# Patient Record
Sex: FEMALE | Race: WHITE | Employment: OTHER | URBAN - METROPOLITAN AREA
[De-identification: names, ages, dates, MRNs, and addresses within clinical notes are randomized per-mention and may not be internally consistent; named-entity substitution may affect disease eponyms.]

---

## 2008-01-30 LAB — COLONOSCOPY, EXTERNAL: NORMAL

## 2017-01-09 ENCOUNTER — OFFICE VISIT (OUTPATIENT)
Dept: FAMILY MEDICINE CLINIC | Age: 67
End: 2017-01-09

## 2017-01-09 VITALS
SYSTOLIC BLOOD PRESSURE: 156 MMHG | DIASTOLIC BLOOD PRESSURE: 80 MMHG | OXYGEN SATURATION: 97 % | BODY MASS INDEX: 33.84 KG/M2 | WEIGHT: 191 LBS | RESPIRATION RATE: 16 BRPM | HEIGHT: 63 IN | TEMPERATURE: 98.1 F | HEART RATE: 83 BPM

## 2017-01-09 DIAGNOSIS — Z13.6 SCREENING FOR CARDIOVASCULAR CONDITION: ICD-10-CM

## 2017-01-09 DIAGNOSIS — Z00.00 ROUTINE GENERAL MEDICAL EXAMINATION AT A HEALTH CARE FACILITY: Primary | ICD-10-CM

## 2017-01-09 DIAGNOSIS — Z23 ENCOUNTER FOR IMMUNIZATION: ICD-10-CM

## 2017-01-09 DIAGNOSIS — E78.00 HYPERCHOLESTEROLEMIA: ICD-10-CM

## 2017-01-09 PROBLEM — Z71.89 ADVANCED CARE PLANNING/COUNSELING DISCUSSION: Status: ACTIVE | Noted: 2017-01-09

## 2017-01-09 RX ORDER — NIFEDIPINE 30 MG/1
30 TABLET, FILM COATED, EXTENDED RELEASE ORAL DAILY
Qty: 30 TAB | Refills: 6 | Status: SHIPPED | OUTPATIENT
Start: 2017-01-09 | End: 2017-05-08 | Stop reason: SDUPTHER

## 2017-01-09 RX ORDER — FLUCONAZOLE 150 MG/1
150 TABLET ORAL DAILY
Qty: 1 TAB | Refills: 1 | Status: SHIPPED | OUTPATIENT
Start: 2017-01-09 | End: 2017-01-10

## 2017-01-09 NOTE — PROGRESS NOTES
This is a Subsequent Medicare Annual Wellness Visit providing Personalized Prevention Plan Services (PPPS) (Performed 12 months after initial AWV and PPPS )    I have reviewed the patient's medical history in detail and updated the computerized patient record. Requests  Are fill on diflucan for suspected vaginal yeast infection. She was treated in November per chart review. State sx have returned. Sx did get better after the diflucan. She requests a refill; She states her insurance will be changing in February. She may have to change endocrinologist ; she will make an appointment to see her current endocrinologist before Feb.  She states she has had some elevated blood sugars. PMH,  Meds, Allergies, Family History, Social history reviewed      History     Past Medical History   Diagnosis Date    Depression     Diabetes ([address]     GERD (gastroesophageal reflux disease)     Hiatal hernia     Hypercholesterolemia     Hypertension     Migraines     Thyroid disease     Urinary tract infection       Past Surgical History   Procedure Laterality Date    Hx partial hysterectomy  11/17/1972    Hx breast biopsy  1993     right breast, benign    Hx mohs procedure Right 09/2001    Hx other surgical  2001     HEAD LACERATION DUE TO A FALL    Hx tubal ligation  11/1971    Hx hysterectomy       Current Outpatient Prescriptions   Medication Sig Dispense Refill    olmesartan-hydroCHLOROthiazide (BENICAR HCT) 40-25 mg per tablet TAKE ONE TABLET BY MOUTH EVERY DAY 90 Tab 3    HYDROcodone-acetaminophen (NORCO) 7.5-325 mg per tablet Take 1-2 Tabs by mouth every four (4) hours as needed for Pain. Max Daily Amount: 12 Tabs. Do not take until after surgery 40 Tab 0    naproxen sodium (NAPROSYN) 220 mg tablet Take 220 mg by mouth two (2) times daily (with meals).       simvastatin (ZOCOR) 40 mg tablet TAKE ONE TABLET BY MOUTH AT NIGHTLY 90 Tab 3    meloxicam (MOBIC) 15 mg tablet Take 1 Tab by mouth daily (with breakfast). 30 Tab 0    omeprazole (PRILOSEC) 10 mg capsule Take 10 mg by mouth daily.  insulin lispro (HUMALOG) 100 unit/mL kwikpen by SubCUTAneous route Before breakfast, lunch, dinner and at bedtime.  meclizine (ANTIVERT) 25 mg tablet Take  by mouth three (3) times daily as needed.  clotrimazole (MYCELEX) 10 mg ernesto Take 10 mg by mouth five (5) times daily.  insulin glargine (LANTUS SOLOSTAR) 100 unit/mL (3 mL) pen INJECT 30 UNITS BENEATH THE SKIN AT BEDTIME (Patient taking differently: INJECT 20 UNITS BENEATH THE SKIN AT BEDTIME AND 30 UNITS IN THE MORNING) 1 Each 0    levothyroxine (SYNTHROID) 175 mcg tablet TAKE ONE TABLET BY MOUTH DAILY BEFORE BREAKFAST 90 Tab 0    PSYLLIUM SEED, WITH DEXTROSE, (FIBER PO) Take  by mouth.  glucose blood VI test strips (FREESTYLE LITE STRIPS) strip Blood sugar check 3-4 times a day due to fluctuating blood sugar 300 Strip 11    Cholecalciferol, Vitamin D3, 5,000 unit Tab Take  by mouth.          Allergies   Allergen Reactions    Gloves, Latex With Aloe Vera Contact Dermatitis     Rash from powder inside latex gloves       Family History   Problem Relation Age of Onset    Diabetes Mother     Hypertension Mother     Cancer Mother     Kidney Disease Mother     Diabetes Father     Heart Disease Father     Hypertension Father     Diabetes Maternal Grandmother     Breast Cancer Maternal Grandmother     Cancer Maternal Grandfather     Breast Cancer Maternal Aunt      Social History   Substance Use Topics    Smoking status: Never Smoker    Smokeless tobacco: Never Used    Alcohol use No     Patient Active Problem List   Diagnosis Code    Hypertension I10    Thyroid disease E07.9    Hiatal hernia K44.9    Hypercholesterolemia E78.00    Depression F32.9    Urinary tract infection N39.0    Migraines G43.909    Lump or mass in breast N63    Family history of malignant neoplasm of breast Z80.3       Depression Risk Factor Screening:     PHQ 2 / 9, over the last two weeks 12/22/2014   Little interest or pleasure in doing things Several days   Feeling down, depressed or hopeless Several days   Total Score PHQ 2 2     Alcohol Risk Factor Screening: On any occasion during the past 3 months, have you had more than 3 drinks containing alcohol? No    Do you average more than 7 drinks per week? No      Functional Ability and Level of Safety:     Hearing Loss   mild    Activities of Daily Living   Self-care. Requires assistance with: no ADLs    Fall Risk     Fall Risk Assessment, last 12 mths 8/30/2016   Able to walk? Yes   Fall in past 12 months? No     Abuse Screen   Patient is not abused    Review of Systems   A comprehensive review of systems was negative except for that written in the HPI.     Physical Examination     Evaluation of Cognitive Function:  Mood/affect:  neutral  Appearance: age appropriate  Family member/caregiver input: none;  Visit Vitals    /90 (BP 1 Location: Left arm, BP Patient Position: Sitting)    Pulse 83    Temp 98.1 °F (36.7 °C) (Oral)    Resp 16    Ht 5' 3\" (1.6 m)    Wt 191 lb (86.6 kg)    LMP 11/17/1972    SpO2 97%    BMI 33.83 kg/m2     General appearance: alert, cooperative, no distress, appears stated age  Neck: supple, symmetrical, trachea midline, no adenopathy, thyroid: not enlarged, symmetric, no tenderness/mass/nodules, no carotid bruit and no JVD  Lungs: clear to auscultation bilaterally  Heart: regular rate and rhythm, S1, S2 normal, no murmur, click, rub or gallop  Extremities: extremities normal, atraumatic, no cyanosis or edema      Patient Care Team:  Gilmer Heaton MD as PCP - General (Family Practice)  Allyson Ayala MD (Gastroenterology)  Michael Arteaga MD (Surgical Oncology)    Advice/Referrals/Counseling   Education and counseling provided:  Are appropriate based on today's review and evaluation  End-of-Life planning (with patient's consent)    Assessment/Plan ICD-10-CM ICD-9-CM    1. Routine general medical examination at a health care facility Z00.00 V70.0    2. Encounter for immunization Z23 V03.89 INFLUENZA VIRUS VACCINE, HIGH DOSE SEASONAL, PRESERVATIVE FREE      ADMIN INFLUENZA VIRUS VAC      PNEUMOCOCCAL CONJ VACCINE 13 VALENT IM   3. Screening for cardiovascular condition Z13.6 V81.2 LIPID PANEL      METABOLIC PANEL, BASIC   4. Hypercholesterolemia- for lab only E78.00 272.0 LIPID PANEL      AST      ALT   . As above, pt is stable  Flu shot/ pneumonia shot today  Labs as ordered  Follow-up Disposition:  Return in about 4 months (around 5/9/2017) for BP/chol. An After Visit Summary was printed and given to the patient. This has been fully explained to the patient, who indicates understanding.

## 2017-01-09 NOTE — PROGRESS NOTES
1. Have you been to the ER, urgent care clinic since your last visit? Hospitalized since your last visit? Yes Where: [facility] emergency room    2. Have you seen or consulted any other health care providers outside of the [address] [place] [address] since your last visit? Include any pap smears or colon screening.  Yes Where: Theodore Cantor MD - endocrinology

## 2017-01-09 NOTE — ACP (ADVANCE CARE PLANNING)
Advance Care Planning (ACP) Provider Conversation Snapshot    Date of ACP Conversation: 01/09/17  Persons included in Conversation:  patient  Length of ACP Conversation in minutes:  < 16 minutes          Conversation Outcomes / Follow-Up Plan:   Recommended completion of Advance Directive form after review of ACP materials and conversation with prospective healthcare agent

## 2017-01-09 NOTE — PATIENT INSTRUCTIONS

## 2017-01-16 ENCOUNTER — HOSPITAL ENCOUNTER (OUTPATIENT)
Dept: LAB | Age: 67
Discharge: HOME | End: 2017-01-16

## 2017-01-16 PROCEDURE — 99001 SPECIMEN HANDLING PT-LAB: CPT | Performed by: FAMILY MEDICINE

## 2017-01-17 LAB
ALT SERPL-CCNC: 12 IU/L (ref 0–32)
AST SERPL-CCNC: 15 IU/L (ref 0–40)
BUN SERPL-MCNC: 22 MG/DL (ref 8–27)
BUN/CREAT SERPL: 18 (ref 11–26)
CALCIUM SERPL-MCNC: 9.2 MG/DL (ref 8.7–10.3)
CHLORIDE SERPL-SCNC: 101 MMOL/L (ref 96–106)
CHOLEST SERPL-MCNC: 242 MG/DL (ref 100–199)
CO2 SERPL-SCNC: 25 MMOL/L (ref 18–29)
CREAT SERPL-MCNC: 1.25 MG/DL (ref 0.57–1)
GLUCOSE SERPL-MCNC: 155 MG/DL (ref 65–99)
HDLC SERPL-MCNC: 38 MG/DL
INTERPRETATION, 910389: NORMAL
INTERPRETATION: NORMAL
LDLC SERPL CALC-MCNC: 158 MG/DL (ref 0–99)
POTASSIUM SERPL-SCNC: 5.3 MMOL/L (ref 3.5–5.2)
SODIUM SERPL-SCNC: 140 MMOL/L (ref 134–144)
TRIGL SERPL-MCNC: 229 MG/DL (ref 0–149)
VLDLC SERPL CALC-MCNC: 46 MG/DL (ref 5–40)

## 2017-01-23 RX ORDER — ATORVASTATIN CALCIUM 80 MG/1
80 TABLET, FILM COATED ORAL DAILY
Qty: 90 TAB | Refills: 3 | Status: SHIPPED | OUTPATIENT
Start: 2017-01-23 | End: 2018-04-16 | Stop reason: SINTOL

## 2017-01-24 ENCOUNTER — TELEPHONE (OUTPATIENT)
Dept: FAMILY MEDICINE CLINIC | Age: 67
End: 2017-01-24

## 2017-01-24 NOTE — TELEPHONE ENCOUNTER
Spoke with patient to inform lab results. Informed that glucose and creatinine were elevated. Informed that potassium was slightly elevated. Informed that total cholesterol, triglyceride, and LDL was elevated. Informed that Dr. Serena Sauer has sent a medication order Lipitor 80 mg to pharmacy and that patient needs to discontinue medication Zocor. Informed to follow a low sugar, low fat, low cholesterol diet and exercise as tolerated. Patient verbalized understanding.

## 2017-01-24 NOTE — TELEPHONE ENCOUNTER
----- Message from Marko Gardner MD sent at 1/23/2017 10:41 AM EST -----  Dc zocor; Lipitor 80 in place of this.  Will review labs with pt per nursing;

## 2017-02-07 ENCOUNTER — HOSPITAL ENCOUNTER (OUTPATIENT)
Dept: MAMMOGRAPHY | Age: 67
Discharge: HOME | End: 2017-02-07
Attending: SURGERY
Payer: MEDICARE

## 2017-02-07 DIAGNOSIS — Z12.31 ENCOUNTER FOR SCREENING MAMMOGRAM FOR MALIGNANT NEOPLASM OF BREAST: ICD-10-CM

## 2017-02-07 PROCEDURE — 77063 BREAST TOMOSYNTHESIS BI: CPT

## 2017-05-08 ENCOUNTER — HOSPITAL ENCOUNTER (OUTPATIENT)
Dept: LAB | Age: 67
Discharge: HOME | End: 2017-05-08
Payer: MEDICARE

## 2017-05-08 ENCOUNTER — HOSPITAL ENCOUNTER (OUTPATIENT)
Dept: GENERAL RADIOLOGY | Age: 67
Discharge: HOME | End: 2017-05-08
Payer: MEDICARE

## 2017-05-08 ENCOUNTER — OFFICE VISIT (OUTPATIENT)
Dept: FAMILY MEDICINE CLINIC | Age: 67
End: 2017-05-08

## 2017-05-08 VITALS
WEIGHT: 193 LBS | TEMPERATURE: 98.1 F | BODY MASS INDEX: 34.2 KG/M2 | DIASTOLIC BLOOD PRESSURE: 78 MMHG | HEART RATE: 97 BPM | OXYGEN SATURATION: 98 % | HEIGHT: 63 IN | RESPIRATION RATE: 16 BRPM | SYSTOLIC BLOOD PRESSURE: 126 MMHG

## 2017-05-08 DIAGNOSIS — E78.00 HYPERCHOLESTEROLEMIA: ICD-10-CM

## 2017-05-08 DIAGNOSIS — M79.672 LEFT FOOT PAIN: ICD-10-CM

## 2017-05-08 DIAGNOSIS — E11.9 DIABETES MELLITUS WITHOUT COMPLICATION (HCC): ICD-10-CM

## 2017-05-08 DIAGNOSIS — Z11.59 SCREENING FOR VIRAL DISEASE: ICD-10-CM

## 2017-05-08 DIAGNOSIS — I10 ESSENTIAL HYPERTENSION: ICD-10-CM

## 2017-05-08 DIAGNOSIS — I10 ESSENTIAL HYPERTENSION: Primary | ICD-10-CM

## 2017-05-08 DIAGNOSIS — R10.13 DYSPEPSIA: ICD-10-CM

## 2017-05-08 PROCEDURE — 73620 X-RAY EXAM OF FOOT: CPT

## 2017-05-08 PROCEDURE — 99001 SPECIMEN HANDLING PT-LAB: CPT | Performed by: FAMILY MEDICINE

## 2017-05-08 RX ORDER — OMEPRAZOLE 40 MG/1
40 CAPSULE, DELAYED RELEASE ORAL DAILY
Qty: 90 CAP | Refills: 2 | Status: SHIPPED | OUTPATIENT
Start: 2017-05-08 | End: 2020-07-24

## 2017-05-08 RX ORDER — OLMESARTAN MEDOXOMIL AND HYDROCHLOROTHIAZIDE 40/25 40; 25 MG/1; MG/1
TABLET ORAL
Qty: 90 TAB | Refills: 3 | Status: SHIPPED | OUTPATIENT
Start: 2017-05-08 | End: 2018-04-16 | Stop reason: SDUPTHER

## 2017-05-08 RX ORDER — INSULIN GLARGINE 100 [IU]/ML
INJECTION, SOLUTION SUBCUTANEOUS
Qty: 10 PEN | Refills: 5 | Status: SHIPPED | OUTPATIENT
Start: 2017-05-08 | End: 2017-08-31 | Stop reason: SDUPTHER

## 2017-05-08 RX ORDER — NIFEDIPINE 30 MG/1
30 TABLET, FILM COATED, EXTENDED RELEASE ORAL DAILY
Qty: 90 TAB | Refills: 3 | Status: SHIPPED | OUTPATIENT
Start: 2017-05-08 | End: 2018-04-16 | Stop reason: SDUPTHER

## 2017-05-08 NOTE — PROGRESS NOTES
1. Have you been to the ER, urgent care clinic since your last visit? Hospitalized since your last visit? No    2. Have you seen or consulted any other health care providers outside of the [address] [place] [address] since your last visit? Include any pap smears or colon screening.  Yes Where: Dr. Sherrell Benavides - endocrinology

## 2017-05-08 NOTE — PATIENT INSTRUCTIONS

## 2017-05-08 NOTE — MR AVS SNAPSHOT
Visit Information Date & Time Provider Department Dept. Phone Encounter #  
 5/8/2017  9:00 AM Shu Villarreal, [address] [id number] Follow-up Instructions Return in about 3 months (around 8/8/2017) for htn/chol/foot abdomen. Upcoming Health Maintenance Date Due  
 GLAUCOMA SCREENING Q2Y 5/23/2017* EYE EXAM RETINAL OR DILATED Q1 5/23/2017* FOOT EXAM Q1 9/19/2017* Hepatitis C Screening 9/21/2017* MICROALBUMIN Q1 9/21/2017* HEMOGLOBIN A1C Q6M 9/22/2017* ZOSTER VACCINE AGE 60> 10/26/2017* DTaP/Tdap/Td series (1 - Tdap) 10/26/2017* INFLUENZA AGE 9 TO ADULT 8/1/2017 MEDICARE YEARLY EXAM 1/10/2018 LIPID PANEL Q1 1/16/2018 BREAST CANCER SCRN MAMMOGRAM 2/7/2019 COLONOSCOPY 2/11/2019 *Topic was postponed. The date shown is not the original due date. Allergies as of 5/8/2017  Review Complete On: 5/8/2017 By: Kala Cole LPN Severity Noted Reaction Type Reactions Darvocet A500 [Propoxyphene N-acetaminophen]  05/08/2017    Nausea and Vomiting Gloves, Latex With Aloe Vera  08/19/2016    Contact Dermatitis Rash from powder inside latex gloves Current Immunizations  Reviewed on 1/9/2017 Name Date Influenza High Dose Vaccine PF 1/9/2017, 11/11/2015 Influenza Vaccine (Quad) PF 10/6/2014 Influenza Vaccine PF 10/31/2013 Pneumococcal Conjugate (PCV-13) 1/9/2017 Pneumococcal Polysaccharide (PPSV-23) 5/14/2015, 10/31/2013 Td, Adsorbed PF 12/22/2014 Not reviewed this visit You Were Diagnosed With   
  
 Codes Comments Essential hypertension    -  Primary ICD-10-CM: I10 
ICD-9-CM: 401.9 Hypercholesterolemia     ICD-10-CM: E78.00 ICD-9-CM: 272.0 Left foot pain     ICD-10-CM: H09.008 ICD-9-CM: 729.5 Dyspepsia     ICD-10-CM: R10.13 ICD-9-CM: 536.8  Screening for viral disease     ICD-10-CM: Z11.59 
ICD-9-CM: V73.99   
 Diabetes mellitus without complication ([facility]     TII-10-UE: E11.9 ICD-9-CM: 250.00 Vitals BP Pulse Temp Resp Height(growth percentile) Weight(growth percentile) 126/78 (BP 1 Location: Left arm, BP Patient Position: Sitting) 97 98.1 °F (36.7 °C) (Oral) 16 5' 3\" (1.6 m) 193 lb (87.5 kg) LMP SpO2 BMI OB Status Smoking Status 11/17/1972 98% 34.19 kg/m2 Hysterectomy Never Smoker BMI and BSA Data Body Mass Index Body Surface Area  
 34.19 kg/m 2 1.97 m 2 Preferred Pharmacy Pharmacy Name Phone [facility] [address] Your Updated Medication List  
  
   
This list is accurate as of: 5/8/17  9:45 AM.  Always use your most recent med list.  
  
  
  
  
 atorvastatin 80 mg tablet Commonly known as:  LIPITOR Take 1 Tab by mouth daily. cholecalciferol (VITAMIN D3) 5,000 unit Tab tablet Commonly known as:  VITAMIN D3 Take  by mouth. FIBER PO Take  by mouth. glucose blood VI test strips strip Commonly known as:  FREESTYLE LITE STRIPS Blood sugar check 3-4 times a day due to fluctuating blood sugar  
  
 insulin glargine 100 unit/mL (3 mL) pen Commonly known as:  LANTUS SOLOSTAR INJECT 40 units of  Subcu in am and then 30 UNITS subcu AT BEDTIME  
  
 levothyroxine 175 mcg tablet Commonly known as:  SYNTHROID  
TAKE ONE TABLET BY MOUTH DAILY BEFORE BREAKFAST  
  
 meclizine 25 mg tablet Commonly known as:  ANTIVERT Take  by mouth three (3) times daily as needed. meloxicam 15 mg tablet Commonly known as:  MOBIC Take 1 Tab by mouth daily (with breakfast). NIFEdipine ER 30 mg ER tablet Commonly known as:  ADALAT CC Take 1 Tab by mouth daily. NOVOLOG PENFILL SC  
by SubCUTAneous route. Sliding Scale as per patient. olmesartan-hydroCHLOROthiazide 40-25 mg per tablet Commonly known as:  BENICAR HCT  
TAKE ONE TABLET BY MOUTH EVERY DAY  
  
 omeprazole 40 mg capsule Commonly known as:  PRILOSEC Take 1 Cap by mouth daily. Prescriptions Sent to Pharmacy Refills NIFEdipine ER (ADALAT CC) 30 mg ER tablet 3 Sig: Take 1 Tab by mouth daily. Class: Normal  
 Pharmacy: [address], [address] [place] [address] Ph #: [phone] Route: Oral  
 olmesartan-hydroCHLOROthiazide (BENICAR HCT) 40-25 mg per tablet 3 Sig: TAKE ONE TABLET BY MOUTH EVERY DAY Class: Normal  
 Pharmacy: [address] Ph #: [phone]  
 insulin glargine (LANTUS SOLOSTAR) 100 unit/mL (3 mL) pen 5 Sig: INJECT 40 units of  Subcu in am and then 30 UNITS subcu AT BEDTIME Class: Normal  
 Pharmacy: [address] Ph #: [phone]  
 glucose blood VI test strips (FREESTYLE LITE STRIPS) strip 11 Sig: Blood sugar check 3-4 times a day due to fluctuating blood sugar Class: Normal  
 Pharmacy: [address] Ph #: [phone]  
 omeprazole (PRILOSEC) 40 mg capsule 2 Sig: Take 1 Cap by mouth daily. Class: Normal  
 Pharmacy: [address], [address] [place] [address] Ph #: [phone] Route: Oral  
  
We Performed the Following REFERRAL TO ENDOCRINOLOGY [NNZ68 Custom] Comments:  
 Please evaluate patient for  Diabetes mellitus- [facility] at [address] per pt request  
  
Follow-up Instructions Return in about 3 months (around 8/8/2017) for htn/chol/foot abdomen. To-Do List   
 05/08/2017 Lab:  ALT   
  
 05/08/2017 Lab:  AST   
  
 05/08/2017 Lab:  HEPATITIS C AB   
  
 05/08/2017 Lab:  LIPID PANEL   
  
 05/08/2017 Lab:  METABOLIC PANEL, BASIC   
  
 05/08/2017 Imaging:  XR FOOT LT AP/LAT Referral Information Referral ID Referred By Referred To [id number] [facility] Not Available Visits Status Start Date End Date 1 New Request 5/8/17 5/8/18 If your referral has a status of pending review or denied, additional information will be sent to support the outcome of this decision. Patient Instructions Abdominal Pain: Care Instructions Your Care Instructions Abdominal pain has many possible causes. Some aren't serious and get better on their own in a few days. Others need more testing and treatment. If your pain continues or gets worse, you need to be rechecked and may need more tests to find out what is wrong. You may need surgery to correct the problem. Don't ignore new symptoms, such as fever, nausea and vomiting, urination problems, pain that gets worse, and dizziness. These may be signs of a more serious problem. Your doctor may have recommended a follow-up visit in the next 8 to 12 hours. If you are not getting better, you may need more tests or treatment. The doctor has checked you carefully, but problems can develop later. If you notice any problems or new symptoms, get medical treatment right away. Follow-up care is a key part of your treatment and safety. Be sure to make and go to all appointments, and call your doctor if you are having problems. It's also a good idea to know your test results and keep a list of the medicines you take. How can you care for yourself at home? · Rest until you feel better. · To prevent dehydration, drink plenty of fluids, enough so that your urine is light yellow or clear like water. Choose water and other caffeine-free clear liquids until you feel better. If you have kidney, heart, or liver disease and have to limit fluids, talk with your doctor before you increase the amount of fluids you drink. · If your stomach is upset, eat mild foods, such as rice, dry toast or crackers, bananas, and applesauce. Try eating several small meals instead of two or three large ones. · Wait until 48 hours after all symptoms have gone away before you have spicy foods, alcohol, and drinks that contain caffeine. · Do not eat foods that are high in fat. · Avoid anti-inflammatory medicines such as aspirin, ibuprofen (Advil, Motrin), and naproxen (Aleve). These can cause stomach upset. Talk to your doctor if you take daily aspirin for another health problem. When should you call for help? Call 911 anytime you think you may need emergency care. For example, call if: 
· You passed out (lost consciousness). · You pass maroon or very bloody stools. · You vomit blood or what looks like coffee grounds. · You have new, severe belly pain. Call your doctor now or seek immediate medical care if: 
· Your pain gets worse, especially if it becomes focused in one area of your belly. · You have a new or higher fever. · Your stools are black and look like tar, or they have streaks of blood. · You have unexpected vaginal bleeding. · You have symptoms of a urinary tract infection. These may include: 
¨ Pain when you urinate. ¨ Urinating more often than usual. 
¨ Blood in your urine. · You are dizzy or lightheaded, or you feel like you may faint. Watch closely for changes in your health, and be sure to contact your doctor if: 
· You are not getting better after 1 day (24 hours). Where can you learn more? Go to http://chasidy-ena.info/. Enter F312 in the search box to learn more about \"Abdominal Pain: Care Instructions. \" Current as of: May 27, 2016 Content Version: 11.2 © 4317-4086 [employer]. Care instructions adapted under license by [employer] (which disclaims liability or warranty for this information). If you have questions about a medical condition or this instruction, always ask your healthcare professional. [address] any warranty or liability for your use of this information. Introducing [facility]! Dear Sherice Sees: Thank you for requesting a [employer] account. Our records indicate that you already have an active [employer] account. You can access your account anytime at [email or web address] [employer]/Health Informatics Did you know that you can access your hospital and ER discharge instructions at any time in [employer]? You can also review all of your test results from your hospital stay or ER visit. Additional Information If you have questions, please visit the Frequently Asked Questions section of the [employer] website at [email or web address]/Health [email or web address] Remember, [employer] is NOT to be used for urgent needs. For medical emergencies, dial 911. Now available from your iPhone and Android! Please provide this summary of care documentation to your next provider. Your primary care clinician is listed as [address]. If you have any questions after today's visit, please call [phone].

## 2017-05-08 NOTE — PROGRESS NOTES
HISTORY OF PRESENT ILLNESS  Maxwell Ruiz is a 78 y.o. female. HPI   Patient is here today for evaluation and treatment of: Foot Pain/Abdominal Pain/Hypertension/Cholesterol problem    Foot Pain: she has had a bump on her foot; has been present since last Fall. Area hurts if she puts pressure on it for extended period of time. She is prescribed mobic. Pt states she has had fecal incontinence at times; Has had some pain in her left upper quadrant; She may have sx daily; She also may have constipation at times; She tries to eat a lower residue diet. No particular foods aggravate sx. She has a lot of flatus. Due for colonoscopy in 2019    Hypertension: BP is controlled today; She is compliant with med regimen. She is on meds as listed below    Cholesterol: she continues on lipitor; She attempts a lower cholesterol diet. Pt will be changing endocrinologist as her previous MD is no longer in her network; She needs a referral to endocrinology at [address] [place] [address]; This is closer to her home. Current Outpatient Prescriptions:     INSULIN ASPART (NOVOLOG PENFILL SC), by SubCUTAneous route. Sliding Scale as per patient., Disp: , Rfl:     NIFEdipine ER (ADALAT CC) 30 mg ER tablet, Take 1 Tab by mouth daily. , Disp: 90 Tab, Rfl: 3    olmesartan-hydroCHLOROthiazide (BENICAR HCT) 40-25 mg per tablet, TAKE ONE TABLET BY MOUTH EVERY DAY, Disp: 90 Tab, Rfl: 3    insulin glargine (LANTUS SOLOSTAR) 100 unit/mL (3 mL) pen, INJECT 40 units of  Subcu in am and then 30 UNITS subcu AT BEDTIME, Disp: 10 Pen, Rfl: 5    glucose blood VI test strips (FREESTYLE LITE STRIPS) strip, Blood sugar check 3-4 times a day due to fluctuating blood sugar, Disp: 300 Strip, Rfl: 11    omeprazole (PRILOSEC) 40 mg capsule, Take 1 Cap by mouth daily. , Disp: 90 Cap, Rfl: 2    atorvastatin (LIPITOR) 80 mg tablet, Take 1 Tab by mouth daily. , Disp: 90 Tab, Rfl: 3    meloxicam (MOBIC) 15 mg tablet, Take 1 Tab by mouth daily (with breakfast). , Disp: 30 Tab, Rfl: 0    meclizine (ANTIVERT) 25 mg tablet, Take  by mouth three (3) times daily as needed. , Disp: , Rfl:     levothyroxine (SYNTHROID) 175 mcg tablet, TAKE ONE TABLET BY MOUTH DAILY BEFORE BREAKFAST, Disp: 90 Tab, Rfl: 0    PSYLLIUM SEED, WITH DEXTROSE, (FIBER PO), Take  by mouth., Disp: , Rfl:     Cholecalciferol, Vitamin D3, 5,000 unit Tab, Take  by mouth.  , Disp: , Rfl:     PMH,  Meds, Allergies, Family History, Social history reviewed    Review of Systems   Constitutional: Negative for chills and fever. Cardiovascular: Negative for chest pain and palpitations. Physical Exam   Constitutional: She is oriented to person, place, and time. She appears well-developed and well-nourished. No distress. Neck: Neck supple. No thyromegaly present. Cardiovascular: Normal rate and regular rhythm. Exam reveals no gallop and no friction rub. No murmur heard. Pulmonary/Chest: Breath sounds normal. No respiratory distress. She has no wheezes. She has no rales. She exhibits no tenderness. Abdominal: Bowel sounds are normal. She exhibits no distension and no mass. There is tenderness (in Left upper quadrant; less so in RUQ. none in LLQ and RLQ). There is no guarding. Musculoskeletal: She exhibits tenderness (at left proximal metarsal joint; no edema). She exhibits no edema. Neurological: She is alert and oriented to person, place, and time. Nursing note and vitals reviewed.      Visit Vitals    /78 (BP 1 Location: Left arm, BP Patient Position: Sitting)    Pulse 97    Temp 98.1 °F (36.7 °C) (Oral)    Resp 16    Ht 5' 3\" (1.6 m)    Wt 193 lb (87.5 kg)    LMP 11/17/1972    SpO2 98%    BMI 34.19 kg/m2     Lab Results   Component Value Date/Time    Cholesterol, total 242 01/16/2017 10:19 AM    HDL Cholesterol 38 01/16/2017 10:19 AM    LDL, calculated 158 01/16/2017 10:19 AM    VLDL, calculated 46 01/16/2017 10:19 AM    Triglyceride 229 01/16/2017 10:19 AM Lab Results   Component Value Date/Time    Sodium 140 01/16/2017 10:19 AM    Potassium 5.3 01/16/2017 10:19 AM    Chloride 101 01/16/2017 10:19 AM    CO2 25 01/16/2017 10:19 AM    Anion gap 7 08/05/2016 09:50 AM    Glucose 155 01/16/2017 10:19 AM    BUN 22 01/16/2017 10:19 AM    Creatinine 1.25 01/16/2017 10:19 AM    BUN/Creatinine ratio 18 01/16/2017 10:19 AM    GFR est AA 51 01/16/2017 10:19 AM    GFR est non-AA 45 01/16/2017 10:19 AM    Calcium 9.2 01/16/2017 10:19 AM         ASSESSMENT and PLAN    ICD-10-CM ICD-9-CM    1. Essential hypertension Z59 290.9 METABOLIC PANEL, BASIC   2. Hypercholesterolemia E78.00 272.0 LIPID PANEL      AST      ALT   3. Left foot pain- new M79.672 729.5 XR FOOT LT AP/LAT   4. Dyspepsia- new R10.13 536.8 omeprazole (PRILOSEC) 40 mg capsule   5. Screening for viral disease Z11.59 V73.99 HEPATITIS C AB   6. Diabetes mellitus without complication ([facility] [address]- for needed referral only E11.9 250.00 REFERRAL TO ENDOCRINOLOGY       As above,   above all stable unless otherwise noted   treatment plan as listed below  Orders Placed This Encounter    XR FOOT LT AP/LAT    LIPID PANEL    METABOLIC PANEL, BASIC    AST    ALT    HEPATITIS C AB    REFERRAL TO ENDOCRINOLOGY    INSULIN ASPART (NOVOLOG PENFILL SC)    NIFEdipine ER (ADALAT CC) 30 mg ER tablet    olmesartan-hydroCHLOROthiazide (BENICAR HCT) 40-25 mg per tablet    insulin glargine (LANTUS SOLOSTAR) 100 unit/mL (3 mL) pen    glucose blood VI test strips (FREESTYLE LITE STRIPS) strip    omeprazole (PRILOSEC) 40 mg capsule     Increase prilosec to 40 mg daily, consider immodium for bowels if p will be out for extended times. ?  Element of gastroparesis  Xray left foot; consider orthotic to keep pressure off joint  Refilled meds needed  Referral for new endocrinologist per pt request  Labs as ordered  Follow up with consultants as recommended  Follow-up Disposition:  Return in about 3 months (around 8/8/2017) for htn/chol/foot abdomen. An After Visit Summary was printed and given to the patient. This has been fully explained to the patient, who indicates understanding.

## 2017-05-09 LAB
ALT SERPL-CCNC: 11 IU/L (ref 0–32)
AST SERPL-CCNC: 11 IU/L (ref 0–40)
BUN SERPL-MCNC: 19 MG/DL (ref 8–27)
BUN/CREAT SERPL: 16 (ref 12–28)
CALCIUM SERPL-MCNC: 8.9 MG/DL (ref 8.7–10.3)
CHLORIDE SERPL-SCNC: 101 MMOL/L (ref 96–106)
CHOLEST SERPL-MCNC: 270 MG/DL (ref 100–199)
CO2 SERPL-SCNC: 25 MMOL/L (ref 18–29)
CREAT SERPL-MCNC: 1.17 MG/DL (ref 0.57–1)
GLUCOSE SERPL-MCNC: 304 MG/DL (ref 65–99)
HCV AB S/CO SERPL IA: <0.1 S/CO RATIO (ref 0–0.9)
HDLC SERPL-MCNC: 41 MG/DL
INTERPRETATION, 910389: NORMAL
INTERPRETATION: NORMAL
LDLC SERPL CALC-MCNC: 186 MG/DL (ref 0–99)
PDF IMAGE, 910387: NORMAL
POTASSIUM SERPL-SCNC: 4.2 MMOL/L (ref 3.5–5.2)
SODIUM SERPL-SCNC: 142 MMOL/L (ref 134–144)
TRIGL SERPL-MCNC: 216 MG/DL (ref 0–149)
VLDLC SERPL CALC-MCNC: 43 MG/DL (ref 5–40)

## 2017-06-30 ENCOUNTER — OFFICE VISIT (OUTPATIENT)
Dept: FAMILY MEDICINE CLINIC | Age: 67
End: 2017-06-30

## 2017-06-30 VITALS
TEMPERATURE: 97.6 F | OXYGEN SATURATION: 98 % | HEART RATE: 81 BPM | SYSTOLIC BLOOD PRESSURE: 122 MMHG | BODY MASS INDEX: 34.55 KG/M2 | WEIGHT: 195 LBS | HEIGHT: 63 IN | RESPIRATION RATE: 16 BRPM | DIASTOLIC BLOOD PRESSURE: 60 MMHG

## 2017-06-30 DIAGNOSIS — Z01.818 PREOP EXAMINATION: Primary | ICD-10-CM

## 2017-06-30 DIAGNOSIS — H25.9 AGE-RELATED CATARACT OF BOTH EYES, UNSPECIFIED AGE-RELATED CATARACT TYPE: ICD-10-CM

## 2017-06-30 RX ORDER — BORIC ACID
POWDER (GRAM) MISCELLANEOUS
COMMUNITY
Start: 2017-05-16

## 2017-06-30 RX ORDER — MULTIVITAMIN
TABLET,CHEWABLE ORAL
COMMUNITY
Start: 2017-05-16

## 2017-06-30 RX ORDER — CIPROFLOXACIN HYDROCHLORIDE 3.5 MG/ML
SOLUTION/ DROPS TOPICAL
COMMUNITY
Start: 2017-06-29 | End: 2017-08-31 | Stop reason: ALTCHOICE

## 2017-06-30 RX ORDER — DUREZOL 0.5 MG/ML
EMULSION OPHTHALMIC
COMMUNITY
Start: 2017-06-29 | End: 2017-08-31 | Stop reason: ALTCHOICE

## 2017-06-30 NOTE — PROGRESS NOTES
1. Have you been to the ER, urgent care clinic since your last visit? Hospitalized since your last visit? No    2. Have you seen or consulted any other health care providers outside of the [facility] since your last visit? Include any pap smears or colon screening.  Yes endocrnology, eye

## 2017-06-30 NOTE — PROGRESS NOTES
Preoperative Evaluation    Date of Exam: 2017    Kiran Giron is a 78 y.o. female (:1950) who presents for preoperative evaluation. Procedure/Surgery: cataract removal w/IOC right eye then left eye. Date of Procedure/Surgery: 17 right eye,   17 left eye  Surgeon: Malissa Herrera MD  Hospital/Surgical Facility: [address] [place] [address]  Primary Physician: Mayra Nieves MD  Latex Allergy: yes    Problem List:     Patient Active Problem List    Diagnosis Date Noted    Uncontrolled type 2 diabetes mellitus with complication, with long-term current use of insulin ([facility] [address] 2017    Advanced care planning/counseling discussion 2017    Family history of malignant neoplasm of breast 2016    Microalbuminuria 2016    Lump or mass in breast 2016    Hypertension     Thyroid disease     Hiatal hernia     Hypercholesterolemia     Depression     Migraines      Social History:     Social History     Social History    Marital status:      Spouse name: N/A    Number of children: N/A    Years of education: N/A     Occupational History    retired      [employer]     Social History Main Topics    Smoking status: Never Smoker    Smokeless tobacco: Never Used    Alcohol use No    Drug use: No    Sexual activity: No     Other Topics Concern    Caffeine Concern Yes     4 cups a day    Exercise No    Seat Belt Yes     Social History Narrative       Recent use of: No recent use of aspirin (ASA), NSAIDS or steroids    Tetanus up to date: 2014      Anesthesia Complications: \"None\"  History of abnormal bleeding : \"None\"  History of Blood Transfusions: \"no\"  Health Care Directive or Living Will: Yes    REVIEW OF SYSTEMS:  A comprehensive review of systems was negative.      EXAM:   Visit Vitals    /60 (BP 1 Location: Left arm, BP Patient Position: Sitting)    Pulse 81    Temp 97.6 °F (36.4 °C) (Oral)    Resp 16    Ht 5' 3\" (1.6 m)    Wt 195 lb (88.5 kg)    LMP 11/17/1972    SpO2 98%    BMI 34.54 kg/m2     General appearance - oriented to person, place, and time and overweight  Mental status - alert, oriented to person, place, and time, normal mood, behavior, speech, dress, motor activity, and thought processes  Eyes - pupils equal and reactive, extraocular eye movements intact  Ears - bilateral TM's and external ear canals normal  Nose - normal and patent, no erythema, discharge or polyps  Mouth - mucous membranes moist, pharynx normal without lesions  Neck - supple, no significant adenopathy  Lymphatics - no palpable lymphadenopathy, no hepatosplenomegaly  Chest - clear to auscultation, no wheezes, rales or rhonchi, symmetric air entry  Heart - normal rate, regular rhythm, normal S1, S2, no murmurs, rubs, clicks or gallops  Abdomen - soft, nontender, nondistended, no masses or organomegaly        IMPRESSION:   None Pt cleared for surgery. Diabetes mellitus  No contraindications to planned surgery  Pt advised no ASA or NSAIDS one week prior to procedure.     Rachele Muro NP   6/30/2017

## 2017-08-31 ENCOUNTER — HOSPITAL ENCOUNTER (OUTPATIENT)
Dept: LAB | Age: 67
Discharge: HOME | End: 2017-08-31
Payer: MEDICARE

## 2017-08-31 ENCOUNTER — OFFICE VISIT (OUTPATIENT)
Dept: FAMILY MEDICINE CLINIC | Age: 67
End: 2017-08-31

## 2017-08-31 VITALS
WEIGHT: 192 LBS | RESPIRATION RATE: 16 BRPM | BODY MASS INDEX: 34.02 KG/M2 | SYSTOLIC BLOOD PRESSURE: 118 MMHG | HEART RATE: 81 BPM | TEMPERATURE: 97.8 F | OXYGEN SATURATION: 97 % | DIASTOLIC BLOOD PRESSURE: 72 MMHG | HEIGHT: 63 IN

## 2017-08-31 DIAGNOSIS — R10.9 ABDOMINAL CRAMPING: ICD-10-CM

## 2017-08-31 DIAGNOSIS — M79.672 LEFT FOOT PAIN: ICD-10-CM

## 2017-08-31 DIAGNOSIS — K59.1 FUNCTIONAL DIARRHEA: Primary | ICD-10-CM

## 2017-08-31 LAB
ALBUMIN SERPL-MCNC: 3.8 G/DL (ref 3.4–5)
ALBUMIN/GLOB SERPL: 1.1 {RATIO} (ref 0.8–1.7)
ALP SERPL-CCNC: 144 U/L (ref 45–117)
ALT SERPL-CCNC: 21 U/L (ref 13–56)
AMYLASE SERPL-CCNC: 34 U/L (ref 25–115)
ANION GAP SERPL CALC-SCNC: 7 MMOL/L (ref 3–18)
AST SERPL-CCNC: 14 U/L (ref 15–37)
BASOPHILS # BLD: 0.1 K/UL (ref 0–0.06)
BASOPHILS NFR BLD: 1 % (ref 0–2)
BILIRUB SERPL-MCNC: 0.3 MG/DL (ref 0.2–1)
BILIRUB UR QL STRIP: NEGATIVE
BUN SERPL-MCNC: 21 MG/DL (ref 7–18)
BUN/CREAT SERPL: 15 (ref 12–20)
CALCIUM SERPL-MCNC: 9.4 MG/DL (ref 8.5–10.1)
CHLORIDE SERPL-SCNC: 104 MMOL/L (ref 100–108)
CO2 SERPL-SCNC: 27 MMOL/L (ref 21–32)
CREAT SERPL-MCNC: 1.36 MG/DL (ref 0.6–1.3)
DIFFERENTIAL METHOD BLD: ABNORMAL
EOSINOPHIL # BLD: 0.1 K/UL (ref 0–0.4)
EOSINOPHIL NFR BLD: 1 % (ref 0–5)
ERYTHROCYTE [DISTWIDTH] IN BLOOD BY AUTOMATED COUNT: 12.6 % (ref 11.6–14.5)
GLOBULIN SER CALC-MCNC: 3.5 G/DL (ref 2–4)
GLUCOSE SERPL-MCNC: 355 MG/DL (ref 74–99)
GLUCOSE UR-MCNC: NORMAL MG/DL
HCT VFR BLD AUTO: 44.6 % (ref 35–45)
HGB BLD-MCNC: 14.1 G/DL (ref 12–16)
KETONES P FAST UR STRIP-MCNC: NORMAL MG/DL
LIPASE SERPL-CCNC: 259 U/L (ref 73–393)
LYMPHOCYTES # BLD: 2.3 K/UL (ref 0.9–3.6)
LYMPHOCYTES NFR BLD: 25 % (ref 21–52)
MCH RBC QN AUTO: 28.4 PG (ref 24–34)
MCHC RBC AUTO-ENTMCNC: 31.6 G/DL (ref 31–37)
MCV RBC AUTO: 89.7 FL (ref 74–97)
MONOCYTES # BLD: 0.5 K/UL (ref 0.05–1.2)
MONOCYTES NFR BLD: 5 % (ref 3–10)
NEUTS SEG # BLD: 6.2 K/UL (ref 1.8–8)
NEUTS SEG NFR BLD: 68 % (ref 40–73)
PH UR STRIP: 5.5 [PH] (ref 4.6–8)
PLATELET # BLD AUTO: 356 K/UL (ref 135–420)
PMV BLD AUTO: 11.9 FL (ref 9.2–11.8)
POTASSIUM SERPL-SCNC: 4.6 MMOL/L (ref 3.5–5.5)
PROT SERPL-MCNC: 7.3 G/DL (ref 6.4–8.2)
PROT UR QL STRIP: NORMAL MG/DL
RBC # BLD AUTO: 4.97 M/UL (ref 4.2–5.3)
SODIUM SERPL-SCNC: 138 MMOL/L (ref 136–145)
SP GR UR STRIP: 1.02 (ref 1–1.03)
UA UROBILINOGEN AMB POC: NORMAL (ref 0.2–1)
URINALYSIS CLARITY POC: CLEAR
URINALYSIS COLOR POC: YELLOW
URINE BLOOD POC: NEGATIVE
URINE LEUKOCYTES POC: NEGATIVE
URINE NITRITES POC: NEGATIVE
WBC # BLD AUTO: 9.2 K/UL (ref 4.6–13.2)

## 2017-08-31 PROCEDURE — 85025 COMPLETE CBC W/AUTO DIFF WBC: CPT | Performed by: FAMILY MEDICINE

## 2017-08-31 PROCEDURE — 36415 COLL VENOUS BLD VENIPUNCTURE: CPT | Performed by: FAMILY MEDICINE

## 2017-08-31 PROCEDURE — 83690 ASSAY OF LIPASE: CPT | Performed by: FAMILY MEDICINE

## 2017-08-31 PROCEDURE — 80053 COMPREHEN METABOLIC PANEL: CPT | Performed by: FAMILY MEDICINE

## 2017-08-31 PROCEDURE — 82150 ASSAY OF AMYLASE: CPT | Performed by: FAMILY MEDICINE

## 2017-08-31 RX ORDER — INSULIN GLARGINE 100 [IU]/ML
INJECTION, SOLUTION SUBCUTANEOUS
Qty: 10 PEN | Refills: 5
Start: 2017-08-31 | End: 2018-04-16 | Stop reason: SDUPTHER

## 2017-08-31 RX ORDER — DIPHENOXYLATE HYDROCHLORIDE AND ATROPINE SULFATE 2.5; .025 MG/1; MG/1
1 TABLET ORAL
Qty: 30 TAB | Refills: 1 | Status: SHIPPED | OUTPATIENT
Start: 2017-08-31 | End: 2018-07-09 | Stop reason: ALTCHOICE

## 2017-08-31 RX ORDER — LEVOTHYROXINE SODIUM 150 UG/1
TABLET ORAL
COMMUNITY
End: 2018-04-16 | Stop reason: SDUPTHER

## 2017-08-31 NOTE — MR AVS SNAPSHOT
Visit Information Date & Time Provider Department Dept. Phone Encounter #  
 8/31/2017 11:00 AM Watson Buck [id number] Follow-up Instructions Return in about 8 weeks (around 10/26/2017) for abdominal pain foot pain; diarrhea. Upcoming Health Maintenance Date Due  
 EYE EXAM RETINAL OR DILATED Q1 2/8/2014 GLAUCOMA SCREENING Q2Y 2/8/2015 INFLUENZA AGE 9 TO ADULT 8/1/2017 FOOT EXAM Q1 9/19/2017* MICROALBUMIN Q1 9/21/2017* HEMOGLOBIN A1C Q6M 9/22/2017* ZOSTER VACCINE AGE 60> 10/26/2017* DTaP/Tdap/Td series (1 - Tdap) 10/26/2017* MEDICARE YEARLY EXAM 1/10/2018 LIPID PANEL Q1 5/8/2018 BREAST CANCER SCRN MAMMOGRAM 2/7/2019 COLONOSCOPY 2/11/2019 *Topic was postponed. The date shown is not the original due date. Allergies as of 8/31/2017  Review Complete On: 8/31/2017 By: Radha Ayala LPN Severity Noted Reaction Type Reactions Darvocet A500 [Propoxyphene N-acetaminophen]  05/08/2017    Nausea and Vomiting Gloves, Latex With Aloe Vera  08/19/2016    Contact Dermatitis Rash from powder inside latex gloves Current Immunizations  Reviewed on 1/9/2017 Name Date Influenza High Dose Vaccine PF 1/9/2017, 11/11/2015 Influenza Vaccine (Quad) PF 10/6/2014 Influenza Vaccine PF 10/31/2013 Pneumococcal Conjugate (PCV-13) 1/9/2017 Pneumococcal Polysaccharide (PPSV-23) 5/14/2015, 10/31/2013 Td, Adsorbed PF 12/22/2014 Not reviewed this visit You Were Diagnosed With   
  
 Codes Comments Functional diarrhea    -  Primary ICD-10-CM: K59.1 ICD-9-CM: 564.5 Left foot pain     ICD-10-CM: A77.199 ICD-9-CM: 729.5 Abdominal cramping     ICD-10-CM: R10.9 ICD-9-CM: 789.00 Vitals BP Pulse Temp Resp Height(growth percentile) Weight(growth percentile)  118/72 (BP 1 Location: Left arm, BP Patient Position: Sitting) 81 97.8 °F (36.6 °C) (Oral) 16 5' 3\" (1.6 m) 192 lb (87.1 kg) LMP SpO2 BMI OB Status Smoking Status 11/17/1972 97% 34.01 kg/m2 Hysterectomy Never Smoker BMI and BSA Data Body Mass Index Body Surface Area 34.01 kg/m 2 1.97 m 2 Preferred Pharmacy Pharmacy Name Phone [facility] [address] [place], [address] Your Updated Medication List  
  
   
This list is accurate as of: 8/31/17 12:13 PM.  Always use your most recent med list. ALCOHOL PREP PADS Padm Generic drug:  alcohol swabs  
  
 atorvastatin 80 mg tablet Commonly known as:  LIPITOR Take 1 Tab by mouth daily. BLADDER CONTROL PADS Pads Generic drug:  Incontinence Pad, Liner, Disp  
  
 cholecalciferol (VITAMIN D3) 5,000 unit Tab tablet Commonly known as:  VITAMIN D3 Take  by mouth. diphenoxylate-atropine 2.5-0.025 mg per tablet Commonly known as:  LOMOTIL Take 1 Tab by mouth four (4) times daily as needed for Diarrhea. Max Daily Amount: 4 Tabs. FIBER PO Take  by mouth. glucose blood VI test strips strip Commonly known as:  FREESTYLE LITE STRIPS Blood sugar check 3-4 times a day due to fluctuating blood sugar  
  
 insulin glargine 100 unit/mL (3 mL) Inpn Commonly known as:  LANTUS SOLOSTAR INJECT 30 units of  Subcu in am and then 30 UNITS subcu AT BEDTIME  
  
 meclizine 25 mg tablet Commonly known as:  ANTIVERT Take  by mouth three (3) times daily as needed. meloxicam 15 mg tablet Commonly known as:  MOBIC Take 1 Tab by mouth daily (with breakfast). NIFEdipine ER 30 mg ER tablet Commonly known as:  ADALAT CC Take 1 Tab by mouth daily. NOVOLOG PENFILL SC  
by SubCUTAneous route. Sliding Scale as per patient. olmesartan-hydroCHLOROthiazide 40-25 mg per tablet Commonly known as:  BENICAR HCT  
TAKE ONE TABLET BY MOUTH EVERY DAY  
  
 omeprazole 40 mg capsule Commonly known as:  PRILOSEC Take 1 Cap by mouth daily. SYNTHROID 150 mcg tablet Generic drug:  levothyroxine Take  by mouth Daily (before breakfast). Prescriptions Printed Refills diphenoxylate-atropine (LOMOTIL) 2.5-0.025 mg per tablet 1 Sig: Take 1 Tab by mouth four (4) times daily as needed for Diarrhea. Max Daily Amount: 4 Tabs. Class: Print Route: Oral  
  
We Performed the Following AMB POC URINALYSIS DIP STICK AUTO W/ MICRO [17183 CPT(R)] Follow-up Instructions Return in about 8 weeks (around 10/26/2017) for abdominal pain foot pain; diarrhea. To-Do List   
 08/31/2017 Lab:  AMYLASE   
  
 08/31/2017 Lab:  CBC WITH AUTOMATED DIFF   
  
 08/31/2017 Lab:  LIPASE   
  
 08/31/2017 Lab:  METABOLIC PANEL, COMPREHENSIVE Patient Instructions Foot Pain: Care Instructions Your Care Instructions Foot injuries that cause pain and swelling are fairly common. Almost all sports or home repair projects can cause a misstep that ends up as foot pain. Normal wear and tear, especially as you get older, also can cause foot pain. Most minor foot injuries will heal on their own, and home treatment is usually all you need to do. If you have a severe injury, you may need tests and treatment. Follow-up care is a key part of your treatment and safety. Be sure to make and go to all appointments, and call your doctor if you are having problems. Its also a good idea to know your test results and keep a list of the medicines you take. How can you care for yourself at home? · Take pain medicines exactly as directed. ¨ If the doctor gave you a prescription medicine for pain, take it as prescribed. ¨ If you are not taking a prescription pain medicine, ask your doctor if you can take an over-the-counter medicine. · Rest and protect your foot. Take a break from any activity that may cause pain. · Put ice or a cold pack on your foot for 10 to 20 minutes at a time. Put a thin cloth between the ice and your skin. · Prop up the sore foot on a pillow when you ice it or anytime you sit or lie down during the next 3 days. Try to keep it above the level of your heart. This will help reduce swelling. · Your doctor may recommend that you wrap your foot with an elastic bandage. Keep your foot wrapped for as long as your doctor advises. · If your doctor recommends crutches, use them as directed. · Wear roomy footwear. · As soon as pain and swelling end, begin gentle exercises of your foot. Your doctor can tell you which exercises will help. When should you call for help? Call 911 anytime you think you may need emergency care. For example, call if: 
· Your foot turns pale, white, blue, or cold. Call your doctor now or seek immediate medical care if: 
· You cannot move or stand on your foot. · Your foot looks twisted or out of its normal position. · Your foot is not stable when you step down. · You have signs of infection, such as: 
¨ Increased pain, swelling, warmth, or redness. ¨ Red streaks leading from the sore area. ¨ Pus draining from a place on your foot. ¨ A fever. · Your foot is numb or tingly. Watch closely for changes in your health, and be sure to contact your doctor if: 
· You do not get better as expected. · You have bruises from an injury that last longer than 2 weeks. Where can you learn more? Go to http://chasidy-ena.info/. Enter V343 in the search box to learn more about \"Foot Pain: Care Instructions. \" Current as of: March 21, 2017 Content Version: 11.3 © 4547-0926 [employer]. Care instructions adapted under license by [employer] (which disclaims liability or warranty for this information).  If you have questions about a medical condition or this instruction, always ask your healthcare professional. [employer] disclaims any warranty or liability for your use of this information. Introducing \[facility] [place] [facility]\"" & HEALTH SERVICES! Dear Jose Juan oMntelongo: Thank you for requesting a [employer] account. Our records indicate that you already have an active [employer] account. You can access your account anytime at [email or web address] Did you know that you can access your hospital and ER discharge instructions at any time in [employer]? You can also review all of your test results from your hospital stay or ER visit. Additional Information If you have questions, please visit the Frequently Asked Questions section of the [employer] website at [email or web address] Remember, [employer] is NOT to be used for urgent needs. For medical emergencies, dial 911. Now available from your iPhone and Android! Please provide this summary of care documentation to your next provider. Your primary care clinician is listed as [address]. If you have any questions after today's visit, please call [phone].

## 2017-08-31 NOTE — PROGRESS NOTES
HISTORY OF PRESENT ILLNESS  Camilla Osorio is a 78 y.o. female. HPI  Patient is here today for evaluation and treatment of: Bilateral Foot Pain / Abdominal Pain / Diarrhea    Bilateral Foot Pain: Pts feet hurt. Has had some swelling in the ankle; She tried compression hose. She was on vacation at the time. Edema helped with elevation and the compression hose. She has not had an injury to her feet; Feet began hurting about 1 month ago. Diarrhea: Pt has had alternating constipation and diarrhea episodes. She mostly has diarrhea She has seen GI before. She has had a colonoscopy and is due again in 2019. She has no associated vomiting.  + nauseated; + crampy pain in abdomen;  Gets a knot in the left side and radiated up to under the breast. Blood sugars have been fluctuating. This am blood sugar was in the 160s. She is under endocrinology care. Will see endocrine next week. Current Outpatient Prescriptions:     BLADDER CONTROL PADS pads, , Disp: , Rfl:     ALCOHOL PREP PADS padm, , Disp: , Rfl:     INSULIN ASPART (NOVOLOG PENFILL SC), by SubCUTAneous route. Sliding Scale as per patient., Disp: , Rfl:     NIFEdipine ER (ADALAT CC) 30 mg ER tablet, Take 1 Tab by mouth daily. , Disp: 90 Tab, Rfl: 3    olmesartan-hydroCHLOROthiazide (BENICAR HCT) 40-25 mg per tablet, TAKE ONE TABLET BY MOUTH EVERY DAY, Disp: 90 Tab, Rfl: 3    insulin glargine (LANTUS SOLOSTAR) 100 unit/mL (3 mL) pen, INJECT 40 units of  Subcu in am and then 30 UNITS subcu AT BEDTIME, Disp: 10 Pen, Rfl: 5    glucose blood VI test strips (FREESTYLE LITE STRIPS) strip, Blood sugar check 3-4 times a day due to fluctuating blood sugar, Disp: 300 Strip, Rfl: 11    omeprazole (PRILOSEC) 40 mg capsule, Take 1 Cap by mouth daily. , Disp: 90 Cap, Rfl: 2    atorvastatin (LIPITOR) 80 mg tablet, Take 1 Tab by mouth daily. , Disp: 90 Tab, Rfl: 3    meloxicam (MOBIC) 15 mg tablet, Take 1 Tab by mouth daily (with breakfast). , Disp: 30 Tab, Rfl: 0    meclizine (ANTIVERT) 25 mg tablet, Take  by mouth three (3) times daily as needed. , Disp: , Rfl:     levothyroxine (SYNTHROID) 175 mcg tablet, TAKE ONE TABLET BY MOUTH DAILY BEFORE BREAKFAST, Disp: 90 Tab, Rfl: 0    PSYLLIUM SEED, WITH DEXTROSE, (FIBER PO), Take  by mouth., Disp: , Rfl:     Cholecalciferol, Vitamin D3, 5,000 unit Tab, Take  by mouth.  , Disp: , Rfl:     DUREZOL 0.05 % ophthalmic emulsion, , Disp: , Rfl:     Pt has had cataract surgery. Review of Systems   Respiratory: Negative for shortness of breath and wheezing. Cardiovascular: Positive for leg swelling. Negative for chest pain. Physical Exam   Constitutional: She is oriented to person, place, and time. She appears well-developed and well-nourished. No distress. HENT:   Head: Normocephalic and atraumatic. Cardiovascular: Normal rate and regular rhythm. Exam reveals no gallop and no friction rub. No murmur heard. Pulmonary/Chest: Breath sounds normal. No respiratory distress. She has no wheezes. She has no rales. Abdominal: Bowel sounds are normal. She exhibits no distension and no mass. There is tenderness (near left flank area). There is no rebound and no guarding. Musculoskeletal: She exhibits tenderness (at left lateral foot; no edema). Neurological: She is alert and oriented to person, place, and time. Nursing note and vitals reviewed. PMH,  Meds, Allergies, Family History, Social history reviewed    Visit Vitals    /72 (BP 1 Location: Left arm, BP Patient Position: Sitting)    Pulse 81    Temp 97.8 °F (36.6 °C) (Oral)    Resp 16    Ht 5' 3\" (1.6 m)    Wt 192 lb (87.1 kg)    LMP 11/17/1972    SpO2 97%    BMI 34.01 kg/m2         ASSESSMENT and PLAN    ICD-10-CM ICD-9-CM    1. Functional diarrhea K59.1 564.5    2. Left foot pain M79.672 729.5    3.  Abdominal cramping R10.9 789.00 AMB POC URINALYSIS DIP STICK AUTO W/ MICRO      CBC WITH AUTOMATED DIFF      METABOLIC PANEL, COMPREHENSIVE      AMYLASE      LIPASE         As above, all new; ? Etiology   treatment plan as listed below  Orders Placed This Encounter    CBC WITH AUTOMATED DIFF    METABOLIC PANEL, COMPREHENSIVE    AMYLASE    LIPASE    AMB POC URINALYSIS DIP STICK AUTO W/ MICRO    insulin glargine (LANTUS SOLOSTAR) 100 unit/mL (3 mL) inpn    levothyroxine (SYNTHROID) 150 mcg tablet    diphenoxylate-atropine (LOMOTIL) 2.5-0.025 mg per tablet     Lomotil for prn diarrhea; Follow-up Disposition:  Return in about 8 weeks (around 10/26/2017) for abdominal pain foot pain; diarrhea. An After Visit Summary was printed and given to the patient. This has been fully explained to the patient, who indicates understanding.

## 2017-08-31 NOTE — PROGRESS NOTES
1. Have you been to the ER, urgent care clinic since your last visit? Hospitalized since your last visit? No    2. Have you seen or consulted any other health care providers outside of the [address] [place] [address] since your last visit? Include any pap smears or colon screening.  Yes Where: Russell Mcduffie MD - ophthalmology / Dr. Gayla Gtz - endocrinology

## 2017-08-31 NOTE — PATIENT INSTRUCTIONS

## 2017-11-08 ENCOUNTER — OFFICE VISIT (OUTPATIENT)
Dept: FAMILY MEDICINE CLINIC | Age: 67
End: 2017-11-08

## 2017-11-08 VITALS
TEMPERATURE: 98.4 F | HEIGHT: 63 IN | SYSTOLIC BLOOD PRESSURE: 135 MMHG | BODY MASS INDEX: 34.02 KG/M2 | OXYGEN SATURATION: 97 % | RESPIRATION RATE: 18 BRPM | HEART RATE: 94 BPM | WEIGHT: 192 LBS | DIASTOLIC BLOOD PRESSURE: 84 MMHG

## 2017-11-08 DIAGNOSIS — E07.9 THYROID DISEASE: ICD-10-CM

## 2017-11-08 DIAGNOSIS — K59.1 FUNCTIONAL DIARRHEA: ICD-10-CM

## 2017-11-08 DIAGNOSIS — E11.9 DIABETES MELLITUS WITHOUT COMPLICATION (HCC): ICD-10-CM

## 2017-11-08 DIAGNOSIS — E78.00 HYPERCHOLESTEROLEMIA: ICD-10-CM

## 2017-11-08 DIAGNOSIS — R06.83 SNORING: ICD-10-CM

## 2017-11-08 DIAGNOSIS — I10 ESSENTIAL HYPERTENSION: Primary | ICD-10-CM

## 2017-11-08 DIAGNOSIS — Z23 ENCOUNTER FOR IMMUNIZATION: ICD-10-CM

## 2017-11-08 NOTE — MR AVS SNAPSHOT
Visit Information Date & Time Provider Department Dept. Phone Encounter #  
 11/8/2017 11:40 AM Lolyprudence Lucero, [address] [place] [address] [place] [facility] [id number] Follow-up Instructions Return in about 4 months (around 3/8/2018) for htn/chol. Upcoming Health Maintenance Date Due ZOSTER VACCINE AGE 60> 9/17/2009 EYE EXAM RETINAL OR DILATED Q1 2/8/2014 DTaP/Tdap/Td series (1 - Tdap) 12/23/2014 GLAUCOMA SCREENING Q2Y 2/8/2015 HEMOGLOBIN A1C Q6M 9/21/2016 MICROALBUMIN Q1 3/21/2017 FOOT EXAM Q1 5/4/2017 Influenza Age 5 to Adult 8/1/2017 MEDICARE YEARLY EXAM 1/10/2018 LIPID PANEL Q1 5/8/2018 BREAST CANCER SCRN MAMMOGRAM 2/7/2019 COLONOSCOPY 2/11/2019 Allergies as of 11/8/2017  Review Complete On: 11/8/2017 By: Hank Hsieh LPN Severity Noted Reaction Type Reactions Darvocet A500 [Propoxyphene N-acetaminophen]  05/08/2017    Nausea and Vomiting Gloves, Latex With Aloe Vera  08/19/2016    Contact Dermatitis Rash from powder inside latex gloves Current Immunizations  Reviewed on 1/9/2017 Name Date Influenza High Dose Vaccine PF 1/9/2017, 11/11/2015 Influenza Vaccine (Quad) PF 10/6/2014 Influenza Vaccine PF 10/31/2013 Pneumococcal Conjugate (PCV-13) 1/9/2017 Pneumococcal Polysaccharide (PPSV-23) 5/14/2015, 10/31/2013 Td, Adsorbed PF 12/22/2014 Not reviewed this visit You Were Diagnosed With   
  
 Codes Comments Essential hypertension    -  Primary ICD-10-CM: I10 
ICD-9-CM: 401.9 Hypercholesterolemia     ICD-10-CM: E78.00 ICD-9-CM: 272.0 Thyroid disease     ICD-10-CM: E07.9 ICD-9-CM: 246.9 Functional diarrhea     ICD-10-CM: K59.1 ICD-9-CM: 564.5 Snoring     ICD-10-CM: R06.83 
ICD-9-CM: 786.09 Diabetes mellitus without complication ([address]     QNT-99-GC: E11.9 ICD-9-CM: 250.00 Vitals BP Pulse Temp Resp Height(growth percentile) Weight(growth percentile) 135/84 (BP 1 Location: Left arm, BP Patient Position: Sitting) 94 98.4 °F (36.9 °C) (Oral) 18 5' 3\" (1.6 m) 192 lb (87.1 kg) LMP SpO2 BMI OB Status Smoking Status 11/17/1972 97% 34.01 kg/m2 Hysterectomy Never Smoker Vitals History BMI and BSA Data Body Mass Index Body Surface Area 34.01 kg/m 2 1.97 m 2 Preferred Pharmacy Pharmacy Name Phone [facility] [address], [address] Your Updated Medication List  
  
   
This list is accurate as of: 11/8/17 12:46 PM.  Always use your most recent med list. ALCOHOL PREP PADS Padm Generic drug:  alcohol swabs  
  
 atorvastatin 80 mg tablet Commonly known as:  LIPITOR Take 1 Tab by mouth daily. BLADDER CONTROL PADS Pads Generic drug:  Incontinence Pad, Liner, Disp  
  
 cholecalciferol (VITAMIN D3) 5,000 unit Tab tablet Commonly known as:  VITAMIN D3 Take  by mouth. diphenoxylate-atropine 2.5-0.025 mg per tablet Commonly known as:  LOMOTIL Take 1 Tab by mouth four (4) times daily as needed for Diarrhea. Max Daily Amount: 4 Tabs. FIBER PO Take  by mouth. glucose blood VI test strips strip Commonly known as:  FREESTYLE LITE STRIPS Blood sugar check 3-4 times a day due to fluctuating blood sugar  
  
 insulin glargine 100 unit/mL (3 mL) Inpn Commonly known as:  LANTUS SOLOSTAR INJECT 30 units of  Subcu in am and then 30 UNITS subcu AT BEDTIME  
  
 meclizine 25 mg tablet Commonly known as:  ANTIVERT Take  by mouth three (3) times daily as needed. meloxicam 15 mg tablet Commonly known as:  MOBIC Take 1 Tab by mouth daily (with breakfast). NIFEdipine ER 30 mg ER tablet Commonly known as:  ADALAT CC Take 1 Tab by mouth daily. NOVOLOG PENFILL SC  
by SubCUTAneous route. Sliding Scale as per patient. olmesartan-hydroCHLOROthiazide 40-25 mg per tablet Commonly known as:  BENICAR HCT  
 TAKE ONE TABLET BY MOUTH EVERY DAY  
  
 omeprazole 40 mg capsule Commonly known as:  PRILOSEC Take 1 Cap by mouth daily. SYNTHROID 150 mcg tablet Generic drug:  levothyroxine Take  by mouth Daily (before breakfast). We Performed the Following REFERRAL TO GASTROENTEROLOGY [NAK76 Custom] REFERRAL TO PULMONARY DISEASE [SXQ31 Custom] Comments: SLEEP EVAL Follow-up Instructions Return in about 4 months (around 3/8/2018) for htn/chol. To-Do List   
 11/22/2017 Lab:  ALT   
  
 11/22/2017 Lab:  AST   
  
 11/22/2017 Lab:  HEMOGLOBIN A1C WITH EAG   
  
 11/22/2017 Lab:  LIPID PANEL   
  
 11/22/2017 Lab:  METABOLIC PANEL, BASIC   
  
 11/22/2017 Lab:  T4, FREE   
  
 11/22/2017 Lab:  TSH 3RD GENERATION Referral Information Referral ID Referred By Referred To  
  
 [id number] Mitchell Petty MD   
   [address] [place] [address] [address] [place], [address] Phone: [phone] Fax: [phone] Visits Status Start Date End Date 1 New Request 11/8/17 11/8/18 If your referral has a status of pending review or denied, additional information will be sent to support the outcome of this decision. Referral ID Referred By Referred To  
 [id number] Claudine Meyer, DO  
   [address] [place] [address] [address] [place], [address] [place] [address] [address] Phone: [phone] Fax: [phone] Visits Status Start Date End Date 1 New Request 11/8/17 11/8/18 If your referral has a status of pending review or denied, additional information will be sent to support the outcome of this decision. Introducing [facility]! Dear Dima Oneil: Thank you for requesting a Avaz account. Our records indicate that you already have an active Avaz account. You can access your account anytime at [email or web address] Did you know that you can access your hospital and ER discharge instructions at any time in [employer]? You can also review all of your test results from your hospital stay or ER visit. Additional Information If you have questions, please visit the Frequently Asked Questions section of the [employer] website at [email or web address] Remember, [employer] is NOT to be used for urgent needs. For medical emergencies, dial 911. Now available from your iPhone and Android! Please provide this summary of care documentation to your next provider. Your primary care clinician is listed as [address]. If you have any questions after today's visit, please call [phone].

## 2017-11-08 NOTE — PROGRESS NOTES
HISTORY OF PRESENT ILLNESS  Branden Tong is a 78 y.o. female. HPI   Patient is here today for evaluation and treatment of; HTN and Hypercholesterolemia; Pt is on meds as listed below; BP is stable currently; Pt states she snores aggressively. She has been observed to stop breathing once as well. Desires a sleep study    She is having difficulty with her referral to endocrine; she requests a refill on lantus until this is corrected. She still has diarrhea; she agrees to see GI. Lomotil has not helped. She feels well and has no new complaints. Current Outpatient Prescriptions:     insulin glargine (LANTUS SOLOSTAR) 100 unit/mL (3 mL) inpn, INJECT 30 units of  Subcu in am and then 30 UNITS subcu AT BEDTIME, Disp: 10 Pen, Rfl: 5    levothyroxine (SYNTHROID) 150 mcg tablet, Take  by mouth Daily (before breakfast). , Disp: , Rfl:     diphenoxylate-atropine (LOMOTIL) 2.5-0.025 mg per tablet, Take 1 Tab by mouth four (4) times daily as needed for Diarrhea. Max Daily Amount: 4 Tabs., Disp: 30 Tab, Rfl: 1    BLADDER CONTROL PADS pads, , Disp: , Rfl:     ALCOHOL PREP PADS padm, , Disp: , Rfl:     INSULIN ASPART (NOVOLOG PENFILL SC), by SubCUTAneous route. Sliding Scale as per patient., Disp: , Rfl:     NIFEdipine ER (ADALAT CC) 30 mg ER tablet, Take 1 Tab by mouth daily. , Disp: 90 Tab, Rfl: 3    olmesartan-hydroCHLOROthiazide (BENICAR HCT) 40-25 mg per tablet, TAKE ONE TABLET BY MOUTH EVERY DAY, Disp: 90 Tab, Rfl: 3    glucose blood VI test strips (FREESTYLE LITE STRIPS) strip, Blood sugar check 3-4 times a day due to fluctuating blood sugar, Disp: 300 Strip, Rfl: 11    omeprazole (PRILOSEC) 40 mg capsule, Take 1 Cap by mouth daily. , Disp: 90 Cap, Rfl: 2    atorvastatin (LIPITOR) 80 mg tablet, Take 1 Tab by mouth daily. , Disp: 90 Tab, Rfl: 3    meclizine (ANTIVERT) 25 mg tablet, Take  by mouth three (3) times daily as needed. , Disp: , Rfl:     PSYLLIUM SEED, WITH DEXTROSE, (FIBER PO), Take  by mouth., Disp: , Rfl:     Cholecalciferol, Vitamin D3, 5,000 unit Tab, Take  by mouth.  , Disp: , Rfl:     meloxicam (MOBIC) 15 mg tablet, Take 1 Tab by mouth daily (with breakfast). , Disp: 30 Tab, Rfl: 0  PMH,  Meds, Allergies, Family History, Social history reviewed      Review of Systems   Constitutional: Negative for chills and fever. Cardiovascular: Negative for chest pain and leg swelling. Gastrointestinal: Positive for diarrhea. Physical Exam   Constitutional: She is oriented to person, place, and time. She appears well-developed and well-nourished. No distress. Cardiovascular: Normal rate and regular rhythm. Exam reveals no gallop and no friction rub. No murmur heard. Pulmonary/Chest: Breath sounds normal. No respiratory distress. She has no wheezes. She has no rales. Musculoskeletal: She exhibits no edema. Neurological: She is oriented to person, place, and time. Nursing note and vitals reviewed. Lab Results   Component Value Date/Time    Cholesterol, total 270 05/08/2017 12:00 AM    HDL Cholesterol 41 05/08/2017 12:00 AM    LDL, calculated 186 05/08/2017 12:00 AM    VLDL, calculated 43 05/08/2017 12:00 AM    Triglyceride 216 05/08/2017 12:00 AM     Lab Results   Component Value Date/Time    Sodium 138 08/31/2017 12:14 PM    Potassium 4.6 08/31/2017 12:14 PM    Chloride 104 08/31/2017 12:14 PM    CO2 27 08/31/2017 12:14 PM    Anion gap 7 08/31/2017 12:14 PM    Glucose 355 08/31/2017 12:14 PM    BUN 21 08/31/2017 12:14 PM    Creatinine 1.36 08/31/2017 12:14 PM    BUN/Creatinine ratio 15 08/31/2017 12:14 PM    GFR est AA 47 08/31/2017 12:14 PM    GFR est non-AA 39 08/31/2017 12:14 PM    Calcium 9.4 08/31/2017 12:14 PM     Lab Results   Component Value Date/Time    Hemoglobin A1c 10.9 05/14/2015 12:17 PM    Hemoglobin A1c, External 11.1 03/21/2016       ASSESSMENT and PLAN    ICD-10-CM ICD-9-CM    1. Essential hypertension L95 302.8 METABOLIC PANEL, BASIC   2.  Hypercholesterolemia E78.00 272.0 LIPID PANEL      AST      ALT   3. Thyroid disease E07.9 246.9 TSH 3RD GENERATION      T4, FREE   4. Functional diarrhea- not controlled K59.1 564.5 REFERRAL TO GASTROENTEROLOGY   5. Snoring- new R06.83 786.09 REFERRAL TO PULMONARY DISEASE   6. Diabetes mellitus without complication (HCC) B65.4 250.00 HEMOGLOBIN A1C WITH EAG   7. Encounter for immunization Z23 V03.89 INFLUENZA VIRUS VACCINE, HIGH DOSE SEASONAL, PRESERVATIVE FREE       As above,    treatment plan as listed below  Orders Placed This Encounter    Influenza virus vaccine ([address]) 72 years and older    LIPID PANEL    METABOLIC PANEL, BASIC    AST    ALT    TSH 3RD GENERATION    T4, FREE    HEMOGLOBIN A1C WITH EAG    [facility] Gast[facility]    [facility] Ref [facility]     Refilled meds needed  Strict diabetic diet advised  Exercise as tolerated  Follow-up Disposition:  Return in about 4 months (around 3/8/2018) for htn/chol. An After Visit Summary was printed and given to the patient. This has been fully explained to the patient, who indicates understanding.

## 2018-02-12 ENCOUNTER — HOSPITAL ENCOUNTER (OUTPATIENT)
Dept: MAMMOGRAPHY | Age: 68
Discharge: HOME | End: 2018-02-12
Attending: FAMILY MEDICINE
Payer: MEDICARE

## 2018-02-12 DIAGNOSIS — Z12.39 BREAST CANCER SCREENING: ICD-10-CM

## 2018-02-12 PROCEDURE — 77063 BREAST TOMOSYNTHESIS BI: CPT

## 2018-04-16 ENCOUNTER — OFFICE VISIT (OUTPATIENT)
Dept: FAMILY MEDICINE CLINIC | Age: 68
End: 2018-04-16

## 2018-04-16 ENCOUNTER — HOSPITAL ENCOUNTER (OUTPATIENT)
Dept: LAB | Age: 68
Discharge: HOME | End: 2018-04-16
Payer: MEDICARE

## 2018-04-16 VITALS
DIASTOLIC BLOOD PRESSURE: 92 MMHG | HEART RATE: 88 BPM | BODY MASS INDEX: 33.31 KG/M2 | WEIGHT: 188 LBS | HEIGHT: 63 IN | RESPIRATION RATE: 12 BRPM | OXYGEN SATURATION: 96 % | SYSTOLIC BLOOD PRESSURE: 150 MMHG | TEMPERATURE: 98 F

## 2018-04-16 DIAGNOSIS — E78.5 HYPERLIPIDEMIA, UNSPECIFIED HYPERLIPIDEMIA TYPE: ICD-10-CM

## 2018-04-16 DIAGNOSIS — I10 ESSENTIAL HYPERTENSION: ICD-10-CM

## 2018-04-16 DIAGNOSIS — E03.9 ACQUIRED HYPOTHYROIDISM: ICD-10-CM

## 2018-04-16 DIAGNOSIS — E11.21 DIABETIC NEPHROPATHY ASSOCIATED WITH TYPE 2 DIABETES MELLITUS (HCC): ICD-10-CM

## 2018-04-16 LAB
APPEARANCE UR: ABNORMAL
BACTERIA URNS QL MICRO: ABNORMAL /HPF
BILIRUB UR QL: NEGATIVE
COLOR UR: YELLOW
EPITH CASTS URNS QL MICRO: ABNORMAL /LPF (ref 0–5)
GLUCOSE UR STRIP.AUTO-MCNC: >1000 MG/DL
HGB UR QL STRIP: NEGATIVE
KETONES UR QL STRIP.AUTO: 15 MG/DL
LEUKOCYTE ESTERASE UR QL STRIP.AUTO: NEGATIVE
NITRITE UR QL STRIP.AUTO: POSITIVE
PH UR STRIP: 5.5 [PH] (ref 5–8)
PROT UR STRIP-MCNC: 30 MG/DL
RBC #/AREA URNS HPF: 0 /HPF (ref 0–5)
SP GR UR REFRACTOMETRY: 1.02 (ref 1–1.03)
UROBILINOGEN UR QL STRIP.AUTO: 0.2 EU/DL (ref 0.2–1)
WBC URNS QL MICRO: ABNORMAL /HPF (ref 0–4)

## 2018-04-16 PROCEDURE — 81001 URINALYSIS AUTO W/SCOPE: CPT | Performed by: FAMILY MEDICINE

## 2018-04-16 PROCEDURE — 82043 UR ALBUMIN QUANTITATIVE: CPT | Performed by: FAMILY MEDICINE

## 2018-04-16 PROCEDURE — 80061 LIPID PANEL: CPT | Performed by: FAMILY MEDICINE

## 2018-04-16 PROCEDURE — 80048 BASIC METABOLIC PNL TOTAL CA: CPT | Performed by: FAMILY MEDICINE

## 2018-04-16 PROCEDURE — 83036 HEMOGLOBIN GLYCOSYLATED A1C: CPT | Performed by: FAMILY MEDICINE

## 2018-04-16 RX ORDER — INSULIN ASPART 100 [IU]/ML
INJECTION, SUSPENSION SUBCUTANEOUS
COMMUNITY
End: 2018-04-16 | Stop reason: CLARIF

## 2018-04-16 RX ORDER — LEVOTHYROXINE SODIUM 150 UG/1
150 TABLET ORAL
Qty: 90 TAB | Refills: 1 | Status: SHIPPED | OUTPATIENT
Start: 2018-04-16 | End: 2019-01-28 | Stop reason: SDUPTHER

## 2018-04-16 RX ORDER — INSULIN GLARGINE 100 [IU]/ML
INJECTION, SOLUTION SUBCUTANEOUS
Qty: 10 PEN | Refills: 2 | Status: SHIPPED | OUTPATIENT
Start: 2018-04-16 | End: 2018-06-07 | Stop reason: SDUPTHER

## 2018-04-16 RX ORDER — INSULIN ASPART 100 [IU]/ML
INJECTION, SOLUTION INTRAVENOUS; SUBCUTANEOUS
Qty: 8 CARTRIDGE | Refills: 2 | Status: SHIPPED | OUTPATIENT
Start: 2018-04-16 | End: 2018-04-17 | Stop reason: SDUPTHER

## 2018-04-16 RX ORDER — SIMVASTATIN 40 MG/1
40 TABLET, FILM COATED ORAL
Qty: 90 TAB | Refills: 4 | Status: SHIPPED | OUTPATIENT
Start: 2018-04-16 | End: 2018-08-06 | Stop reason: SDUPTHER

## 2018-04-16 RX ORDER — SIMVASTATIN 40 MG/1
TABLET, FILM COATED ORAL
COMMUNITY
End: 2018-04-16 | Stop reason: SDUPTHER

## 2018-04-16 RX ORDER — OLMESARTAN MEDOXOMIL AND HYDROCHLOROTHIAZIDE 40/25 40; 25 MG/1; MG/1
TABLET ORAL
Qty: 90 TAB | Refills: 0 | Status: SHIPPED | OUTPATIENT
Start: 2018-04-16 | End: 2018-09-24 | Stop reason: SDUPTHER

## 2018-04-16 RX ORDER — NIFEDIPINE 30 MG/1
30 TABLET, FILM COATED, EXTENDED RELEASE ORAL DAILY
Qty: 90 TAB | Refills: 0 | Status: SHIPPED | OUTPATIENT
Start: 2018-04-16 | End: 2018-09-24 | Stop reason: SDUPTHER

## 2018-04-16 NOTE — PATIENT INSTRUCTIONS
Please call the clinic if you haven't received a call with the results of your tests or with an appointment plan for any referrals/studies within one week. No news is not good news; it's no news. Please review the list of your current medications (name, formulation, strength and dosing instructions) and allergies and call us if there is an error. It is good practice to bring all of your prescriptions, over the counter medications and herbal supplements to each visit. Learn what conditions your medications are treating. Know which doctor is responsible for managing which condition. If you are needing of more medication, contact the office of the doctor managing that condition. It is my practice to ensure that any prescription generated has a large enough supply with enough refills to last you through and beyond the time I am next expecting to see you. If your medications are running low, contact your pharmacy for a refill. If there are no refills remaining, that means it is time for you to see me to reassess the status of your condition and whether the medication is still appropriate.

## 2018-04-16 NOTE — PROGRESS NOTES
Suhas Hawkins is a 75 y.o. female  Former patient of Dr. Elsi Banuelos. Lapse in care x few months due to insurance issues  Transferring care as closer to residence    DM2 with microalbuminuria. Dx   Eye exam last year. No retinopathy    On Lantus  Had been using Novolog SSI pre meal - none x 3 months    Reports had been on metformin, stopped in response to rising creatinine  Also been on glipizide. Unsure why it was stopped     Lots of UTI's and yeast infections    No history of medullary cancer of the thyroid. No fam hx MEN    Home glucoses:  Fastin-260  PP lunch if queasy: 160 or sleepy: 300  PP dinner: 160-400  Hypoglycemic episodes: 40s during the night on occasion - most recently January. Unsure why    \"My diet is a problem\"  Salt and vinegar potato chips    Hypertension: ran out of ARB thiazide. Stretching CCB  Hypothyroidism: No history of surgery or radiation. stretching replacement. \"I have angina. No heart attack though. That's how they found I have a hiatal hernia. \"  No recollection of what sounds like a cardiac workup though. [place] in [place]. Going to [employer] mid May    Patient Care Team:  Bernard Perez MD as PCP - General (Family Practice)  Tessie Vidal MD (Gastroenterology)  Allergies   Allergen Reactions    Darvocet A500 [Propoxyphene N-Acetaminophen] Nausea and Vomiting    Gloves, Latex With Aloe Vera Contact Dermatitis     Rash from powder inside latex gloves       Outpatient Prescriptions Marked as Taking for the 18 encounter (Office Visit) with Bernard Perez MD   Medication Sig Dispense Refill    simvastatin (ZOCOR) 40 mg tablet Take  by mouth nightly.  ESOMEPRAZOLE MAGNESIUM (NEXIUM PO) Take  by mouth.  insulin glargine (LANTUS SOLOSTAR) 100 unit/mL (3 mL) inpn INJECT 30 units of  Subcu in am and then 30 UNITS subcu AT BEDTIME 10 Pen 5    levothyroxine (SYNTHROID) 150 mcg tablet Take  by mouth Daily (before breakfast).       NIFEdipine ER (ADALAT CC) 30 mg ER tablet Take 1 Tab by mouth daily. 90 Tab 3     Patient Active Problem List    Diagnosis    Uncontrolled type 2 diabetes mellitus with microalbuminuria, without long-term current use of insulin ([address]    Advanced care planning/counseling discussion    Family history of malignant neoplasm of breast    Microalbuminuria     Last Assessment & Plan:   Unchanged  See the notes in the \"SUGGESTIONS\" area.        Lump or mass in breast    Hypertension    Acquired hypothyroidism    Hiatal hernia    Hyperlipidemia    Depression    Migraines     Past Medical History:   Diagnosis Date    Depression     Diabetes ([address]     GERD (gastroesophageal reflux disease)     Hiatal hernia     Hypercholesterolemia     Hypertension     Migraines     Thyroid disease     Urinary tract infection      Past Surgical History:   Procedure Laterality Date    HX BREAST BIOPSY  1993    right breast, benign    HX HYSTERECTOMY      HX MOHS PROCEDURES Right 09/2001    HX OTHER SURGICAL  2001    HEAD LACERATION DUE TO A FALL    HX PARTIAL HYSTERECTOMY  11/17/1972    HX TUBAL LIGATION  11/1971     Family History   Problem Relation Age of Onset    Diabetes Mother     Hypertension Mother     Cancer Mother     Kidney Disease Mother     Diabetes Father     Heart Disease Father     Hypertension Father     Diabetes Maternal Grandmother     Breast Cancer Maternal Grandmother     Cancer Maternal Grandfather     Breast Cancer Maternal Aunt      Social History     Social History    Marital status:      Spouse name: N/A    Number of children: N/A    Years of education: N/A     Occupational History    retired      [employer].     Social History Main Topics    Smoking status: Never Smoker    Smokeless tobacco: Never Used    Alcohol use No    Drug use: No    Sexual activity: No     Other Topics Concern    Caffeine Concern Yes     4 cups a day    Exercise No    Seat Belt Yes     Social History Narrative Review of Systems   Musculoskeletal: Positive for joint swelling (hands). Neurological: Positive for numbness (tingling in feet \"sometimes\"). Visit Vitals    BP (!) 150/92    Pulse 88    Temp 98 °F (36.7 °C) (Oral)    Resp 12    Ht 5' 3\" (1.6 m)    Wt 188 lb (85.3 kg)    LMP 11/17/1972  Comment: partial    SpO2 96%    BMI 33.3 kg/m2      Physical Exam   Constitutional: She is oriented to person, place, and time. She appears well-developed. No distress. Pleasant obese white female   HENT:   Head: Normocephalic and atraumatic. Neck: Neck supple. No thyromegaly present. Cardiovascular: Normal rate, regular rhythm and normal heart sounds. No murmur heard. Pulmonary/Chest: Effort normal and breath sounds normal. No respiratory distress. She has no wheezes. She has no rales. Musculoskeletal: She exhibits no edema. Lymphadenopathy:     She has no cervical adenopathy. Neurological: She is alert and oriented to person, place, and time. Skin: Skin is warm and dry. Psychiatric: She has a normal mood and affect. Her behavior is normal. Judgment and thought content normal.       Labs dated 2/8/2018 reviewed through Care Everywhere: Sodium 136, chloride 97, calcium 9.7, BUN 19, creatinine 1.2, CO2 26, GFR 43, potassium 4.2, glucose 423, alk phos 132, remaining LFTs unremarkable; TSH 12.78, free T4 0.8      Lab Results   Component Value Date/Time    Hemoglobin A1c 10.9 (H) 05/14/2015 12:17 PM    Hemoglobin A1c 9.1 (H) 10/06/2014 11:17 AM    Hemoglobin A1c 10.0 (H) 02/03/2014 10:40 AM    Hemoglobin A1c, External 11.1 03/21/2016    Glucose 355 (H) 08/31/2017 12:14 PM    Glucose (POC) 139 (H) 08/19/2016 02:14 PM    Microalb/Creat ratio (ug/mg creat.) 15.8 10/06/2014 11:17 AM    LDL, calculated 186 (H) 05/08/2017 12:00 AM    Creatinine 1.36 (H) 08/31/2017 12:14 PM           ICD-10-CM ICD-9-CM    1.  Uncontrolled type 2 diabetes mellitus with microalbuminuria, without long-term current use of insulin (HCC) E11.29 250.42 insulin glargine (LANTUS SOLOSTAR U-100 INSULIN) 100 unit/mL (3 mL) inpn    E11.65 791.0 insulin aspart U-100 (NOVOLOG) 100 unit/mL crtg    R80.9  HEMOGLOBIN A1C WITH EAG      METABOLIC PANEL, BASIC      MICROALBUMIN, UR, RAND W/ MICROALB/CREAT RATIO      URINALYSIS W/ RFLX MICROSCOPIC      LIPID PANEL W/ REFLX DIRECT LDL   2. Hyperlipidemia, unspecified hyperlipidemia type E78.5 272.4 simvastatin (ZOCOR) 40 mg tablet      LIPID PANEL W/ REFLX DIRECT LDL   3. Essential hypertension I10 401.9 NIFEdipine ER (ADALAT CC) 30 mg ER tablet      olmesartan-hydroCHLOROthiazide (BENICAR HCT) 40-25 mg per tablet   4. Acquired hypothyroidism E03.9 244.9 levothyroxine (SYNTHROID) 150 mcg tablet     Labile sugars reflect diet - not quirkiness of her disease  DM class/nutrition  Resume prior regimen for now  Bring sliding scale  Foot exam next visit    UTI/yeast infections likely due to poor control. Nonetheless, would avoid SGLT2 inhibitors at least for now. Anticipate GLP1 agonist  Review records for metformin details  Resuming statin    Will let us know which ophtho she can see now    Hypothyroidism: uncontrolled. Resume replacement  Hypertension: uncontrolled. Resume prior meds. PPI on chart. No NTG or ASA - nonetheless will attempt to find clarification of \"angina\"    BMI > 25. Therapeutic lifestyle changes recommended      Unless otherwise stated, conditions above are stable or improved and well controlled, with a plan to continue present management. The patient understands our medical plan. Alternatives have been explained and offered. The risks, benefits and significant side effects of all medications have been reviewed. Anticipated time course and progression of condition reviewed. All questions have been addressed.      She is instructed to call the clinic if she has not been notified either by phone or through [address] with the results of her tests or with an appointment plan for any referrals within 1 week(s). No news is not good news; it's no news. The patient  is to call if her condition worsens or fails to improve or if significant side effects are experienced. Follow-up Disposition:  Return in about 3 weeks (around 5/4/2018) for diabetes follow up. Dragon medical dictation software was used for portions of this report. Unintended voice recognition errors may occur.

## 2018-04-16 NOTE — PROGRESS NOTES
New patient here to establish care she is asking for refills on all of her medications today. She states she has been haing issues with health insurance for 3 months and has ran completely out of her medication. Patient states she has had tests done at [facility], care everywhere has been ran. Medication reconciliation has been completed with patient. Care team has been discussed/updated.

## 2018-04-16 NOTE — Clinical Note
Turns out she had been seeing Dr. Dragan Gomez at the [facility] office.  Her [facility] records may be in media - or last year's eye exam.

## 2018-04-16 NOTE — MR AVS SNAPSHOT
[facility] [address] [place], [place] [id number] Patient: Claudette Sarks MRN: [id number] KKT:[id number] Visit Information Date & Time Provider Department Dept. Phone Encounter #  
 4/16/2018  1:45 PM Jack Peña MD [facility] [phone] [id number] Follow-up Instructions Return in about 3 weeks (around 5/4/2018) for diabetes follow up. Upcoming Health Maintenance Date Due ZOSTER VACCINE AGE 60> 9/17/2009 EYE EXAM RETINAL OR DILATED Q1 2/8/2014 DTaP/Tdap/Td series (1 - Tdap) 12/23/2014 GLAUCOMA SCREENING Q2Y 2/8/2015 HEMOGLOBIN A1C Q6M 9/21/2016 MICROALBUMIN Q1 3/21/2017 FOOT EXAM Q1 5/4/2017 MEDICARE YEARLY EXAM 3/20/2018 LIPID PANEL Q1 5/8/2018 COLONOSCOPY 2/11/2019 BREAST CANCER SCRN MAMMOGRAM 2/12/2020 Allergies as of 4/16/2018  Review Complete On: 4/16/2018 By: Jack Peña MD  
  
 Severity Noted Reaction Type Reactions Darvocet A500 [Propoxyphene N-acetaminophen]  05/08/2017    Nausea and Vomiting Gloves, Latex With Aloe Vera  08/19/2016    Contact Dermatitis Rash from powder inside latex gloves Current Immunizations  Reviewed on 4/16/2018 Name Date Influenza High Dose Vaccine PF 11/8/2017, 1/9/2017, 11/11/2015 Influenza Vaccine (Quad) PF 10/6/2014 Influenza Vaccine PF 10/31/2013 Pneumococcal Conjugate (PCV-13) 1/9/2017 Pneumococcal Polysaccharide (PPSV-23) 5/14/2015, 10/31/2013 Td, Adsorbed PF 12/22/2014 Reviewed by Jack Peña MD on 4/16/2018 at  2:50 PM  
You Were Diagnosed With   
  
 Codes Comments Uncontrolled type 2 diabetes mellitus with complication, with long-term current use of insulin (HCC)    -  Primary ICD-10-CM: E11.8, E11.65, Z79.4 ICD-9-CM: 250.82, V58.67 Hyperlipidemia, unspecified hyperlipidemia type     ICD-10-CM: E78.5 ICD-9-CM: 272.4 Essential hypertension     ICD-10-CM: I10 
ICD-9-CM: 401.9 Acquired hypothyroidism     ICD-10-CM: E03.9 ICD-9-CM: 366. 9 Vitals BP Pulse Temp Resp Height(growth percentile) Weight(growth percentile) (!) 150/92 88 98 °F (36.7 °C) (Oral) 12 5' 3\" (1.6 m) 188 lb (85.3 kg) LMP SpO2 BMI OB Status Smoking Status 11/17/1972 96% 33.3 kg/m2 Hysterectomy Never Smoker Vitals History BMI and BSA Data Body Mass Index Body Surface Area  
 33.3 kg/m 2 1.95 m 2 Preferred Pharmacy Pharmacy Name Phone [facility] [address], [address] [phone] Your Updated Medication List  
  
   
This list is accurate as of 4/16/18  2:58 PM.  Always use your most recent med list. ALCOHOL PREP PADS Padm Generic drug:  alcohol swabs BLADDER CONTROL PADS Pads Generic drug:  Incontinence Pad, Liner, Disp  
  
 cholecalciferol (VITAMIN D3) 5,000 unit Tab tablet Commonly known as:  VITAMIN D3 Take  by mouth. diphenoxylate-atropine 2.5-0.025 mg per tablet Commonly known as:  LOMOTIL Take 1 Tab by mouth four (4) times daily as needed for Diarrhea. Max Daily Amount: 4 Tabs. FIBER PO Take  by mouth. glucose blood VI test strips strip Commonly known as:  FREESTYLE LITE STRIPS Blood sugar check 3-4 times a day due to fluctuating blood sugar  
  
 insulin aspart U-100 100 unit/mL Crtg Commonly known as:  Mark Anthony Morton Sliding Scale as per patient. insulin glargine 100 unit/mL (3 mL) Inpn Commonly known as:  LANTUS SOLOSTAR U-100 INSULIN INJECT 30 units of  Subcu in am and then 30 UNITS subcu AT BEDTIME  
  
 levothyroxine 150 mcg tablet Commonly known as:  SYNTHROID Take 1 Tab by mouth Daily (before breakfast). meclizine 25 mg tablet Commonly known as:  ANTIVERT Take  by mouth three (3) times daily as needed. meloxicam 15 mg tablet Commonly known as:  MOBIC  
 Take 1 Tab by mouth daily (with breakfast). NEXIUM PO Take  by mouth. NIFEdipine ER 30 mg ER tablet Commonly known as:  ADALAT CC Take 1 Tab by mouth daily. olmesartan-hydroCHLOROthiazide 40-25 mg per tablet Commonly known as:  BENICAR HCT  
TAKE ONE TABLET BY MOUTH EVERY DAY  
  
 omeprazole 40 mg capsule Commonly known as:  PRILOSEC Take 1 Cap by mouth daily. simvastatin 40 mg tablet Commonly known as:  ZOCOR Take 1 Tab by mouth nightly. Prescriptions Sent to Pharmacy Refills  
 simvastatin (ZOCOR) 40 mg tablet 4 Sig: Take 1 Tab by mouth nightly. Class: Normal  
 [facility]acy: [id number], [address] [place] [address] Rd Ph #: [phone] Route: Oral  
 insulin glargine (LANTUS SOLOSTAR U-100 INSULIN) 100 unit/mL (3 mL) inpn 2 Sig: INJECT 30 units of  Subcu in am and then 30 UNITS subcu AT BEDTIME Class: Normal  
 [facility]acy: [id number] [address] [place] [address] Ph #: [phone]  
 levothyroxine (SYNTHROID) 150 mcg tablet 1 Sig: Take 1 Tab by mouth Daily (before breakfast). Class: Normal  
 Pharmacy: [id number], [address] [place] [address] Rd Ph #: [phone] Route: Oral  
 NIFEdipine ER (ADALAT CC) 30 mg ER tablet 0 Sig: Take 1 Tab by mouth daily. Class: Normal  
 [facility]acy: [id number], [address] [place] [address] Rd Ph #: [phone] Route: Oral  
 olmesartan-hydroCHLOROthiazide (BENICAR HCT) 40-25 mg per tablet 0 Sig: TAKE ONE TABLET BY MOUTH EVERY DAY Class: Normal  
 [facility]: [id number] [address] [place] [address] Ph #: [phone]  
 insulin aspart U-100 (NOVOLOG) 100 unit/mL crtg 2 Sig: Sliding Scale as per patient. Class: Normal  
 Pharmacy: [id number], [address] [place] [address] Rd Ph #: [phone] Follow-up Instructions Return in about 3 weeks (around 5/4/2018) for diabetes follow up. To-Do List   
 04/16/2018 Lab:  HEMOGLOBIN A1C WITH EAG   
  
 04/16/2018 Lab:  LIPID PANEL W/ REFLX DIRECT LDL   
  
 04/16/2018 Lab:  METABOLIC PANEL, BASIC   
  
 04/16/2018 Lab:  MICROALBUMIN, UR, RAND W/ MICROALB/CREAT RATIO   
  
 04/16/2018 Lab:  URINALYSIS W/ RFLX MICROSCOPIC Patient Instructions Please call the clinic if you haven't received a call with the results of your tests or with an appointment plan for any referrals/studies within one week. No news is not good news; it's no news. Please review the list of your current medications (name, formulation, strength and dosing instructions) and allergies and call us if there is an error. It is good practice to bring all of your prescriptions, over the counter medications and herbal supplements to each visit. Learn what conditions your medications are treating. Know which doctor is responsible for managing which condition. If you are needing of more medication, contact the office of the doctor managing that condition. It is my practice to ensure that any prescription generated has a large enough supply with enough refills to last you through and beyond the time I am next expecting to see you. If your medications are running low, contact your pharmacy for a refill. If there are no refills remaining, that means it is time for you to see me to reassess the status of your condition and whether the medication is still appropriate. Introducing [facility] & HEALTH SERVICES! Dear Severo Maciel: Thank you for requesting a OutboundEngine account. Our records indicate that you already have an active OutboundEngine account. You can access your account anytime at [email or web address] Did you know that you can access your hospital and ER discharge instructions at any time in [employer]? You can also review all of your test results from your hospital stay or ER visit. Additional Information If you have questions, please visit the Frequently Asked Questions section of the [employer] website at [email or web address] Remember, [employer] is NOT to be used for urgent needs. For medical emergencies, dial 911. Now available from your iPhone and Android! Please provide this summary of care documentation to your next provider. Your primary care clinician is listed as Tim Draper. If you have any questions after today's visit, please call [phone].

## 2018-04-17 LAB
ANION GAP SERPL CALC-SCNC: 12 MMOL/L (ref 3–18)
BUN SERPL-MCNC: 21 MG/DL (ref 7–18)
BUN/CREAT SERPL: 16 (ref 12–20)
CALCIUM SERPL-MCNC: 8.7 MG/DL (ref 8.5–10.1)
CHLORIDE SERPL-SCNC: 100 MMOL/L (ref 100–108)
CHOLEST SERPL-MCNC: 324 MG/DL
CO2 SERPL-SCNC: 27 MMOL/L (ref 21–32)
CREAT SERPL-MCNC: 1.29 MG/DL (ref 0.6–1.3)
CREAT UR-MCNC: 71.45 MG/DL (ref 30–125)
EST. AVERAGE GLUCOSE BLD GHB EST-MCNC: 266 MG/DL
GLUCOSE SERPL-MCNC: 267 MG/DL (ref 74–99)
HBA1C MFR BLD: 10.9 % (ref 4.2–5.6)
HDLC SERPL-MCNC: 46 MG/DL (ref 40–60)
HDLC SERPL: 7 {RATIO} (ref 0–5)
LDLC SERPL CALC-MCNC: 234.2 MG/DL (ref 0–100)
LIPID PROFILE,FLP: ABNORMAL
MICROALBUMIN UR-MCNC: 31.7 MG/DL (ref 0–3)
MICROALBUMIN/CREAT UR-RTO: 444 MG/G (ref 0–30)
POTASSIUM SERPL-SCNC: 3.8 MMOL/L (ref 3.5–5.5)
SODIUM SERPL-SCNC: 139 MMOL/L (ref 136–145)
TRIGL SERPL-MCNC: 219 MG/DL (ref ?–150)
VLDLC SERPL CALC-MCNC: 43.8 MG/DL

## 2018-04-17 RX ORDER — INSULIN ASPART 100 [IU]/ML
INJECTION, SOLUTION INTRAVENOUS; SUBCUTANEOUS
Qty: 8 CARTRIDGE | Refills: 2 | Status: SHIPPED | OUTPATIENT
Start: 2018-04-17 | End: 2018-06-07 | Stop reason: SDUPTHER

## 2018-04-17 NOTE — TELEPHONE ENCOUNTER
Pharmacy called to inquire about patients Novolog instructions. Advised pharmacy patient was new patient she was suppose to call back with medication instructions from previous provider. Pharmacy stated they will call the patient to inform.

## 2018-04-17 NOTE — TELEPHONE ENCOUNTER
Patient called to inform Dr. Adonay Wiseman her sliding scale is:  Blood sugars:  100-150 = 6 units Novolog  150-200 = 8 units Novolog  200-250 = 10 units Novolog  250-300= 12 units Novolog  300-350 = 14 units Novolog  400 = Emergency room

## 2018-04-19 ENCOUNTER — TELEPHONE (OUTPATIENT)
Dept: FAMILY MEDICINE CLINIC | Age: 68
End: 2018-04-19

## 2018-04-19 NOTE — TELEPHONE ENCOUNTER
Called patient to inform her RX has been corrected. Advised patient to always bring medications to appointment to make sure our system matches what she takes.

## 2018-04-19 NOTE — TELEPHONE ENCOUNTER
Ms. Galen Fat called stating that the wrong thing was called in, cartridges rather than the refill syringes for Novolog Flex Pen. She would like this corrected and a call back at [phone]. She also switched her pharmacy from [facility] to the [facility] [address]. They do take her [employer] drug care plan.

## 2018-04-19 NOTE — TELEPHONE ENCOUNTER
Called [facility] in [place] gave verbal order to switch to the Novolog pen due to the cartridge that was ordered was for an old pen.

## 2018-04-30 ENCOUNTER — TELEPHONE (OUTPATIENT)
Dept: FAMILY MEDICINE CLINIC | Age: 68
End: 2018-04-30

## 2018-04-30 NOTE — TELEPHONE ENCOUNTER
Ms. Haroldo Ramirez went to [facility] Saturday for an upper respiratory infection. She has [employer] as primary and will need a referral placed for 4/28/18.

## 2018-04-30 NOTE — TELEPHONE ENCOUNTER
Called patient had her verify her  she has been told she does need a referral for Urgent Care, [facility] no longer require referrals. She has expressed understanding and agrees with this plan.

## 2018-05-07 ENCOUNTER — OFFICE VISIT (OUTPATIENT)
Dept: FAMILY MEDICINE CLINIC | Age: 68
End: 2018-05-07

## 2018-05-07 ENCOUNTER — HOSPITAL ENCOUNTER (OUTPATIENT)
Dept: LAB | Age: 68
Discharge: HOME | End: 2018-05-07
Payer: MEDICARE

## 2018-05-07 VITALS
OXYGEN SATURATION: 97 % | HEIGHT: 63 IN | TEMPERATURE: 97.6 F | BODY MASS INDEX: 32.57 KG/M2 | RESPIRATION RATE: 16 BRPM | DIASTOLIC BLOOD PRESSURE: 66 MMHG | WEIGHT: 183.8 LBS | SYSTOLIC BLOOD PRESSURE: 124 MMHG | HEART RATE: 93 BPM

## 2018-05-07 DIAGNOSIS — I10 ESSENTIAL HYPERTENSION: ICD-10-CM

## 2018-05-07 DIAGNOSIS — J20.9 ACUTE BRONCHITIS, UNSPECIFIED ORGANISM: ICD-10-CM

## 2018-05-07 DIAGNOSIS — E78.5 HYPERLIPIDEMIA, UNSPECIFIED HYPERLIPIDEMIA TYPE: ICD-10-CM

## 2018-05-07 DIAGNOSIS — E11.21 DIABETIC NEPHROPATHY ASSOCIATED WITH TYPE 2 DIABETES MELLITUS (HCC): ICD-10-CM

## 2018-05-07 LAB
ANION GAP SERPL CALC-SCNC: 10 MMOL/L (ref 3–18)
BUN SERPL-MCNC: 23 MG/DL (ref 7–18)
BUN/CREAT SERPL: 15 (ref 12–20)
CALCIUM SERPL-MCNC: 9.2 MG/DL (ref 8.5–10.1)
CHLORIDE SERPL-SCNC: 102 MMOL/L (ref 100–108)
CO2 SERPL-SCNC: 26 MMOL/L (ref 21–32)
CREAT SERPL-MCNC: 1.51 MG/DL (ref 0.6–1.3)
GLUCOSE SERPL-MCNC: 291 MG/DL (ref 74–99)
POTASSIUM SERPL-SCNC: 4.8 MMOL/L (ref 3.5–5.5)
SODIUM SERPL-SCNC: 138 MMOL/L (ref 136–145)

## 2018-05-07 PROCEDURE — 80048 BASIC METABOLIC PNL TOTAL CA: CPT | Performed by: FAMILY MEDICINE

## 2018-05-07 RX ORDER — BENZONATATE 100 MG/1
CAPSULE ORAL
Refills: 0 | COMMUNITY
Start: 2018-04-30 | End: 2018-06-07 | Stop reason: ALTCHOICE

## 2018-05-07 RX ORDER — AMOXICILLIN AND CLAVULANATE POTASSIUM 875; 125 MG/1; MG/1
TABLET, FILM COATED ORAL
Refills: 0 | COMMUNITY
Start: 2018-04-28 | End: 2018-06-07 | Stop reason: ALTCHOICE

## 2018-05-07 RX ORDER — ZOSTER VACCINE RECOMBINANT, ADJUVANTED 50 MCG/0.5
KIT INTRAMUSCULAR
Refills: 0 | COMMUNITY
Start: 2018-04-18 | End: 2018-08-27 | Stop reason: ALTCHOICE

## 2018-05-07 RX ORDER — METFORMIN HYDROCHLORIDE 500 MG/1
1000 TABLET, EXTENDED RELEASE ORAL
Qty: 60 TAB | Refills: 1 | Status: SHIPPED | OUTPATIENT
Start: 2018-05-07 | End: 2018-06-07 | Stop reason: SDUPTHER

## 2018-05-07 NOTE — PROGRESS NOTES
Patient here for 3 month f/u on her DM no concerns. 1. Have you been to the ER, urgent care clinic since your last visit? Hospitalized since your last visit? Yes When: 4/28/18 Where: [facility] Reason for visit: cough  2. Have you seen or consulted any other health care providers outside of the [facility] since your last visit? Include any pap smears or colon screening. No  Health Maintenance reviewed - Patient has been asked to set up here [address] [place] [address] to address HM. Medication reconciliation has been completed with patient. Care team discussed/updated as well as pharmacy. Care everywhere has been ran.

## 2018-05-07 NOTE — MR AVS SNAPSHOT
[facility] [address] [place] [address] 
[phone] Patient: Alberta Azevedo MRN: [id number]:[id number] Visit Information Date & Time Provider Department Dept. Phone Encounter #  
 5/7/2018 10:45 AM Jason Gomes MD [facility] [phone] [id number] Follow-up Instructions Return in about 1 month (around 6/7/2018) for diabetes follow up, no labs prior. Upcoming Health Maintenance Date Due ZOSTER VACCINE AGE 60> 9/17/2009 EYE EXAM RETINAL OR DILATED Q1 2/8/2014 DTaP/Tdap/Td series (1 - Tdap) 12/23/2014 GLAUCOMA SCREENING Q2Y 2/8/2015 FOOT EXAM Q1 5/4/2017 MEDICARE YEARLY EXAM 3/20/2018 Influenza Age 5 to Adult 8/1/2018 HEMOGLOBIN A1C Q6M 10/16/2018 COLONOSCOPY 2/11/2019 MICROALBUMIN Q1 4/16/2019 LIPID PANEL Q1 4/16/2019 BREAST CANCER SCRN MAMMOGRAM 2/12/2020 Allergies as of 5/7/2018  Review Complete On: 5/7/2018 By: Jason Gomes MD  
  
 Severity Noted Reaction Type Reactions Darvocet A500 [Propoxyphene N-acetaminophen]  05/08/2017    Nausea and Vomiting Gloves, Latex With Aloe Vera  08/19/2016    Contact Dermatitis Rash from powder inside latex gloves Current Immunizations  Reviewed on 5/7/2018 Name Date Influenza High Dose Vaccine PF 11/8/2017, 1/9/2017, 11/11/2015 Influenza Vaccine (Quad) PF 10/6/2014 Influenza Vaccine PF 10/31/2013 Pneumococcal Conjugate (PCV-13) 1/9/2017 Pneumococcal Polysaccharide (PPSV-23) 5/14/2015, 10/31/2013 Td, Adsorbed PF 12/22/2014 Reviewed by Jason Gomes MD on 5/7/2018 at 10:58 AM  
You Were Diagnosed With   
  
 Codes Comments Uncontrolled type 2 diabetes mellitus with microalbuminuria, with long-term current use of insulin (HCC)    -  Primary ICD-10-CM: E11.29, E11.65, R80.9, Z79.4 ICD-9-CM: 250.42, 791.0, V58.67   
 Diabetic nephropathy associated with type 2 diabetes mellitus ([facility] [address]     ICD-10-CM: E11.21 
ICD-9-CM: 250.40, 583.81 Essential hypertension     ICD-10-CM: I10 
ICD-9-CM: 401.9 Hyperlipidemia, unspecified hyperlipidemia type     ICD-10-CM: E78.5 ICD-9-CM: 272.4 Acute bronchitis, unspecified organism     ICD-10-CM: J20.9 ICD-9-CM: 466.0 Vitals BP Pulse Temp Resp Height(growth percentile) Weight(growth percentile) 124/66 93 97.6 °F (36.4 °C) (Oral) 16 5' 3\" (1.6 m) 183 lb 12.8 oz (83.4 kg) LMP SpO2 BMI OB Status Smoking Status 11/17/1972 97% 32.56 kg/m2 Hysterectomy Never Smoker BMI and BSA Data Body Mass Index Body Surface Area 32.56 kg/m 2 1.93 m 2 Preferred Pharmacy Pharmacy Name Phone [facility] [address], [address] [facility] [phone] Your Updated Medication List  
  
   
This list is accurate as of 5/7/18 11:33 AM.  Always use your most recent med list. ALCOHOL PREP PADS Padm Generic drug:  alcohol swabs  
  
 amoxicillin-clavulanate 875-125 mg per tablet Commonly known as:  AUGMENTIN  
take 1 tablet by mouth twice a day for 10 days  
  
 benzonatate 100 mg capsule Commonly known as:  TESSALON  
TAKE 1 CAPSULE EVERY 8 HOURS. .. SWALLOW PILLS WHOLE  
  
 BLADDER CONTROL PADS Pads Generic drug:  Incontinence Pad, Liner, Disp  
  
 cholecalciferol (VITAMIN D3) 5,000 unit Tab tablet Commonly known as:  VITAMIN D3 Take  by mouth. diphenoxylate-atropine 2.5-0.025 mg per tablet Commonly known as:  LOMOTIL Take 1 Tab by mouth four (4) times daily as needed for Diarrhea. Max Daily Amount: 4 Tabs. FIBER PO Take  by mouth. glucose blood VI test strips strip Commonly known as:  FREESTYLE LITE STRIPS Blood sugar check 3-4 times a day due to fluctuating blood sugar  
  
 insulin aspart U-100 100 unit/mL Crtg Commonly known as:  Ivonne Pagan Sliding Scale: 100-150 = 6 units; 150-200 = 8 units; 200-250 = 10 units; 250-300= 12 units; 300-350 = 14 units; 400 = Emergency room  
  
 insulin glargine 100 unit/mL (3 mL) Inpn Commonly known as:  LANTUS SOLOSTAR U-100 INSULIN INJECT 30 units of  Subcu in am and then 30 UNITS subcu AT BEDTIME  
  
 levothyroxine 150 mcg tablet Commonly known as:  SYNTHROID Take 1 Tab by mouth Daily (before breakfast). meclizine 25 mg tablet Commonly known as:  ANTIVERT Take  by mouth three (3) times daily as needed. metFORMIN  mg tablet Commonly known as:  GLUCOPHAGE XR Take 2 Tabs by mouth daily (with dinner). Start with 1 tab, increase as tolerated. NIFEdipine ER 30 mg ER tablet Commonly known as:  ADALAT CC Take 1 Tab by mouth daily. olmesartan-hydroCHLOROthiazide 40-25 mg per tablet Commonly known as:  BENICAR HCT  
TAKE ONE TABLET BY MOUTH EVERY DAY  
  
 omeprazole 40 mg capsule Commonly known as:  PRILOSEC Take 1 Cap by mouth daily. SHINGRIX (PF) 50 mcg/0.5 mL Susr injection Generic drug:  varicella-zoster recombinant (PF)  
inject 0.5 milliliter intramuscularly  
  
 simvastatin 40 mg tablet Commonly known as:  ZOCOR Take 1 Tab by mouth nightly. Prescriptions Sent to Pharmacy Refills  
 metFORMIN ER (GLUCOPHAGE XR) 500 mg tablet 1 Sig: Take 2 Tabs by mouth daily (with dinner). Start with 1 tab, increase as tolerated. Class: Normal  
 Pharmacy: [id number], [address] [place] [address] Ph #: [phone] Route: Oral  
  
Follow-up Instructions Return in about 1 month (around 6/7/2018) for diabetes follow up, no labs prior. To-Do List   
 05/07/2018 Lab:  METABOLIC PANEL, BASIC Patient Instructions If your blood sugar drops <100, call my office for guidance. Schedule your diabetes classes Introducing [facility] & HEALTH SERVICES! Dear Rachna Davidk: Thank you for requesting a AppwoRx account. Our records indicate that you already have an active App[employer]Rx account. You can access your account anytime at [email or web address] Did you know that you can access your hospital and ER discharge instructions at any time in AppwoRx? You can also review all of your test results from your hospital stay or ER visit. Additional Information If you have questions, please visit the Frequently Asked Questions section of the AppwoRx website at [email or web address] Remember, AppwoRx is NOT to be used for urgent needs. For medical emergencies, dial 911. Now available from your iPhone and Android! Please provide this summary of care documentation to your next provider. Your primary care clinician is listed as Hailey Toth. If you have any questions after today's visit, please call [phone].

## 2018-06-07 ENCOUNTER — OFFICE VISIT (OUTPATIENT)
Dept: FAMILY MEDICINE CLINIC | Age: 68
End: 2018-06-07

## 2018-06-07 ENCOUNTER — HOSPITAL ENCOUNTER (OUTPATIENT)
Dept: LAB | Age: 68
Discharge: HOME | End: 2018-06-07
Payer: MEDICARE

## 2018-06-07 ENCOUNTER — TELEPHONE (OUTPATIENT)
Dept: FAMILY MEDICINE CLINIC | Age: 68
End: 2018-06-07

## 2018-06-07 VITALS
DIASTOLIC BLOOD PRESSURE: 72 MMHG | TEMPERATURE: 98.3 F | BODY MASS INDEX: 32.5 KG/M2 | HEART RATE: 67 BPM | HEIGHT: 63 IN | WEIGHT: 183.4 LBS | SYSTOLIC BLOOD PRESSURE: 120 MMHG | OXYGEN SATURATION: 99 % | RESPIRATION RATE: 12 BRPM

## 2018-06-07 DIAGNOSIS — R19.7 DIARRHEA, UNSPECIFIED TYPE: ICD-10-CM

## 2018-06-07 DIAGNOSIS — E55.9 VITAMIN D DEFICIENCY: ICD-10-CM

## 2018-06-07 LAB
ANION GAP SERPL CALC-SCNC: 10 MMOL/L (ref 3–18)
BUN SERPL-MCNC: 17 MG/DL (ref 7–18)
BUN/CREAT SERPL: 14 (ref 12–20)
CALCIUM SERPL-MCNC: 9.1 MG/DL (ref 8.5–10.1)
CHLORIDE SERPL-SCNC: 100 MMOL/L (ref 100–108)
CO2 SERPL-SCNC: 27 MMOL/L (ref 21–32)
CREAT SERPL-MCNC: 1.25 MG/DL (ref 0.6–1.3)
EST. AVERAGE GLUCOSE BLD GHB EST-MCNC: 260 MG/DL
GLUCOSE SERPL-MCNC: 307 MG/DL (ref 74–99)
HBA1C MFR BLD: 10.7 % (ref 4.2–5.6)
POTASSIUM SERPL-SCNC: 4.1 MMOL/L (ref 3.5–5.5)
SODIUM SERPL-SCNC: 137 MMOL/L (ref 136–145)

## 2018-06-07 PROCEDURE — 83036 HEMOGLOBIN GLYCOSYLATED A1C: CPT | Performed by: FAMILY MEDICINE

## 2018-06-07 PROCEDURE — 82306 VITAMIN D 25 HYDROXY: CPT | Performed by: FAMILY MEDICINE

## 2018-06-07 PROCEDURE — 80048 BASIC METABOLIC PNL TOTAL CA: CPT | Performed by: FAMILY MEDICINE

## 2018-06-07 RX ORDER — INSULIN GLARGINE 100 [IU]/ML
INJECTION, SOLUTION SUBCUTANEOUS
Qty: 10 PEN | Refills: 2 | Status: SHIPPED | OUTPATIENT
Start: 2018-06-07 | End: 2018-08-28 | Stop reason: SDUPTHER

## 2018-06-07 RX ORDER — INSULIN ASPART 100 [IU]/ML
INJECTION, SOLUTION INTRAVENOUS; SUBCUTANEOUS
Qty: 8 CARTRIDGE | Refills: 2 | Status: SHIPPED | OUTPATIENT
Start: 2018-06-07 | End: 2019-01-10

## 2018-06-07 RX ORDER — METFORMIN HYDROCHLORIDE 500 MG/1
2000 TABLET, EXTENDED RELEASE ORAL
Qty: 360 TAB | Refills: 1 | Status: SHIPPED | OUTPATIENT
Start: 2018-06-07 | End: 2018-08-06 | Stop reason: SDUPTHER

## 2018-06-07 RX ORDER — ERGOCALCIFEROL 1.25 MG/1
50000 CAPSULE ORAL
Qty: 20 CAP | Refills: 1 | Status: SHIPPED | OUTPATIENT
Start: 2018-06-07 | End: 2019-01-28 | Stop reason: ALTCHOICE

## 2018-06-07 NOTE — TELEPHONE ENCOUNTER
Fax received from [facility] my meds stating prior [facility] Lite Test Strips. Submit a PA request  1. Go to key. Trumaker and click \"Enter a Key\"  2. Patient last name: Joanna Whatley      : Zackery      Valdivia: 48  3.  Click \"start a PA\", complete the form, and \"send to plan\"     See attached scanned document

## 2018-06-07 NOTE — PROGRESS NOTES
Chief Complaint   Patient presents with    Follow-up    Diabetes       Pt preferred language for health care discussion is english. Is someone accompanying this pt? no    Is the patient using any DME equipment during OV? no    Depression Screening:  PHQ over the last two weeks 12/22/2014 10/6/2014   PHQ Not Done - Active Diagnosis of Depression or Bipolar Disorder   Little interest or pleasure in doing things Several days -   Feeling down, depressed or hopeless Several days -   Total Score PHQ 2 2 -       Learning Assessment:  Learning Assessment 4/16/2018 5/14/2015 2/14/2013   PRIMARY LEARNER Patient Patient Patient   PRIMARY LANGUAGE ENGLISH ENGLISH ENGLISH   LEARNER PREFERENCE PRIMARY DEMONSTRATION LISTENING LISTENING     READING READING DEMONSTRATION     LISTENING DEMONSTRATION READING   ANSWERED BY Jacque patient patient   RELATIONSHIP SELF SELF SELF       Abuse Screening:  Abuse Screening Questionnaire 10/6/2014   Do you ever feel afraid of your partner? N   Are you in a relationship with someone who physically or mentally threatens you? N   Is it safe for you to go home? Y       Fall Risk  Fall Risk Assessment, last 12 mths 6/7/2018 4/16/2018 11/8/2017 8/30/2016 6/21/2016 2/17/2016 12/22/2014   Able to walk? Yes Yes Yes Yes Yes Yes Yes   Fall in past 12 months? No No No No No No No         Health Maintenance reviewed and discussed per provider. Pt is due for   Health Maintenance Due   Topic Date Due    ZOSTER VACCINE AGE 60>  09/17/2009    EYE EXAM RETINAL OR DILATED Q1  02/08/2014    DTaP/Tdap/Td series (1 - Tdap) 12/23/2014    GLAUCOMA SCREENING Q2Y  02/08/2015    MEDICARE YEARLY EXAM  03/20/2018     Please order/place referral if appropriate. Advance Directive:  1. Do you have an advance directive in place? Patient Reply:no        Coordination of Care:  1. Have you been to the ER, urgent care clinic since your last visit? Hospitalized since your last visit? no    2.  Have you seen or consulted any other health care providers outside of the [address] [place] [address] since your last visit? Include any pap smears or colon screening. no    Pharmacy verified  Care Team verified and updated. Please see Red banners under Allergies, Med rec, Immunizations to remove outside inquires. All correct information has been verified with patient and added to chart. Patient has eye appointment in July with Dr. Antonia Zarate.

## 2018-06-07 NOTE — MR AVS SNAPSHOT
[facility] [address] [facility] [facility] [address] 
[phone] Patient: Ale Zhang MRN: [id number] HOV:[id number] Visit Information Date & Time Provider Department Dept. Phone Encounter #  
 6/7/2018 11:30 AM Sugar Busby MD [facility] [phone] [id number] Follow-up Instructions Return in about 1 month (around 7/7/2018) for diabetes follow up, [address] same day, stool evaluation if unresolved by fiber. Upcoming Health Maintenance Date Due ZOSTER VACCINE AGE 60> 9/17/2009 EYE EXAM RETINAL OR DILATED Q1 2/8/2014 DTaP/Tdap/Td series (1 - Tdap) 12/23/2014 GLAUCOMA SCREENING Q2Y 2/8/2015 MEDICARE YEARLY EXAM 3/20/2018 Influenza Age 5 to Adult 8/1/2018 HEMOGLOBIN A1C Q6M 10/16/2018 COLONOSCOPY 2/11/2019 MICROALBUMIN Q1 4/16/2019 LIPID PANEL Q1 4/16/2019 FOOT EXAM Q1 5/7/2019 BREAST CANCER SCRN MAMMOGRAM 2/12/2020 Allergies as of 6/7/2018  Review Complete On: 6/7/2018 By: Sugar Busby MD  
  
 Severity Noted Reaction Type Reactions Darvocet A500 [Propoxyphene N-acetaminophen]  05/08/2017    Nausea and Vomiting Gloves, Latex With Aloe Vera  08/19/2016    Contact Dermatitis Rash from powder inside latex gloves Current Immunizations  Reviewed on 5/7/2018 Name Date Influenza High Dose Vaccine PF 11/8/2017, 1/9/2017, 11/11/2015 Influenza Vaccine (Quad) PF 10/6/2014 Influenza Vaccine PF 10/31/2013 Pneumococcal Conjugate (PCV-13) 1/9/2017 Pneumococcal Polysaccharide (PPSV-23) 5/14/2015, 10/31/2013 Td, Adsorbed PF 12/22/2014 Not reviewed this visit You Were Diagnosed With   
  
 Codes Comments Vitamin D deficiency    -  Primary ICD-10-CM: E55.9 ICD-9-CM: 268.9 Uncontrolled type 2 diabetes mellitus with microalbuminuria, with long-term current use of insulin (HCC)     ICD-10-CM: E11.29, E11.65, R80.9, Z79.4 ICD-9-CM: 250.42, 791.0, V58.67 Vitals BP Pulse Temp Resp Height(growth percentile) Weight(growth percentile) 120/72 (BP 1 Location: Right arm, BP Patient Position: Sitting) 67 98.3 °F (36.8 °C) (Oral) 12 5' 3\" (1.6 m) 183 lb 6.4 oz (83.2 kg) LMP SpO2 BMI OB Status Smoking Status 11/17/1972 99% 32.49 kg/m2 Hysterectomy Never Smoker BMI and BSA Data Body Mass Index Body Surface Area  
 32.49 kg/m 2 1.92 m 2 Preferred Pharmacy Pharmacy Name Phone [facility] [address], [address] Alyssa Cunningham [phone] Your Updated Medication List  
  
   
This list is accurate as of 6/7/18 12:20 PM.  Always use your most recent med list. ALCOHOL PREP PADS Padm Generic drug:  alcohol swabs BLADDER CONTROL PADS Pads Generic drug:  Incontinence Pad, Liner, Disp diphenoxylate-atropine 2.5-0.025 mg per tablet Commonly known as:  LOMOTIL Take 1 Tab by mouth four (4) times daily as needed for Diarrhea. Max Daily Amount: 4 Tabs.  
  
 ergocalciferol 50,000 unit capsule Commonly known as:  VITAMIN D2 Take 1 Cap by mouth every seven (7) days. Start with 1 cap daily for 3 days FIBER PO Take  by mouth. glucose blood VI test strips strip Commonly known as:  FREESTYLE LITE STRIPS Blood sugar check 3-4 times a day due to fluctuating blood sugar  
  
 insulin aspart U-100 100 unit/mL Crtg Commonly known as:  Bonnie Esteves Sliding Scale: 100-150 = 6 units; 150-200 = 8 units; 200-250 = 10 units; 250-300= 12 units; 300-350 = 14 units; 400 = Emergency room  
  
 insulin glargine 100 unit/mL (3 mL) Inpn Commonly known as:  LANTUS SOLOSTAR U-100 INSULIN INJECT 30 units of  Subcu in am and then 30 UNITS subcu AT BEDTIME  
  
 levothyroxine 150 mcg tablet Commonly known as:  SYNTHROID Take 1 Tab by mouth Daily (before breakfast). meclizine 25 mg tablet Commonly known as:  ANTIVERT  
 Take  by mouth three (3) times daily as needed. metFORMIN  mg tablet Commonly known as:  GLUCOPHAGE XR Take 4 Tabs by mouth daily (with dinner). NIFEdipine ER 30 mg ER tablet Commonly known as:  ADALAT CC Take 1 Tab by mouth daily. olmesartan-hydroCHLOROthiazide 40-25 mg per tablet Commonly known as:  BENICAR HCT  
TAKE ONE TABLET BY MOUTH EVERY DAY  
  
 omeprazole 40 mg capsule Commonly known as:  PRILOSEC Take 1 Cap by mouth daily. SHINGRIX (PF) 50 mcg/0.5 mL Susr injection Generic drug:  varicella-zoster recombinant (PF)  
inject 0.5 milliliter intramuscularly  
  
 simvastatin 40 mg tablet Commonly known as:  ZOCOR Take 1 Tab by mouth nightly. Prescriptions Sent to Pharmacy Refills  
 insulin aspart U-100 (NOVOLOG) 100 unit/mL crtg 2 Sig: Sliding Scale: 100-150 = 6 units; 150-200 = 8 units; 200-250 = 10 units; 250-300= 12 units; 300-350 = 14 units; 400 = Emergency room Class: Normal  
 [facility]: [id number] [address] [place] [address] Ph #: [phone]  
 insulin glargine (LANTUS SOLOSTAR U-100 INSULIN) 100 unit/mL (3 mL) inpn 2 Sig: INJECT 30 units of  Subcu in am and then 30 UNITS subcu AT BEDTIME Class: Normal  
 [facility]: [id number] [address] [place] [address] Ph #: [phone]  
 glucose blood VI test strips (FREESTYLE LITE STRIPS) strip 11 Sig: Blood sugar check 3-4 times a day due to fluctuating blood sugar Class: Normal  
 [facility]: [id number] [address] [place] [address] Ph #: [phone]  
 metFORMIN ER (GLUCOPHAGE XR) 500 mg tablet 1 Sig: Take 4 Tabs by mouth daily (with dinner). Class: Normal  
 [facility]: [id number] [address] [place] [facility] Ph #: [phone] Route: Oral  
 ergocalciferol (VITAMIN D2) 50,000 unit capsule 1 Sig: Take 1 Cap by mouth every seven (7) days. Start with 1 cap daily for 3 days Class: Normal  
 Pharmacy: [facility] [place], [address] [place] [address] Ph #: [phone] Route: Oral  
  
Follow-up Instructions Return in about 1 month (around 7/7/2018) for diabetes follow up, [address] same day, stool evaluation if unresolved by fiber. To-Do List   
 06/07/2018 Lab:  HEMOGLOBIN A1C WITH EAG   
  
 06/07/2018 Lab:  METABOLIC PANEL, BASIC   
  
 06/07/2018 Lab:  VITAMIN D, 25 HYDROXY Patient Instructions Metamucil or Citrucel is excellent for BOTH loose stools and constipation. Schedule diabetes classes Schedule eye exam 
 
 
 
 
  
Introducing [facility]! Dear Sindi Flannery: Thank you for requesting a Dwllr account. Our records indicate that you already have an active Dwllr account. You can access your account anytime at [email or web address]. [email or web address] Did you know that you can access your hospital and ER discharge instructions at any time in Dwllr? You can also review all of your test results from your hospital stay or ER visit. Additional Information If you have questions, please visit the Frequently Asked Questions section of the Dwllr website at [email or web address] Remember, Dwllr is NOT to be used for urgent needs. For medical emergencies, dial 911. Now available from your iPhone and Android! Please provide this summary of care documentation to your next provider. Your primary care clinician is listed as Jael Martinez. If you have any questions after today's visit, please call [phone].

## 2018-06-07 NOTE — PATIENT INSTRUCTIONS
Metamucil or Citrucel is excellent for BOTH loose stools and constipation.     Schedule diabetes classes    Schedule eye exam

## 2018-06-07 NOTE — PROGRESS NOTES
Celia Zazueta is a 75 y.o. female who was seen in clinic today (6/7/2018). Assessment & Plan:       ICD-10-CM ICD-9-CM    1. Uncontrolled type 2 diabetes mellitus with microalbuminuria, with long-term current use of insulin (HCC) E11.29 250.42 insulin aspart U-100 (NOVOLOG) 100 unit/mL crtg    E11.65 791.0 insulin glargine (LANTUS SOLOSTAR U-100 INSULIN) 100 unit/mL (3 mL) inpn    R80.9 V58.67 glucose blood VI test strips (FREESTYLE LITE STRIPS) strip    Z79.4  metFORMIN ER (GLUCOPHAGE XR) 500 mg tablet      METABOLIC PANEL, BASIC      HEMOGLOBIN A1C WITH EAG   2. Vitamin D deficiency E55.9 268.9 VITAMIN D, 25 HYDROXY      ergocalciferol (VITAMIN D2) 50,000 unit capsule   3. Diarrhea, unspecified type R19.7 787.91      Uncontrolled  Schedule classes and eye exam  Increase metformin to 2000 mg daily - may be limited by aggravation of diarrhea    Resume Vit d replacement to facilitate statin retrial    Add fiber        Follow-up Disposition:  Return in about 1 month (around 7/7/2018) for diabetes follow up, [facility] same day, stool evaluation if unresolved by fiber. Subjective:     FU DM2 with microalbuminuria. Last month:  On Lantus  Novolog SSI pre meal    Home glucoses:  Fasting: most 180-200,  116 outlier, 295 outlier  PP lunch if queasy: mid 200s-300s prior to illness  PP dinner: 160-400    Initiated: Metformin: Start low, go slow. Up to 1000 mg daily x 1 week  Schedule class (not done)  Schedule eye exam (not done)    Home glucoses since return from [address] [facility] 3 weeks ago:  Fasting: 160-170, lowest was 81, 129; highest 234  PP lunch: NA  PP dinner: 170-250, rare 363 400 \"I know what happened\" - a couple of cookies, banana after meatloaf and mashed potatoes - took 18 units short acting insulin, down to 200s within 2 hours    Vicco: miserable twofold:  \"blisters on blisters\" on her feet  Bowel incontinence daily \"for a while\" approx 1 year. Prior PCM had referred her to GI. Lost insurance and didn't go. Gradually worsening x 3 months (prior to metformin)  Has had RUQ US  Also had colo 2014, q5 due to polyps  Occasionally with constipation  No change with addition metformin      Hyperlipidemia: Uncontrolled. Prior intolerance to Lipitor. Focus on DM control first.    Notes prior tx for vit d def. None currently. Patient Care Team:  Bernard Perez MD as PCP - General (Family Practice)  Tessie Vidal MD (Gastroenterology)  Allergies   Allergen Reactions    Darvocet A500 [Propoxyphene N-Acetaminophen] Nausea and Vomiting    Gloves, Latex With Aloe Vera Contact Dermatitis     Rash from powder inside latex gloves       Outpatient Prescriptions Marked as Taking for the 6/7/18 encounter (Office Visit) with Bernard Perez MD   Medication Sig Dispense Refill    SHINGRIX, PF, 50 mcg/0.5 mL susr injection inject 0.5 milliliter intramuscularly  0    metFORMIN ER (GLUCOPHAGE XR) 500 mg tablet Take 2 Tabs by mouth daily (with dinner). Start with 1 tab, increase as tolerated. 60 Tab 1    insulin aspart U-100 (NOVOLOG) 100 unit/mL crtg Sliding Scale: 100-150 = 6 units; 150-200 = 8 units; 200-250 = 10 units; 250-300= 12 units; 300-350 = 14 units; 400 = Emergency room 8 Cartridge 2    simvastatin (ZOCOR) 40 mg tablet Take 1 Tab by mouth nightly. 90 Tab 4    insulin glargine (LANTUS SOLOSTAR U-100 INSULIN) 100 unit/mL (3 mL) inpn INJECT 30 units of  Subcu in am and then 30 UNITS subcu AT BEDTIME 10 Pen 2    levothyroxine (SYNTHROID) 150 mcg tablet Take 1 Tab by mouth Daily (before breakfast). 90 Tab 1    NIFEdipine ER (ADALAT CC) 30 mg ER tablet Take 1 Tab by mouth daily. 90 Tab 0    olmesartan-hydroCHLOROthiazide (BENICAR HCT) 40-25 mg per tablet TAKE ONE TABLET BY MOUTH EVERY DAY 90 Tab 0    diphenoxylate-atropine (LOMOTIL) 2.5-0.025 mg per tablet Take 1 Tab by mouth four (4) times daily as needed for Diarrhea. Max Daily Amount: 4 Tabs.  30 Tab 1    BLADDER CONTROL PADS pads       ALCOHOL PREP PADS padm  glucose blood VI test strips (FREESTYLE LITE STRIPS) strip Blood sugar check 3-4 times a day due to fluctuating blood sugar 300 Strip 11    omeprazole (PRILOSEC) 40 mg capsule Take 1 Cap by mouth daily. 90 Cap 2    meclizine (ANTIVERT) 25 mg tablet Take  by mouth three (3) times daily as needed.  PSYLLIUM SEED, WITH DEXTROSE, (FIBER PO) Take  by mouth.  Cholecalciferol, Vitamin D3, 5,000 unit Tab Take  by mouth.            Patient Active Problem List    Diagnosis    Uncontrolled type 2 diabetes mellitus with microalbuminuria, with long-term current use of insulin ([facility]    Family history of malignant neoplasm of breast    Diabetic nephropathy associated with type 2 diabetes mellitus ([facility]    Lump or mass in breast    Hypertension    Acquired hypothyroidism    Hiatal hernia     EGD 11/14/2017 Birgit Strickland MD \"prn acid suppression\"      Hyperlipidemia    Depression    Migraines     Past Medical History:   Diagnosis Date    Depression     Diabetes ([facility]     GERD (gastroesophageal reflux disease)     Hiatal hernia     Hypercholesterolemia     Hypertension     Migraines     Thyroid disease     Urinary tract infection        Family History   Problem Relation Age of Onset    Diabetes Mother     Hypertension Mother     Cancer Mother     Kidney Disease Mother     Diabetes Father     Heart Disease Father     Hypertension Father     Diabetes Maternal Grandmother     Breast Cancer Maternal Grandmother     Cancer Maternal Grandfather     Breast Cancer Maternal Aunt      Social History     Social History    Marital status:      Spouse name: N/A    Number of children: N/A    Years of education: N/A     Occupational History    retired      [employer]     Social History Main Topics    Smoking status: Never Smoker    Smokeless tobacco: Never Used    Alcohol use No    Drug use: No    Sexual activity: No     Other Topics Concern    Caffeine Concern Yes     4 cups a day    Exercise No    Seat Belt Yes     Social History Narrative         Review of Systems   Constitutional: Negative for fever and unexpected weight change. Gastrointestinal: Positive for blood in stool (cw years of experience with hemorrhoids, no melena). Negative for nausea and vomiting. Objective:     Physical Exam   Constitutional: She is oriented to person, place, and time. She appears well-developed. No distress. Pleasant obese white female   HENT:   Head: Normocephalic and atraumatic. Cardiovascular: Normal rate, regular rhythm and normal heart sounds. No murmur heard. Pulmonary/Chest: Effort normal and breath sounds normal. No respiratory distress. She has no wheezes. She has no rales. Musculoskeletal: She exhibits no edema. Neurological: She is alert and oriented to person, place, and time. Skin: Skin is warm and dry. Psychiatric: She has a normal mood and affect. Her behavior is normal. Judgment and thought content normal.     Visit Vitals    /72 (BP 1 Location: Right arm, BP Patient Position: Sitting)    Pulse 67    Temp 98.3 °F (36.8 °C) (Oral)    Resp 12    Ht 5' 3\" (1.6 m)    Wt 183 lb 6.4 oz (83.2 kg)    LMP 11/17/1972  Comment: partial    SpO2 99%    BMI 32.49 kg/m2        Lab Results   Component Value Date/Time    Hemoglobin A1c 10.9 (H) 04/16/2018 03:01 PM    Hemoglobin A1c 10.9 (H) 05/14/2015 12:17 PM    Hemoglobin A1c 9.1 (H) 10/06/2014 11:17 AM    Hemoglobin A1c, External 11.1 03/21/2016    Glucose 291 (H) 05/07/2018 11:35 AM    Glucose (POC) 139 (H) 08/19/2016 02:14 PM    Microalbumin/Creat ratio (mg/g creat) 444 (H) 04/16/2018 03:01 PM    Microalbumin,urine random 31.70 (H) 04/16/2018 03:01 PM    LDL, calculated 234.2 (H) 04/16/2018 03:01 PM    Creatinine 1.51 (H) 05/07/2018 11:35 AM     No results found for: VITD3, Lara Rice, VD3RIA      Disclaimer: The patient understands our medical plan. Alternatives have been explained and offered. The risks, benefits and significant side effects of all medications have been reviewed. Anticipated time course and progression of condition reviewed. All questions have been addressed. She is encouraged to employ the information provided in the after visit summary, which was reviewed. Where appropriate, She is instructed to call the clinic if she has not been notified either by phone or through [address] with the results of her tests or with an appointment plan for any referrals within 1 week(s). No news is not good news; it's no news. The patient  is to call if her condition worsens or fails to improve or if significant side effects are experienced. Aspects of this note may have been generated using voice recognition software. Despite editing, there may be unrecognized errors.        Joe Ballard MD

## 2018-06-08 LAB — 25(OH)D3 SERPL-MCNC: 19.3 NG/ML (ref 30–100)

## 2018-06-08 RX ORDER — BLOOD-GLUCOSE METER
1 EACH MISCELLANEOUS 4 TIMES DAILY
Qty: 1 EACH | Refills: 0 | Status: SHIPPED | OUTPATIENT
Start: 2018-06-08

## 2018-06-08 NOTE — PROGRESS NOTES
Results released to patient via [employer]. No change in management plan with fu next month.

## 2018-06-08 NOTE — TELEPHONE ENCOUNTER
Patient called back and states she spoke with [facility], and they will cover the Accu strips and meter. If this is what Dr. Darline Chery for strips she will need the meter too.

## 2018-06-08 NOTE — TELEPHONE ENCOUNTER
PA sent to Tommy this was the result: This request has received an Unfavorable outcome. Please note any additional information provided by Tommy at the bottom of your screen.   You will also receive a faxed copy of the determination

## 2018-06-08 NOTE — TELEPHONE ENCOUNTER
Called patient informed her that insurance will not cover the test strips. Advised patient to contact insurance company to find our which test strips they will cover.

## 2018-07-09 ENCOUNTER — OFFICE VISIT (OUTPATIENT)
Dept: FAMILY MEDICINE CLINIC | Age: 68
End: 2018-07-09

## 2018-07-09 VITALS
TEMPERATURE: 97.5 F | DIASTOLIC BLOOD PRESSURE: 60 MMHG | RESPIRATION RATE: 14 BRPM | BODY MASS INDEX: 32.04 KG/M2 | HEART RATE: 79 BPM | OXYGEN SATURATION: 98 % | HEIGHT: 63 IN | WEIGHT: 180.8 LBS | SYSTOLIC BLOOD PRESSURE: 126 MMHG

## 2018-07-09 VITALS
OXYGEN SATURATION: 98 % | DIASTOLIC BLOOD PRESSURE: 60 MMHG | TEMPERATURE: 97.5 F | HEIGHT: 63 IN | BODY MASS INDEX: 32.04 KG/M2 | WEIGHT: 180.8 LBS | RESPIRATION RATE: 14 BRPM | HEART RATE: 79 BPM | SYSTOLIC BLOOD PRESSURE: 126 MMHG

## 2018-07-09 DIAGNOSIS — M94.9 DISORDER OF BONE AND CARTILAGE: ICD-10-CM

## 2018-07-09 DIAGNOSIS — M89.9 DISORDER OF BONE AND CARTILAGE: ICD-10-CM

## 2018-07-09 DIAGNOSIS — Z00.00 MEDICARE ANNUAL WELLNESS VISIT, INITIAL: Primary | ICD-10-CM

## 2018-07-09 DIAGNOSIS — E11.21 DIABETIC NEPHROPATHY ASSOCIATED WITH TYPE 2 DIABETES MELLITUS (HCC): ICD-10-CM

## 2018-07-09 DIAGNOSIS — E03.9 ACQUIRED HYPOTHYROIDISM: ICD-10-CM

## 2018-07-09 DIAGNOSIS — Z12.11 SCREEN FOR COLON CANCER: ICD-10-CM

## 2018-07-09 DIAGNOSIS — F32.1 MODERATE MAJOR DEPRESSION (HCC): ICD-10-CM

## 2018-07-09 DIAGNOSIS — E55.9 VITAMIN D DEFICIENCY: ICD-10-CM

## 2018-07-09 DIAGNOSIS — R68.89 OTHER GENERAL SYMPTOMS AND SIGNS: ICD-10-CM

## 2018-07-09 NOTE — PATIENT INSTRUCTIONS
Medicare Wellness Visit, Female    The best way to improve and maintain good health is to have a healthy lifestyle by eating a well-balanced diet, exercising regularly, limiting alcohol and stopping smoking. Regular visits with your physician or non-physician health care provider also support your good health. Preventive screening tests can find health problems before they become diseases or illnesses. Here is a list of your current Health Maintenance items with a due date:  Health Maintenance   Topic Date Due    ZOSTER VACCINE AGE 60>  09/17/2009    DTaP/Tdap/Td series (1 - Tdap) 12/23/2014    MEDICARE YEARLY EXAM  03/20/2018    Influenza Age 5 to Adult  08/01/2018    EYE EXAM RETINAL OR DILATED Q1  09/07/2018    HEMOGLOBIN A1C Q6M  12/07/2018    COLONOSCOPY  02/11/2019    MICROALBUMIN Q1  04/16/2019    LIPID PANEL Q1  04/16/2019    FOOT EXAM Q1  05/07/2019    BREAST CANCER SCRN MAMMOGRAM  02/12/2020    GLAUCOMA SCREENING Q2Y  06/05/2020    Hepatitis C Screening  Completed    Bone Densitometry (Dexa) Screening  Completed    Pneumococcal 65+ Low/Medium Risk  Completed       Preventive services such as immunizations prevent serious infections. All people over age 72 should have a Pneumovax and a Prevnar-13 shot to prevent potentially life threatening infections with the pneumococcus bacteria, a common cause of pneumonia. These are once in a lifetime unless you and your provider decide differently. All people over 65 should have a yearly influenza vaccine or \"flu\" shot. This does not prevent infection with cold viruses but has been proven to prevent hospitalization and death from influenza. Although Medicare part B \"regular Medicare\" currently only covers tetanus vaccination in the context of an injury, a tetanus vaccine (Tdap or Td) is recommended every 10 years.     A new 2 shot shingles vaccine series (Shingrix) is recommended after age 48 even for people who have already received Zostavax (the old vaccine). It is also not covered by Medicare part B. Note, however, that both the Shingles vaccine and Tdap/Td are generally covered by secondary carriers. Please check your coverage and out of pocket expenses. Consider contacting your local health department because it may stock these vaccines for a reasonable charge. We currently have documentation of the following immunization history for you:  Immunization History   Administered Date(s) Administered    Influenza High Dose Vaccine PF 11/11/2015, 01/09/2017, 11/08/2017    Influenza Vaccine (Quad) PF 10/06/2014    Influenza Vaccine PF 10/31/2013    Pneumococcal Conjugate (PCV-13) 01/09/2017    Pneumococcal Polysaccharide (PPSV-23) 10/31/2013, 05/14/2015    Td, Adsorbed PF 12/22/2014           Screening for infection with Hepatitis C is recommended for anyone born between [birth year] through 1965. The table at the top of this document indicates the status of this and other preventive services. A bone mass density test (DEXA) to screen for osteoporosis or thinning of the bones should be done at least once after age 72 and may be done up to every 2 years as determined by you and your health care provider. The most recent DEXA we have on file for you is:  DEXA Results (most recent):    [facility]   DEXA BONE DENSITY STUDY AXIAL  Narrative DXA BONE DENSITOMETRY, CENTRAL    CPT CODE: 23616     INDICATION: POST MENOPAUSAL    TECHNIQUE: Using GE LUNAR Prodigy densitometer, bone density measurement was  performed in the lumbar spine, the proximal left and right femora      . T Score  refers to standard deviations above or below average compared to a young adult  of the same sex. Z Score refers to standard deviations above or below average  compared to a patient of the same sex, age, race and weight. COMPARISON: None available.     FINDINGS:     Lumbar Spine Levels: L1-L4  Mean Bone Mineral Density (BMD):  1.065 g/cm2    T Score: -1.0   Z Score: -0.2       Left Total Proximal Femur BMD: 0.917 g/cm2    T Score:  -0.7    Z Score:  0.0      Right Total Proximal Femur BMD: 0.937 g/cm2   T Score:  -0.6       Z Score:  0.1        Left Femoral Neck BMD:  0.869 g/cm2    T Score: -1.2  Z Score: -0.2      Right Femoral Neck BMD:  0.873 g/cm2    T Score: -1.2  Z Score: -0.2        Impression IMPRESSION:    BMD MEASURES  . CONSISTENT WITH OSTEOPENIA. Liza Jordan Based upon current ISCD guidelines, the patient's overall diagnostic category,  selected using WHO criteria in postmenopausal women and males aged 48 and above,  is selected based upon the lowest T Score from among the lumbar spine, total  femur, femoral neck, (or distal third radius if measured). In men under age 48, premenopausal women who are not otherwise hormone  deficient, and children, current ISCD guidelines suggest that a Z Score higher  than -2.0 be considered within range of normal limits for age. WHO Definition of Osteoporosis and Osteopenia on DXA (specified for post  menopausal  females):     Normal:                     T Score at or above -1 SD   Osteopenia:              T Score between -1 and -2.5 SD   Osteoporosis:          T Score at or below -2.5 SD    The risk of fracture approximately doubles for each 1 SD decrease in T Score. It is important to consider other factors in assessing a patient's risk of  fracture, including age, risk of falling/injury, history of fragility fracture,  family history of osteoporosis, smoking, low weight. It is also important to note that DXA measures bone density but does not  distinguish among causes of decreased bone density, which include primary versus  secondary osteoporosis (such as metabolic bone disorders or possible effects of  medications) and also other conditions (such as osteomalacia).   Clinical  considerations should determine what additional evaluation may be warranted to  exclude secondary conditions in a patient with low bone density. Please note that reliable, valid comparisons can not be made between studies  which have been performed on different densitometers. If clinically warranted,  follow up study performed at this site would best permit assessment of trend for  possible change in bone mineral density over time in comparison to this study. Thank you for this referral.         Screening for diabetes mellitus with a blood sugar test (glucose) should be done at least every 3 years until age 79. You and your health care provider may decide whether to continue screening after age 79. The most recent blood glucose we have on file for you is: Lab Results   Component Value Date/Time    Glucose 307 (H) 06/07/2018 12:40 PM    Glucose (POC) 139 (H) 08/19/2016 02:14 PM       Fasting sugars >126 on 2 separate occassions are consistent with diabetes. Random sugars >200 on 2 separate occassions are consistent with diabetes    Glaucoma is a disease of the eye due to increased ocular pressure that can lead to blindness. People with risk factors for glaucoma ( race, diabetes, family history) should consider screening at least every 2 years by an eye professional.     Cardiovascular screening tests that check for elevated lipids or cholesterol (fatty part of blood) which can lead to heart disease and strokes should be done every 4-6 years through age 79. You and your health care provider may decide whether to continue screening after age 79.  The most recent lipid panel we have on file for you is:   Lab Results   Component Value Date/Time    Cholesterol, total 324 (H) 04/16/2018 03:01 PM    HDL Cholesterol 46 04/16/2018 03:01 PM    LDL, calculated 234.2 (H) 04/16/2018 03:01 PM    VLDL, calculated 43.8 04/16/2018 03:01 PM    Triglyceride 219 (H) 04/16/2018 03:01 PM    CHOL/HDL Ratio 7.0 (H) 04/16/2018 03:01 PM       Colorectal cancer screening that evaluates for blood or polyps in your colon for people with average risk should be done yearly as a stool test, every five years as a flexible sigmoidoscope or every 10 years as a colonoscopy up to age 76. You and your health care provider may decide whether to continue screening after age 76. Breast cancer screening with a mammogram is recommended at least once every 2 years  for women age 54-69. You and your health care provider may decide whether to continue screening after age 76. The most recent mammogram we have on file for you is:   [facility] Results (most recent):    Results from Hospital Encounter encounter on 02/12/18   MIKAYLA 3D SAVI W MAMMO BI SCREENING INCL CAD  Narrative SCREENING MAMMOGRAM  BILATERAL  3D tomosynthesis    HISTORY: Screening. Reported history of benign right breast biopsy    COMPARISON: 2017, 2016, 2014    TECHNIQUE: Digital mammography (2-D and 3-D) was performed with CAD. Routine  views were performed. FINDINGS:   No suspicious mass or suspicious calcifications are seen . Impression IMPRESSION:      No evidence for malignancy. Recommend routine annual followup. BIRADS 1:  Negative  Breast Density B: There are scattered areas of fibroglandular density. Screening for cervical cancer with a pap smear is recommended for all women with a cervix until age 72. The frequency of this test is based on the details of her prior pap smear testing. You and your health care provider may decide whether to continue screening after age 72. You are done with cervical cancer screening. People who have smoked the equivalent of 1 pack per day for 30 years or more may benefit from screening for lung cancer with a yearly low dose CT scan until they have been non smokers for 15 years or competing health conditions render this unlikely to be beneficial. Our records show:N/A    Your Medicare Wellness Exam is recommended annually.          Well Visit, Over 72: Care Instructions  Your Care Instructions    Physical exams can help you stay healthy. Your doctor has checked your overall health and may have suggested ways to take good care of yourself. He or she also may have recommended tests. At home, you can help prevent illness with healthy eating, regular exercise, and other steps. Follow-up care is a key part of your treatment and safety. Be sure to make and go to all appointments, and call your doctor if you are having problems. It's also a good idea to know your test results and keep a list of the medicines you take. How can you care for yourself at home? · Reach and stay at a healthy weight. This will lower your risk for many problems, such as obesity, diabetes, heart disease, and high blood pressure. · Get at least 30 minutes of exercise on most days of the week. Walking is a good choice. You also may want to do other activities, such as running, swimming, cycling, or playing tennis or team sports. · Do not smoke. Smoking can make health problems worse. If you need help quitting, talk to your doctor about stop-smoking programs and medicines. These can increase your chances of quitting for good. · Protect your skin from too much sun. When you're outdoors from 10 a.m. to 4 p.m., stay in the shade or cover up with clothing and a hat with a wide brim. Wear sunglasses that block UV rays. Even when it's cloudy, put broad-spectrum sunscreen (SPF 30 or higher) on any exposed skin. · See a dentist one or two times a year for checkups and to have your teeth cleaned. · Wear a seat belt in the car. · Limit alcohol to 2 drinks a day for men and 1 drink a day for women. Too much alcohol can cause health problems. Follow your doctor's advice about when to have certain tests. These tests can spot problems early. For men and women  · Cholesterol. Your doctor will tell you how often to have this done based on your overall health and other things that can increase your risk for heart attack and stroke. · Blood pressure.  Have your blood pressure checked during a routine doctor visit. Your doctor will tell you how often to check your blood pressure based on your age, your blood pressure results, and other factors. · Diabetes. Ask your doctor whether you should have tests for diabetes. · Vision. Experts recommend that you have yearly exams for glaucoma and other age-related eye problems. · Hearing. Tell your doctor if you notice any change in your hearing. You can have tests to find out how well you hear. · Colon cancer tests. Keep having colon cancer tests as your doctor recommends. You can have one of several types of tests. · Heart attack and stroke risk. At least every 4 to 6 years, you should have your risk for heart attack and stroke assessed. Your doctor uses factors such as your age, blood pressure, cholesterol, and whether you smoke or have diabetes to show what your risk for a heart attack or stroke is over the next 10 years. · Osteoporosis. Talk to your doctor about whether you should have a bone density test to find out whether you have thinning bones. Also ask your doctor about whether you should take calcium and vitamin D supplements. For women  · Pap test and pelvic exam. You may no longer need a Pap test. Talk with your doctor about whether to stop or continue to have Pap tests. · Breast exam and mammogram. Ask how often you should have a mammogram, which is an X-ray of your breasts. A mammogram can spot breast cancer before it can be felt and when it is easiest to treat. · Thyroid disease. Talk to your doctor about whether to have your thyroid checked as part of a regular physical exam. Women have an increased chance of a thyroid problem. For men  · Prostate exam. Talk to your doctor about whether you should have a blood test (called a PSA test) for prostate cancer. Experts disagree on whether men should have this test. Some experts recommend that you discuss the benefits and risks of the test with your doctor.   · Abdominal aortic aneurysm. Ask your doctor whether you should have a test to check for an aneurysm. You may need a test if you ever smoked or if your parent, brother, sister, or child has had an aneurysm. When should you call for help? Watch closely for changes in your health, and be sure to contact your doctor if you have any problems or symptoms that concern you. Where can you learn more? Go to http://chasidy-ena.info/. Enter I140 in the search box to learn more about \"Well Visit, Over 65: Care Instructions. \"  Current as of: May 12, 2017  Content Version: 11.4  © 3641-3190 [employer]. Care instructions adapted under license by [employer] (which disclaims liability or warranty for this information). If you have questions about a medical condition or this instruction, always ask your healthcare professional. [address] any warranty or liability for your use of this information.

## 2018-07-09 NOTE — MR AVS SNAPSHOT
[facility] [address] [place] [address] 
[phone] Patient: Denisha Dykes MRN: [id number] SBS:[id number] Visit Information Date & Time Provider Department Dept. Phone Encounter #  
 7/9/2018 10:00 AM Wyatt Nyhan, MD [facility] [phone] [id number] Follow-up Instructions Return in about 1 week (around 7/16/2018) for depression evaluation, separate for bowel issues, back pain, 1 year for Medicare Wellness. Upcoming Health Maintenance Date Due ZOSTER VACCINE AGE 60> 9/17/2009 DTaP/Tdap/Td series (1 - Tdap) 12/23/2014 MEDICARE YEARLY EXAM 3/20/2018 Influenza Age 5 to Adult 8/1/2018 EYE EXAM RETINAL OR DILATED Q1 9/7/2018 HEMOGLOBIN A1C Q6M 12/7/2018 COLONOSCOPY 2/11/2019 MICROALBUMIN Q1 4/16/2019 LIPID PANEL Q1 4/16/2019 FOOT EXAM Q1 5/7/2019 BREAST CANCER SCRN MAMMOGRAM 2/12/2020 GLAUCOMA SCREENING Q2Y 6/5/2020 Allergies as of 7/9/2018  Review Complete On: 7/9/2018 By: Wyatt Nyhan, MD  
  
 Severity Noted Reaction Type Reactions Darvocet A500 [Propoxyphene N-acetaminophen]  05/08/2017    Nausea and Vomiting Gloves, Latex With Aloe Vera  08/19/2016    Contact Dermatitis Rash from powder inside latex gloves Current Immunizations  Reviewed on 5/7/2018 Name Date Influenza High Dose Vaccine PF 11/8/2017, 1/9/2017, 11/11/2015 Influenza Vaccine (Quad) PF 10/6/2014 Influenza Vaccine PF 10/31/2013 Pneumococcal Conjugate (PCV-13) 1/9/2017 Pneumococcal Polysaccharide (PPSV-23) 5/14/2015, 10/31/2013 Td, Adsorbed PF 12/22/2014 Not reviewed this visit You Were Diagnosed With   
  
 Codes Comments Medicare annual wellness visit, initial    -  Primary ICD-10-CM: Z00.00 ICD-9-CM: V70.0 Moderate major depression (HCC)     ICD-10-CM: F32.1 ICD-9-CM: 296.22   
 Disorder of bone and cartilage     ICD-10-CM: M89.9, M94.9 ICD-9-CM: 733.90 Screen for colon cancer     ICD-10-CM: Z12.11 ICD-9-CM: V76.51 Vitals BP Pulse Temp Resp Height(growth percentile) Weight(growth percentile) 126/60 79 97.5 °F (36.4 °C) (Oral) 14 5' 3\" (1.6 m) 180 lb 12.8 oz (82 kg) LMP SpO2 BMI OB Status Smoking Status 11/17/1972 98% 32.03 kg/m2 Hysterectomy Never Smoker Vitals History BMI and BSA Data Body Mass Index Body Surface Area 32.03 kg/m 2 1.91 m 2 Preferred Pharmacy Pharmacy Name Phone [facility] [address], [address] [facility] [place] [phone] Your Updated Medication List  
  
   
This list is accurate as of 7/9/18 11:01 AM.  Always use your most recent med list. ALCOHOL PREP PADS Padm Generic drug:  alcohol swabs BLADDER CONTROL PADS Pads Generic drug:  Incontinence Pad, Liner, Disp Blood-Glucose Meter Misc Commonly known as:  ACCU-CHEK CODY PLUS METER  
1 Device by Does Not Apply route four (4) times daily. ergocalciferol 50,000 unit capsule Commonly known as:  VITAMIN D2 Take 1 Cap by mouth every seven (7) days. Start with 1 cap daily for 3 days FIBER PO Take  by mouth. * glucose blood VI test strips strip Commonly known as:  FREESTYLE LITE STRIPS Blood sugar check 3-4 times a day due to fluctuating blood sugar * glucose blood VI test strips strip Commonly known as:  ACCU-CHEK CODY PLUS TEST STRP Patient is to check blood sugar 3-4 times a day  
  
 insulin aspart U-100 100 unit/mL Crtg Commonly known as:  Ila Leung Sliding Scale: 100-150 = 6 units; 150-200 = 8 units; 200-250 = 10 units; 250-300= 12 units; 300-350 = 14 units; 400 = Emergency room  
  
 insulin glargine 100 unit/mL (3 mL) Inpn Commonly known as:  LANTUS SOLOSTAR U-100 INSULIN INJECT 30 units of  Subcu in am and then 30 UNITS subcu AT BEDTIME  
  
 levothyroxine 150 mcg tablet Commonly known as:  SYNTHROID Take 1 Tab by mouth Daily (before breakfast). meclizine 25 mg tablet Commonly known as:  ANTIVERT Take  by mouth three (3) times daily as needed. metFORMIN  mg tablet Commonly known as:  GLUCOPHAGE XR Take 4 Tabs by mouth daily (with dinner). NIFEdipine ER 30 mg ER tablet Commonly known as:  ADALAT CC Take 1 Tab by mouth daily. olmesartan-hydroCHLOROthiazide 40-25 mg per tablet Commonly known as:  BENICAR HCT  
TAKE ONE TABLET BY MOUTH EVERY DAY  
  
 omeprazole 40 mg capsule Commonly known as:  PRILOSEC Take 1 Cap by mouth daily. SHINGRIX (PF) 50 mcg/0.5 mL Susr injection Generic drug:  varicella-zoster recombinant (PF)  
inject 0.5 milliliter intramuscularly  
  
 simvastatin 40 mg tablet Commonly known as:  ZOCOR Take 1 Tab by mouth nightly. * Notice: This list has 2 medication(s) that are the same as other medications prescribed for you. Read the directions carefully, and ask your doctor or other care provider to review them with you. We Performed the Following REFERRAL FOR COLONOSCOPY [[id number] Custom] Comments:  
 Moi Turner is a 75 y.o. female due for colon cancer screening with colo. Please evaluate and treat as indicated. Thank you. Follow-up Instructions Return in about 1 week (around 7/16/2018) for depression evaluation, separate for bowel issues, back pain, 1 year for Medicare Wellness. To-Do List   
 07/12/2018 Imaging:  DEXA BONE DENSITY STUDY AXIAL Referral Information Referral ID Referred By Referred To  
  
 [id number] Dixie CAMACHO MD   
   [address] [place] [address] [address] [place] [address] [facility] [address] Phone: [phone] Fax: [phone] Visits Status Start Date End Date 1 New Request 7/9/18 7/9/19 If your referral has a status of pending review or denied, additional information will be sent to support the outcome of this decision. Patient Instructions Medicare Wellness Visit, Female The best way to improve and maintain good health is to have a healthy lifestyle by eating a well-balanced diet, exercising regularly, limiting alcohol and stopping smoking. Regular visits with your physician or non-physician health care provider also support your good health. Preventive screening tests can find health problems before they become diseases or illnesses. Here is a list of your current Health Maintenance items with a due date: 
Health Maintenance Topic Date Due  
 ZOSTER VACCINE AGE 60>  09/17/2009  DTaP/Tdap/Td series (1 - Tdap) 12/23/2014  MEDICARE YEARLY EXAM  03/20/2018  Influenza Age 5 to Adult  08/01/2018  
 EYE EXAM RETINAL OR DILATED Q1  09/07/2018  HEMOGLOBIN A1C Q6M  12/07/2018  COLONOSCOPY  02/11/2019  MICROALBUMIN Q1  04/16/2019  LIPID PANEL Q1  04/16/2019  
 FOOT EXAM Q1  05/07/2019  BREAST CANCER SCRN MAMMOGRAM  02/12/2020  GLAUCOMA SCREENING Q2Y  06/05/2020  Hepatitis C Screening  Completed  Bone Densitometry (Dexa) Screening  Completed  Pneumococcal 65+ Low/Medium Risk  Completed Preventive services such as immunizations prevent serious infections. All people over age 72 should have a Pneumovax and a Prevnar-13 shot to prevent potentially life threatening infections with the pneumococcus bacteria, a common cause of pneumonia. These are once in a lifetime unless you and your provider decide differently. All people over 65 should have a yearly influenza vaccine or \"flu\" shot. This does not prevent infection with cold viruses but has been proven to prevent hospitalization and death from influenza.  
 
Although Medicare part B \"regular Medicare\" currently only covers tetanus vaccination in the context of an injury, a tetanus vaccine (Tdap or Td) is recommended every 10 years. A new 2 shot shingles vaccine series (Shingrix) is recommended after age 48 even for people who have already received Zostavax (the old vaccine). It is also not covered by Medicare part B. Note, however, that both the Shingles vaccine and Tdap/Td are generally covered by secondary carriers. Please check your coverage and out of pocket expenses. Consider contacting your local health department because it may stock these vaccines for a reasonable charge. We currently have documentation of the following immunization history for you: 
Immunization History Administered Date(s) Administered  Influenza High Dose Vaccine PF 11/11/2015, 01/09/2017, 11/08/2017  Influenza Vaccine (Quad) PF 10/06/2014  Influenza Vaccine PF 10/31/2013  Pneumococcal Conjugate (PCV-13) 01/09/2017  Pneumococcal Polysaccharide (PPSV-23) 10/31/2013, 05/14/2015  Td, Adsorbed PF 12/22/2014 Screening for infection with Hepatitis C is recommended for anyone born between 80 through [address]. The table at the top of this document indicates the status of this and other preventive services. A bone mass density test (DEXA) to screen for osteoporosis or thinning of the bones should be done at least once after age 72 and may be done up to every 2 years as determined by you and your health care provider. The most recent DEXA we have on file for you is: DEXA Results (most recent): 
 
Results from Hospital Encounter encounter on 12/31/14 DEXA BONE DENSITY STUDY AXIAL Narrative DXA BONE DENSITOMETRY, CENTRAL 
 
CPT CODE: 23034 INDICATION: POST MENOPAUSAL 
 
TECHNIQUE: Using GE LUNAR Prodigy densitometer, bone density measurement was 
performed in the lumbar spine, the proximal left and right femora      . T Score 
refers to standard deviations above or below average compared to a young adult of the same sex. Z Score refers to standard deviations above or below average 
compared to a patient of the same sex, age, race and weight. COMPARISON: None available. FINDINGS:  
 
Lumbar Spine Levels: L1-L4 Mean Bone Mineral Density (BMD):  1.065 g/cm2 T Score: -1.0  
Z Score: -0.2 Left Total Proximal Femur BMD: 0.917 g/cm2 T Score:  -0.7 Z Score:  0.0 Right Total Proximal Femur BMD: 0.937 g/cm2 T Score:  -0.6 Z Score:  0.1 Left Femoral Neck BMD:  0.869 g/cm2 T Score: -1.2 Z Score: -0.2 Right Femoral Neck BMD:  0.873 g/cm2 T Score: -1.2 Z Score: -0.2 Impression IMPRESSION: 
 
BMD MEASURES  . CONSISTENT WITH OSTEOPENIA. Liza Jordan Based upon current ISCD guidelines, the patient's overall diagnostic category, 
selected using WHO criteria in postmenopausal women and males aged 48 and above, 
is selected based upon the lowest T Score from among the lumbar spine, total 
femur, femoral neck, (or distal third radius if measured). In men under age 48, premenopausal women who are not otherwise hormone 
deficient, and children, current ISCD guidelines suggest that a Z Score higher 
than -2.0 be considered within range of normal limits for age. WHO Definition of Osteoporosis and Osteopenia on DXA (specified for post 
menopausal  females): 
 
 Normal:                     T Score at or above -1 SD Osteopenia:              T Score between -1 and -2.5 SD Osteoporosis:          T Score at or below -2.5 SD The risk of fracture approximately doubles for each 1 SD decrease in T Score. It is important to consider other factors in assessing a patient's risk of 
fracture, including age, risk of falling/injury, history of fragility fracture, 
family history of osteoporosis, smoking, low weight.    
 
It is also important to note that DXA measures bone density but does not 
distinguish among causes of decreased bone density, which include primary versus 
secondary osteoporosis (such as metabolic bone disorders or possible effects of 
medications) and also other conditions (such as osteomalacia). Clinical 
considerations should determine what additional evaluation may be warranted to 
exclude secondary conditions in a patient with low bone density. Please note that reliable, valid comparisons can not be made between studies 
which have been performed on different densitometers. If clinically warranted, 
follow up study performed at this site would best permit assessment of trend for 
possible change in bone mineral density over time in comparison to this study. Thank you for this referral. 
  
 
 
Screening for diabetes mellitus with a blood sugar test (glucose) should be done at least every 3 years until age 79. You and your health care provider may decide whether to continue screening after age 79. The most recent blood glucose we have on file for you is: Lab Results Component Value Date/Time Glucose 307 (H) 06/07/2018 12:40 PM  
 Glucose (POC) 139 (H) 08/19/2016 02:14 PM  
 
 
Fasting sugars >126 on 2 separate occassions are consistent with diabetes. Random sugars >200 on 2 separate occassions are consistent with diabetes Glaucoma is a disease of the eye due to increased ocular pressure that can lead to blindness. People with risk factors for glaucoma ( race, diabetes, family history) should consider screening at least every 2 years by an eye professional.  
 
Cardiovascular screening tests that check for elevated lipids or cholesterol (fatty part of blood) which can lead to heart disease and strokes should be done every 4-6 years through age 79. You and your health care provider may decide whether to continue screening after age 79. The most recent lipid panel we have on file for you is:  
Lab Results Component Value Date/Time  Cholesterol, total 324 (H) 04/16/2018 03:01 PM  
 HDL Cholesterol 46 04/16/2018 03:01 PM  
 LDL, calculated 234.2 (H) 04/16/2018 03:01 PM  
 VLDL, calculated 43.8 04/16/2018 03:01 PM  
 Triglyceride 219 (H) 04/16/2018 03:01 PM  
 CHOL/HDL Ratio 7.0 (H) 04/16/2018 03:01 PM  
 
 
Colorectal cancer screening that evaluates for blood or polyps in your colon for people with average risk should be done yearly as a stool test, every five years as a flexible sigmoidoscope or every 10 years as a colonoscopy up to age 76. You and your health care provider may decide whether to continue screening after age 76. Breast cancer screening with a mammogram is recommended at least once every 2 years  for women age 54-69. You and your health care provider may decide whether to continue screening after age 76. The most recent mammogram we have on file for you is: [facility] Results (most recent): 
 
Results from Hospital Encounter encounter on 02/12/18 MIKAYLA 3D SAVI W MAMMO BI SCREENING INCL CAD Narrative SCREENING MAMMOGRAM  BILATERAL  3D tomosynthesis HISTORY: Screening. Reported history of benign right breast biopsy COMPARISON: 2017, 2016, 2014 TECHNIQUE: Digital mammography (2-D and 3-D) was performed with CAD. Routine 
views were performed. FINDINGS:  
No suspicious mass or suspicious calcifications are seen . Impression IMPRESSION:   
 
No evidence for malignancy. Recommend routine annual followup. BIRADS 1:  Negative Breast Density B: There are scattered areas of fibroglandular density. Screening for cervical cancer with a pap smear is recommended for all women with a cervix until age 72. The frequency of this test is based on the details of her prior pap smear testing. You and your health care provider may decide whether to continue screening after age 72. You are done with cervical cancer screening.  
 
People who have smoked the equivalent of 1 pack per day for 30 years or more may benefit from screening for lung cancer with a yearly low dose CT scan until they have been non smokers for 15 years or competing health conditions render this unlikely to be beneficial. Our records show:N/A Your Medicare Wellness Exam is recommended annually. Well Visit, Over 72: Care Instructions Your Care Instructions Physical exams can help you stay healthy. Your doctor has checked your overall health and may have suggested ways to take good care of yourself. He or she also may have recommended tests. At home, you can help prevent illness with healthy eating, regular exercise, and other steps. Follow-up care is a key part of your treatment and safety. Be sure to make and go to all appointments, and call your doctor if you are having problems. It's also a good idea to know your test results and keep a list of the medicines you take. How can you care for yourself at home? · Reach and stay at a healthy weight. This will lower your risk for many problems, such as obesity, diabetes, heart disease, and high blood pressure. · Get at least 30 minutes of exercise on most days of the week. Walking is a good choice. You also may want to do other activities, such as running, swimming, cycling, or playing tennis or team sports. · Do not smoke. Smoking can make health problems worse. If you need help quitting, talk to your doctor about stop-smoking programs and medicines. These can increase your chances of quitting for good. · Protect your skin from too much sun. When you're outdoors from 10 a.m. to 4 p.m., stay in the shade or cover up with clothing and a hat with a wide brim. Wear sunglasses that block UV rays. Even when it's cloudy, put broad-spectrum sunscreen (SPF 30 or higher) on any exposed skin. · See a dentist one or two times a year for checkups and to have your teeth cleaned. · Wear a seat belt in the car. · Limit alcohol to 2 drinks a day for men and 1 drink a day for women. Too much alcohol can cause health problems. Follow your doctor's advice about when to have certain tests. These tests can spot problems early. For men and women · Cholesterol. Your doctor will tell you how often to have this done based on your overall health and other things that can increase your risk for heart attack and stroke. · Blood pressure. Have your blood pressure checked during a routine doctor visit. Your doctor will tell you how often to check your blood pressure based on your age, your blood pressure results, and other factors. · Diabetes. Ask your doctor whether you should have tests for diabetes. · Vision. Experts recommend that you have yearly exams for glaucoma and other age-related eye problems. · Hearing. Tell your doctor if you notice any change in your hearing. You can have tests to find out how well you hear. · Colon cancer tests. Keep having colon cancer tests as your doctor recommends. You can have one of several types of tests. · Heart attack and stroke risk. At least every 4 to 6 years, you should have your risk for heart attack and stroke assessed. Your doctor uses factors such as your age, blood pressure, cholesterol, and whether you smoke or have diabetes to show what your risk for a heart attack or stroke is over the next 10 years. · Osteoporosis. Talk to your doctor about whether you should have a bone density test to find out whether you have thinning bones. Also ask your doctor about whether you should take calcium and vitamin D supplements. For women · Pap test and pelvic exam. You may no longer need a Pap test. Talk with your doctor about whether to stop or continue to have Pap tests. · Breast exam and mammogram. Ask how often you should have a mammogram, which is an X-ray of your breasts. A mammogram can spot breast cancer before it can be felt and when it is easiest to treat. · Thyroid disease.  Talk to your doctor about whether to have your thyroid checked as part of a regular physical exam. Women have an increased chance of a thyroid problem. For men · Prostate exam. Talk to your doctor about whether you should have a blood test (called a PSA test) for prostate cancer. Experts disagree on whether men should have this test. Some experts recommend that you discuss the benefits and risks of the test with your doctor. · Abdominal aortic aneurysm. Ask your doctor whether you should have a test to check for an aneurysm. You may need a test if you ever smoked or if your parent, brother, sister, or child has had an aneurysm. When should you call for help? Watch closely for changes in your health, and be sure to contact your doctor if you have any problems or symptoms that concern you. Where can you learn more? Go to http://chasidy-ena.info/. Enter F330 in the search box to learn more about \"Well Visit, Over 65: Care Instructions. \" Current as of: May 12, 2017 Content Version: 11.4 © 5411-6570 [employer]. Care instructions adapted under license by [employer] (which disclaims liability or warranty for this information). If you have questions about a medical condition or this instruction, always ask your healthcare professional. [place] [address] any warranty or liability for your use of this information. Introducing hospitals & HEALTH SERVICES! Dear Tammie Mclain: Thank you for requesting a My Dentist account. Our records indicate that you already have an active My Dentist account. You can access your account anytime at [email or web address]. Cat [email or web address] Media Did you know that you can access your hospital and ER discharge instructions at any time in My Dentist? You can also review all of your test results from your hospital stay or ER visit. Additional Information If you have questions, please visit the Frequently Asked Questions section of the [employer] website at [email or web address] Remember, [employer] is NOT to be used for urgent needs. For medical emergencies, dial 911. Now available from your iPhone and Android! Please provide this summary of care documentation to your next provider. Your primary care clinician is listed as Prem Winter. If you have any questions after today's visit, please call [phone].

## 2018-07-09 NOTE — PROGRESS NOTES
1. Have you been to the ER, urgent care clinic since your last visit? Hospitalized since your last visit? No  2. Have you seen or consulted any other health care providers outside of the [address] [place] [address] since your last visit? Include any pap smears or colon screening. No  Medication reconciliation has been completed with patient. Care team discussed/updated as well as pharmacy. Care everywhere has been ran. Health Maintenance reviewed - Will request shingles documentation from [facility].  TUG= 6 sec. This is an Initial Medicare Annual Wellness Exam (AWV) (Performed 12 months after IPPE or effective date of Medicare Part B enrollment, Once in a lifetime)    I have reviewed the patient's medical history in detail and updated the computerized patient record. History     Past Medical History:   Diagnosis Date    Depression     Diabetes ([address]     GERD (gastroesophageal reflux disease)     Hiatal hernia     Hypercholesterolemia     Hypertension     Migraines     Thyroid disease     Urinary tract infection       Past Surgical History:   Procedure Laterality Date    HX BREAST BIOPSY  1993    right breast, benign    HX HYSTERECTOMY      HX MOHS PROCEDURES Right 09/2001    HX OTHER SURGICAL  2001    HEAD LACERATION DUE TO A FALL    HX PARTIAL HYSTERECTOMY  11/17/1972    HX TUBAL LIGATION  11/1971     Current Outpatient Prescriptions   Medication Sig Dispense Refill    Blood-Glucose Meter (ACCU-CHEK CODY PLUS METER) misc 1 Device by Does Not Apply route four (4) times daily.  1 Each 0    glucose blood VI test strips (ACCU-CHEK CODY PLUS TEST STRP) strip Patient is to check blood sugar 3-4 times a day 300 Strip 11    insulin aspart U-100 (NOVOLOG) 100 unit/mL crtg Sliding Scale: 100-150 = 6 units; 150-200 = 8 units; 200-250 = 10 units; 250-300= 12 units; 300-350 = 14 units; 400 = Emergency room 8 Cartridge 2    insulin glargine (LANTUS SOLOSTAR U-100 INSULIN) 100 unit/mL (3 mL) inpn INJECT 30 units of  Subcu in am and then 30 UNITS subcu AT BEDTIME 10 Pen 2    glucose blood VI test strips (FREESTYLE LITE STRIPS) strip Blood sugar check 3-4 times a day due to fluctuating blood sugar 300 Strip 11    metFORMIN ER (GLUCOPHAGE XR) 500 mg tablet Take 4 Tabs by mouth daily (with dinner). 360 Tab 1    ergocalciferol (VITAMIN D2) 50,000 unit capsule Take 1 Cap by mouth every seven (7) days. Start with 1 cap daily for 3 days 20 Cap 1    simvastatin (ZOCOR) 40 mg tablet Take 1 Tab by mouth nightly. 90 Tab 4    levothyroxine (SYNTHROID) 150 mcg tablet Take 1 Tab by mouth Daily (before breakfast). 90 Tab 1    NIFEdipine ER (ADALAT CC) 30 mg ER tablet Take 1 Tab by mouth daily. 90 Tab 0    olmesartan-hydroCHLOROthiazide (BENICAR HCT) 40-25 mg per tablet TAKE ONE TABLET BY MOUTH EVERY DAY 90 Tab 0    BLADDER CONTROL PADS pads       ALCOHOL PREP PADS padm       omeprazole (PRILOSEC) 40 mg capsule Take 1 Cap by mouth daily. 90 Cap 2    PSYLLIUM SEED, WITH DEXTROSE, (FIBER PO) Take  by mouth.  SHINGRIX, PF, 50 mcg/0.5 mL susr injection inject 0.5 milliliter intramuscularly  0    meclizine (ANTIVERT) 25 mg tablet Take  by mouth three (3) times daily as needed.        Allergies   Allergen Reactions    [de-identified] A500 [Propoxyphene N-Acetaminophen] Nausea and Vomiting    Gloves, Latex With Aloe Vera Contact Dermatitis     Rash from powder inside latex gloves       Family History   Problem Relation Age of Onset    Diabetes Mother     Hypertension Mother     Cancer Mother     Kidney Disease Mother     Diabetes Father     Heart Disease Father     Hypertension Father     Diabetes Maternal Grandmother     Breast Cancer Maternal Grandmother     Cancer Maternal Grandfather     Breast Cancer Maternal Aunt      Social History   Substance Use Topics    Smoking status: Never Smoker    Smokeless tobacco: Never Used    Alcohol use No     Patient Active Problem List   Diagnosis Code    Hypertension I10    Acquired hypothyroidism E03.9    Hiatal hernia K44.9    Hyperlipidemia E78.5    Depression F32.9    Migraines G43.909    Lump or mass in breast N63.0    Family history of malignant neoplasm of breast Z80.3    Uncontrolled type 2 diabetes mellitus with microalbuminuria, with long-term current use of insulin (HCC) E11.29, E11.65, R80.9, Z79.4    Diabetic nephropathy associated with type 2 diabetes mellitus (HCC) E11.21    Moderate major depression (HCC) F32.1       Depression Risk Factor Screening:     PHQ over the last two weeks 7/9/2018   PHQ Not Done -   Little interest or pleasure in doing things Not at all   Feeling down, depressed or hopeless More than half the days   Total Score PHQ 2 2   Trouble falling or staying asleep, or sleeping too much Nearly every day   Feeling tired or having little energy Nearly every day   Poor appetite or overeating Several days   Feeling bad about yourself - or that you are a failure or have let yourself or your family down Not at all   Trouble concentrating on things such as school, work, reading or watching TV Not at all   Moving or speaking so slowly that other people could have noticed; or the opposite being so fidgety that others notice Not at all   Thoughts of being better off dead, or hurting yourself in some way Not at all   PHQ 9 Score 9   How difficult have these problems made it for you to do your work, take care of your home and get along with others Not difficult at all     Tears up when I recommend we schedule time to address this. Alcohol Risk Factor Screening: You do not drink alcohol or very rarely. Functional Ability and Level of Safety:     Hearing Loss  The patient needs further evaluation. Activities of Daily Living  The home contains: no safety equipment. Patient does total self care    Fall Risk  Fall Risk Assessment, last 12 mths 7/9/2018   Able to walk? Yes   Fall in past 12 months?  No   TUG test 6 seconds    Abuse Screen  Patient is not abused    Cognitive Screening   Evaluation of Cognitive Function:  Has your family/caregiver stated any concerns about your memory: no  Normal    Patient Care Team   Patient Care Team:  Harper Rucker MD as PCP - General (Family Practice)    Assessment/Plan   Education and counseling provided:  End-of-Life planning (with patient's consent)    Diagnoses and all orders for this visit:    1. Medicare annual wellness visit, initial    2. Moderate major depression ([facility]    3. Disorder of bone and cartilage  -     Dexa Bone Density Study Axial ([id number]); Future    4. Screen for colon cancer  -     Referral for Colonoscopy (options for GI, Colon &  Rectal Surgery, & General Surgery)       Health Maintenance Due   Topic Date Due    ZOSTER VACCINE AGE 60>  09/17/2009    DTaP/Tdap/Td series (1 - Tdap) 12/23/2014    MEDICARE YEARLY EXAM  03/20/2018       Follow-up Disposition:  Return in about 1 week (around 7/16/2018) for depression evaluation, separate for bowel issues, back pain, 1 year for Medicare Wellness.

## 2018-07-09 NOTE — PROGRESS NOTES
Patient here for f/u on her DM. 1. Have you been to the ER, urgent care clinic since your last visit? Hospitalized since your last visit? No  2. Have you seen or consulted any other health care providers outside of the [address] [place] [address] since your last visit? Include any pap smears or colon screening. No  Medication reconciliation has been completed with patient. Care team discussed/updated as well as pharmacy. Care everywhere has been ran. Health Maintenance reviewed - Will request shingles vaccine from [employer].

## 2018-07-09 NOTE — PROGRESS NOTES
Ileana Holley is a 75 y.o. female who was seen in clinic today (2018). Assessment & Plan:       ICD-10-CM ICD-9-CM    1. Uncontrolled type 2 diabetes mellitus with microalbuminuria, with long-term current use of insulin (HCC) E11.29 250.42 VITAMIN B12 & FOLATE    E11.65 791.0 MAGNESIUM    R80.9 V58.67 HEMOGLOBIN A1C WITH EAG    Z79.4  MICROALBUMIN, UR, RAND W/ MICROALB/CREAT RATIO      METABOLIC PANEL, COMPREHENSIVE      LIPID PANEL W/ REFLX DIRECT LDL   2. Vitamin D deficiency E55.9 268.9 VITAMIN D, 25 HYDROXY      METABOLIC PANEL, COMPREHENSIVE   3. Diabetic nephropathy associated with type 2 diabetes mellitus (HCC) E11.21 250.40 MICROALBUMIN, UR, RAND W/ MICROALB/CREAT RATIO     583.81    4. Acquired hypothyroidism E03.9 244.9 TSH W/ REFLX FREE T4 IF ABNORMAL   5. Other general symptoms and signs R68.89 780.99 VITAMIN B12 & FOLATE      MAGNESIUM     Uncontrolled DM 2: Not using medications properly with suspension of basal insulin when applying short acting insulin  Basal insulin is to be used every single day regardless of additional short acting insulin requirements  Tolerating metformin 1500 mg daily. Continue and aim to increase to 2000  Schedule classes  Schedule eye exam      Follow-up Disposition:  Return in about 1 month (around 2018) for diabetes follow up, non fasting labs 1 week prior. Subjective: Ileana Holley was seen today for Diabetes    Uncontrolled 2 with microalbuminuria at last visit at which point metformin was increased to thousand milligrams daily. Currently on 1500 mg.   Insulin was unchanged  Classes not yet scheduled  Eye exam not yet scheduled    Home glucoses:  Fastin-200 this morning 337 (late dinner) \"so I gave myself 24 NovoLog instead of the Lantus\"  PP lunch: 160-200  PP dinner: 129-247  Hypoglycemic episodes: none    Tolerating simvastatin well despite prior history of intolerance to atorvastatin    Obesity: has lost 17 lbs since initiation of metformin      Vitamin D deficiency on replacement. Results for orders placed or performed during the hospital encounter of 06/07/18   VITAMIN D, 25 HYDROXY   Result Value Ref Range    Vitamin D 25-Hydroxy 19.3 (L) 30 - 007 ng/mL   METABOLIC PANEL, BASIC   Result Value Ref Range    Sodium 137 136 - 145 mmol/L    Potassium 4.1 3.5 - 5.5 mmol/L    Chloride 100 100 - 108 mmol/L    CO2 27 21 - 32 mmol/L    Anion gap 10 3.0 - 18 mmol/L    Glucose 307 (H) 74 - 99 mg/dL    BUN 17 7.0 - 18 MG/DL    Creatinine 1.25 0.6 - 1.3 MG/DL    BUN/Creatinine ratio 14 12 - 20      GFR est AA 52 (L) >60 ml/min/1.73m2    GFR est non-AA 43 (L) >60 ml/min/1.73m2    Calcium 9.1 8.5 - 10.1 MG/DL   HEMOGLOBIN A1C WITH EAG   Result Value Ref Range    Hemoglobin A1c 10.7 (H) 4.2 - 5.6 %    Est. average glucose 260 mg/dL      Prior to Admission medications    Medication Sig Start Date End Date Taking? Authorizing Provider   Blood-Glucose Meter (ACCU-CHEK CODY PLUS METER) misc 1 Device by Does Not Apply route four (4) times daily. 6/8/18  Yes Aminah Martinez MD   glucose blood VI test strips (ACCU-CHEK CODY PLUS TEST STRP) strip Patient is to check blood sugar 3-4 times a day 6/8/18  Yes Aminah Martinez MD   insulin aspart U-100 (NOVOLOG) 100 unit/mL crtg Sliding Scale: 100-150 = 6 units; 150-200 = 8 units; 200-250 = 10 units; 250-300= 12 units; 300-350 = 14 units; 400 = Emergency room 6/7/18  Yes Aminah Martinez MD   insulin glargine (LANTUS SOLOSTAR U-100 INSULIN) 100 unit/mL (3 mL) inpn INJECT 30 units of  Subcu in am and then 30 UNITS subcu AT BEDTIME 6/7/18  Yes Aminah Martinez MD   glucose blood VI test strips (FREESTYLE LITE STRIPS) strip Blood sugar check 3-4 times a day due to fluctuating blood sugar 6/7/18  Yes Aminah Martinez MD   metFORMIN ER (GLUCOPHAGE XR) 500 mg tablet Take 4 Tabs by mouth daily (with dinner).  6/7/18  Yes Aminah Martinez MD   ergocalciferol (VITAMIN D2) 50,000 unit capsule Take 1 Cap by mouth every seven (7) days. Start with 1 cap daily for 3 days 6/7/18  Yes Dang Manzanares MD   simvastatin (ZOCOR) 40 mg tablet Take 1 Tab by mouth nightly. 4/16/18  Yes Dang Manzanares MD   levothyroxine (SYNTHROID) 150 mcg tablet Take 1 Tab by mouth Daily (before breakfast). 4/16/18  Yes Dang Manzanares MD   NIFEdipine ER (ADALAT CC) 30 mg ER tablet Take 1 Tab by mouth daily. 4/16/18  Yes Dang Manzanares MD   olmesartan-hydroCHLOROthiazide [facility] HCT) 40-25 mg per tablet TAKE ONE TABLET BY MOUTH EVERY DAY 4/16/18  Yes Dang Manzanares MD   BLADDER CONTROL PADS pads  5/16/17  Yes Historical Provider   ALCOHOL PREP PADS padm  5/16/17  Yes Historical Provider   omeprazole (PRILOSEC) 40 mg capsule Take 1 Cap by mouth daily. 5/8/17  Yes Eric Franks MD   PSYLLIUM SEED, WITH DEXTROSE, (FIBER PO) Take  by mouth. Yes Historical Provider   SHINGRIX, PF, 50 mcg/0.5 mL susr injection inject 0.5 milliliter intramuscularly 4/18/18   Historical Provider   meclizine (ANTIVERT) 25 mg tablet Take  by mouth three (3) times daily as needed. Historical Provider          Allergies   Allergen Reactions    Darvocet A500 [Propoxyphene N-Acetaminophen] Nausea and Vomiting    Gloves, Latex With Aloe Vera Contact Dermatitis     Rash from powder inside latex gloves         Patient Care Team:  Dang Manzanares MD as PCP - General (Family Practice)      Review of Systems   Gastrointestinal: Negative for nausea. Musculoskeletal: Negative for myalgias. Objective:   Physical Exam   Constitutional: She is oriented to person, place, and time. She appears well-developed. No distress. Pleasant obese white female   HENT:   Head: Normocephalic and atraumatic. Pulmonary/Chest: Effort normal.   Neurological: She is alert and oriented to person, place, and time. Psychiatric: She has a normal mood and affect.  Her behavior is normal. Judgment and thought content normal.     Visit Vitals    /60    Pulse 79    Temp 97.5 °F (36.4 °C) (Oral)    Resp 14    Ht 5' 3\" (1.6 m)    Wt 180 lb 12.8 oz (82 kg)    LMP 11/17/1972  Comment: partial    SpO2 98%    BMI 32.03 kg/m2           Disclaimer: The patient understands our medical plan. Alternatives have been explained and offered. The risks, benefits and significant side effects of all medications have been reviewed. Anticipated time course and progression of condition reviewed. All questions have been addressed. She is encouraged to employ the information provided in the after visit summary, which was reviewed. Where appropriate, she is instructed to call the clinic if she has not been notified either by phone or through [address] with the results of her tests or with an appointment plan for any referrals within 1 week(s). No news is not good news; it's no news. The patient  is to call if her condition worsens or fails to improve or if significant side effects are experienced. Aspects of this note may have been generated using voice recognition software. Despite editing, there may be unrecognized errors.        Wendy Dominguez MD

## 2018-07-09 NOTE — PATIENT INSTRUCTIONS
Schedule eye exam  Schedule classes    To register for [facility]'s diabetes class, call [phone].      Use the Lantus EVERY DAY  If your Sliding Scale is indicated based on your sugars, use that WITH the Lantus

## 2018-07-09 NOTE — MR AVS SNAPSHOT
[facility] [address] [place] [address] 
[phone] Patient: Corinne Johnson MRN: [id number] DBM:[id number] Visit Information Date & Time Provider Department Dept. Phone Encounter #  
 7/9/2018 10:30 AM Reema Jaffe MD [facility] [phone] [id number] Follow-up Instructions Return in about 1 month (around 8/9/2018) for diabetes follow up, non fasting labs 1 week prior. Upcoming Health Maintenance Date Due ZOSTER VACCINE AGE 60> 9/17/2009 DTaP/Tdap/Td series (1 - Tdap) 12/23/2014 MEDICARE YEARLY EXAM 3/20/2018 Influenza Age 5 to Adult 8/1/2018 EYE EXAM RETINAL OR DILATED Q1 9/7/2018 HEMOGLOBIN A1C Q6M 12/7/2018 COLONOSCOPY 2/11/2019 MICROALBUMIN Q1 4/16/2019 LIPID PANEL Q1 4/16/2019 FOOT EXAM Q1 5/7/2019 BREAST CANCER SCRN MAMMOGRAM 2/12/2020 GLAUCOMA SCREENING Q2Y 6/5/2020 Allergies as of 7/9/2018  Review Complete On: 7/9/2018 By: Reema Jaffe MD  
  
 Severity Noted Reaction Type Reactions Darvocet A500 [Propoxyphene N-acetaminophen]  05/08/2017    Nausea and Vomiting Gloves, Latex With Aloe Vera  08/19/2016    Contact Dermatitis Rash from powder inside latex gloves Current Immunizations  Reviewed on 5/7/2018 Name Date Influenza High Dose Vaccine PF 11/8/2017, 1/9/2017, 11/11/2015 Influenza Vaccine (Quad) PF 10/6/2014 Influenza Vaccine PF 10/31/2013 Pneumococcal Conjugate (PCV-13) 1/9/2017 Pneumococcal Polysaccharide (PPSV-23) 5/14/2015, 10/31/2013 Td, Adsorbed PF 12/22/2014 Not reviewed this visit You Were Diagnosed With   
  
 Codes Comments Uncontrolled type 2 diabetes mellitus with microalbuminuria, with long-term current use of insulin (HCC)    -  Primary ICD-10-CM: E11.29, E11.65, R80.9, Z79.4 ICD-9-CM: 250.42, 791.0, V58.67 Vitamin D deficiency     ICD-10-CM: E55.9 ICD-9-CM: 268.9 Diabetic nephropathy associated with type 2 diabetes mellitus ([facility] [address]     ICD-10-CM: E11.21 
ICD-9-CM: 250.40, 583.81 Acquired hypothyroidism     ICD-10-CM: E03.9 ICD-9-CM: 244.9 Other general symptoms and signs     ICD-10-CM: R68.89 ICD-9-CM: 780.99 Vitals BP Pulse Temp Resp Height(growth percentile) Weight(growth percentile) 126/60 79 97.5 °F (36.4 °C) (Oral) 14 5' 3\" (1.6 m) 180 lb 12.8 oz (82 kg) LMP SpO2 BMI OB Status Smoking Status 11/17/1972 98% 32.03 kg/m2 Hysterectomy Never Smoker BMI and BSA Data Body Mass Index Body Surface Area 32.03 kg/m 2 1.91 m 2 Preferred Pharmacy Pharmacy Name Phone [facility] [address], [address] [facility] [phone] Your Updated Medication List  
  
   
This list is accurate as of 7/9/18 10:43 AM.  Always use your most recent med list. ALCOHOL PREP PADS Padm Generic drug:  alcohol swabs BLADDER CONTROL PADS Pads Generic drug:  Incontinence Pad, Liner, Disp Blood-Glucose Meter Misc Commonly known as:  ACCU-CHEK CODY PLUS METER  
1 Device by Does Not Apply route four (4) times daily. ergocalciferol 50,000 unit capsule Commonly known as:  VITAMIN D2 Take 1 Cap by mouth every seven (7) days. Start with 1 cap daily for 3 days FIBER PO Take  by mouth. * glucose blood VI test strips strip Commonly known as:  FREESTYLE LITE STRIPS Blood sugar check 3-4 times a day due to fluctuating blood sugar * glucose blood VI test strips strip Commonly known as:  ACCU-CHEK CODY PLUS TEST STRP Patient is to check blood sugar 3-4 times a day  
  
 insulin aspart U-100 100 unit/mL Crtg Commonly known as:  Basilia Judge Sliding Scale: 100-150 = 6 units; 150-200 = 8 units; 200-250 = 10 units; 250-300= 12 units; 300-350 = 14 units; 400 = Emergency room  
  
 insulin glargine 100 unit/mL (3 mL) Inpn Commonly known as:  LANTUS SOLOSTAR U-100 INSULIN INJECT 30 units of  Subcu in am and then 30 UNITS subcu AT BEDTIME  
  
 levothyroxine 150 mcg tablet Commonly known as:  SYNTHROID Take 1 Tab by mouth Daily (before breakfast). meclizine 25 mg tablet Commonly known as:  ANTIVERT Take  by mouth three (3) times daily as needed. metFORMIN  mg tablet Commonly known as:  GLUCOPHAGE XR Take 4 Tabs by mouth daily (with dinner). NIFEdipine ER 30 mg ER tablet Commonly known as:  ADALAT CC Take 1 Tab by mouth daily. olmesartan-hydroCHLOROthiazide 40-25 mg per tablet Commonly known as:  BENICAR HCT  
TAKE ONE TABLET BY MOUTH EVERY DAY  
  
 omeprazole 40 mg capsule Commonly known as:  PRILOSEC Take 1 Cap by mouth daily. SHINGRIX (PF) 50 mcg/0.5 mL Susr injection Generic drug:  varicella-zoster recombinant (PF)  
inject 0.5 milliliter intramuscularly  
  
 simvastatin 40 mg tablet Commonly known as:  ZOCOR Take 1 Tab by mouth nightly. * Notice: This list has 2 medication(s) that are the same as other medications prescribed for you. Read the directions carefully, and ask your doctor or other care provider to review them with you. Follow-up Instructions Return in about 1 month (around 8/9/2018) for diabetes follow up, non fasting labs 1 week prior. To-Do List   
 10/07/2018 Lab:  TSH W/ REFLX FREE T4 IF ABNORMAL   
  
 10/08/2018 Lab:  MICROALBUMIN, UR, RAND W/ MICROALB/CREAT RATIO   
  
 10/08/2018 Lab:  VITAMIN D, 25 HYDROXY   
  
 10/09/2018 Lab:  HEMOGLOBIN A1C WITH EAG   
  
 10/09/2018 Lab:  MAGNESIUM Around 10/09/2018 Lab:  METABOLIC PANEL, COMPREHENSIVE Around 10/09/2018 Lab:  VITAMIN B12 & FOLATE Patient Instructions Schedule eye exam 
Schedule classes To register for [facility]'s diabetes class, call [phone].   
 
Use the Lantus EVERY DAY 
 If your Sliding Scale is indicated based on your sugars, use that WITH the Lantus [facility]! Dear Thalia Aponte: Thank you for requesting a [employer] account. Our records indicate that you already have an active [employer] account. You can access your account anytime at [email or web address] Did you know that you can access your hospital and ER discharge instructions at any time in [employer]? You can also review all of your test results from your hospital stay or ER visit. Additional Information If you have questions, please visit the Frequently Asked Questions section of the [employer] website at [email or web address] Remember, [employer] is NOT to be used for urgent needs. For medical emergencies, dial 911. Now available from your iPhone and Android! Please provide this summary of care documentation to your next provider. Your primary care clinician is listed as Tiffany Purchase. If you have any questions after today's visit, please call [phone].

## 2018-07-16 ENCOUNTER — OFFICE VISIT (OUTPATIENT)
Dept: FAMILY MEDICINE CLINIC | Age: 68
End: 2018-07-16

## 2018-07-16 VITALS
DIASTOLIC BLOOD PRESSURE: 74 MMHG | RESPIRATION RATE: 13 BRPM | HEART RATE: 79 BPM | OXYGEN SATURATION: 98 % | WEIGHT: 186.13 LBS | SYSTOLIC BLOOD PRESSURE: 120 MMHG | BODY MASS INDEX: 32.98 KG/M2 | HEIGHT: 63 IN

## 2018-07-16 DIAGNOSIS — F33.0 MILD EPISODE OF RECURRENT MAJOR DEPRESSIVE DISORDER (HCC): Primary | ICD-10-CM

## 2018-07-16 RX ORDER — SERTRALINE HYDROCHLORIDE 50 MG/1
50 TABLET, FILM COATED ORAL DAILY
Qty: 30 TAB | Refills: 1 | Status: SHIPPED | OUTPATIENT
Start: 2018-07-16 | End: 2018-08-27 | Stop reason: SDUPTHER

## 2018-07-16 NOTE — PROGRESS NOTES
Jessica Suarez is a 75 y.o. female who was seen in clinic today (7/16/2018). Assessment & Plan:       ICD-10-CM ICD-9-CM    1. Mild episode of recurrent major depressive disorder (HCC) F33.0 296.31 sertraline (ZOLOFT) 50 mg tablet     New to me, uncontrolled  Low risk self harm      Follow-up Disposition:  Return in about 3 weeks (around 8/6/2018) for diabetes followup, non fasting labs 1 week prior, 6 weeks for depression, (30). Subjective: Jessica Suarez was seen today for Depression    Remote dx late 20s-early 35s  \"They had me on valium . .. One other one I can't remember. \" On medication for \"a while\". Self discontinued bc didn't like feeling sleepy all the time. Hospitalized x 3 days for suicide attempt at that time - pills    In recent years, fleeting thoughts without plan but \"too many things to stick around for now\" (great grand baby, wedding)    No guns at home    No manic symptoms. \"I have seen ghosts. Literally. \" Shortly after moved into her current house 3 years ago. Prayed. Has \"felt a presence\" since then. But no visions since then. Occasionally hears someone call her name. No directives. Wonders if it's God. Self medicates with food and Diet Coke. No DOA.     PHQ over the last two weeks 7/16/2018   PHQ Not Done -   Little interest or pleasure in doing things Not at all   Feeling down, depressed or hopeless Several days   Total Score PHQ 2 1   Trouble falling or staying asleep, or sleeping too much Nearly every day   Feeling tired or having little energy Nearly every day   Poor appetite or overeating Not at all   Feeling bad about yourself - or that you are a failure or have let yourself or your family down Several days   Trouble concentrating on things such as school, work, reading or watching TV Not at all   Moving or speaking so slowly that other people could have noticed; or the opposite being so fidgety that others notice Not at all   Thoughts of being better off dead, or hurting yourself in some way Not at all   PHQ 9 Score 8   How difficult have these problems made it for you to do your work, take care of your home and get along with others Very difficult            Prior to Admission medications    Medication Sig Start Date End Date Taking? Authorizing Provider   Blood-Glucose Meter (ACCU-CHEK CODY PLUS METER) misc 1 Device by Does Not Apply route four (4) times daily. 6/8/18  Yes Jojo Barbour MD   glucose blood VI test strips (ACCU-CHEK CODY PLUS TEST STRP) strip Patient is to check blood sugar 3-4 times a day 6/8/18  Yes Jojo Barbour MD   insulin aspart U-100 (NOVOLOG) 100 unit/mL crtg Sliding Scale: 100-150 = 6 units; 150-200 = 8 units; 200-250 = 10 units; 250-300= 12 units; 300-350 = 14 units; 400 = Emergency room 6/7/18  Yes Jojo Barbour MD   insulin glargine (LANTUS SOLOSTAR U-100 INSULIN) 100 unit/mL (3 mL) inpn INJECT 30 units of  Subcu in am and then 30 UNITS subcu AT BEDTIME 6/7/18  Yes Jojo Barbour MD   glucose blood VI test strips (FREESTYLE LITE STRIPS) strip Blood sugar check 3-4 times a day due to fluctuating blood sugar 6/7/18  Yes Jojo Barbour MD   metFORMIN ER (GLUCOPHAGE XR) 500 mg tablet Take 4 Tabs by mouth daily (with dinner). 6/7/18  Yes Jojo Barbour MD   ergocalciferol (VITAMIN D2) 50,000 unit capsule Take 1 Cap by mouth every seven (7) days. Start with 1 cap daily for 3 days 6/7/18  Yes Jojo Barbour MD   simvastatin (ZOCOR) 40 mg tablet Take 1 Tab by mouth nightly. 4/16/18  Yes Jojo Barbour MD   levothyroxine (SYNTHROID) 150 mcg tablet Take 1 Tab by mouth Daily (before breakfast). 4/16/18  Yes Jojo Barbour MD   NIFEdipine ER (ADALAT CC) 30 mg ER tablet Take 1 Tab by mouth daily.  4/16/18  Yes Jojo Barbour MD   olmesartan-hydroCHLOROthiazide [facility] HCT) 40-25 mg per tablet TAKE ONE TABLET BY MOUTH EVERY DAY 4/16/18  Yes Jojo Barbour MD   BLADDER CONTROL PADS pads  5/16/17  Yes Historical Provider ALCOHOL PREP PADS padm  5/16/17  Yes Historical Provider   omeprazole (PRILOSEC) 40 mg capsule Take 1 Cap by mouth daily. 5/8/17  Yes Betty Marroquin MD   meclizine (ANTIVERT) 25 mg tablet Take  by mouth three (3) times daily as needed. Yes Historical Provider   PSYLLIUM SEED, WITH DEXTROSE, (FIBER PO) Take  by mouth. Yes Historical Provider   SHINGRIX, PF, 50 mcg/0.5 mL susr injection inject 0.5 milliliter intramuscularly 4/18/18   Historical Provider          Allergies   Allergen Reactions    Darvocet A500 [Propoxyphene N-Acetaminophen] Nausea and Vomiting    Gloves, Latex With Aloe Vera Contact Dermatitis     Rash from powder inside latex gloves         Patient Care Team:  Jojo Barbour MD as PCP - General (Family Practice)      Review of Systems   Constitutional: Positive for malaise/fatigue. Psychiatric/Behavioral: Positive for hallucinations. Negative for substance abuse and suicidal ideas. The patient has insomnia. - see also HPI       Objective:     Visit Vitals    /74    Pulse 79    Resp 13    Ht 5' 3\" (1.6 m)    Wt 186 lb 2 oz (84.4 kg)    LMP 11/17/1972  Comment: partial    SpO2 98%    BMI 32.97 kg/m2      Physical Exam   Constitutional: She is oriented to person, place, and time. She appears well-developed. No distress. Pleasant obese white female   HENT:   Head: Normocephalic and atraumatic. Pulmonary/Chest: Effort normal.   Neurological: She is alert and oriented to person, place, and time. Psychiatric: She has a normal mood and affect. Her behavior is normal. Judgment and thought content normal.   Well-groomed, good eye contact             Disclaimer: The patient understands our medical plan. Alternatives have been explained and offered. The risks, benefits and significant side effects of all medications have been reviewed. Anticipated time course and progression of condition reviewed. All questions have been addressed.   She is encouraged to employ the information provided in the after visit summary, which was reviewed. Where appropriate, she is instructed to call the clinic if she has not been notified either by phone or through [address] with the results of her tests or with an appointment plan for any referrals within 1 week(s). No news is not good news; it's no news. The patient  is to call if her condition worsens or fails to improve or if significant side effects are experienced. Aspects of this note may have been generated using voice recognition software. Despite editing, there may be unrecognized errors.        Tatiana Ochoa MD

## 2018-07-16 NOTE — PATIENT INSTRUCTIONS
Depression Treatment: Care Instructions  Your Care Instructions    Depression is a condition that affects the way you feel, think, and act. It causes symptoms such as low energy, loss of interest in daily activities, and sadness or grouchiness that goes on for a long time. Depression is very common and affects men and women of all ages. Depression is a medical illness caused by changes in the natural chemicals in your brain. It is not a character flaw, and it does not mean that you are a bad or weak person. It does not mean that you are going crazy. It is important to know that depression can be treated. Medicines, counseling, and self-care can all help. Many people do not get help because they are embarrassed or think that they will get over the depression on their own. But some people do not get better without treatment. Follow-up care is a key part of your treatment and safety. Be sure to make and go to all appointments, and call your doctor if you are having problems. It's also a good idea to know your test results and keep a list of the medicines you take. How can you care for yourself at home? Learn about antidepressant medicines  Antidepressant medicines can improve or end the symptoms of depression. You may need to take the medicine for at least 6 months, and often longer. Keep taking your medicine even if you feel better. If you stop taking it too soon, your symptoms may come back or get worse. You may start to feel better within 1 to 3 weeks of taking antidepressant medicine. But it can take as many as 6 to 8 weeks to see more improvement. Talk to your doctor if you have problems with your medicine or if you do not notice any improvement after 3 weeks. Antidepressants can make you feel tired, dizzy, or nervous. Some people have dry mouth, constipation, headaches, sexual problems, an upset stomach, or diarrhea.  Many of these side effects are mild and go away on their own after you take the medicine for a few weeks. Some may last longer. Talk to your doctor if side effects bother you too much. You might be able to try a different medicine. If you are pregnant or breastfeeding, talk to your doctor about what medicines you can take. Learn about counseling  In many cases, counseling can work as well as medicines to treat mild to moderate depression. Counseling is done by licensed mental health providers, such as psychologists, social workers, and some types of nurses. It can be done in one-on-one sessions or in a group setting. Many people find group sessions helpful. Cognitive-behavioral therapy is a type of counseling. In this treatment therapy, you learn how to see and change unhelpful thinking styles that may be adding to your depression. Counseling and medicines often work well when used together. To manage depression  · Be physically active. Getting 30 minutes of exercise each day is good for your body and your mind. Begin slowly if it is hard for you to get started. If you already exercise, keep it up. · Plan something pleasant for yourself every day. Include activities that you have enjoyed in the past.  · Get enough sleep. Talk to your doctor if you have problems sleeping. · Eat a balanced diet. If you do not feel hungry, eat small snacks rather than large meals. · Do not drink alcohol, use illegal drugs, or take medicines that your doctor has not prescribed for you. They may interfere with your treatment. · Spend time with family and friends. It may help to speak openly about your depression with people you trust.  · Take your medicines exactly as prescribed. Call your doctor if you think you are having a problem with your medicine. · Do not make major life decisions while you are depressed. Depression may change the way you think. You will be able to make better decisions after you feel better. · Think positively.  Challenge negative thoughts with statements such as \"I am hopeful\"; \"Things will get better\"; and \"I can ask for the help I need. \" Write down these statements and read them often, even if you don't believe them yet. · Be patient with yourself. It took time for your depression to develop, and it will take time for your symptoms to improve. Do not take on too much or be too hard on yourself. · Learn all you can about depression from written and online materials. · Check out behavioral health classes to learn more about dealing with depression. · Keep the numbers for these national suicide hotlines: 5-294-579-TALK (2-536-596-280.237.8134) and 4-563-YBMWQLF (2-933.427.6549). If you or someone you know talks about suicide or feeling hopeless, get help right away. When should you call for help? Call 911 anytime you think you may need emergency care. For example, call if:    · You feel you cannot stop from hurting yourself or someone else.   [facility] your doctor now or seek immediate medical care if:    · You hear voices.     · You feel much more depressed.    Watch closely for changes in your health, and be sure to contact your doctor if:    · You are having problems with your depression medicine.     · You are not getting better as expected. Where can you learn more? Go to http://chasidy-ena.info/. Enter J808 in the search box to learn more about \"Depression Treatment: Care Instructions. \"  Current as of: December 7, 2017  Content Version: 11.7  © 2406-6770 [employer]. Care instructions adapted under license by [employer] (which disclaims liability or warranty for this information). If you have questions about a medical condition or this instruction, always ask your healthcare professional. [address] any warranty or liability for your use of this information. Learning About Depression  What is depression? Depression is an illness that affects how a person feels, thinks, and acts.  It's different from normal feelings of sadness or grief. A person who has depression may have less energy. He or she may lose interest in daily activities and may feel sad and grouchy for a long time. Depression is a common illness. It affects men and women of all ages and backgrounds. Many people, and sometimes their families, feel embarrassed or ashamed about having depression. But it isn't a sign of personal weakness. It's not a character flaw. A person who is depressed is not \"crazy. \" Depression is a medical illness. It's caused by changes in the natural chemicals in the brain. Most experts believe that a combination of family history (a person's genes) and stressful life events can cause depression. Health problems may also cause depression or make it worse. It's common for people with long-term (chronic) health problems like coronary artery disease, diabetes, cancer, or chronic pain to feel depressed. It's important to know that depression can be treated. The first step toward feeling better is often just seeing that the problem exists. What are the symptoms? The symptoms of depression may be hard to notice at first. They vary among people, and it's easy to confuse them with just feeling \"off. \" The two most common symptoms are:  · Feeling sad or hopeless nearly every day for at least 2 weeks. · Losing interest in or not getting pleasure from most activities that used to be enjoyable, and feeling this way nearly every day for at least 2 weeks. Other symptoms may appear. A person with depression may, almost every day:  · Eat or sleep more or less than usual.  · Feel tired. · Feel unworthy or guilty. · Find it hard to focus, remember things, or make decisions. A serious symptom of depression is thinking about death or suicide. If you or someone you care about talks about this or about feeling hopeless, get help right away. How is it treated? Doctors usually treat depression with medicines or counseling.  Often a combination of the two works best. Many people don't get help because they think that they'll get over the depression on their own. But some people do not get better without treatment. Antidepressant medicines can improve the symptoms of depression in 1 to 3 weeks. But it can take 6 to 8 weeks to see more improvement. Your doctor will likely have you keep taking these medicines for at least 6 months. In many cases, counseling can work as well as medicines to treat mild to moderate depression. Counseling is done by licensed mental health providers, such as psychologists and social workers. This kind of treatment deals with how you think about things and how you act each day. If depression is caused by a medical problem, treating that problem may also help relieve the depression. What can you do to help someone with depression? If someone you care about is depressed, you may feel helpless. But there are some things you can do. · Help the person get treatment or stay with it. This is the best thing you can do. · Support and encourage the person. · Help the person have good health habits. Urge him or her to get regular exercise, eat a balanced diet, and get enough sleep. · Take care of yourself. Ask others to give you emotional and practical support while you are helping a friend or loved one who has depression. · Keep the numbers for these national suicide hotlines: 0-247-217-TALK (4-952.628.9430) and 9-577-UBCIJDS (4-889.741.4563). If you or someone you know talks about suicide or feeling hopeless, get help right away. Where can you learn more? Go to http://chasidy-ena.info/. Enter V443 in the search box to learn more about \"Learning About Depression. \"  Current as of: December 7, 2017  Content Version: 11.7  © 3630-9683 [employer]. Care instructions adapted under license by [employer] (which disclaims liability or warranty for this information).  If you have questions about a medical condition or this instruction, always ask your healthcare professional. [place] [address] any warranty or liability for your use of this information.

## 2018-07-16 NOTE — MR AVS SNAPSHOT
[facility] [address] [place] [address] 
[phone] Patient: Lan Reid MRN: [id number] Visit Information Date & Time Provider Department Dept. Phone Encounter #  
 7/16/2018  9:45 AM Adam Walker MD [facility] [phone] [id number] Follow-up Instructions Return in about 3 weeks (around 8/6/2018) for diabetes followup, non fasting labs 1 week prior, 6 weeks for depression, (30). Upcoming Health Maintenance Date Due ZOSTER VACCINE AGE 60> 9/17/2009 DTaP/Tdap/Td series (1 - Tdap) 12/23/2014 Influenza Age 5 to Adult 8/1/2018 EYE EXAM RETINAL OR DILATED Q1 9/7/2018 HEMOGLOBIN A1C Q6M 12/7/2018 COLONOSCOPY 2/11/2019 MICROALBUMIN Q1 4/16/2019 LIPID PANEL Q1 4/16/2019 FOOT EXAM Q1 5/7/2019 MEDICARE YEARLY EXAM 7/10/2019 BREAST CANCER SCRN MAMMOGRAM 2/12/2020 GLAUCOMA SCREENING Q2Y 6/5/2020 Allergies as of 7/16/2018  Review Complete On: 7/16/2018 By: Adam Walker MD  
  
 Severity Noted Reaction Type Reactions Darvocet A500 [Propoxyphene N-acetaminophen]  05/08/2017    Nausea and Vomiting Gloves, Latex With Aloe Vera  08/19/2016    Contact Dermatitis Rash from powder inside latex gloves Current Immunizations  Reviewed on 5/7/2018 Name Date Influenza High Dose Vaccine PF 11/8/2017, 1/9/2017, 11/11/2015 Influenza Vaccine (Quad) PF 10/6/2014 Influenza Vaccine PF 10/31/2013 Pneumococcal Conjugate (PCV-13) 1/9/2017 Pneumococcal Polysaccharide (PPSV-23) 5/14/2015, 10/31/2013 Td, Adsorbed PF 12/22/2014 Not reviewed this visit You Were Diagnosed With   
  
 Codes Comments Mild episode of recurrent major depressive disorder ([facility]    -  Primary ICD-10-CM: F33.0 ICD-9-CM: 296.31 Vitals  BP Pulse Resp Height(growth percentile) Weight(growth percentile) LMP  
 120/74 79 13 5' 3\" (1.6 m) 186 lb 2 oz (84.4 kg) 11/17/1973 SpO2 BMI OB Status Smoking Status 98% 32.97 kg/m2 Hysterectomy Never Smoker Vitals History BMI and BSA Data Body Mass Index Body Surface Area  
 32.97 kg/m 2 1.94 m 2 Preferred Pharmacy Pharmacy Name Phone [facility] [address], [address] [place] [phone] Your Updated Medication List  
  
   
This list is accurate as of 7/16/18 10:42 AM.  Always use your most recent med list. ALCOHOL PREP PADS Padm Generic drug:  alcohol swabs BLADDER CONTROL PADS Pads Generic drug:  Incontinence Pad, Liner, Disp Blood-Glucose Meter Misc Commonly known as:  ACCU-CHEK CODY PLUS METER  
1 Device by Does Not Apply route four (4) times daily. ergocalciferol 50,000 unit capsule Commonly known as:  VITAMIN D2 Take 1 Cap by mouth every seven (7) days. Start with 1 cap daily for 3 days FIBER PO Take  by mouth. * glucose blood VI test strips strip Commonly known as:  FREESTYLE LITE STRIPS Blood sugar check 3-4 times a day due to fluctuating blood sugar * glucose blood VI test strips strip Commonly known as:  ACCU-CHEK CODY PLUS TEST STRP Patient is to check blood sugar 3-4 times a day  
  
 insulin aspart U-100 100 unit/mL Crtg Commonly known as:  Mina Bruce Sliding Scale: 100-150 = 6 units; 150-200 = 8 units; 200-250 = 10 units; 250-300= 12 units; 300-350 = 14 units; 400 = Emergency room  
  
 insulin glargine 100 unit/mL (3 mL) Inpn Commonly known as:  LANTUS SOLOSTAR U-100 INSULIN INJECT 30 units of  Subcu in am and then 30 UNITS subcu AT BEDTIME  
  
 levothyroxine 150 mcg tablet Commonly known as:  SYNTHROID Take 1 Tab by mouth Daily (before breakfast). meclizine 25 mg tablet Commonly known as:  ANTIVERT Take  by mouth three (3) times daily as needed. metFORMIN  mg tablet Commonly known as:  GLUCOPHAGE XR Take 4 Tabs by mouth daily (with dinner). NIFEdipine ER 30 mg ER tablet Commonly known as:  ADALAT CC Take 1 Tab by mouth daily. olmesartan-hydroCHLOROthiazide 40-25 mg per tablet Commonly known as:  BENICAR HCT  
TAKE ONE TABLET BY MOUTH EVERY DAY  
  
 omeprazole 40 mg capsule Commonly known as:  PRILOSEC Take 1 Cap by mouth daily. sertraline 50 mg tablet Commonly known as:  ZOLOFT Take 1 Tab by mouth daily. SHINGRIX (PF) 50 mcg/0.5 mL Susr injection Generic drug:  varicella-zoster recombinant (PF)  
inject 0.5 milliliter intramuscularly  
  
 simvastatin 40 mg tablet Commonly known as:  ZOCOR Take 1 Tab by mouth nightly. * Notice: This list has 2 medication(s) that are the same as other medications prescribed for you. Read the directions carefully, and ask your doctor or other care provider to review them with you. Prescriptions Sent to Pharmacy Refills  
 sertraline (ZOLOFT) 50 mg tablet 1 Sig: Take 1 Tab by mouth daily. Class: Normal  
 [facility]: [id number] Ph #: [phone] Route: Oral  
  
Follow-up Instructions Return in about 3 weeks (around 8/6/2018) for diabetes followup, non fasting labs 1 week prior, 6 weeks for depression, (30). To-Do List   
 07/17/2018 2:00 PM  
  Appointment with [facility] BONE DEXA RM 1 at [facility] RAD BONE DENSITY ([phone]) OUTSIDE FILMS  - Any outside films related to the study being scheduled should be brought with you on the day of the exam.  If this cannot be done there may be a delay in the reading of the study.   MEDICATIONS  - Patient must bring a complete list of all medications currently taking to include prescriptions, over-the-counter meds, herbals, vitamins & any dietary supplements - Patient must discontinue use of calcium, vitamins, or calcium supplements including antacids (calcium based) 24 hours before scan. GENERAL INSTRUCTIONS  - [facility] cannot accommodate patients on stretchers, patient must be able to walk into the room and be able to sit up for a portion of the exam.  
  
  
Patient Instructions Depression Treatment: Care Instructions Your Care Instructions Depression is a condition that affects the way you feel, think, and act. It causes symptoms such as low energy, loss of interest in daily activities, and sadness or grouchiness that goes on for a long time. Depression is very common and affects men and women of all ages. Depression is a medical illness caused by changes in the natural chemicals in your brain. It is not a character flaw, and it does not mean that you are a bad or weak person. It does not mean that you are going crazy. It is important to know that depression can be treated. Medicines, counseling, and self-care can all help. Many people do not get help because they are embarrassed or think that they will get over the depression on their own. But some people do not get better without treatment. Follow-up care is a key part of your treatment and safety. Be sure to make and go to all appointments, and call your doctor if you are having problems. It's also a good idea to know your test results and keep a list of the medicines you take. How can you care for yourself at home? Learn about antidepressant medicines Antidepressant medicines can improve or end the symptoms of depression. You may need to take the medicine for at least 6 months, and often longer. Keep taking your medicine even if you feel better. If you stop taking it too soon, your symptoms may come back or get worse. You may start to feel better within 1 to 3 weeks of taking antidepressant medicine. But it can take as many as 6 to 8 weeks to see more improvement.  Talk to your doctor if you have problems with your medicine or if you do not notice any improvement after 3 weeks. Antidepressants can make you feel tired, dizzy, or nervous. Some people have dry mouth, constipation, headaches, sexual problems, an upset stomach, or diarrhea. Many of these side effects are mild and go away on their own after you take the medicine for a few weeks. Some may last longer. Talk to your doctor if side effects bother you too much. You might be able to try a different medicine. If you are pregnant or breastfeeding, talk to your doctor about what medicines you can take. Learn about counseling In many cases, counseling can work as well as medicines to treat mild to moderate depression. Counseling is done by licensed mental health providers, such as psychologists, social workers, and some types of nurses. It can be done in one-on-one sessions or in a group setting. Many people find group sessions helpful. Cognitive-behavioral therapy is a type of counseling. In this treatment therapy, you learn how to see and change unhelpful thinking styles that may be adding to your depression. Counseling and medicines often work well when used together. To manage depression · Be physically active. Getting 30 minutes of exercise each day is good for your body and your mind. Begin slowly if it is hard for you to get started. If you already exercise, keep it up. · Plan something pleasant for yourself every day. Include activities that you have enjoyed in the past. 
· Get enough sleep. Talk to your doctor if you have problems sleeping. · Eat a balanced diet. If you do not feel hungry, eat small snacks rather than large meals. · Do not drink alcohol, use illegal drugs, or take medicines that your doctor has not prescribed for you. They may interfere with your treatment. · Spend time with family and friends. It may help to speak openly about your depression with people you trust. 
· Take your medicines exactly as prescribed.  Call your doctor if you think you are having a problem with your medicine. · Do not make major life decisions while you are depressed. Depression may change the way you think. You will be able to make better decisions after you feel better. · Think positively. Challenge negative thoughts with statements such as \"I am hopeful\"; \"Things will get better\"; and \"I can ask for the help I need. \" Write down these statements and read them often, even if you don't believe them yet. · Be patient with yourself. It took time for your depression to develop, and it will take time for your symptoms to improve. Do not take on too much or be too hard on yourself. · Learn all you can about depression from written and online materials. · Check out behavioral health classes to learn more about dealing with depression. · Keep the numbers for these national suicide hotlines: 5-023-868-TALK (6-883.295.6594) and 2-258-EKEUOVL (5-328.553.8297). If you or someone you know talks about suicide or feeling hopeless, get help right away. When should you call for help? Call 911 anytime you think you may need emergency care. For example, call if: 
  · You feel you cannot stop from hurting yourself or someone else.  
[facility] your doctor now or seek immediate medical care if: 
  · You hear voices.  
  · You feel much more depressed.  
 Watch closely for changes in your health, and be sure to contact your doctor if: 
  · You are having problems with your depression medicine.  
  · You are not getting better as expected. Where can you learn more? Go to http://chasidy-ena.info/. Enter B209 in the search box to learn more about \"Depression Treatment: Care Instructions. \" Current as of: December 7, 2017 Content Version: 11.7 © 6091-5531 [employer]. Care instructions adapted under license by [employer] (which disclaims liability or warranty for this information).  If you have questions about a medical condition or this instruction, always ask your healthcare professional. [place] [address] any warranty or liability for your use of this information. Learning About Depression What is depression? Depression is an illness that affects how a person feels, thinks, and acts. It's different from normal feelings of sadness or grief. A person who has depression may have less energy. He or she may lose interest in daily activities and may feel sad and grouchy for a long time. Depression is a common illness. It affects men and women of all ages and backgrounds. Many people, and sometimes their families, feel embarrassed or ashamed about having depression. But it isn't a sign of personal weakness. It's not a character flaw. A person who is depressed is not \"crazy. \" Depression is a medical illness. It's caused by changes in the natural chemicals in the brain. Most experts believe that a combination of family history (a person's genes) and stressful life events can cause depression. Health problems may also cause depression or make it worse. It's common for people with long-term (chronic) health problems like coronary artery disease, diabetes, cancer, or chronic pain to feel depressed. It's important to know that depression can be treated. The first step toward feeling better is often just seeing that the problem exists. What are the symptoms? The symptoms of depression may be hard to notice at first. They vary among people, and it's easy to confuse them with just feeling \"off. \" The two most common symptoms are: · Feeling sad or hopeless nearly every day for at least 2 weeks. · Losing interest in or not getting pleasure from most activities that used to be enjoyable, and feeling this way nearly every day for at least 2 weeks. Other symptoms may appear. A person with depression may, almost every day: 
· Eat or sleep more or less than usual. 
· Feel tired. · Feel unworthy or guilty. · Find it hard to focus, remember things, or make decisions. A serious symptom of depression is thinking about death or suicide. If you or someone you care about talks about this or about feeling hopeless, get help right away. How is it treated? Doctors usually treat depression with medicines or counseling. Often a combination of the two works best. Many people don't get help because they think that they'll get over the depression on their own. But some people do not get better without treatment. Antidepressant medicines can improve the symptoms of depression in 1 to 3 weeks. But it can take 6 to 8 weeks to see more improvement. Your doctor will likely have you keep taking these medicines for at least 6 months. In many cases, counseling can work as well as medicines to treat mild to moderate depression. Counseling is done by licensed mental health providers, such as psychologists and social workers. This kind of treatment deals with how you think about things and how you act each day. If depression is caused by a medical problem, treating that problem may also help relieve the depression. What can you do to help someone with depression? If someone you care about is depressed, you may feel helpless. But there are some things you can do. · Help the person get treatment or stay with it. This is the best thing you can do. · Support and encourage the person. · Help the person have good health habits. Urge him or her to get regular exercise, eat a balanced diet, and get enough sleep. · Take care of yourself. Ask others to give you emotional and practical support while you are helping a friend or loved one who has depression. · Keep the numbers for these national suicide hotlines: 1-177-492-TALK (0-209.344.5595) and 3-646-KQWNQON (8-490.587.8411). If you or someone you know talks about suicide or feeling hopeless, get help right away. Where can you learn more? Go to http://chasidy-ena.info/. Enter W384 in the search box to learn more about \"Learning About Depression. \" Current as of: December 7, 2017 Content Version: 11.7 © 4632-6407 [employer]. Care instructions adapted under license by [employer]) (which disclaims liability or warranty for this information). If you have questions about a medical condition or this instruction, always ask your healthcare professional. [address] any warranty or liability for your use of this information. Introducing [facility]! Dear Doc Aleshia: Thank you for requesting a [employer] account. Our records indicate that you already have an active [employer] account. You can access your account anytime at [email or web address] Did you know that you can access your hospital and ER discharge instructions at any time in [employer]? You can also review all of your test results from your hospital stay or ER visit. Additional Information If you have questions, please visit the Frequently Asked Questions section of the [employer] website at [email or web address] Remember, [employer] is NOT to be used for urgent needs. For medical emergencies, dial 911. Now available from your iPhone and Android! Please provide this summary of care documentation to your next provider. Your primary care clinician is listed as [facility]. If you have any questions after today's visit, please call [phone].

## 2018-07-16 NOTE — PROGRESS NOTES
Chief Complaint   Patient presents with    Depression     PHQ over the last two weeks 7/16/2018   PHQ Not Done -   Little interest or pleasure in doing things Not at all   Feeling down, depressed or hopeless Several days   Total Score PHQ 2 1   Trouble falling or staying asleep, or sleeping too much Nearly every day   Feeling tired or having little energy Nearly every day   Poor appetite or overeating Not at all   Feeling bad about yourself - or that you are a failure or have let yourself or your family down Several days   Trouble concentrating on things such as school, work, reading or watching TV Not at all   Moving or speaking so slowly that other people could have noticed; or the opposite being so fidgety that others notice Not at all   Thoughts of being better off dead, or hurting yourself in some way Not at all   PHQ 9 Score 8   How difficult have these problems made it for you to do your work, take care of your home and get along with others -

## 2018-07-17 ENCOUNTER — HOSPITAL ENCOUNTER (OUTPATIENT)
Dept: BONE DENSITY | Age: 68
Discharge: HOME | End: 2018-07-17
Attending: FAMILY MEDICINE
Payer: MEDICARE

## 2018-07-17 DIAGNOSIS — M94.9 DISORDER OF BONE AND CARTILAGE: ICD-10-CM

## 2018-07-17 DIAGNOSIS — M89.9 DISORDER OF BONE AND CARTILAGE: ICD-10-CM

## 2018-07-17 PROBLEM — M85.89 OSTEOPENIA OF MULTIPLE SITES: Status: ACTIVE | Noted: 2018-07-17

## 2018-07-17 PROCEDURE — 77080 DXA BONE DENSITY AXIAL: CPT

## 2018-07-30 ENCOUNTER — HOSPITAL ENCOUNTER (OUTPATIENT)
Dept: LAB | Age: 68
Discharge: HOME | End: 2018-07-30
Payer: MEDICARE

## 2018-07-30 ENCOUNTER — LAB ONLY (OUTPATIENT)
Dept: FAMILY MEDICINE CLINIC | Age: 68
End: 2018-07-30

## 2018-07-30 DIAGNOSIS — Z01.89 ROUTINE LAB DRAW: Primary | ICD-10-CM

## 2018-07-30 DIAGNOSIS — E03.9 ACQUIRED HYPOTHYROIDISM: ICD-10-CM

## 2018-07-30 DIAGNOSIS — E11.21 DIABETIC NEPHROPATHY ASSOCIATED WITH TYPE 2 DIABETES MELLITUS (HCC): ICD-10-CM

## 2018-07-30 DIAGNOSIS — R68.89 OTHER GENERAL SYMPTOMS AND SIGNS: ICD-10-CM

## 2018-07-30 DIAGNOSIS — E55.9 VITAMIN D DEFICIENCY: ICD-10-CM

## 2018-07-30 LAB
ALBUMIN SERPL-MCNC: 3.6 G/DL (ref 3.4–5)
ALBUMIN/GLOB SERPL: 1 {RATIO} (ref 0.8–1.7)
ALP SERPL-CCNC: 121 U/L (ref 45–117)
ALT SERPL-CCNC: 18 U/L (ref 13–56)
ANION GAP SERPL CALC-SCNC: 11 MMOL/L (ref 3–18)
AST SERPL-CCNC: 10 U/L (ref 15–37)
BILIRUB SERPL-MCNC: 0.3 MG/DL (ref 0.2–1)
BUN SERPL-MCNC: 16 MG/DL (ref 7–18)
BUN/CREAT SERPL: 13 (ref 12–20)
CALCIUM SERPL-MCNC: 8.4 MG/DL (ref 8.5–10.1)
CHLORIDE SERPL-SCNC: 106 MMOL/L (ref 100–108)
CHOLEST SERPL-MCNC: 159 MG/DL
CO2 SERPL-SCNC: 24 MMOL/L (ref 21–32)
CREAT SERPL-MCNC: 1.25 MG/DL (ref 0.6–1.3)
EST. AVERAGE GLUCOSE BLD GHB EST-MCNC: 258 MG/DL
FOLATE SERPL-MCNC: 19.6 NG/ML (ref 3.1–17.5)
GLOBULIN SER CALC-MCNC: 3.6 G/DL (ref 2–4)
GLUCOSE SERPL-MCNC: 141 MG/DL (ref 74–99)
HBA1C MFR BLD: 10.6 % (ref 4.2–5.6)
HDLC SERPL-MCNC: 39 MG/DL (ref 40–60)
HDLC SERPL: 4.1 {RATIO} (ref 0–5)
LDLC SERPL CALC-MCNC: 84.2 MG/DL (ref 0–100)
LIPID PROFILE,FLP: ABNORMAL
MAGNESIUM SERPL-MCNC: 2 MG/DL (ref 1.6–2.6)
POTASSIUM SERPL-SCNC: 4.1 MMOL/L (ref 3.5–5.5)
PROT SERPL-MCNC: 7.2 G/DL (ref 6.4–8.2)
SODIUM SERPL-SCNC: 141 MMOL/L (ref 136–145)
TRIGL SERPL-MCNC: 179 MG/DL (ref ?–150)
TSH SERPL-ACNC: 2.7 UIU/ML (ref 0.36–3.74)
VIT B12 SERPL-MCNC: 263 PG/ML (ref 211–911)
VLDLC SERPL CALC-MCNC: 35.8 MG/DL

## 2018-07-30 PROCEDURE — 82306 VITAMIN D 25 HYDROXY: CPT | Performed by: FAMILY MEDICINE

## 2018-07-30 PROCEDURE — 84443 ASSAY THYROID STIM HORMONE: CPT | Performed by: FAMILY MEDICINE

## 2018-07-30 PROCEDURE — 80061 LIPID PANEL: CPT | Performed by: FAMILY MEDICINE

## 2018-07-30 PROCEDURE — 83735 ASSAY OF MAGNESIUM: CPT | Performed by: FAMILY MEDICINE

## 2018-07-30 PROCEDURE — 82043 UR ALBUMIN QUANTITATIVE: CPT | Performed by: FAMILY MEDICINE

## 2018-07-30 PROCEDURE — 80053 COMPREHEN METABOLIC PANEL: CPT | Performed by: FAMILY MEDICINE

## 2018-07-30 PROCEDURE — 83036 HEMOGLOBIN GLYCOSYLATED A1C: CPT | Performed by: FAMILY MEDICINE

## 2018-07-30 PROCEDURE — 82746 ASSAY OF FOLIC ACID SERUM: CPT | Performed by: FAMILY MEDICINE

## 2018-07-30 NOTE — PROGRESS NOTES
Patient came into office today for lab draw. Patient identified by name and date of birth. Performed venipuncture in patients right ac  . Patient tolerated procedure well.

## 2018-07-31 LAB — 25(OH)D3 SERPL-MCNC: 57.5 NG/ML (ref 30–100)

## 2018-08-01 LAB
CREAT UR-MCNC: 128.2 MG/DL (ref 30–125)
MICROALBUMIN UR-MCNC: 6.2 MG/DL (ref 0–3)
MICROALBUMIN/CREAT UR-RTO: 48 MG/G (ref 0–30)

## 2018-08-06 ENCOUNTER — OFFICE VISIT (OUTPATIENT)
Dept: FAMILY MEDICINE CLINIC | Age: 68
End: 2018-08-06

## 2018-08-06 VITALS
OXYGEN SATURATION: 95 % | HEART RATE: 53 BPM | WEIGHT: 182.2 LBS | RESPIRATION RATE: 12 BRPM | HEIGHT: 63 IN | DIASTOLIC BLOOD PRESSURE: 56 MMHG | BODY MASS INDEX: 32.28 KG/M2 | SYSTOLIC BLOOD PRESSURE: 110 MMHG | TEMPERATURE: 98.2 F

## 2018-08-06 DIAGNOSIS — E78.5 HYPERLIPIDEMIA, UNSPECIFIED HYPERLIPIDEMIA TYPE: ICD-10-CM

## 2018-08-06 DIAGNOSIS — T88.7XXA MEDICATION SIDE EFFECT: ICD-10-CM

## 2018-08-06 DIAGNOSIS — K52.9 CHRONIC DIARRHEA: ICD-10-CM

## 2018-08-06 RX ORDER — TETANUS TOXOID, REDUCED DIPHTHERIA TOXOID AND ACELLULAR PERTUSSIS VACCINE, ADSORBED 5; 2.5; 8; 8; 2.5 [IU]/.5ML; [IU]/.5ML; UG/.5ML; UG/.5ML; UG/.5ML
SUSPENSION INTRAMUSCULAR
Refills: 0 | COMMUNITY
Start: 2018-07-16 | End: 2018-08-27 | Stop reason: ALTCHOICE

## 2018-08-06 RX ORDER — METFORMIN HYDROCHLORIDE 500 MG/1
1000 TABLET, EXTENDED RELEASE ORAL
Qty: 360 TAB | Refills: 1
Start: 2018-08-06 | End: 2018-09-24 | Stop reason: SINTOL

## 2018-08-06 RX ORDER — SIMVASTATIN 40 MG/1
40 TABLET, FILM COATED ORAL
Qty: 90 TAB | Refills: 4 | Status: SHIPPED | OUTPATIENT
Start: 2018-08-06 | End: 2019-07-09 | Stop reason: SDUPTHER

## 2018-08-06 NOTE — PROGRESS NOTES
Brandee Ty is a 75 y.o. female who was seen in clinic today (2018). Assessment & Plan:       ICD-10-CM ICD-9-CM    1. Uncontrolled type 2 diabetes mellitus with microalbuminuria, with long-term current use of insulin ([facility]) E11.29 250.42 metFORMIN ER (GLUCOPHAGE XR) 500 mg tablet    E11.65 791.0 dulaglutide (TRULICITY) 9.40 BR/8.7 mL sub-q pen    R80.9 V58.67     Z79.4     2. Hyperlipidemia, unspecified hyperlipidemia type E78.5 272.4 simvastatin (ZOCOR) 40 mg tablet   3. Chronic diarrhea K52.9 787.91    4. Medication side effect T88. 7XXA 995.20      Patient Instructions   Stop the metformin altogether for 1-2 weeks to get back to your baseline bowel functioning. Then resume it at no more than 1000 mg daily. Call my office if your morning sugars are <90          Adding GLP1 agonist  Continue insulin unchanged at present. Dietary discretion      Follow-up Disposition:  Return in about 1 month (around 2018) for diabetes follow up (goal = visit 2-3 weeks after starting new diabeetes medicine, no labs prior. Subjective: Brandee Ty was seen today for Diabetes    Follow-up uncontrolled 2 with microalbuminuria on metformin now 2000 mg daily, basal and short acting insulin  At most recent visit identified she was suspending basal insulin when employing short acting insulin.   Educated on proper use and encouraged to enroll in classes  Classes not yet scheduled due to diarrhea    Scheduled to see GI later this month for chronic diarrhea - predated the metformin, but worse after dose increased beyond 1000 mg daily    Home glucoses:  Fastin-230  PP lunch: 170-240  PP dinner: 190-300  Hypoglycemic episodes: none    Noting her trigger foods: salt and vinegar chips, pasta    Tolerating simvastatin well despite prior history of intolerance to atorvastatin        Lab Results   Component Value Date/Time    Hemoglobin A1c 10.6 (H) 2018 08:48 AM    Hemoglobin A1c 10.7 (H) 06/07/2018 12:40 PM    Hemoglobin A1c 10.9 (H) 04/16/2018 03:01 PM    Hemoglobin A1c, External 11.1 03/21/2016    Glucose 141 (H) 07/30/2018 08:48 AM    Glucose (POC) 139 (H) 08/19/2016 02:14 PM    Microalbumin/Creat ratio (mg/g creat) 48 (H) 07/30/2018 08:41 AM    Microalbumin,urine random 6.20 (H) 07/30/2018 08:41 AM    LDL, calculated 84.2 07/30/2018 08:48 AM    Creatinine 1.25 07/30/2018 08:48 AM         Results for orders placed or performed during the hospital encounter of 07/30/18   VITAMIN D, 25 HYDROXY   Result Value Ref Range    Vitamin D 25-Hydroxy 57.5 30 - 100 ng/mL   VITAMIN B12 & FOLATE   Result Value Ref Range    Vitamin B12 263 211 - 911 pg/mL    Folate 19.6 (H) 3.10 - 17.50 ng/mL   MAGNESIUM   Result Value Ref Range    Magnesium 2.0 1.6 - 2.6 mg/dL   HEMOGLOBIN A1C WITH EAG   Result Value Ref Range    Hemoglobin A1c 10.6 (H) 4.2 - 5.6 %    Est. average glucose 912 mg/dL   METABOLIC PANEL, COMPREHENSIVE   Result Value Ref Range    Sodium 141 136 - 145 mmol/L    Potassium 4.1 3.5 - 5.5 mmol/L    Chloride 106 100 - 108 mmol/L    CO2 24 21 - 32 mmol/L    Anion gap 11 3.0 - 18 mmol/L    Glucose 141 (H) 74 - 99 mg/dL    BUN 16 7.0 - 18 MG/DL    Creatinine 1.25 0.6 - 1.3 MG/DL    BUN/Creatinine ratio 13 12 - 20      GFR est AA 52 (L) >60 ml/min/1.73m2    GFR est non-AA 43 (L) >60 ml/min/1.73m2    Calcium 8.4 (L) 8.5 - 10.1 MG/DL    Bilirubin, total 0.3 0.2 - 1.0 MG/DL    ALT (SGPT) 18 13 - 56 U/L    AST (SGOT) 10 (L) 15 - 37 U/L    Alk.  phosphatase 121 (H) 45 - 117 U/L    Protein, total 7.2 6.4 - 8.2 g/dL    Albumin 3.6 3.4 - 5.0 g/dL    Globulin 3.6 2.0 - 4.0 g/dL    A-G Ratio 1.0 0.8 - 1.7     TSH W/ REFLX FREE T4 IF ABNORMAL   Result Value Ref Range    TSH 2.70 0.36 - 3.74 uIU/mL   LIPID PANEL W/ REFLX DIRECT LDL   Result Value Ref Range    LIPID PROFILE          Cholesterol, total 159 <200 MG/DL    Triglyceride 179 (H) <150 MG/DL    HDL Cholesterol 39 (L) 40 - 60 MG/DL    LDL, calculated 84.2 0 - 100 MG/DL    VLDL, calculated 35.8 MG/DL    CHOL/HDL Ratio 4.1 0 - 5.0     MICROALBUMIN, UR, RAND W/ MICROALB/CREAT RATIO   Result Value Ref Range    Microalbumin,urine random 6.20 (H) 0 - 3.0 MG/DL    Creatinine, urine 128.20 (H) 30 - 125 mg/dL    Microalbumin/Creat ratio (mg/g creat) 48 (H) 0 - 30 mg/g      Prior to Admission medications    Medication Sig Start Date End Date Taking? Authorizing Provider   Blood-Glucose Meter (ACCU-CHEK CODY PLUS METER) misc 1 Device by Does Not Apply route four (4) times daily. 6/8/18  Yes Jojo Barbour MD   glucose blood VI test strips (ACCU-CHEK CODY PLUS TEST STRP) strip Patient is to check blood sugar 3-4 times a day 6/8/18  Yes Jojo Barbour MD   insulin aspart U-100 (NOVOLOG) 100 unit/mL crtg Sliding Scale: 100-150 = 6 units; 150-200 = 8 units; 200-250 = 10 units; 250-300= 12 units; 300-350 = 14 units; 400 = Emergency room 6/7/18  Yes Jojo Barbour MD   insulin glargine (LANTUS SOLOSTAR U-100 INSULIN) 100 unit/mL (3 mL) inpn INJECT 30 units of  Subcu in am and then 30 UNITS subcu AT BEDTIME 6/7/18  Yes Jojo Barbour MD   glucose blood VI test strips (FREESTYLE LITE STRIPS) strip Blood sugar check 3-4 times a day due to fluctuating blood sugar 6/7/18  Yes Jojo Barbour MD   metFORMIN ER (GLUCOPHAGE XR) 500 mg tablet Take 4 Tabs by mouth daily (with dinner). 6/7/18  Yes Jojo Barbour MD   ergocalciferol (VITAMIN D2) 50,000 unit capsule Take 1 Cap by mouth every seven (7) days. Start with 1 cap daily for 3 days 6/7/18  Yes Jojo Barbour MD   simvastatin (ZOCOR) 40 mg tablet Take 1 Tab by mouth nightly. 4/16/18  Yes Jojo Barbour MD   levothyroxine (SYNTHROID) 150 mcg tablet Take 1 Tab by mouth Daily (before breakfast). 4/16/18  Yes Jojo Barbour MD   NIFEdipine ER (ADALAT CC) 30 mg ER tablet Take 1 Tab by mouth daily.  4/16/18  Yes Jojo Barbour MD   olmesartan-hydroCHLOROthiazide [facility] HCT) 40-25 mg per tablet TAKE ONE TABLET BY MOUTH EVERY DAY 4/16/18  Yes Leatha Cooper MD   BLADDER CONTROL PADS pads  5/16/17  Yes Historical Provider   ALCOHOL PREP PADS padm  5/16/17  Yes Historical Provider   omeprazole (PRILOSEC) 40 mg capsule Take 1 Cap by mouth daily. 5/8/17  Yes Hood Post MD   PSYLLIUM SEED, WITH DEXTROSE, (FIBER PO) Take  by mouth. Yes Historical Provider   SHINGRIX, PF, 50 mcg/0.5 mL susr injection inject 0.5 milliliter intramuscularly 4/18/18   Historical Provider   meclizine (ANTIVERT) 25 mg tablet Take  by mouth three (3) times daily as needed. Historical Provider          Allergies   Allergen Reactions    Darvocet A500 [Propoxyphene N-Acetaminophen] Nausea and Vomiting    Gloves, Latex With Aloe Vera Contact Dermatitis     Rash from powder inside latex gloves         Patient Care Team:  Leatha Cooper MD as PCP - General (Family Practice)      Review of Systems   Cardiovascular: Negative for chest pain. Gastrointestinal: Positive for nausea. Negative for vomiting. Musculoskeletal: Negative for myalgias. Objective:   Physical Exam   Constitutional: She is oriented to person, place, and time. She appears well-developed. No distress. Pleasant obese white female   HENT:   Head: Normocephalic and atraumatic. Pulmonary/Chest: Effort normal.   Neurological: She is alert and oriented to person, place, and time. Psychiatric: She has a normal mood and affect. Her behavior is normal. Judgment and thought content normal.     Visit Vitals    /56    Pulse (!) 53    Temp 98.2 °F (36.8 °C) (Oral)    Resp 12    Ht 5' 3\" (1.6 m)    Wt 182 lb 3.2 oz (82.6 kg)    LMP 11/17/1972  Comment: partial    SpO2 95%    BMI 32.28 kg/m2           Disclaimer: The patient understands our medical plan. Alternatives have been explained and offered. The risks, benefits and significant side effects of all medications have been reviewed. Anticipated time course and progression of condition reviewed.  All questions have been addressed. She is encouraged to employ the information provided in the after visit summary, which was reviewed. Where appropriate, she is instructed to call the clinic if she has not been notified either by phone or through [address] with the results of her tests or with an appointment plan for any referrals within 1 week(s). No news is not good news; it's no news. The patient  is to call if her condition worsens or fails to improve or if significant side effects are experienced. Aspects of this note may have been generated using voice recognition software. Despite editing, there may be unrecognized errors.        Evie Williamson MD

## 2018-08-06 NOTE — PROGRESS NOTES
Patient here for 3 week f/u on DM and lab results she is asking for refills today on her simvastatin. 1. Have you been to the ER, urgent care clinic since your last visit? Hospitalized since your last visit? No  2. Have you seen or consulted any other health care providers outside of the [address] [place] [address] since your last visit? Include any pap smears or colon screening. No    Medication reconciliation has been completed with patient. Care team discussed/updated as well as pharmacy. Care everywhere has been ran. Health Maintenance reviewed - Tdap was done 7/16/18, Shingles vaccine done 4/18/18, Flu vaccine not available. [place]

## 2018-08-06 NOTE — MR AVS SNAPSHOT
[address] [facility] 
[phone] Patient: Bam Sal MRN: [id number] MOS:08/39/7375 Visit Information Date & Time Provider Department Dept. Phone Encounter #  
 8/6/2018 11:00 AM Maico Lofton MD [facility] [phone] [id number] Follow-up Instructions Return in about 1 month (around 9/6/2018) for diabetes follow up (goal = visit 2-3 weeks after starting new diabeetes medicine, no labs prior. Your Appointments 8/27/2018  9:15 AM  
ROUTINE CARE with Maico Lofton MD  
[facility]) Appt Note: 6 weeks for depression (30)  
 [address] [facility]  
[phone]  
  
   
 [facility] [address] [place] [address] Upcoming Health Maintenance Date Due ZOSTER VACCINE AGE 60> 9/17/2009 DTaP/Tdap/Td series (1 - Tdap) 12/23/2014 Influenza Age 5 to Adult 8/1/2018 EYE EXAM RETINAL OR DILATED Q1 9/7/2018 HEMOGLOBIN A1C Q6M 1/30/2019 COLONOSCOPY 2/11/2019 FOOT EXAM Q1 5/7/2019 MEDICARE YEARLY EXAM 7/10/2019 MICROALBUMIN Q1 7/30/2019 LIPID PANEL Q1 7/30/2019 BREAST CANCER SCRN MAMMOGRAM 2/12/2020 GLAUCOMA SCREENING Q2Y 6/5/2020 Allergies as of 8/6/2018  Review Complete On: 8/6/2018 By: Maico Lofton MD  
  
 Severity Noted Reaction Type Reactions Darvocet A500 [Propoxyphene N-acetaminophen]  05/08/2017    Nausea and Vomiting Gloves, Latex With Aloe Vera  08/19/2016    Contact Dermatitis Rash from powder inside latex gloves Current Immunizations  Reviewed on 5/7/2018 Name Date Influenza High Dose Vaccine PF 11/8/2017, 1/9/2017, 11/11/2015 Influenza Vaccine (Quad) PF 10/6/2014 Influenza Vaccine PF 10/31/2013 Pneumococcal Conjugate (PCV-13) 1/9/2017 Pneumococcal Polysaccharide (PPSV-23) 5/14/2015, 10/31/2013 Td, Adsorbed PF 12/22/2014 Not reviewed this visit You Were Diagnosed With   
  
 Codes Comments Uncontrolled type 2 diabetes mellitus with microalbuminuria, with long-term current use of insulin (HCC)    -  Primary ICD-10-CM: E11.29, E11.65, R80.9, Z79.4 ICD-9-CM: 250.42, 791.0, V58.67 Hyperlipidemia, unspecified hyperlipidemia type     ICD-10-CM: E78.5 ICD-9-CM: 272.4 Vitals BP Pulse Temp Resp Height(growth percentile) Weight(growth percentile) 110/56 (!) 53 98.2 °F (36.8 °C) (Oral) 12 5' 3\" (1.6 m) 182 lb 3.2 oz (82.6 kg) LMP SpO2 BMI OB Status Smoking Status 11/17/1972 95% 32.28 kg/m2 Hysterectomy Never Smoker Vitals History BMI and BSA Data Body Mass Index Body Surface Area  
 32.28 kg/m 2 1.92 m 2 Preferred Pharmacy Pharmacy Name Phone [facility] [phone], [address] [facility] [phone] Your Updated Medication List  
  
   
This list is accurate as of 8/6/18 11:23 AM.  Always use your most recent med list. ALCOHOL PREP PADS Padm Generic drug:  alcohol swabs BLADDER CONTROL PADS Pads Generic drug:  Incontinence Pad, Liner, Disp Blood-Glucose Meter Misc Commonly known as:  ACCU-CHEK CODY PLUS METER  
1 Device by Does Not Apply route four (4) times daily. BOOSTRIX TDAP 2.5-8-5 Lf-mcg-Lf/0.5mL Syrg Generic drug:  Diphth, Pertus(Acell), Tetanus USE AS DIRECTED  
  
 dulaglutide 0.75 mg/0.5 mL sub-q pen Commonly known as:  TRULICITY  
0.5 mL by SubCUTAneous route every seven (7) days. ergocalciferol 50,000 unit capsule Commonly known as:  VITAMIN D2 Take 1 Cap by mouth every seven (7) days. Start with 1 cap daily for 3 days FIBER PO Take  by mouth. * glucose blood VI test strips strip Commonly known as:  FREESTYLE LITE STRIPS  
 Blood sugar check 3-4 times a day due to fluctuating blood sugar * glucose blood VI test strips strip Commonly known as:  ACCU-CHEK CODY PLUS TEST STRP Patient is to check blood sugar 3-4 times a day  
  
 insulin aspart U-100 100 unit/mL Crtg Commonly known as:  Emily Mercedes Sliding Scale: 100-150 = 6 units; 150-200 = 8 units; 200-250 = 10 units; 250-300= 12 units; 300-350 = 14 units; 400 = Emergency room  
  
 insulin glargine 100 unit/mL (3 mL) Inpn Commonly known as:  LANTUS SOLOSTAR U-100 INSULIN INJECT 30 units of  Subcu in am and then 30 UNITS subcu AT BEDTIME  
  
 levothyroxine 150 mcg tablet Commonly known as:  SYNTHROID Take 1 Tab by mouth Daily (before breakfast). meclizine 25 mg tablet Commonly known as:  ANTIVERT Take  by mouth three (3) times daily as needed. metFORMIN  mg tablet Commonly known as:  GLUCOPHAGE XR Take 2 Tabs by mouth daily (with dinner). NIFEdipine ER 30 mg ER tablet Commonly known as:  ADALAT CC Take 1 Tab by mouth daily. olmesartan-hydroCHLOROthiazide 40-25 mg per tablet Commonly known as:  BENICAR HCT  
TAKE ONE TABLET BY MOUTH EVERY DAY  
  
 omeprazole 40 mg capsule Commonly known as:  PRILOSEC Take 1 Cap by mouth daily. sertraline 50 mg tablet Commonly known as:  ZOLOFT Take 1 Tab by mouth daily. SHINGRIX (PF) 50 mcg/0.5 mL Susr injection Generic drug:  varicella-zoster recombinant (PF)  
inject 0.5 milliliter intramuscularly  
  
 simvastatin 40 mg tablet Commonly known as:  ZOCOR Take 1 Tab by mouth nightly. * Notice: This list has 2 medication(s) that are the same as other medications prescribed for you. Read the directions carefully, and ask your doctor or other care provider to review them with you. Prescriptions Sent to Pharmacy Refills  
 simvastatin (ZOCOR) 40 mg tablet 4 Sig: Take 1 Tab by mouth nightly.   
 Class: Normal  
 [facility]acy: [id number], [address] [place] Ph #: [phone] Route: Oral  
 dulaglutide (TRULICITY) 2.00 LR/5.1 mL sub-q pen 2 Si.5 mL by SubCUTAneous route every seven (7) days. Class: Normal  
 Pharmacy: [id number], [address] [facility] [place] Ph #: [phone] Route: SubCUTAneous Follow-up Instructions Return in about 1 month (around 2018) for diabetes follow up (goal = visit 2-3 weeks after starting new diabeetes medicine, no labs prior. Patient Instructions Stop the metformin altogether for 1-2 weeks to get back to your baseline bowel functioning. Then resume it at no more than 1000 mg daily. Call my office if your morning sugars are <90 Introducing [facility]! Dear Dharmesh Monk: Thank you for requesting a [employer] account. Our records indicate that you already have an active [employer] account. You can access your account anytime at [email or web address] Did you know that you can access your hospital and ER discharge instructions at any time in [employer]? You can also review all of your test results from your hospital stay or ER visit. Additional Information If you have questions, please visit the Frequently Asked Questions section of the [employer] website at [email or web address] Remember, [employer]cs is NOT to be used for urgent needs. For medical emergencies, dial 911. Now available from your iPhone and Android! Please provide this summary of care documentation to your next provider. Your primary care clinician is listed as [place]. If you have any questions after today's visit, please call [phone].

## 2018-08-06 NOTE — PATIENT INSTRUCTIONS
Stop the metformin altogether for 1-2 weeks to get back to your baseline bowel functioning. Then resume it at no more than 1000 mg daily.       Call my office if your morning sugars are <90

## 2018-08-27 ENCOUNTER — OFFICE VISIT (OUTPATIENT)
Dept: FAMILY MEDICINE CLINIC | Age: 68
End: 2018-08-27

## 2018-08-27 ENCOUNTER — DOCUMENTATION ONLY (OUTPATIENT)
Dept: FAMILY MEDICINE CLINIC | Age: 68
End: 2018-08-27

## 2018-08-27 VITALS
RESPIRATION RATE: 12 BRPM | BODY MASS INDEX: 31.86 KG/M2 | HEIGHT: 63 IN | SYSTOLIC BLOOD PRESSURE: 110 MMHG | HEART RATE: 82 BPM | OXYGEN SATURATION: 99 % | DIASTOLIC BLOOD PRESSURE: 62 MMHG | TEMPERATURE: 97.9 F | WEIGHT: 179.8 LBS

## 2018-08-27 DIAGNOSIS — F33.0 MILD EPISODE OF RECURRENT MAJOR DEPRESSIVE DISORDER (HCC): Primary | ICD-10-CM

## 2018-08-27 DIAGNOSIS — Z23 ENCOUNTER FOR IMMUNIZATION: ICD-10-CM

## 2018-08-27 RX ORDER — SERTRALINE HYDROCHLORIDE 50 MG/1
50 TABLET, FILM COATED ORAL DAILY
Qty: 90 TAB | Refills: 4 | Status: SHIPPED | OUTPATIENT
Start: 2018-08-27 | End: 2019-07-09 | Stop reason: SDUPTHER

## 2018-08-27 NOTE — PATIENT INSTRUCTIONS
Recovering From Depression: Care Instructions  Your Care Instructions    Taking good care of yourself is important as you recover from depression. In time, your symptoms will fade as your treatment takes hold. Do not give up. Instead, focus your energy on getting better. Your mood will improve. It just takes some time. Focus on things that can help you feel better, such as being with friends and family, eating well, and getting enough rest. But take things slowly. Do not do too much too soon. You will begin to feel better gradually. Follow-up care is a key part of your treatment and safety. Be sure to make and go to all appointments, and call your doctor if you are having problems. It's also a good idea to know your test results and keep a list of the medicines you take. How can you care for yourself at home? Be realistic  · If you have a large task to do, break it up into smaller steps you can handle, and just do what you can. · You may want to put off important decisions until your depression has lifted. If you have plans that will have a major impact on your life, such as marriage, divorce, or a job change, try to wait a bit. Talk it over with friends and loved ones who can help you look at the overall picture first.  · Reaching out to people for help is important. Do not isolate yourself. Let your family and friends help you. Find someone you can trust and confide in, and talk to that person. · Be patient, and be kind to yourself. Remember that depression is not your fault and is not something you can overcome with willpower alone. Treatment is necessary for depression, just like for any other illness. Feeling better takes time, and your mood will improve little by little. Stay active  · Stay busy and get outside. Take a walk, or try some other light exercise. · Talk with your doctor about an exercise program. Exercise can help with mild depression. · Go to a movie or concert.  Take part in a [employer] activity or other social gathering. Go to a [employer] game. · Ask a friend to have dinner with you. Take care of yourself  · Eat a balanced diet with plenty of fresh fruits and vegetables, whole grains, and lean protein. If you have lost your appetite, eat small snacks rather than large meals. · Avoid drinking alcohol or using illegal drugs. Do not take medicines that have not been prescribed for you. They may interfere with medicines you may be taking for depression, or they may make your depression worse. · Take your medicines exactly as they are prescribed. You may start to feel better within 1 to 3 weeks of taking antidepressant medicine. But it can take as many as 6 to 8 weeks to see more improvement. If you have questions or concerns about your medicines, or if you do not notice any improvement by 3 weeks, talk to your doctor. · If you have any side effects from your medicine, tell your doctor. Antidepressants can make you feel tired, dizzy, or nervous. Some people have dry mouth, constipation, headaches, sexual problems, or diarrhea. Many of these side effects are mild and will go away on their own after you have been taking the medicine for a few weeks. Some may last longer. Talk to your doctor if side effects are bothering you too much. You might be able to try a different medicine. · Get enough sleep. If you have problems sleeping:  ¨ Go to bed at the same time every night, and get up at the same time every morning. ¨ Keep your bedroom dark and quiet. ¨ Do not exercise after 5:00 p.m. ¨ Avoid drinks with caffeine after 5:00 p.m. · Avoid sleeping pills unless they are prescribed by the doctor treating your depression. Sleeping pills may make you groggy during the day, and they may interact with other medicine you are taking. · If you have any other illnesses, such as diabetes, heart disease, or high blood pressure, make sure to continue with your treatment.  Tell your doctor about all of the medicines you take, including those with or without a prescription. · Keep the numbers for these national suicide hotlines: 1-544-853-TALK (7-570.408.5749) and 1-586-PFBXVEA (7-986.426.9235). If you or someone you know talks about suicide or feeling hopeless, get help right away. When should you call for help? Call 911 anytime you think you may need emergency care. For example, call if:    · You feel like hurting yourself or someone else.     · Someone you know has depression and is about to attempt or is attempting suicide.   [facility] your doctor now or seek immediate medical care if:    · You hear voices.     · Someone you know has depression and:  ¨ Starts to give away his or her possessions. ¨ Uses illegal drugs or drinks alcohol heavily. ¨ Talks or writes about death, including writing suicide notes or talking about guns, knives, or pills. ¨ Starts to spend a lot of time alone. ¨ Acts very aggressively or suddenly appears calm.    Watch closely for changes in your health, and be sure to contact your doctor if:    · You do not get better as expected. Where can you learn more? Go to http://chasidy-ena.info/. Enter A282 in the search box to learn more about \"Recovering From Depression: Care Instructions. \"  Current as of: December 7, 2017  Content Version: 11.7  © 1144-0057 [employer]. Care instructions adapted under license by [facility] (which disclaims liability or warranty for this information). If you have questions about a medical condition or this instruction, always ask your healthcare professional. [place] [address] any warranty or liability for your use of this information.

## 2018-08-27 NOTE — MR AVS SNAPSHOT
[address] [place] [address] Road 
[phone] Patient: Nawaf Bañuelos MRN: [id number] Visit Information Date & Time Provider Department Dept. Phone Encounter #  
 8/27/2018  9:15 AM Dorys Dominguez MD [facility] [phone] [id number] Follow-up Instructions Return in about 6 months (around 2/27/2019) for depression follow up, sooner if needed. Your Appointments 8/28/2018 10:30 AM  
ROUTINE CARE with Dorys Dominguez MD  
[facility]) Appt Note: Return in about 1 month (around 9/6/2018) for diabetes follow up (goal = visit 2-3 weeks after starting new diabeetes medicine, no labs prior. [facility] [address] [place] [address]  
[phone]  
  
   
 [address] Upcoming Health Maintenance Date Due ZOSTER VACCINE AGE 60> 9/17/2009 DTaP/Tdap/Td series (1 - Tdap) 12/23/2014 Influenza Age 5 to Adult 8/1/2018 EYE EXAM RETINAL OR DILATED Q1 9/7/2018 COLONOSCOPY 2/11/2019 HEMOGLOBIN A1C Q6M 1/30/2019 FOOT EXAM Q1 5/7/2019 MEDICARE YEARLY EXAM 7/10/2019 MICROALBUMIN Q1 7/30/2019 LIPID PANEL Q1 7/30/2019 BREAST CANCER SCRN MAMMOGRAM 2/12/2020 GLAUCOMA SCREENING Q2Y 6/5/2020 Allergies as of 8/27/2018  Review Complete On: 8/27/2018 By: Dorys Dominguez MD  
  
 Severity Noted Reaction Type Reactions Darvocet A500 [Propoxyphene N-acetaminophen]  05/08/2017    Nausea and Vomiting Gloves, Latex With Aloe Vera  08/19/2016    Contact Dermatitis Rash from powder inside latex gloves Current Immunizations  Reviewed on 5/7/2018 Name Date Influenza High Dose Vaccine PF  Incomplete, 11/8/2017, 1/9/2017, 11/11/2015 Influenza Vaccine (Quad) PF 10/6/2014 Influenza Vaccine PF 10/31/2013 Pneumococcal Conjugate (PCV-13) 1/9/2017 Pneumococcal Polysaccharide (PPSV-23) 5/14/2015, 10/31/2013 Td, Adsorbed PF 12/22/2014 Not reviewed this visit You Were Diagnosed With   
  
 Codes Comments Encounter for immunization    -  Primary ICD-10-CM: A97 ICD-9-CM: V03.89 Mild episode of recurrent major depressive disorder (HCC)     ICD-10-CM: F33.0 ICD-9-CM: 296.31 Vitals BP Pulse Temp Resp Height(growth percentile) Weight(growth percentile) 110/62 82 97.9 °F (36.6 °C) (Oral) 12 5' 3\" (1.6 m) 179 lb 12.8 oz (81.6 kg) LMP SpO2 BMI OB Status Smoking Status 11/17/1972 99% 31.85 kg/m2 Hysterectomy Never Smoker Vitals History BMI and BSA Data Body Mass Index Body Surface Area  
 31.85 kg/m 2 1.9 m 2 Preferred Pharmacy Pharmacy Name Phone [facility] [address] [facility] [phone] Your Updated Medication List  
  
   
This list is accurate as of 8/27/18  9:54 AM.  Always use your most recent med list. ALCOHOL PREP PADS Padm Generic drug:  alcohol swabs BLADDER CONTROL PADS Pads Generic drug:  Incontinence Pad, Liner, Disp Blood-Glucose Meter Misc Commonly known as:  ACCU-CHEK CODY PLUS METER  
1 Device by Does Not Apply route four (4) times daily. dulaglutide 0.75 mg/0.5 mL sub-q pen Commonly known as:  TRULICITY  
0.5 mL by SubCUTAneous route every seven (7) days. ergocalciferol 50,000 unit capsule Commonly known as:  VITAMIN D2 Take 1 Cap by mouth every seven (7) days. Start with 1 cap daily for 3 days FIBER PO Take  by mouth. * glucose blood VI test strips strip Commonly known as:  FREESTYLE LITE STRIPS Blood sugar check 3-4 times a day due to fluctuating blood sugar * glucose blood VI test strips strip Commonly known as:  ACCU-CHEK CODY PLUS TEST STRP Patient is to check blood sugar 3-4 times a day insulin aspart U-100 100 unit/mL Crtg Commonly known as:  Raquel Alon Sliding Scale: 100-150 = 6 units; 150-200 = 8 units; 200-250 = 10 units; 250-300= 12 units; 300-350 = 14 units; 400 = Emergency room  
  
 insulin glargine 100 unit/mL (3 mL) Inpn Commonly known as:  LANTUS SOLOSTAR U-100 INSULIN INJECT 30 units of  Subcu in am and then 30 UNITS subcu AT BEDTIME  
  
 levothyroxine 150 mcg tablet Commonly known as:  SYNTHROID Take 1 Tab by mouth Daily (before breakfast). meclizine 25 mg tablet Commonly known as:  ANTIVERT Take  by mouth three (3) times daily as needed. metFORMIN  mg tablet Commonly known as:  GLUCOPHAGE XR Take 2 Tabs by mouth daily (with dinner). NIFEdipine ER 30 mg ER tablet Commonly known as:  ADALAT CC Take 1 Tab by mouth daily. olmesartan-hydroCHLOROthiazide 40-25 mg per tablet Commonly known as:  BENICAR HCT  
TAKE ONE TABLET BY MOUTH EVERY DAY  
  
 omeprazole 40 mg capsule Commonly known as:  PRILOSEC Take 1 Cap by mouth daily. sertraline 50 mg tablet Commonly known as:  ZOLOFT Take 1 Tab by mouth daily. simvastatin 40 mg tablet Commonly known as:  ZOCOR Take 1 Tab by mouth nightly. * Notice: This list has 2 medication(s) that are the same as other medications prescribed for you. Read the directions carefully, and ask your doctor or other care provider to review them with you. Prescriptions Sent to Pharmacy Refills  
 sertraline (ZOLOFT) 50 mg tablet 4 Sig: Take 1 Tab by mouth daily. Class: Normal  
 [facility]: [id number] [address] Ph #: [phone] Route: Oral  
  
We Performed the Following INFLUENZA VIRUS VACCINE, HIGH DOSE SEASONAL, PRESERVATIVE FREE [94920 CPT(R)] Follow-up Instructions Return in about 6 months (around 2/27/2019) for depression follow up, sooner if needed. Patient Instructions Recovering From Depression: Care Instructions Your Care Instructions Taking good care of yourself is important as you recover from depression. In time, your symptoms will fade as your treatment takes hold. Do not give up. Instead, focus your energy on getting better. Your mood will improve. It just takes some time. Focus on things that can help you feel better, such as being with friends and family, eating well, and getting enough rest. But take things slowly. Do not do too much too soon. You will begin to feel better gradually. Follow-up care is a key part of your treatment and safety. Be sure to make and go to all appointments, and call your doctor if you are having problems. It's also a good idea to know your test results and keep a list of the medicines you take. How can you care for yourself at home? Be realistic · If you have a large task to do, break it up into smaller steps you can handle, and just do what you can. · You may want to put off important decisions until your depression has lifted. If you have plans that will have a major impact on your life, such as marriage, divorce, or a job change, try to wait a bit. Talk it over with friends and loved ones who can help you look at the overall picture first. 
· Reaching out to people for help is important. Do not isolate yourself. Let your family and friends help you. Find someone you can trust and confide in, and talk to that person. · Be patient, and be kind to yourself. Remember that depression is not your fault and is not something you can overcome with willpower alone. Treatment is necessary for depression, just like for any other illness. Feeling better takes time, and your mood will improve little by little. Stay active · Stay busy and get outside. Take a walk, or try some other light exercise. · Talk with your doctor about an exercise program. Exercise can help with mild depression. · Go to a movie or concert. Take part in a Druze activity or other social gathering. Go to a ball game. · Ask a friend to have dinner with you. Take care of yourself · Eat a balanced diet with plenty of fresh fruits and vegetables, whole grains, and lean protein. If you have lost your appetite, eat small snacks rather than large meals. · Avoid drinking alcohol or using illegal drugs. Do not take medicines that have not been prescribed for you. They may interfere with medicines you may be taking for depression, or they may make your depression worse. · Take your medicines exactly as they are prescribed. You may start to feel better within 1 to 3 weeks of taking antidepressant medicine. But it can take as many as 6 to 8 weeks to see more improvement. If you have questions or concerns about your medicines, or if you do not notice any improvement by 3 weeks, talk to your doctor. · If you have any side effects from your medicine, tell your doctor. Antidepressants can make you feel tired, dizzy, or nervous. Some people have dry mouth, constipation, headaches, sexual problems, or diarrhea. Many of these side effects are mild and will go away on their own after you have been taking the medicine for a few weeks. Some may last longer. Talk to your doctor if side effects are bothering you too much. You might be able to try a different medicine. · Get enough sleep. If you have problems sleeping: ¨ Go to bed at the same time every night, and get up at the same time every morning. ¨ Keep your bedroom dark and quiet. ¨ Do not exercise after 5:00 p.m. ¨ Avoid drinks with caffeine after 5:00 p.m. · Avoid sleeping pills unless they are prescribed by the doctor treating your depression. Sleeping pills may make you groggy during the day, and they may interact with other medicine you are taking.  
· If you have any other illnesses, such as diabetes, heart disease, or high blood pressure, make sure to continue with your treatment. Tell your doctor about all of the medicines you take, including those with or without a prescription. · Keep the numbers for these national suicide hotlines: 6-751-131-TALK (7-170.263.7462) and 5-801-YZIKWDA (1-763.815.6052). If you or someone you know talks about suicide or feeling hopeless, get help right away. When should you call for help? Call 911 anytime you think you may need emergency care. For example, call if: 
  · You feel like hurting yourself or someone else.  
  · Someone you know has depression and is about to attempt or is attempting suicide.  
[facility] your doctor now or seek immediate medical care if: 
  · You hear voices.  
  · Someone you know has depression and: 
¨ Starts to give away his or her possessions. ¨ Uses illegal drugs or drinks alcohol heavily. ¨ Talks or writes about death, including writing suicide notes or talking about guns, knives, or pills. ¨ Starts to spend a lot of time alone. ¨ Acts very aggressively or suddenly appears calm.  
 Watch closely for changes in your health, and be sure to contact your doctor if: 
  · You do not get better as expected. Where can you learn more? Go to http://chasidy-ena.info/. Enter B213 in the search box to learn more about \"Recovering From Depression: Care Instructions. \" Current as of: December 7, 2017 Content Version: 11.7 © 2005-3942 [employer]. Care instructions adapted under license by [employer] (which disclaims liability or warranty for this information). If you have questions about a medical condition or this instruction, always ask your healthcare professional. [place] [address] any warranty or liability for your use of this information. Introducing [facility]! Dear Dewayne Painter: Thank you for requesting a Suo Yi account.   Our records indicate that you already have an active Rocawear account. You can access your account anytime at [email or web address] Did you know that you can access your hospital and ER discharge instructions at any time in Rocawear? You can also review all of your test results from your hospital stay or ER visit. Additional Information If you have questions, please visit the Frequently Asked Questions section of the Rocawear website at [email or web address] Remember, Rocawear is NOT to be used for urgent needs. For medical emergencies, dial 911. Now available from your iPhone and Android! Please provide this summary of care documentation to your next provider. Your primary care clinician is listed as Dutch Scherer. If you have any questions after today's visit, please call [phone].

## 2018-08-27 NOTE — PROGRESS NOTES
Patient here for 6 week f/u on her depression she would like to talk about her Metformin whether she should restart this medication. 1. Have you been to the ER, urgent care clinic since your last visit? Hospitalized since your last visit? No  2. Have you seen or consulted any other health care providers outside of the [address] [place] [address] since your last visit? Include any pap smears or colon screening. Yes When: 8/21/18 Where: Dr. Jasvir Russell Reason for visit: diarrhea, colonoscopy consult    Medication reconciliation has been completed with patient. Care team discussed/updated as well as pharmacy. Care everywhere has been ran. Health Maintenance reviewed - Will request Zoster, and Tdap from [employer], will give flu vaccine today.

## 2018-08-27 NOTE — PROGRESS NOTES
Brandee Ty is a 75 y.o. female who was seen in clinic today (8/27/2018). Assessment & Plan:       ICD-10-CM ICD-9-CM    1. Mild episode of recurrent major depressive disorder (HCC) F33.0 296.31 sertraline (ZOLOFT) 50 mg tablet   2. Encounter for immunization Z23 V03.89 INFLUENZA VACCINE INACTIVATED (IIV), SUBUNIT, ADJUVANTED, IM      CANCELED: INFLUENZA VIRUS VACCINE, HIGH DOSE SEASONAL, PRESERVATIVE FREE     Improved well controlled continue present management      Follow-up Disposition:  Return in about 6 months (around 2/27/2019) for depression follow up, sooner if needed. Subjective: Brandee Ty was seen today for Depression and Medication Evaluation (Metformin)      Follow-up MDD prior remote history of hospitalization for suicide attempt. SSRI initiated 7/16/2018. Feels much better. Sleeping longer at night. Sleep interuptions are related to voiding only. No lydia  Remains excited about upcoming family wedding and birth of GG child in Nov    Saw Dr. Rad Silva thinks many of her GI symptoms are medication related.     PHQ over the last two weeks 8/27/2018   PHQ Not Done -   Little interest or pleasure in doing things Not at all   Feeling down, depressed, irritable, or hopeless Not at all   Total Score PHQ 2 0   Trouble falling or staying asleep, or sleeping too much More than half the days   Feeling tired or having little energy Several days   Poor appetite, weight loss, or overeating Not at all   Feeling bad about yourself - or that you are a failure or have let yourself or your family down Not at all   Trouble concentrating on things such as school, work, reading, or watching TV Not at all   Moving or speaking so slowly that other people could have noticed; or the opposite being so fidgety that others notice Not at all   Thoughts of being better off dead, or hurting yourself in some way Not at all   PHQ 9 Score 3   How difficult have these problems made it for you to do your work, take care of your home and get along with others Not difficult at all                Outpatient Prescriptions Marked as Taking for the 8/27/18 encounter (Office Visit) with Porfirio Riojas MD   Medication Sig Dispense Refill    simvastatin (ZOCOR) 40 mg tablet Take 1 Tab by mouth nightly. 90 Tab 4    dulaglutide (TRULICITY) 0.09 PN/5.1 mL sub-q pen 0.5 mL by SubCUTAneous route every seven (7) days. 4 Pen 2    sertraline (ZOLOFT) 50 mg tablet Take 1 Tab by mouth daily. 30 Tab 1    Blood-Glucose Meter (ACCU-CHEK CODY PLUS METER) misc 1 Device by Does Not Apply route four (4) times daily. 1 Each 0    glucose blood VI test strips (ACCU-CHEK CODY PLUS TEST STRP) strip Patient is to check blood sugar 3-4 times a day 300 Strip 11    insulin aspart U-100 (NOVOLOG) 100 unit/mL crtg Sliding Scale: 100-150 = 6 units; 150-200 = 8 units; 200-250 = 10 units; 250-300= 12 units; 300-350 = 14 units; 400 = Emergency room 8 Cartridge 2    insulin glargine (LANTUS SOLOSTAR U-100 INSULIN) 100 unit/mL (3 mL) inpn INJECT 30 units of  Subcu in am and then 30 UNITS subcu AT BEDTIME 10 Pen 2    glucose blood VI test strips (FREESTYLE LITE STRIPS) strip Blood sugar check 3-4 times a day due to fluctuating blood sugar 300 Strip 11    ergocalciferol (VITAMIN D2) 50,000 unit capsule Take 1 Cap by mouth every seven (7) days. Start with 1 cap daily for 3 days 20 Cap 1    levothyroxine (SYNTHROID) 150 mcg tablet Take 1 Tab by mouth Daily (before breakfast). 90 Tab 1    NIFEdipine ER (ADALAT CC) 30 mg ER tablet Take 1 Tab by mouth daily. 90 Tab 0    olmesartan-hydroCHLOROthiazide (BENICAR HCT) 40-25 mg per tablet TAKE ONE TABLET BY MOUTH EVERY DAY 90 Tab 0    BLADDER CONTROL PADS pads       ALCOHOL PREP PADS padm       omeprazole (PRILOSEC) 40 mg capsule Take 1 Cap by mouth daily. 90 Cap 2    PSYLLIUM SEED, WITH DEXTROSE, (FIBER PO) Take  by mouth.          Patient Active Problem List    Diagnosis    Osteopenia of multiple sites 7/2018      Moderate major depression (HCC)    Uncontrolled type 2 diabetes mellitus with microalbuminuria, with long-term current use of insulin ([facility] [facility]    Family history of malignant neoplasm of breast    Diabetic nephropathy associated with type 2 diabetes mellitus ([facility]    Lump or mass in breast    Hypertension    Acquired hypothyroidism    Hiatal hernia     EGD 11/14/2017 Del Young MD \"prn acid suppression\"      Hyperlipidemia    Depression    Migraines         Allergies   Allergen Reactions    Darvocet A500 [Propoxyphene N-Acetaminophen] Nausea and Vomiting    Gloves, Latex With Aloe Vera Contact Dermatitis     Rash from powder inside latex gloves           Patient Care Team:  Junaid Serrano MD as PCP - General (Family Practice)  Harris Walker MD (Gastroenterology)    The following sections were reviewed & updated as appropriate: PMH, PSH, FH, and SH. ROS - per HPI       Objective:     Visit Vitals    /62    Pulse 82    Temp 97.9 °F (36.6 °C) (Oral)    Resp 12    Ht 5' 3\" (1.6 m)    Wt 179 lb 12.8 oz (81.6 kg)    LMP 11/17/1972  Comment: partial    SpO2 99%    BMI 31.85 kg/m2      Physical Exam   Constitutional: She is oriented to person, place, and time. She appears well-developed. No distress. HENT:   Head: Normocephalic and atraumatic. Pulmonary/Chest: Effort normal.   Neurological: She is alert and oriented to person, place, and time. Psychiatric: She has a normal mood and affect. Her behavior is normal. Judgment and thought content normal.         Disclaimer: The patient understands our medical plan. Alternatives have been explained and offered. The risks, benefits and significant side effects of all medications have been reviewed. Anticipated time course and progression of condition reviewed. All questions have been addressed. She is encouraged to employ the information provided in the after visit summary, which was reviewed.       Where appropriate, she is instructed to call the clinic if she has not been notified either by phone or through [address] with the results of her tests or with an appointment plan for any referrals within 1 week(s). No news is not good news; it's no news. The patient  is to call if her condition worsens or fails to improve or if significant side effects are experienced. Aspects of this note may have been generated using voice recognition software. Despite editing, there may be unrecognized errors.        Dutch Scherer MD

## 2018-08-28 ENCOUNTER — OFFICE VISIT (OUTPATIENT)
Dept: FAMILY MEDICINE CLINIC | Age: 68
End: 2018-08-28

## 2018-08-28 VITALS
RESPIRATION RATE: 12 BRPM | TEMPERATURE: 97.7 F | OXYGEN SATURATION: 99 % | HEART RATE: 70 BPM | HEIGHT: 63 IN | SYSTOLIC BLOOD PRESSURE: 124 MMHG | BODY MASS INDEX: 31.96 KG/M2 | WEIGHT: 180.4 LBS | DIASTOLIC BLOOD PRESSURE: 77 MMHG

## 2018-08-28 DIAGNOSIS — M25.50 ARTHRALGIA, UNSPECIFIED JOINT: ICD-10-CM

## 2018-08-28 DIAGNOSIS — R20.0 NUMBNESS AND TINGLING: ICD-10-CM

## 2018-08-28 DIAGNOSIS — R20.2 NUMBNESS AND TINGLING: ICD-10-CM

## 2018-08-28 DIAGNOSIS — K44.9 HIATAL HERNIA: ICD-10-CM

## 2018-08-28 DIAGNOSIS — E55.9 VITAMIN D DEFICIENCY: ICD-10-CM

## 2018-08-28 RX ORDER — POLYETHYLENE GLYCOL 3350 17 G/17G
POWDER, FOR SOLUTION ORAL
COMMUNITY
Start: 2018-08-21 | End: 2019-08-01 | Stop reason: SDUPTHER

## 2018-08-28 RX ORDER — HYDROGEN PEROXIDE 3 %
SOLUTION, NON-ORAL MISCELLANEOUS
COMMUNITY
End: 2022-07-29

## 2018-08-28 RX ORDER — CALCIUM CARB/VITAMIN D3/VIT K1 500-500-40
TABLET,CHEWABLE ORAL
COMMUNITY
End: 2019-01-28 | Stop reason: ALTCHOICE

## 2018-08-28 RX ORDER — INSULIN GLARGINE 100 [IU]/ML
60 INJECTION, SOLUTION SUBCUTANEOUS DAILY
Qty: 10 PEN | Refills: 2 | Status: SHIPPED | OUTPATIENT
Start: 2018-08-28 | End: 2019-01-10

## 2018-08-28 RX ORDER — BISACODYL 5 MG
TABLET, DELAYED RELEASE (ENTERIC COATED) ORAL
COMMUNITY
Start: 2018-08-21 | End: 2019-11-12 | Stop reason: ALTCHOICE

## 2018-08-28 NOTE — PROGRESS NOTES
Chief Complaint   Patient presents with    Follow-up    Diabetes       Pt preferred language for health care discussion is english.     Is someone accompanying this pt? no    Is the patient using any DME equipment during OV? no    Depression Screening:  PHQ over the last two weeks 8/27/2018 7/16/2018 7/9/2018 12/22/2014 10/6/2014   PHQ Not Done - - - - Active Diagnosis of Depression or Bipolar Disorder   Little interest or pleasure in doing things Not at all Not at all Not at all Several days -   Feeling down, depressed, irritable, or hopeless Not at all Several days More than half the days Several days -   Total Score PHQ 2 0 1 2 2 -   Trouble falling or staying asleep, or sleeping too much More than half the days Nearly every day Nearly every day - -   Feeling tired or having little energy Several days Nearly every day Nearly every day - -   Poor appetite, weight loss, or overeating Not at all Not at all Several days - -   Feeling bad about yourself - or that you are a failure or have let yourself or your family down Not at all Several days Not at all - -   Trouble concentrating on things such as school, work, reading, or watching TV Not at all Not at all Not at all - -   Moving or speaking so slowly that other people could have noticed; or the opposite being so fidgety that others notice Not at all Not at all Not at all - -   Thoughts of being better off dead, or hurting yourself in some way Not at all Not at all Not at all - -   PHQ 9 Score 3 8 9 - -   How difficult have these problems made it for you to do your work, take care of your home and get along with others Not difficult at all Very difficult Not difficult at all - -       Learning Assessment:  Learning Assessment 4/16/2018 5/14/2015 2/14/2013   PRIMARY LEARNER Patient Patient Patient   PRIMARY LANGUAGE ENGLISH ENGLISH ENGLISH   LEARNER PREFERENCE PRIMARY DEMONSTRATION LISTENING LISTENING     READING READING DEMONSTRATION     LISTENING DEMONSTRATION READING   ANSWERED BY Jacque patient patient   RELATIONSHIP SELF SELF SELF       Abuse Screening:  Abuse Screening Questionnaire 7/9/2018 10/6/2014   Do you ever feel afraid of your partner? N N   Are you in a relationship with someone who physically or mentally threatens you? N N   Is it safe for you to go home? Y Y       Fall Risk  Fall Risk Assessment, last 12 mths 8/28/2018 7/9/2018 6/7/2018 4/16/2018 11/8/2017 8/30/2016 6/21/2016   Able to walk? Yes Yes Yes Yes Yes Yes Yes   Fall in past 12 months? No No No No No No No         Health Maintenance reviewed and discussed per provider. Pt is due for   Health Maintenance Due   Topic Date Due    ZOSTER VACCINE AGE 60>  09/17/2009    DTaP/Tdap/Td series (1 - Tdap) 12/23/2014    EYE EXAM RETINAL OR DILATED Q1  09/07/2018    COLONOSCOPY  02/11/2019     Please order/place referral if appropriate. Advance Directive:  1. Do you have an advance directive in place? Patient Reply:no    2. If not, would you like material regarding how to put one in place? Patient Reply: no    2. Per patient no changes to their ACP contact no. Coordination of Care:  1. Have you been to the ER, urgent care clinic since your last visit? Hospitalized since your last visit? no    2. Have you seen or consulted any other health care providers outside of the [address] [place] [address] since your last visit? Include any pap smears or colon screening. no    Pharmacy verified  Care Team verified and updated. Please see Red banners under Allergies, Med rec, Immunizations to remove outside inquires. All correct information has been verified with patient and added to chart.

## 2018-08-28 NOTE — PROGRESS NOTES
Felecia Lozano is a 75 y.o. female who was seen in clinic today (2018). Assessment & Plan:       ICD-10-CM ICD-9-CM    1. Uncontrolled type 2 diabetes mellitus with microalbuminuria, with long-term current use of insulin ([facility]) E11.29 250.42 insulin glargine (LANTUS SOLOSTAR U-100 INSULIN) 100 unit/mL (3 mL) inpn    E11.65 791.0 HEMOGLOBIN A1C WITH EAG    D91.7 K73.23 METABOLIC PANEL, BASIC    N73.2     2. Hiatal hernia K44.9 553.3    3. Arthralgia, unspecified joint M25.50 719.40    4. Numbness and tingling R20.0 782.0     R20.2         Still uncontrolled, though encouraging trends  Schedule eye exam  Classes  Continue to exercise dietary discretion    Paresthesias likely to improve with glucose control, plan visit to eval further for neuropathy      Vit d def: well controlled on replacement  Plan dedicated visit for arthralgias    Encouraged to scheduled dedicated visit with Dr. Daxa Alvarado to share the multiple other GI related symptoms she continues to experience, suspected related to [place]      Follow-up Disposition:  Return in about 1 month (around 2018) for diabetes follow up, non fasting labs 1 week prior, (30). Subjective: Felecia Lozano was seen today for Follow-up and Diabetes    follow up uncontrolled DM2 with microalb  On basal insulin has always worried about \"insulin shock\" so has elected to use 30 units 2x/day but hates the needle  short acting SS insulin  Metformin suspended 2018 due to diarrhea - completely resolved  GLP1 agonist initiated - \"I love it\"    Classes - not yet - \"once the wedding's done, I'm free! \" ()    Home glucoses:  Fastin-230 last time, since then 98- 120 , single >200 after had eaten late  PP lunch: 170-240 last time,  Since 160-180, highest 205  PP dinner: 190-300 last time, since then 190-300 - started cutting back on sizes, added fruits and vegetables 2 weeks ago bringing sugars down to max 228  Hypoglycemic episodes: none    Also limiting self to snack sized chip bags      Lab Results   Component Value Date/Time    Hemoglobin A1c 10.6 (H) 07/30/2018 08:48 AM    Hemoglobin A1c 10.7 (H) 06/07/2018 12:40 PM    Hemoglobin A1c 10.9 (H) 04/16/2018 03:01 PM    Hemoglobin A1c, External 11.1 03/21/2016    Glucose 141 (H) 07/30/2018 08:48 AM    Glucose (POC) 139 (H) 08/19/2016 02:14 PM    Microalbumin/Creat ratio (mg/g creat) 48 (H) 07/30/2018 08:41 AM    Microalbumin,urine random 6.20 (H) 07/30/2018 08:41 AM    LDL, calculated 84.2 07/30/2018 08:48 AM    Creatinine 1.25 07/30/2018 08:48 AM       Results for orders placed or performed during the hospital encounter of 07/30/18   VITAMIN D, 25 HYDROXY   Result Value Ref Range    Vitamin D 25-Hydroxy 57.5 30 - 100 ng/mL   VITAMIN B12 & FOLATE   Result Value Ref Range    Vitamin B12 263 211 - 911 pg/mL    Folate 19.6 (H) 3.10 - 17.50 ng/mL   MAGNESIUM   Result Value Ref Range    Magnesium 2.0 1.6 - 2.6 mg/dL   HEMOGLOBIN A1C WITH EAG   Result Value Ref Range    Hemoglobin A1c 10.6 (H) 4.2 - 5.6 %    Est. average glucose 890 mg/dL   METABOLIC PANEL, COMPREHENSIVE   Result Value Ref Range    Sodium 141 136 - 145 mmol/L    Potassium 4.1 3.5 - 5.5 mmol/L    Chloride 106 100 - 108 mmol/L    CO2 24 21 - 32 mmol/L    Anion gap 11 3.0 - 18 mmol/L    Glucose 141 (H) 74 - 99 mg/dL    BUN 16 7.0 - 18 MG/DL    Creatinine 1.25 0.6 - 1.3 MG/DL    BUN/Creatinine ratio 13 12 - 20      GFR est AA 52 (L) >60 ml/min/1.73m2    GFR est non-AA 43 (L) >60 ml/min/1.73m2    Calcium 8.4 (L) 8.5 - 10.1 MG/DL    Bilirubin, total 0.3 0.2 - 1.0 MG/DL    ALT (SGPT) 18 13 - 56 U/L    AST (SGOT) 10 (L) 15 - 37 U/L    Alk.  phosphatase 121 (H) 45 - 117 U/L    Protein, total 7.2 6.4 - 8.2 g/dL    Albumin 3.6 3.4 - 5.0 g/dL    Globulin 3.6 2.0 - 4.0 g/dL    A-G Ratio 1.0 0.8 - 1.7     TSH W/ REFLX FREE T4 IF ABNORMAL   Result Value Ref Range    TSH 2.70 0.36 - 3.74 uIU/mL   LIPID PANEL W/ REFLX DIRECT LDL   Result Value Ref Range    LIPID PROFILE Cholesterol, total 159 <200 MG/DL    Triglyceride 179 (H) <150 MG/DL    HDL Cholesterol 39 (L) 40 - 60 MG/DL    LDL, calculated 84.2 0 - 100 MG/DL    VLDL, calculated 35.8 MG/DL    CHOL/HDL Ratio 4.1 0 - 5.0     MICROALBUMIN, UR, RAND W/ MICROALB/CREAT RATIO   Result Value Ref Range    Microalbumin,urine random 6.20 (H) 0 - 3.0 MG/DL    Creatinine, urine 128.20 (H) 30 - 125 mg/dL    Microalbumin/Creat ratio (mg/g creat) 48 (H) 0 - 30 mg/g        Outpatient Prescriptions Marked as Taking for the 8/28/18 encounter (Office Visit) with Tamera Wing MD   Medication Sig Dispense Refill    bisacodyl (DULCOLAX, BISACODYL,) 5 mg EC tablet Take as directed      esomeprazole (NEXIUM) 20 mg capsule Take  by mouth.  polyethylene glycol (MIRALAX) 17 gram/dose powder Take as a split dose as directed.  cholecalciferol, vitamin d3, (VITAMIN D3) 400 unit cap Take  by mouth.  sertraline (ZOLOFT) 50 mg tablet Take 1 Tab by mouth daily. 90 Tab 4    simvastatin (ZOCOR) 40 mg tablet Take 1 Tab by mouth nightly. 90 Tab 4    metFORMIN ER (GLUCOPHAGE XR) 500 mg tablet Take 2 Tabs by mouth daily (with dinner). 360 Tab 1    dulaglutide (TRULICITY) 8.81 BI/8.3 mL sub-q pen 0.5 mL by SubCUTAneous route every seven (7) days. 4 Pen 2    Blood-Glucose Meter (ACCU-CHEK CODY PLUS METER) misc 1 Device by Does Not Apply route four (4) times daily.  1 Each 0    glucose blood VI test strips (ACCU-CHEK CODY PLUS TEST STRP) strip Patient is to check blood sugar 3-4 times a day 300 Strip 11    insulin aspart U-100 (NOVOLOG) 100 unit/mL crtg Sliding Scale: 100-150 = 6 units; 150-200 = 8 units; 200-250 = 10 units; 250-300= 12 units; 300-350 = 14 units; 400 = Emergency room 8 Cartridge 2    insulin glargine (LANTUS SOLOSTAR U-100 INSULIN) 100 unit/mL (3 mL) inpn INJECT 30 units of  Subcu in am and then 30 UNITS subcu AT BEDTIME 10 Pen 2    glucose blood VI test strips (FREESTYLE LITE STRIPS) strip Blood sugar check 3-4 times a day due to fluctuating blood sugar 300 Strip 11    ergocalciferol (VITAMIN D2) 50,000 unit capsule Take 1 Cap by mouth every seven (7) days. Start with 1 cap daily for 3 days 20 Cap 1    levothyroxine (SYNTHROID) 150 mcg tablet Take 1 Tab by mouth Daily (before breakfast). 90 Tab 1    NIFEdipine ER (ADALAT CC) 30 mg ER tablet Take 1 Tab by mouth daily. 90 Tab 0    olmesartan-hydroCHLOROthiazide (BENICAR HCT) 40-25 mg per tablet TAKE ONE TABLET BY MOUTH EVERY DAY 90 Tab 0    BLADDER CONTROL PADS pads       ALCOHOL PREP PADS padm       omeprazole (PRILOSEC) 40 mg capsule Take 1 Cap by mouth daily. 90 Cap 2    meclizine (ANTIVERT) 25 mg tablet Take  by mouth three (3) times daily as needed.  PSYLLIUM SEED, WITH DEXTROSE, (FIBER PO) Take  by mouth. Patient Active Problem List    Diagnosis    Osteopenia of multiple sites     7/2018      Moderate major depression ([address]    Uncontrolled type 2 diabetes mellitus with microalbuminuria, with long-term current use of insulin ([address]    Family history of malignant neoplasm of breast    Diabetic nephropathy associated with type 2 diabetes mellitus ([address]    Lump or mass in breast    Hypertension    Acquired hypothyroidism    Hiatal hernia     EGD 11/14/2017 Ethel Frazier MD \"prn acid suppression\"      Hyperlipidemia    Depression    Migraines         Allergies   Allergen Reactions    Darvocet A500 [Propoxyphene N-Acetaminophen] Nausea and Vomiting    Gloves, Latex With Aloe Vera Contact Dermatitis     Rash from powder inside latex gloves      Metformin Diarrhea         Patient Care Team:  Reema Jaffe MD as PCP - General (Family Practice)  Rhonda Snider MD (Gastroenterology)    The following sections were reviewed & updated as appropriate: PMH, PSH, FH, and SH. Review of Systems   Respiratory: Negative for shortness of breath. Cardiovascular: Positive for chest pain (non exertional, recumbant).    Gastrointestinal: Negative for diarrhea and nausea. Musculoskeletal: Positive for myalgias (chronic, unchanged). Neurological: Positive for sensory change (numbness/tingling in hands and feet x 1 year). Objective:     Visit Vitals    /77 (BP 1 Location: Left arm, BP Patient Position: Sitting)    Pulse 70    Temp 97.7 °F (36.5 °C) (Oral)    Resp 12    Ht 5' 3\" (1.6 m)    Wt 180 lb 6.4 oz (81.8 kg)    LMP 11/17/1972  Comment: partial    SpO2 99%    BMI 31.96 kg/m2      Physical Exam   Constitutional: She is oriented to person, place, and time. She appears well-developed. No distress. Pleasant obese white female   HENT:   Head: Normocephalic and atraumatic. Pulmonary/Chest: Effort normal.   Neurological: She is alert and oriented to person, place, and time. Psychiatric: She has a normal mood and affect. Her behavior is normal. Judgment and thought content normal.         Disclaimer: The patient understands our medical plan. Alternatives have been explained and offered. The risks, benefits and significant side effects of all medications have been reviewed. Anticipated time course and progression of condition reviewed. All questions have been addressed. She is encouraged to employ the information provided in the after visit summary, which was reviewed. Where appropriate, she is instructed to call the clinic if she has not been notified either by phone or through [address] with the results of her tests or with an appointment plan for any referrals within 1 week(s). No news is not good news; it's no news. The patient  is to call if her condition worsens or fails to improve or if significant side effects are experienced. Aspects of this note may have been generated using voice recognition software. Despite editing, there may be unrecognized errors.        Ron Mohan MD

## 2018-08-28 NOTE — PATIENT INSTRUCTIONS

## 2018-08-28 NOTE — MR AVS SNAPSHOT
[address] [place] [address] Road 
[phone] Patient: Franklin Doshi MRN: [id number] Visit Information Date & Time Provider Department Dept. Phone Encounter #  
 8/28/2018 10:30 AM Ron Mohan MD [facility] [phone] [id number] Follow-up Instructions Return in about 1 month (around 9/28/2018) for diabetes follow up, non fasting labs 1 week prior, (30).  
  
 2/25/2019  9:15 AM  
TRANSITIONAL CARE MANAGEMENT with Ron Mohan MD  
[facility] [address]) Appt Note: Return in about 6 months (around 2/27/2019) for depression follow up, sooner if needed. [facility] [address] [place] [address]  
[phone]  
  
   
 [facility] [address] [place] [address] Upcoming Health Maintenance Date Due ZOSTER VACCINE AGE 60> 9/17/2009 DTaP/Tdap/Td series (1 - Tdap) 12/23/2014 EYE EXAM RETINAL OR DILATED Q1 9/7/2018 COLONOSCOPY 2/11/2019 HEMOGLOBIN A1C Q6M 1/30/2019 FOOT EXAM Q1 5/7/2019 MEDICARE YEARLY EXAM 7/10/2019 MICROALBUMIN Q1 7/30/2019 LIPID PANEL Q1 7/30/2019 BREAST CANCER SCRN MAMMOGRAM 2/12/2020 GLAUCOMA SCREENING Q2Y 6/5/2020 Allergies as of 8/28/2018  Review Complete On: 8/28/2018 By: Ron Mohan MD  
  
 Severity Noted Reaction Type Reactions Darvocet A500 [Propoxyphene N-acetaminophen]  05/08/2017    Nausea and Vomiting Gloves, Latex With Aloe Vera  08/19/2016    Contact Dermatitis Rash from powder inside latex gloves Metformin  08/28/2018    Diarrhea Current Immunizations  Reviewed on 5/7/2018 Name Date Influenza High Dose Vaccine PF 11/8/2017, 1/9/2017, 11/11/2015 Influenza Vaccine (Quad) PF 10/6/2014 Influenza Vaccine (Tri) Adjuvanted 8/27/2018 Influenza Vaccine PF 10/31/2013 Pneumococcal Conjugate (PCV-13) 1/9/2017 Pneumococcal Polysaccharide (PPSV-23) 5/14/2015, 10/31/2013 Td, Adsorbed PF 12/22/2014 Not reviewed this visit You Were Diagnosed With   
  
 Codes Comments Hiatal hernia    -  Primary ICD-10-CM: K44.9 ICD-9-CM: 553.3 Uncontrolled type 2 diabetes mellitus with microalbuminuria, with long-term current use of insulin ([facility])     ICD-10-CM: E11.29, E11.65, R80.9, Z79.4 ICD-9-CM: 250.42, 791.0, V58.67 Arthralgia, unspecified joint     ICD-10-CM: M25.50 ICD-9-CM: 719.40 Numbness and tingling     ICD-10-CM: R20.0, R20.2 ICD-9-CM: 927. 0 Vitals BP Pulse Temp Resp Height(growth percentile) Weight(growth percentile) 124/77 (BP 1 Location: Left arm, BP Patient Position: Sitting) 70 97.7 °F (36.5 °C) (Oral) 12 5' 3\" (1.6 m) 180 lb 6.4 oz (81.8 kg) LMP SpO2 BMI OB Status Smoking Status 11/17/1972 99% 31.96 kg/m2 Hysterectomy Never Smoker BMI and BSA Data Body Mass Index Body Surface Area 31.96 kg/m 2 1.91 m 2 Preferred Pharmacy Pharmacy Name Phone [facility] [phone] [facility] [phone] Your Updated Medication List  
  
   
This list is accurate as of 8/28/18 11:30 AM.  Always use your most recent med list. ALCOHOL PREP PADS Padm Generic drug:  alcohol swabs BLADDER CONTROL PADS Pads Generic drug:  Incontinence Pad, Liner, Disp Blood-Glucose Meter Misc Commonly known as:  ACCU-CHEK CODY PLUS METER  
1 Device by Does Not Apply route four (4) times daily. dulaglutide 0.75 mg/0.5 mL sub-q pen Commonly known as:  TRULICITY  
0.5 mL by SubCUTAneous route every seven (7) days. DULCOLAX (BISACODYL) 5 mg EC tablet Generic drug:  bisacodyl Take as directed  
  
 ergocalciferol 50,000 unit capsule Commonly known as:  VITAMIN D2 Take 1 Cap by mouth every seven (7) days. Start with 1 cap daily for 3 days esomeprazole 20 mg capsule Commonly known as:  [place] Take  by mouth. FIBER PO Take  by mouth. * glucose blood VI test strips strip Commonly known as:  FREESTYLE LITE STRIPS Blood sugar check 3-4 times a day due to fluctuating blood sugar * glucose blood VI test strips strip Commonly known as:  ACCU-CHEK CODY PLUS TEST STRP Patient is to check blood sugar 3-4 times a day  
  
 insulin aspart U-100 100 unit/mL Crtg Commonly known as:  David Leos Sliding Scale: 100-150 = 6 units; 150-200 = 8 units; 200-250 = 10 units; 250-300= 12 units; 300-350 = 14 units; 400 = Emergency room  
  
 insulin glargine 100 unit/mL (3 mL) Inpn Commonly known as:  LANTUS SOLOSTAR U-100 INSULIN  
60 Units by SubCUTAneous route daily. levothyroxine 150 mcg tablet Commonly known as:  SYNTHROID Take 1 Tab by mouth Daily (before breakfast). meclizine 25 mg tablet Commonly known as:  ANTIVERT Take  by mouth three (3) times daily as needed. metFORMIN  mg tablet Commonly known as:  GLUCOPHAGE XR Take 2 Tabs by mouth daily (with dinner). MIRALAX 17 gram/dose powder Generic drug:  polyethylene glycol Take as a split dose as directed. NIFEdipine ER 30 mg ER tablet Commonly known as:  ADALAT CC Take 1 Tab by mouth daily. olmesartan-hydroCHLOROthiazide 40-25 mg per tablet Commonly known as:  BENICAR HCT  
TAKE ONE TABLET BY MOUTH EVERY DAY  
  
 omeprazole 40 mg capsule Commonly known as:  PRILOSEC Take 1 Cap by mouth daily. sertraline 50 mg tablet Commonly known as:  ZOLOFT Take 1 Tab by mouth daily. simvastatin 40 mg tablet Commonly known as:  ZOCOR Take 1 Tab by mouth nightly. VITAMIN D3 400 unit Cap Generic drug:  cholecalciferol (vitamin d3) Take  by mouth. * Notice: This list has 2 medication(s) that are the same as other medications prescribed for you.  Read the directions carefully, and ask your doctor or other care provider to review them with you. Prescriptions Sent to Pharmacy Refills  
 insulin glargine (LANTUS SOLOSTAR U-100 INSULIN) 100 unit/mL (3 mL) inpn 2 Si Units by SubCUTAneous route daily. Class: Normal  
 Pharmacy: [id number] [address], [address] [place] [address] #: [phone] Route: SubCUTAneous Follow-up Instructions Return in about 1 month (around 2018) for diabetes follow up, non fasting labs 1 week prior, (30). To-Do List   
 2018 Lab:  HEMOGLOBIN A1C WITH EAG   
  
 2018 Lab:  METABOLIC PANEL, BASIC Patient Instructions Learning About Diabetes Food Guidelines Your Care Instructions Meal planning is important to manage diabetes. It helps keep your blood sugar at a target level (which you set with your doctor). You don't have to eat special foods. You can eat what your family eats, including sweets once in a while. But you do have to pay attention to how often you eat and how much you eat of certain foods. You may want to work with a dietitian or a certified diabetes educator (CDE) to help you plan meals and snacks. A dietitian or CDE can also help you lose weight if that is one of your goals. What should you know about eating carbs? Managing the amount of carbohydrate (carbs) you eat is an important part of healthy meals when you have diabetes. Carbohydrate is found in many foods. · Learn which foods have carbs. And learn the amounts of carbs in different foods. ¨ Bread, cereal, pasta, and rice have about 15 grams of carbs in a serving. A serving is 1 slice of bread (1 ounce), ½ cup of cooked cereal, or 1/3 cup of cooked pasta or rice. ¨ Fruits have 15 grams of carbs in a serving.  A serving is 1 small fresh fruit, such as an apple or orange; ½ of a banana; ½ cup of cooked or canned fruit; ½ cup of fruit juice; 1 cup of melon or raspberries; or 2 tablespoons of dried fruit. ¨ Milk and no-sugar-added yogurt have 15 grams of carbs in a serving. A serving is 1 cup of milk or 2/3 cup of no-sugar-added yogurt. ¨ Starchy vegetables have 15 grams of carbs in a serving. A serving is ½ cup of mashed potatoes or sweet potato; 1 cup winter squash; ½ of a small baked potato; ½ cup of cooked beans; or ½ cup cooked corn or green peas. · Learn how much carbs to eat each day and at each meal. A dietitian or CDE can teach you how to keep track of the amount of carbs you eat. This is called carbohydrate counting. · If you are not sure how to count carbohydrate grams, use the Plate Method to plan meals. It is a good, quick way to make sure that you have a balanced meal. It also helps you spread carbs throughout the day. ¨ Divide your plate by types of foods. Put non-starchy vegetables on half the plate, meat or other protein food on one-quarter of the plate, and a grain or starchy vegetable in the final quarter of the plate. To this you can add a small piece of fruit and 1 cup of milk or yogurt, depending on how many carbs you are supposed to eat at a meal. 
· Try to eat about the same amount of carbs at each meal. Do not \"save up\" your daily allowance of carbs to eat at one meal. 
· Proteins have very little or no carbs per serving. Examples of proteins are beef, chicken, turkey, fish, eggs, tofu, cheese, cottage cheese, and peanut butter. A serving size of meat is 3 ounces, which is about the size of a deck of cards. Examples of meat substitute serving sizes (equal to 1 ounce of meat) are 1/4 cup of cottage cheese, 1 egg, 1 tablespoon of peanut butter, and ½ cup of tofu. How can you eat out and still eat healthy? · Learn to estimate the serving sizes of foods that have carbohydrate. If you measure food at home, it will be easier to estimate the amount in a serving of restaurant food.  
· If the meal you order has too much carbohydrate (such as potatoes, corn, or baked beans), ask to have a low-carbohydrate food instead. Ask for a salad or green vegetables. · If you use insulin, check your blood sugar before and after eating out to help you plan how much to eat in the future. · If you eat more carbohydrate at a meal than you had planned, take a walk or do other exercise. This will help lower your blood sugar. What else should you know? · Limit saturated fat, such as the fat from meat and dairy products. This is a healthy choice because people who have diabetes are at higher risk of heart disease. So choose lean cuts of meat and nonfat or low-fat dairy products. Use olive or canola oil instead of butter or shortening when cooking. · Don't skip meals. Your blood sugar may drop too low if you skip meals and take insulin or certain medicines for diabetes. · Check with your doctor before you drink alcohol. Alcohol can cause your blood sugar to drop too low. Alcohol can also cause a bad reaction if you take certain diabetes medicines. Follow-up care is a key part of your treatment and safety. Be sure to make and go to all appointments, and call your doctor if you are having problems. It's also a good idea to know your test results and keep a list of the medicines you take. Where can you learn more? Go to http://chasidy-ena.info/. Enter U900 in the search box to learn more about \"Learning About Diabetes Food Guidelines. \" Current as of: December 7, 2017 Content Version: 11.7 © 7748-3618 [employer]. Care instructions adapted under license by [employer] (which disclaims liability or warranty for this information). If you have questions about a medical condition or this instruction, always ask your healthcare professional. [place] [address] any warranty or liability for your use of this information. Introducing [facility]! Dear Tammie Mclain: Thank you for requesting a [employer] account. Our records indicate that you already have an active [employer] account. You can access your account anytime at [email or web address]. [email or web address] Did you know that you can access your hospital and ER discharge instructions at any time in [employer]? You can also review all of your test results from your hospital stay or ER visit. Additional Information If you have questions, please visit the Frequently Asked Questions section of the [employer] website at [email or web address]. [email or web address]/. Remember, [employer] is NOT to be used for urgent needs. For medical emergencies, dial 911. Now available from your iPhone and Android! Please provide this summary of care documentation to your next provider. Your primary care clinician is listed as Darren Looney. If you have any questions after today's visit, please call [phone].

## 2018-09-18 ENCOUNTER — HOSPITAL ENCOUNTER (OUTPATIENT)
Dept: LAB | Age: 68
Discharge: HOME | End: 2018-09-18
Payer: MEDICARE

## 2018-09-18 ENCOUNTER — LAB ONLY (OUTPATIENT)
Dept: FAMILY MEDICINE CLINIC | Age: 68
End: 2018-09-18

## 2018-09-18 DIAGNOSIS — E11.21 DIABETIC NEPHROPATHY ASSOCIATED WITH TYPE 2 DIABETES MELLITUS (HCC): Primary | ICD-10-CM

## 2018-09-18 LAB
ANION GAP SERPL CALC-SCNC: 14 MMOL/L (ref 3–18)
BUN SERPL-MCNC: 18 MG/DL (ref 7–18)
BUN/CREAT SERPL: 14 (ref 12–20)
CALCIUM SERPL-MCNC: 9.7 MG/DL (ref 8.5–10.1)
CHLORIDE SERPL-SCNC: 106 MMOL/L (ref 100–108)
CO2 SERPL-SCNC: 23 MMOL/L (ref 21–32)
CREAT SERPL-MCNC: 1.3 MG/DL (ref 0.6–1.3)
GLUCOSE SERPL-MCNC: 137 MG/DL (ref 74–99)
POTASSIUM SERPL-SCNC: 4.9 MMOL/L (ref 3.5–5.5)
SODIUM SERPL-SCNC: 143 MMOL/L (ref 136–145)

## 2018-09-18 PROCEDURE — 80048 BASIC METABOLIC PNL TOTAL CA: CPT | Performed by: FAMILY MEDICINE

## 2018-09-18 PROCEDURE — 83036 HEMOGLOBIN GLYCOSYLATED A1C: CPT | Performed by: FAMILY MEDICINE

## 2018-09-18 NOTE — PROGRESS NOTES
Pt came in to have blood drawn. Name/ verified. Venipuncture performed on right arm. Pt tolerated it well.      Kevin Simmonds, LPN

## 2018-09-19 LAB
EST. AVERAGE GLUCOSE BLD GHB EST-MCNC: 192 MG/DL
HBA1C MFR BLD: 8.3 % (ref 4.2–5.6)

## 2018-09-24 ENCOUNTER — OFFICE VISIT (OUTPATIENT)
Dept: FAMILY MEDICINE CLINIC | Age: 68
End: 2018-09-24

## 2018-09-24 VITALS
HEART RATE: 72 BPM | RESPIRATION RATE: 10 BRPM | WEIGHT: 177.4 LBS | TEMPERATURE: 97.7 F | DIASTOLIC BLOOD PRESSURE: 68 MMHG | HEIGHT: 63 IN | BODY MASS INDEX: 31.43 KG/M2 | OXYGEN SATURATION: 98 % | SYSTOLIC BLOOD PRESSURE: 120 MMHG

## 2018-09-24 DIAGNOSIS — E11.21 DIABETIC NEPHROPATHY ASSOCIATED WITH TYPE 2 DIABETES MELLITUS (HCC): ICD-10-CM

## 2018-09-24 DIAGNOSIS — I10 ESSENTIAL HYPERTENSION: ICD-10-CM

## 2018-09-24 DIAGNOSIS — E66.09 CLASS 1 OBESITY DUE TO EXCESS CALORIES WITH SERIOUS COMORBIDITY AND BODY MASS INDEX (BMI) OF 31.0 TO 31.9 IN ADULT: ICD-10-CM

## 2018-09-24 RX ORDER — ACETAMINOPHEN 500 MG
TABLET ORAL
COMMUNITY

## 2018-09-24 RX ORDER — OLMESARTAN MEDOXOMIL AND HYDROCHLOROTHIAZIDE 40/25 40; 25 MG/1; MG/1
TABLET ORAL
Qty: 90 TAB | Refills: 4 | Status: SHIPPED | OUTPATIENT
Start: 2018-09-24 | End: 2019-02-21 | Stop reason: ALTCHOICE

## 2018-09-24 RX ORDER — NIFEDIPINE 30 MG/1
30 TABLET, FILM COATED, EXTENDED RELEASE ORAL DAILY
Qty: 90 TAB | Refills: 4 | Status: SHIPPED | OUTPATIENT
Start: 2018-09-24 | End: 2019-02-21

## 2018-09-24 NOTE — MR AVS SNAPSHOT
[address] [place] [address] Road 
[phone] Patient: Chris Rasmussen MRN: [id number] [id number] Visit Information Date & Time Provider Department Dept. Phone Encounter #  
 9/24/2018 10:00 AM Evie Williamson MD [facility] [phone] [id number] Follow-up Instructions Return in about 2 months (around 11/24/2018) for diabetes follow up, non fasting labs 1 week prior, (30).  
  
 2/25/2019  9:15 AM  
TRANSITIONAL CARE MANAGEMENT with Evie Williamson MD  
[facility] Appt Note: Return in about 6 months (around 2/27/2019) for depression follow up, sooner if needed. [facility] [address] [place] [address]  
[phone]  
  
   
 [facility] [address] [place] [address] Upcoming Health Maintenance Date Due ZOSTER VACCINE AGE 60> 9/17/2009 DTaP/Tdap/Td series (1 - Tdap) 12/23/2014 EYE EXAM RETINAL OR DILATED Q1 9/7/2018 COLONOSCOPY 2/11/2019 HEMOGLOBIN A1C Q6M 3/18/2019 FOOT EXAM Q1 5/7/2019 MEDICARE YEARLY EXAM 7/10/2019 MICROALBUMIN Q1 7/30/2019 LIPID PANEL Q1 7/30/2019 BREAST CANCER SCRN MAMMOGRAM 2/12/2020 GLAUCOMA SCREENING Q2Y 6/5/2020 Allergies as of 9/24/2018  Review Complete On: 9/24/2018 By: Evie Williamson MD  
  
 Severity Noted Reaction Type Reactions Darvocet A500 [Propoxyphene N-acetaminophen]  05/08/2017    Nausea and Vomiting Gloves, Latex With Aloe Vera  08/19/2016    Contact Dermatitis Rash from powder inside latex gloves Metformin  08/28/2018    Diarrhea Current Immunizations  Reviewed on 5/7/2018 Name Date Influenza High Dose Vaccine PF 11/8/2017, 1/9/2017, 11/11/2015 Influenza Vaccine (Quad) PF 10/6/2014 Influenza Vaccine (Tri) Adjuvanted 8/27/2018 Influenza Vaccine PF 10/31/2013 Pneumococcal Conjugate (PCV-13) 1/9/2017 Pneumococcal Polysaccharide (PPSV-23) 5/14/2015, 10/31/2013 Td, Adsorbed PF 12/22/2014 Not reviewed this visit You Were Diagnosed With   
  
 Codes Comments Uncontrolled type 2 diabetes mellitus with microalbuminuria, with long-term current use of insulin (HCC)    -  Primary ICD-10-CM: E11.29, E11.65, R80.9, Z79.4 ICD-9-CM: 250.42, 791.0, V58.67 Diabetic nephropathy associated with type 2 diabetes mellitus ([facility]     ICD-10-CM: E11.21 
ICD-9-CM: 250.40, 583.81 Essential hypertension     ICD-10-CM: I10 
ICD-9-CM: 401.9 Class 1 obesity due to excess calories with serious comorbidity and body mass index (BMI) of 31.0 to 31.9 in adult     ICD-10-CM: E66.09, Z68.31 
ICD-9-CM: 278.00, V85.31 Vitals BP Pulse Temp Resp Height(growth percentile) Weight(growth percentile) 120/68 72 97.7 °F (36.5 °C) (Oral) 10 5' 3\" (1.6 m) 177 lb 6.4 oz (80.5 kg) LMP SpO2 BMI OB Status Smoking Status 11/17/1972 98% 31.42 kg/m2 Hysterectomy Never Smoker Vitals History BMI and BSA Data Body Mass Index Body Surface Area  
 31.42 kg/m 2 1.89 m 2 Preferred Pharmacy Pharmacy Name Phone [facility] [phone] Carla Leung [phone] Your Updated Medication List  
  
   
This list is accurate as of 9/24/18 10:55 AM.  Always use your most recent med list. ALCOHOL PREP PADS Padm Generic drug:  alcohol swabs BLADDER CONTROL PADS Pads Generic drug:  Incontinence Pad, Liner, Disp Blood-Glucose Meter Misc Commonly known as:  ACCU-CHEK CODY PLUS METER  
1 Device by Does Not Apply route four (4) times daily. dulaglutide 0.75 mg/0.5 mL sub-q pen Commonly known as:  TRULICITY  
0.5 mL by SubCUTAneous route every seven (7) days. DULCOLAX (BISACODYL) 5 mg EC tablet Generic drug:  bisacodyl Take as directed ergocalciferol 50,000 unit capsule Commonly known as:  VITAMIN D2 Take 1 Cap by mouth every seven (7) days. Start with 1 cap daily for 3 days  
  
 esomeprazole 20 mg capsule Commonly known as:  Jerrlyn Sarna Take  by mouth. FIBER PO Take  by mouth. * glucose blood VI test strips strip Commonly known as:  FREESTYLE LITE STRIPS Blood sugar check 3-4 times a day due to fluctuating blood sugar * glucose blood VI test strips strip Commonly known as:  ACCU-CHEK CODY PLUS TEST STRP Patient is to check blood sugar 3-4 times a day  
  
 insulin aspart U-100 100 unit/mL Crtg Commonly known as:  Deitra Moose Sliding Scale: 100-150 = 6 units; 150-200 = 8 units; 200-250 = 10 units; 250-300= 12 units; 300-350 = 14 units; 400 = Emergency room  
  
 insulin glargine 100 unit/mL (3 mL) Inpn Commonly known as:  LANTUS SOLOSTAR U-100 INSULIN  
60 Units by SubCUTAneous route daily. levothyroxine 150 mcg tablet Commonly known as:  SYNTHROID Take 1 Tab by mouth Daily (before breakfast). meclizine 25 mg tablet Commonly known as:  ANTIVERT Take  by mouth three (3) times daily as needed. MIRALAX 17 gram/dose powder Generic drug:  polyethylene glycol Take as a split dose as directed. NIFEdipine ER 30 mg ER tablet Commonly known as:  ADALAT CC Take 1 Tab by mouth daily. olmesartan-hydroCHLOROthiazide 40-25 mg per tablet Commonly known as:  BENICAR HCT  
TAKE ONE TABLET BY MOUTH EVERY DAY  
  
 omeprazole 40 mg capsule Commonly known as:  PRILOSEC Take 1 Cap by mouth daily. sertraline 50 mg tablet Commonly known as:  ZOLOFT Take 1 Tab by mouth daily. simvastatin 40 mg tablet Commonly known as:  ZOCOR Take 1 Tab by mouth nightly. TYLENOL EXTRA STRENGTH 500 mg tablet Generic drug:  acetaminophen Take  by mouth every six (6) hours as needed for Pain. VITAMIN D3 400 unit Cap Generic drug:  cholecalciferol (vitamin d3) Take  by mouth. * Notice: This list has 2 medication(s) that are the same as other medications prescribed for you. Read the directions carefully, and ask your doctor or other care provider to review them with you. Prescriptions Sent to Pharmacy Refills  
 dulaglutide (TRULICITY) 1.29 WG/9.7 mL sub-q pen 4 Si.5 mL by SubCUTAneous route every seven (7) days. Class: Normal  
 [id number], [address] [place] [address] [facility] #: [phone] Route: SubCUTAneous  
 olmesartan-hydroCHLOROthiazide (BENICAR HCT) 40-25 mg per tablet 4 Sig: TAKE ONE TABLET BY MOUTH EVERY DAY Class: Normal  
 [facility]acy: [id number], [address] [place] [address] [facility] #: [phone] NIFEdipine ER (ADALAT CC) 30 mg ER tablet 4 Sig: Take 1 Tab by mouth daily. Class: Normal  
 [facility]acy: [id number], [address] [place] [address] [facility] #: [phone] Route: Oral  
  
Follow-up Instructions Return in about 2 months (around 2018) for diabetes follow up, non fasting labs 1 week prior, (30). To-Do List   
 2018 Lab:  HEMOGLOBIN A1C WITH EAG   
  
 2018 Lab:  METABOLIC PANEL, BASIC Introducing [facility]! Dear Vicky Wood: Thank you for requesting a [employer] account. Our records indicate that you already have an active [employer] account. You can access your account anytime at [email or web address] Did you know that you can access your hospital and ER discharge instructions at any time in [employer]? You can also review all of your test results from your hospital stay or ER visit. Additional Information If you have questions, please visit the Frequently Asked Questions section of the [employer] website at [email or web address] Remember, [employer] is NOT to be used for urgent needs. For medical emergencies, dial 911. Now available from your iPhone and Android! Please provide this summary of care documentation to your next provider. Your primary care clinician is listed as Dorian Plascencia. If you have any questions after today's visit, please call [phone].

## 2018-09-24 NOTE — PROGRESS NOTES
Yamile Berg is a 75 y.o. female who was seen in clinic today (2018). Assessment & Plan:       ICD-10-CM ICD-9-CM    1. Uncontrolled type 2 diabetes mellitus with microalbuminuria, with long-term current use of insulin (HCC) E11.29 250.42 dulaglutide (TRULICITY) 1.20 DE SANTIAGO/2.7 mL sub-q pen    E11.65 791.0 HEMOGLOBIN A1C WITH EAG    E27.8 B97.61 METABOLIC PANEL, BASIC    P11.4     2. Diabetic nephropathy associated with type 2 diabetes mellitus (HCC) E11.21 250.40      583.81    3. Essential hypertension T76 654.6 METABOLIC PANEL, BASIC      olmesartan-hydroCHLOROthiazide (BENICAR HCT) 40-25 mg per tablet      NIFEdipine ER (ADALAT CC) 30 mg ER tablet   4. Class 1 obesity due to excess calories with serious comorbidity and body mass index (BMI) of 31.0 to 31.9 in adult E66.09 278.00     Z68.31 V85.31        DM2: much improved, not to target <8  Schedule classes and eye exam  Continue meds unchanged    Hypertension: Well controlled, continue present management    Obesity: slowly losing weight, good job! Follow-up Disposition: Not on File      Subjective: Yamile Berg was seen today for Diabetes    Follow-up uncontrolled DM 2 with microalbuminuria.     On:  Basal insulin - preferring splitting hers twice daily  Short acting - approx 3x/week: when she \"feels weird\" and sleepy, she checks her sugar, generally >200 and then uses it  GLP-1 agonist    Metformin previously suspended due to diarrhea    DM classes: not yet  Eye exam: not yet    Home glucoses:  Fastin-172 last night (admits to [employer] and chips last night)  PP lunch: 160-204  PP dinner: 180-208  Hypoglycemic episodes: none    Soc: wedding canceled due to [address] day reschedule (already legally ), doing fairly well with moderation in chip consumption    lsoing weight    Lab Results   Component Value Date/Time    Hemoglobin A1c 8.3 (H) 2018 09:40 AM    Hemoglobin A1c 10.6 (H) 2018 08:48 AM Hemoglobin A1c 10.7 (H) 06/07/2018 12:40 PM    Hemoglobin A1c, External 11.1 03/21/2016    Glucose 137 (H) 09/18/2018 09:40 AM    Glucose (POC) 139 (H) 08/19/2016 02:14 PM    Microalbumin/Creat ratio (mg/g creat) 48 (H) 07/30/2018 08:41 AM    Microalbumin,urine random 6.20 (H) 07/30/2018 08:41 AM    LDL, calculated 84.2 07/30/2018 08:48 AM    Creatinine 1.30 09/18/2018 09:40 AM       Results for orders placed or performed during the hospital encounter of 09/18/18   HEMOGLOBIN A1C WITH EAG   Result Value Ref Range    Hemoglobin A1c 8.3 (H) 4.2 - 5.6 %    Est. average glucose 880 mg/dL   METABOLIC PANEL, BASIC   Result Value Ref Range    Sodium 143 136 - 145 mmol/L    Potassium 4.9 3.5 - 5.5 mmol/L    Chloride 106 100 - 108 mmol/L    CO2 23 21 - 32 mmol/L    Anion gap 14 3.0 - 18 mmol/L    Glucose 137 (H) 74 - 99 mg/dL    BUN 18 7.0 - 18 MG/DL    Creatinine 1.30 0.6 - 1.3 MG/DL    BUN/Creatinine ratio 14 12 - 20      GFR est AA 49 (L) >60 ml/min/1.73m2    GFR est non-AA 41 (L) >60 ml/min/1.73m2    Calcium 9.7 8.5 - 10.1 MG/DL        Outpatient Prescriptions Marked as Taking for the 9/24/18 encounter (Office Visit) with Adam Walker MD   Medication Sig Dispense Refill    acetaminophen (TYLENOL EXTRA STRENGTH) 500 mg tablet Take  by mouth every six (6) hours as needed for Pain.  esomeprazole (NEXIUM) 20 mg capsule Take  by mouth.  insulin glargine (LANTUS SOLOSTAR U-100 INSULIN) 100 unit/mL (3 mL) inpn 60 Units by SubCUTAneous route daily. 10 Pen 2    sertraline (ZOLOFT) 50 mg tablet Take 1 Tab by mouth daily. 90 Tab 4    simvastatin (ZOCOR) 40 mg tablet Take 1 Tab by mouth nightly. 90 Tab 4    dulaglutide (TRULICITY) 9.87 MX/8.3 mL sub-q pen 0.5 mL by SubCUTAneous route every seven (7) days. 4 Pen 2    Blood-Glucose Meter (ACCU-CHEK CODY PLUS METER) misc 1 Device by Does Not Apply route four (4) times daily.  1 Each 0    glucose blood VI test strips (ACCU-CHEK CODY PLUS TEST STRP) strip Patient is to check blood sugar 3-4 times a day 300 Strip 11    insulin aspart U-100 (NOVOLOG) 100 unit/mL crtg Sliding Scale: 100-150 = 6 units; 150-200 = 8 units; 200-250 = 10 units; 250-300= 12 units; 300-350 = 14 units; 400 = Emergency room 8 Cartridge 2    glucose blood VI test strips (FREESTYLE LITE STRIPS) strip Blood sugar check 3-4 times a day due to fluctuating blood sugar 300 Strip 11    ergocalciferol (VITAMIN D2) 50,000 unit capsule Take 1 Cap by mouth every seven (7) days. Start with 1 cap daily for 3 days 20 Cap 1    levothyroxine (SYNTHROID) 150 mcg tablet Take 1 Tab by mouth Daily (before breakfast). 90 Tab 1    NIFEdipine ER (ADALAT CC) 30 mg ER tablet Take 1 Tab by mouth daily. 90 Tab 0    olmesartan-hydroCHLOROthiazide (BENICAR HCT) 40-25 mg per tablet TAKE ONE TABLET BY MOUTH EVERY DAY 90 Tab 0    BLADDER CONTROL PADS pads       ALCOHOL PREP PADS padm       omeprazole (PRILOSEC) 40 mg capsule Take 1 Cap by mouth daily.  80 Cap 2       Patient Active Problem List    Diagnosis    Vitamin D deficiency    Osteopenia of multiple sites     7/2018      Moderate major depression ([address]    Uncontrolled type 2 diabetes mellitus with microalbuminuria, with long-term current use of insulin ([address]    Family history of malignant neoplasm of breast    Diabetic nephropathy associated with type 2 diabetes mellitus ([address]    Lump or mass in breast    Hypertension    Acquired hypothyroidism    Hiatal hernia     EGD 11/14/2017 Legrand Leventhal, MD \"prn acid suppression\"      Hyperlipidemia    Depression    Migraines         Allergies   Allergen Reactions    Darvocet A500 [Propoxyphene N-Acetaminophen] Nausea and Vomiting    Gloves, Latex With Aloe Vera Contact Dermatitis     Rash from powder inside latex gloves      Metformin Diarrhea         Patient Care Team:  Fabrice Dickey MD as PCP - General (Family Practice)  Katelyn Ford MD (Gastroenterology)    The following sections were reviewed & updated as appropriate: PMH, PSH, FH, and SH. Review of Systems   Constitutional: Negative for malaise/fatigue. Gastrointestinal: Positive for diarrhea (but improved). Negative for nausea. Objective:     Visit Vitals    /68    Pulse 72    Temp 97.7 °F (36.5 °C) (Oral)    Resp 10    Ht 5' 3\" (1.6 m)    Wt 177 lb 6.4 oz (80.5 kg)    LMP 11/17/1972  Comment: partial    SpO2 98%    BMI 31.42 kg/m2      Physical Exam      Disclaimer: The patient understands our medical plan. Alternatives have been explained and offered. The risks, benefits and significant side effects of all medications have been reviewed. Anticipated time course and progression of condition reviewed. All questions have been addressed. She is encouraged to employ the information provided in the after visit summary, which was reviewed. Where appropriate, she is instructed to call the clinic if she has not been notified either by phone or through [address] with the results of her tests or with an appointment plan for any referrals within 1 week(s). No news is not good news; it's no news. The patient  is to call if her condition worsens or fails to improve or if significant side effects are experienced. Aspects of this note may have been generated using voice recognition software. Despite editing, there may be unrecognized errors.        Darren Looney MD

## 2018-09-24 NOTE — PROGRESS NOTES
Patient here for 1 month f/u on her DM, no concerns    1. Have you been to the ER, urgent care clinic since your last visit? Hospitalized since your last visit? No  2. Have you seen or consulted any other health care providers outside of the [address] [place] [address] since your last visit? Include any pap smears or colon screening. No    Medication reconciliation has been completed with patient. Care team discussed/updated as well as pharmacy. Care everywhere has been ran. Health Maintenance reviewed - Will schedule her appt with Dr. Rigo Espinal, will request Tdap, and Shingles from [address] [place] [address].

## 2018-11-19 ENCOUNTER — LAB ONLY (OUTPATIENT)
Dept: FAMILY MEDICINE CLINIC | Age: 68
End: 2018-11-19

## 2018-11-19 ENCOUNTER — HOSPITAL ENCOUNTER (OUTPATIENT)
Dept: LAB | Age: 68
Discharge: HOME | End: 2018-11-19
Payer: MEDICARE

## 2018-11-19 DIAGNOSIS — I10 ESSENTIAL HYPERTENSION: ICD-10-CM

## 2018-11-19 LAB
ANION GAP SERPL CALC-SCNC: 7 MMOL/L (ref 3–18)
BUN SERPL-MCNC: 33 MG/DL (ref 7–18)
BUN/CREAT SERPL: 16 (ref 12–20)
CALCIUM SERPL-MCNC: 9.5 MG/DL (ref 8.5–10.1)
CHLORIDE SERPL-SCNC: 110 MMOL/L (ref 100–108)
CO2 SERPL-SCNC: 27 MMOL/L (ref 21–32)
CREAT SERPL-MCNC: 2.12 MG/DL (ref 0.6–1.3)
EST. AVERAGE GLUCOSE BLD GHB EST-MCNC: 194 MG/DL
GLUCOSE SERPL-MCNC: 129 MG/DL (ref 74–99)
HBA1C MFR BLD: 8.4 % (ref 4.2–5.6)
POTASSIUM SERPL-SCNC: 5.3 MMOL/L (ref 3.5–5.5)
SODIUM SERPL-SCNC: 144 MMOL/L (ref 136–145)

## 2018-11-19 PROCEDURE — 80048 BASIC METABOLIC PNL TOTAL CA: CPT

## 2018-11-19 PROCEDURE — 83036 HEMOGLOBIN GLYCOSYLATED A1C: CPT

## 2018-11-19 NOTE — PROGRESS NOTES
Patient here for lab draw only name and  verified venipuncture performed on patient's right arm was successful patient tolerated well.

## 2018-11-20 DIAGNOSIS — N28.9 ABNORMAL RENAL FUNCTION FINDING: Primary | ICD-10-CM

## 2018-11-20 NOTE — PROGRESS NOTES
Glenis Weston is a 70 y.o. female who was seen in clinic today (12/4/2018). Assessment & Plan:       ICD-10-CM ICD-9-CM    1. Pyelonephritis N12 590.80 CULTURE, URINE      URINALYSIS W/ RFLX MICROSCOPIC      CBC WITH AUTOMATED DIFF      METABOLIC PANEL, BASIC      cefTRIAXone (ROCEPHIN) 1 gram injection      CEFTRIAXONE SODIUM INJECTION  MG      MA THER/PROPH/DIAG INJECTION, SUBCUT/IM      ciprofloxacin HCl (CIPRO) 500 mg tablet   2. Acute renal injury ([address] N17.9 584.9 ciprofloxacin HCl (CIPRO) 500 mg tablet   3. Uncontrolled type 2 diabetes mellitus with microalbuminuria, with long-term current use of insulin (HCC) E11.29 250.42     E11.65 791.0     R80.9 V58.67     Z79.4     4. Diabetic nephropathy associated with type 2 diabetes mellitus (HCC) E11.21 250.40      583.81    5. Class 1 obesity due to excess calories with serious comorbidity and body mass index (BMI) of 31.0 to 31.9 in adult E66.09 278.00     Z68.31 V85.31      Pyelo with acute renal injury. Ceftriaxone 1 gm in office. Renally adjusted ciprofloxacin 500 mg daily x 7 days pending culture    DM2: uncontrolled, fasting sugars improving on GLP-1 agonist. Concurrent infection likely confounder. No med changes  Schedule eye exam    Obesity: improving with DM management changes. Strongly encouraged to attend DM classes. Do NOT start any fad/high protein diet. Follow-up Disposition:  Return in about 2 days (around 12/6/2018) for kidney infection follow up. Subjective: Glenis Weston was seen today for Diabetes (f/u)    Follow-up uncontrolled DM 2 with nephropathy/obesity on:  Basal insulin  Short acting-approximately 3 times per week  GLP-1 agonist  FS's improved over past month from 200 to 100-110s    Metformin not tolerated due to diarrhea    Most recent OV 9/24/2018. No medication adjustments at that time.     Encouraged DM classes - looking at online classes  Eye exam - not done        Claimed to be asymptomatic when contacted in the wake of labs showing acute renal function decline. Now notes mild dysuria x 1 month, managing on her own with mixed success with cranberry juice. +/- left flank pain same time frame    Context of chronic back pain and increased activity as a caregiver    - kidney stones  UTIs: reports 1-2/year since youth, +pyelo  VCUG in late 1990's, no prophylactic abx    Lab Results   Component Value Date/Time    Hemoglobin A1c 8.4 (H) 11/19/2018 09:06 AM    Hemoglobin A1c 8.3 (H) 09/18/2018 09:40 AM    Hemoglobin A1c 10.6 (H) 07/30/2018 08:48 AM    Hemoglobin A1c, External 11.1 03/21/2016    Glucose 153 (H) 11/21/2018 12:00 AM    Glucose (POC) 139 (H) 08/19/2016 02:14 PM    Microalbumin/Creat ratio (mg/g creat) 48 (H) 07/30/2018 08:41 AM    Microalbumin,urine random 6.20 (H) 07/30/2018 08:41 AM    LDL, calculated 84.2 07/30/2018 08:48 AM    Creatinine 1.70 (H) 11/21/2018 12:00 AM       Results for orders placed or performed during the hospital encounter of 11/21/18   [facility] SPECIMEN COL   Result Value Ref Range    [facility] SPECIM[facility]LL[facility]. Specimens collected/sent to [facility]. Please direct inquiries to [phone]). Outpatient Medications Marked as Taking for the 12/4/18 encounter (Office Visit) with Eddie Smart MD   Medication Sig Dispense Refill    dulaglutide (TRULICITY) 7.22 OP/0.5 mL sub-q pen 0.5 mL by SubCUTAneous route every seven (7) days. 12 Pen 4    olmesartan-hydroCHLOROthiazide (BENICAR HCT) 40-25 mg per tablet TAKE ONE TABLET BY MOUTH EVERY DAY 90 Tab 4    NIFEdipine ER (ADALAT CC) 30 mg ER tablet Take 1 Tab by mouth daily. 90 Tab 4    esomeprazole (NEXIUM) 20 mg capsule Take  by mouth.  cholecalciferol, vitamin d3, (VITAMIN D3) 400 unit cap Take  by mouth.  insulin glargine (LANTUS SOLOSTAR U-100 INSULIN) 100 unit/mL (3 mL) inpn 60 Units by SubCUTAneous route daily. 10 Pen 2    sertraline (ZOLOFT) 50 mg tablet Take 1 Tab by mouth daily.  90 Tab 4    simvastatin (ZOCOR) 40 mg tablet Take 1 Tab by mouth nightly. 90 Tab 4    Blood-Glucose Meter (ACCU-CHEK CODY PLUS METER) misc 1 Device by Does Not Apply route four (4) times daily. 1 Each 0    glucose blood VI test strips (ACCU-CHEK CODY PLUS TEST STRP) strip Patient is to check blood sugar 3-4 times a day 300 Strip 11    insulin aspart U-100 (NOVOLOG) 100 unit/mL crtg Sliding Scale: 100-150 = 6 units; 150-200 = 8 units; 200-250 = 10 units; 250-300= 12 units; 300-350 = 14 units; 400 = Emergency room 8 Cartridge 2    levothyroxine (SYNTHROID) 150 mcg tablet Take 1 Tab by mouth Daily (before breakfast). 90 Tab 1    omeprazole (PRILOSEC) 40 mg capsule Take 1 Cap by mouth daily. 90 Cap 2    meclizine (ANTIVERT) 25 mg tablet Take  by mouth three (3) times daily as needed.            Patient Active Problem List    Diagnosis    Class 1 obesity due to excess calories with serious comorbidity and body mass index (BMI) of 31.0 to 31.9 in adult    Vitamin D deficiency    Osteopenia of multiple sites     7/2018      Moderate major depression ([address]    Uncontrolled type 2 diabetes mellitus with microalbuminuria, with long-term current use of insulin ([address]    Family history of malignant neoplasm of breast    Diabetic nephropathy associated with type 2 diabetes mellitus ([address]    Lump or mass in breast    Hypertension    Acquired hypothyroidism    Hiatal hernia     EGD 11/14/2017 Paz Padron MD \"prn acid suppression\"      Hyperlipidemia    Depression    Migraines         Allergies   Allergen Reactions    Darvocet A500 [Propoxyphene N-Acetaminophen] Nausea and Vomiting    Gloves, Latex With Aloe Vera Contact Dermatitis     Rash from powder inside latex gloves      Metformin Diarrhea         Patient Care Team:  Jill Vee MD as PCP - General (Family Practice)  Terence Wan MD (Gastroenterology)  Shantal Brennan (Ophthalmology)    The following sections were reviewed & updated as appropriate: PMH, PSH, FH, and SH. Review of Systems   Constitutional: Positive for malaise/fatigue (x 1 month). Negative for chills and fever. Gastrointestinal: Positive for blood in stool (streaks, hx hemorrhoids ), constipation and nausea (x 1 month). Negative for vomiting. Genitourinary: Negative for hematuria. Objective:     Visit Vitals  /58   Pulse 74   Temp 97.9 °F (36.6 °C) (Oral)   Resp 12   Ht 5' 3\" (1.6 m)   Wt 175 lb (79.4 kg)   LMP 11/17/1972 Comment: partial   SpO2 99%   BMI 31.00 kg/m²      Physical Exam   Constitutional: She is oriented to person, place, and time. She appears well-developed. No distress. HENT:   Head: Normocephalic and atraumatic. Cardiovascular: Normal rate, regular rhythm and normal heart sounds. Exam reveals no gallop and no friction rub. No murmur heard. Pulmonary/Chest: Effort normal and breath sounds normal. No respiratory distress. She has no wheezes. She has no rhonchi. She has no rales. Abdominal: Soft. Bowel sounds are normal. She exhibits no distension. There is tenderness in the suprapubic area. There is CVA tenderness (left). There is no rigidity, no rebound and no guarding. Musculoskeletal: She exhibits no edema. Neurological: She is alert and oriented to person, place, and time. Skin: Skin is warm and dry. She is not diaphoretic. No cyanosis. Nails show no clubbing. Psychiatric: She has a normal mood and affect. Her behavior is normal. Judgment and thought content normal.         Disclaimer: The patient understands our medical plan. Alternatives have been explained and offered. The risks, benefits and significant side effects of all medications have been reviewed. Anticipated time course and progression of condition reviewed. All questions have been addressed. She is encouraged to employ the information provided in the after visit summary, which was reviewed.       Where appropriate, she is instructed to call the clinic if she has not been notified either by phone or through [address] with the results of her tests or with an appointment plan for any referrals within 1 week(s). No news is not good news; it's no news. The patient  is to call if her condition worsens or fails to improve or if significant side effects are experienced. Aspects of this note may have been generated using voice recognition software. Despite editing, there may be unrecognized errors.        Castillo Valencia MD

## 2018-11-20 NOTE — PROGRESS NOTES
Big drop in renal function vs 2 months ago. Has she been ill (vomiting? Diarrhea?)? Any new meds other than than the trulicity we added a couple of months ago? NSAIDs? If she's feeling poorly, please add her to my schedule this week. If not, please advise to stop any NSAIDs and have bloodwork repeated, preferably through [facility] for speed - not fasting!  TIM

## 2018-11-20 NOTE — PROGRESS NOTES
Called patient had her verify her  she has been made aware of her renal function, she denies being ill at all she says she feels okay. She does have some diarrhea, constipation on and off since May but states this has improved since the onset and is nothing happening to frequently. She says she is not taking any NSAIDS and has not been taking any new medication prescribed or OTC, her only medication OTC is her Nexium. Patient says she has to go to he daughters home tomorrow and will stop pass [facility] to have her labs repeated.

## 2018-11-21 ENCOUNTER — HOSPITAL ENCOUNTER (OUTPATIENT)
Dept: LAB | Age: 68
Discharge: HOME | End: 2018-11-21

## 2018-11-21 LAB — XX-LABCORP SPECIMEN COL,LCBCF: NORMAL

## 2018-11-21 PROCEDURE — 99001 SPECIMEN HANDLING PT-LAB: CPT

## 2018-11-22 LAB
BUN SERPL-MCNC: 27 MG/DL (ref 8–27)
BUN/CREAT SERPL: 16 (ref 12–28)
CALCIUM SERPL-MCNC: 9.3 MG/DL (ref 8.7–10.3)
CHLORIDE SERPL-SCNC: 106 MMOL/L (ref 96–106)
CO2 SERPL-SCNC: 21 MMOL/L (ref 20–29)
CREAT SERPL-MCNC: 1.7 MG/DL (ref 0.57–1)
GLUCOSE SERPL-MCNC: 153 MG/DL (ref 65–99)
INTERPRETATION: NORMAL
POTASSIUM SERPL-SCNC: 4.9 MMOL/L (ref 3.5–5.2)
SODIUM SERPL-SCNC: 144 MMOL/L (ref 134–144)

## 2018-12-04 ENCOUNTER — OFFICE VISIT (OUTPATIENT)
Dept: FAMILY MEDICINE CLINIC | Age: 68
End: 2018-12-04

## 2018-12-04 ENCOUNTER — HOSPITAL ENCOUNTER (OUTPATIENT)
Dept: LAB | Age: 68
Discharge: HOME | End: 2018-12-04
Payer: MEDICARE

## 2018-12-04 VITALS
WEIGHT: 175 LBS | RESPIRATION RATE: 12 BRPM | OXYGEN SATURATION: 99 % | TEMPERATURE: 97.9 F | DIASTOLIC BLOOD PRESSURE: 58 MMHG | BODY MASS INDEX: 31.01 KG/M2 | SYSTOLIC BLOOD PRESSURE: 110 MMHG | HEIGHT: 63 IN | HEART RATE: 74 BPM

## 2018-12-04 DIAGNOSIS — E11.21 DIABETIC NEPHROPATHY ASSOCIATED WITH TYPE 2 DIABETES MELLITUS (HCC): ICD-10-CM

## 2018-12-04 DIAGNOSIS — N12 PYELONEPHRITIS: Primary | ICD-10-CM

## 2018-12-04 DIAGNOSIS — N17.9 ACUTE RENAL INJURY (HCC): ICD-10-CM

## 2018-12-04 DIAGNOSIS — E66.09 CLASS 1 OBESITY DUE TO EXCESS CALORIES WITH SERIOUS COMORBIDITY AND BODY MASS INDEX (BMI) OF 31.0 TO 31.9 IN ADULT: ICD-10-CM

## 2018-12-04 DIAGNOSIS — N12 PYELONEPHRITIS: ICD-10-CM

## 2018-12-04 LAB
ANION GAP SERPL CALC-SCNC: 10 MMOL/L (ref 3–18)
APPEARANCE UR: ABNORMAL
BACTERIA URNS QL MICRO: ABNORMAL /HPF
BASOPHILS # BLD: 0 K/UL (ref 0–0.1)
BASOPHILS NFR BLD: 0 % (ref 0–2)
BILIRUB UR QL: NEGATIVE
BUN SERPL-MCNC: 30 MG/DL (ref 7–18)
BUN/CREAT SERPL: 17 (ref 12–20)
CALCIUM SERPL-MCNC: 8.6 MG/DL (ref 8.5–10.1)
CHLORIDE SERPL-SCNC: 106 MMOL/L (ref 100–108)
CO2 SERPL-SCNC: 24 MMOL/L (ref 21–32)
COLOR UR: YELLOW
CREAT SERPL-MCNC: 1.81 MG/DL (ref 0.6–1.3)
DIFFERENTIAL METHOD BLD: ABNORMAL
EOSINOPHIL # BLD: 0.1 K/UL (ref 0–0.4)
EOSINOPHIL NFR BLD: 1 % (ref 0–5)
EPITH CASTS URNS QL MICRO: ABNORMAL /LPF (ref 0–5)
ERYTHROCYTE [DISTWIDTH] IN BLOOD BY AUTOMATED COUNT: 12.1 % (ref 11.6–14.5)
GLUCOSE SERPL-MCNC: 159 MG/DL (ref 74–99)
GLUCOSE UR STRIP.AUTO-MCNC: NEGATIVE MG/DL
HCT VFR BLD AUTO: 40.5 % (ref 35–45)
HGB BLD-MCNC: 12.8 G/DL (ref 12–16)
HGB UR QL STRIP: NEGATIVE
KETONES UR QL STRIP.AUTO: NEGATIVE MG/DL
LEUKOCYTE ESTERASE UR QL STRIP.AUTO: ABNORMAL
LYMPHOCYTES # BLD: 2 K/UL (ref 0.9–3.6)
LYMPHOCYTES NFR BLD: 28 % (ref 21–52)
MCH RBC QN AUTO: 28.4 PG (ref 24–34)
MCHC RBC AUTO-ENTMCNC: 31.6 G/DL (ref 31–37)
MCV RBC AUTO: 90 FL (ref 74–97)
MONOCYTES # BLD: 0.6 K/UL (ref 0.05–1.2)
MONOCYTES NFR BLD: 8 % (ref 3–10)
NEUTS SEG # BLD: 4.5 K/UL (ref 1.8–8)
NEUTS SEG NFR BLD: 63 % (ref 40–73)
NITRITE UR QL STRIP.AUTO: POSITIVE
PH UR STRIP: 5 [PH] (ref 5–8)
PLATELET # BLD AUTO: 340 K/UL (ref 135–420)
PMV BLD AUTO: 12.3 FL (ref 9.2–11.8)
POTASSIUM SERPL-SCNC: 4.5 MMOL/L (ref 3.5–5.5)
PROT UR STRIP-MCNC: NEGATIVE MG/DL
RBC # BLD AUTO: 4.5 M/UL (ref 4.2–5.3)
RBC #/AREA URNS HPF: 0 /HPF (ref 0–5)
SODIUM SERPL-SCNC: 140 MMOL/L (ref 136–145)
SP GR UR REFRACTOMETRY: 1.02 (ref 1–1.03)
UROBILINOGEN UR QL STRIP.AUTO: 0.2 EU/DL (ref 0.2–1)
WBC # BLD AUTO: 7.2 K/UL (ref 4.6–13.2)
WBC URNS QL MICRO: ABNORMAL /HPF (ref 0–4)

## 2018-12-04 PROCEDURE — 87186 SC STD MICRODIL/AGAR DIL: CPT

## 2018-12-04 PROCEDURE — 85025 COMPLETE CBC W/AUTO DIFF WBC: CPT

## 2018-12-04 PROCEDURE — 81001 URINALYSIS AUTO W/SCOPE: CPT

## 2018-12-04 PROCEDURE — 80048 BASIC METABOLIC PNL TOTAL CA: CPT

## 2018-12-04 PROCEDURE — 87086 URINE CULTURE/COLONY COUNT: CPT

## 2018-12-04 PROCEDURE — 87077 CULTURE AEROBIC IDENTIFY: CPT

## 2018-12-04 RX ORDER — CIPROFLOXACIN 500 MG/1
500 TABLET ORAL DAILY
Qty: 7 TAB | Refills: 0 | Status: SHIPPED | OUTPATIENT
Start: 2018-12-04 | End: 2018-12-18 | Stop reason: ALTCHOICE

## 2018-12-04 RX ORDER — CEFTRIAXONE 1 G/1
1 INJECTION, POWDER, FOR SOLUTION INTRAMUSCULAR; INTRAVENOUS ONCE
Qty: 1 VIAL | Refills: 0
Start: 2018-12-04 | End: 2018-12-04

## 2018-12-04 NOTE — PROGRESS NOTES
Byron Darbynicole 70 y.o. female being seen for   Chief Complaint   Patient presents with    Diabetes     f/u     Right above left kidney had pain over the weekend   Patient advised that they my need to make a separate appt. 1. Have you been to the ER, urgent care clinic since your last visit? Hospitalized since your last visit? No    2. Have you seen or consulted any other health care providers outside of the [address] [place] [address] since your last visit? Include any pap smears or colon screening.  No    Pharmacy has been verified  Care team has been verified/updated    Byron Oliveros 70 y.o. female in office received cefTRIAXone (ROCEPHIN) 1 gram injection in her right gluteus (IM)  the patient sat and waited 20 min before leaving   Questions / concerns answered   No reaction noted, tolerated well      Byron Oliveros 70 y.o. female had blood work done in right arm   Questions / concerns answered   No reaction noted, tolerated well

## 2018-12-04 NOTE — PATIENT INSTRUCTIONS
Urinary Tract Infection in Women: Care Instructions  Your Care Instructions    A urinary tract infection, or UTI, is a general term for an infection anywhere between the kidneys and the urethra (where urine comes out). Most UTIs are bladder infections. They often cause pain or burning when you urinate. UTIs are caused by bacteria and can be cured with antibiotics. Be sure to complete your treatment so that the infection goes away. Follow-up care is a key part of your treatment and safety. Be sure to make and go to all appointments, and call your doctor if you are having problems. It's also a good idea to know your test results and keep a list of the medicines you take. How can you care for yourself at home? · Take your antibiotics as directed. Do not stop taking them just because you feel better. You need to take the full course of antibiotics. · Drink extra water and other fluids for the next day or two. This may help wash out the bacteria that are causing the infection. (If you have kidney, heart, or liver disease and have to limit fluids, talk with your doctor before you increase your fluid intake.)  · Avoid drinks that are carbonated or have caffeine. They can irritate the bladder. · Urinate often. Try to empty your bladder each time. · To relieve pain, take a hot bath or lay a heating pad set on low over your lower belly or genital area. Never go to sleep with a heating pad in place. To prevent UTIs  · Drink plenty of water each day. This helps you urinate often, which clears bacteria from your system. (If you have kidney, heart, or liver disease and have to limit fluids, talk with your doctor before you increase your fluid intake.)  · Urinate when you need to. · Urinate right after you have sex. · Change sanitary pads often. · Avoid douches, bubble baths, feminine hygiene sprays, and other feminine hygiene products that have deodorants.   · After going to the bathroom, wipe from front to back.  When should you call for help? Call your doctor now or seek immediate medical care if:    · Symptoms such as fever, chills, nausea, or vomiting get worse or appear for the first time.     · You have new pain in your back just below your rib cage. This is called flank pain.     · There is new blood or pus in your urine.     · You have any problems with your antibiotic medicine.    Watch closely for changes in your health, and be sure to contact your doctor if:    · You are not getting better after taking an antibiotic for 2 days.     · Your symptoms go away but then come back. Where can you learn more? Go to http://chasidy-ena.info/. Enter M149 in the search box to learn more about \"Urinary Tract Infection in Women: Care Instructions. \"  Current as of: March 21, 2018  Content Version: 11.8  © 7621-1911 [employer]. Care instructions adapted under license by [employer] (which disclaims liability or warranty for this information). If you have questions about a medical condition or this instruction, always ask your healthcare professional. [address] any warranty or liability for your use of this information.

## 2018-12-06 ENCOUNTER — OFFICE VISIT (OUTPATIENT)
Dept: FAMILY MEDICINE CLINIC | Age: 68
End: 2018-12-06

## 2018-12-06 VITALS
DIASTOLIC BLOOD PRESSURE: 60 MMHG | TEMPERATURE: 97.4 F | BODY MASS INDEX: 31.01 KG/M2 | HEIGHT: 63 IN | WEIGHT: 175 LBS | OXYGEN SATURATION: 96 % | HEART RATE: 70 BPM | RESPIRATION RATE: 12 BRPM | SYSTOLIC BLOOD PRESSURE: 128 MMHG

## 2018-12-06 DIAGNOSIS — N17.9 ACUTE RENAL INJURY (HCC): ICD-10-CM

## 2018-12-06 DIAGNOSIS — N12 PYELONEPHRITIS: Primary | ICD-10-CM

## 2018-12-06 NOTE — PATIENT INSTRUCTIONS
Plan to have your blood drawn at [facility] the day before your appointment with me. Call the office if you have any blood sugars <80 before you see me.

## 2018-12-06 NOTE — PROGRESS NOTES
Thuy Carvajal is a 70 y.o. female who was seen in clinic today (12/6/2018). Assessment & Plan:       ICD-10-CM ICD-9-CM    1. Pyelonephritis G37 697.69 METABOLIC PANEL, BASIC   2. Acute renal injury ([address] S87.8 157.0 METABOLIC PANEL, BASIC   3. Uncontrolled type 2 diabetes mellitus with microalbuminuria, with long-term current use of insulin ([facility]) Z84.97 006.07 METABOLIC PANEL, BASIC    N95.99 791.0     R80.9 V58.67     Z79.4       Pyelo improving  Complete antibiotics    Patient Instructions   Plan to have your blood drawn at [facility] the day before your appointment with me. Call the office if you have any blood sugars <80 before you see me. Follow-up Disposition:  Return in about 12 days (around 12/18/2018) for pyelonephritis and acute renal injury. Subjective: Thuy Carvajal was seen today for Follow-up (Kidney infection )    Follow-up pyelonephritis with acute renal injury post ceftriaxone 1 g IM on 12/4/2018 and ciprofloxacin 500 mg daily (renally adjusted) since then. Less flank pain, nausea. Dysuria resolved. Reports BS 87 this morning, 140s pp last night      Results for orders placed or performed during the hospital encounter of 12/04/18   CULTURE, URINE   Result Value Ref Range    Special Requests: NO SPECIAL REQUESTS      Culture result: >100,000 COLONIES/mL GRAM NEGATIVE RODS (A)     URINALYSIS W/ RFLX MICROSCOPIC   Result Value Ref Range    Color YELLOW      Appearance CLOUDY      Specific gravity 1.020 1.005 - 1.030      pH (UA) 5.0 5.0 - 8.0      Protein NEGATIVE  NEG mg/dL    Glucose NEGATIVE  NEG mg/dL    Ketone NEGATIVE  NEG mg/dL    Bilirubin NEGATIVE  NEG      Blood NEGATIVE  NEG      Urobilinogen 0.2 0.2 - 1.0 EU/dL    Nitrites POSITIVE (A) NEG      Leukocyte Esterase SMALL (A) NEG     CBC WITH AUTOMATED DIFF   Result Value Ref Range    WBC 7.2 4.6 - 13.2 K/uL    RBC 4.50 4. 20 - 5.30 M/uL    HGB 12.8 12.0 - 16.0 g/dL    HCT 40.5 35.0 - 45.0 %    MCV 90.0 74.0 - 97.0 FL    MCH 28.4 24.0 - 34.0 PG    MCHC 31.6 31.0 - 37.0 g/dL    RDW 12.1 11.6 - 14.5 %    PLATELET 552 220 - 117 K/uL    MPV 12.3 (H) 9.2 - 11.8 FL    NEUTROPHILS 63 40 - 73 %    LYMPHOCYTES 28 21 - 52 %    MONOCYTES 8 3 - 10 %    EOSINOPHILS 1 0 - 5 %    BASOPHILS 0 0 - 2 %    ABS. NEUTROPHILS 4.5 1.8 - 8.0 K/UL    ABS. LYMPHOCYTES 2.0 0.9 - 3.6 K/UL    ABS. MONOCYTES 0.6 0.05 - 1.2 K/UL    ABS. EOSINOPHILS 0.1 0.0 - 0.4 K/UL    ABS. BASOPHILS 0.0 0.0 - 0.1 K/UL    DF AUTOMATED     METABOLIC PANEL, BASIC   Result Value Ref Range    Sodium 140 136 - 145 mmol/L    Potassium 4.5 3.5 - 5.5 mmol/L    Chloride 106 100 - 108 mmol/L    CO2 24 21 - 32 mmol/L    Anion gap 10 3.0 - 18 mmol/L    Glucose 159 (H) 74 - 99 mg/dL    BUN 30 (H) 7.0 - 18 MG/DL    Creatinine 1.81 (H) 0.6 - 1.3 MG/DL    BUN/Creatinine ratio 17 12 - 20      GFR est AA 34 (L) >60 ml/min/1.73m2    GFR est non-AA 28 (L) >60 ml/min/1.73m2    Calcium 8.6 8.5 - 10.1 MG/DL   URINE MICROSCOPIC ONLY   Result Value Ref Range    WBC 20 to 25 0 - 4 /hpf    RBC 0 0 - 5 /hpf    Epithelial cells 2+ 0 - 5 /lpf    Bacteria 4+ (A) NEG /hpf        Outpatient Medications Marked as Taking for the 12/6/18 encounter (Office Visit) with Michael Vergara MD   Medication Sig Dispense Refill    ciprofloxacin HCl (CIPRO) 500 mg tablet Take 1 Tab by mouth daily. 7 Tab 0    dulaglutide (TRULICITY) 4.12 CU/9.3 mL sub-q pen 0.5 mL by SubCUTAneous route every seven (7) days. 12 Pen 4    olmesartan-hydroCHLOROthiazide (BENICAR HCT) 40-25 mg per tablet TAKE ONE TABLET BY MOUTH EVERY DAY 90 Tab 4    NIFEdipine ER (ADALAT CC) 30 mg ER tablet Take 1 Tab by mouth daily. 90 Tab 4    esomeprazole (NEXIUM) 20 mg capsule Take  by mouth.  insulin glargine (LANTUS SOLOSTAR U-100 INSULIN) 100 unit/mL (3 mL) inpn 60 Units by SubCUTAneous route daily. 10 Pen 2    sertraline (ZOLOFT) 50 mg tablet Take 1 Tab by mouth daily.  90 Tab 4    simvastatin (ZOCOR) 40 mg tablet Take 1 Tab by mouth nightly. 90 Tab 4    Blood-Glucose Meter (ACCU-CHEK CODY PLUS METER) misc 1 Device by Does Not Apply route four (4) times daily. 1 Each 0    glucose blood VI test strips (ACCU-CHEK CODY PLUS TEST STRP) strip Patient is to check blood sugar 3-4 times a day 300 Strip 11    insulin aspart U-100 (NOVOLOG) 100 unit/mL crtg Sliding Scale: 100-150 = 6 units; 150-200 = 8 units; 200-250 = 10 units; 250-300= 12 units; 300-350 = 14 units; 400 = Emergency room 8 Cartridge 2    levothyroxine (SYNTHROID) 150 mcg tablet Take 1 Tab by mouth Daily (before breakfast). 90 Tab 1    ALCOHOL PREP PADS padm       omeprazole (PRILOSEC) 40 mg capsule Take 1 Cap by mouth daily. 80 Cap 2       Patient Active Problem List    Diagnosis    Class 1 obesity due to excess calories with serious comorbidity and body mass index (BMI) of 31.0 to 31.9 in adult    Vitamin D deficiency    Osteopenia of multiple sites     7/2018      Moderate major depression ([address]    Uncontrolled type 2 diabetes mellitus with microalbuminuria, with long-term current use of insulin ([address]    Family history of malignant neoplasm of breast    Diabetic nephropathy associated with type 2 diabetes mellitus ([address]    Lump or mass in breast    Hypertension    Acquired hypothyroidism    Hiatal hernia     EGD 11/14/2017 Bailee Christensen MD \"prn acid suppression\"      Hyperlipidemia    Depression    Migraines         Allergies   Allergen Reactions    Darvocet A500 [Propoxyphene N-Acetaminophen] Nausea and Vomiting    Gloves, Latex With Aloe Vera Contact Dermatitis     Rash from powder inside latex gloves      Metformin Diarrhea         Patient Care Team:  Lorna Prado MD as PCP - General (Family Practice)  Markus Cooley MD (Gastroenterology)  Adebayo Vela (Ophthalmology)    The following sections were reviewed & updated as appropriate: PMH, PSH, FH, and SH.       ROS - per HPI       Objective:     Visit Vitals  /60   Pulse 70 Temp 97.4 °F (36.3 °C)   Resp 12   Ht 5' 3\" (1.6 m)   Wt 175 lb (79.4 kg)   LMP 11/17/1972 Comment: partial   SpO2 96%   BMI 31.00 kg/m²      Physical Exam   Constitutional: She is oriented to person, place, and time. She appears well-developed. No distress. HENT:   Head: Normocephalic and atraumatic. Pulmonary/Chest: Effort normal.   Abdominal: There is CVA tenderness (left). Neurological: She is alert and oriented to person, place, and time. Psychiatric: She has a normal mood and affect. Her behavior is normal. Judgment and thought content normal.         Disclaimer: The patient understands our medical plan. Alternatives have been explained and offered. The risks, benefits and significant side effects of all medications have been reviewed. Anticipated time course and progression of condition reviewed. All questions have been addressed. She is encouraged to employ the information provided in the after visit summary, which was reviewed. Where appropriate, she is instructed to call the clinic if she has not been notified either by phone or through [address] with the results of her tests or with an appointment plan for any referrals within 1 week(s). No news is not good news; it's no news. The patient  is to call if her condition worsens or fails to improve or if significant side effects are experienced. Aspects of this note may have been generated using voice recognition software. Despite editing, there may be unrecognized errors.        Raul Wiseman MD

## 2018-12-06 NOTE — PROGRESS NOTES
Rip Flair 70 y.o. female is following up   Chief Complaint   Patient presents with    Follow-up     Kidney infection      Feeling a little bit better     1. Have you been to the ER, urgent care clinic since your last visit? Hospitalized since your last visit? No    2. Have you seen or consulted any other health care providers outside of the [address] [place] [address] since your last visit? Include any pap smears or colon screening.  No    Pharmacy has been verified  Care team has been verified/updated

## 2018-12-08 LAB
BACTERIA SPEC CULT: ABNORMAL
BACTERIA SPEC CULT: ABNORMAL
SERVICE CMNT-IMP: ABNORMAL

## 2018-12-17 ENCOUNTER — HOSPITAL ENCOUNTER (OUTPATIENT)
Dept: LAB | Age: 68
Discharge: HOME | End: 2018-12-17

## 2018-12-17 LAB — XX-LABCORP SPECIMEN COL,LCBCF: NORMAL

## 2018-12-17 PROCEDURE — 99001 SPECIMEN HANDLING PT-LAB: CPT

## 2018-12-18 ENCOUNTER — OFFICE VISIT (OUTPATIENT)
Dept: FAMILY MEDICINE CLINIC | Age: 68
End: 2018-12-18

## 2018-12-18 ENCOUNTER — HOSPITAL ENCOUNTER (OUTPATIENT)
Dept: LAB | Age: 68
Discharge: HOME | End: 2018-12-18
Payer: MEDICARE

## 2018-12-18 VITALS
BODY MASS INDEX: 31.01 KG/M2 | TEMPERATURE: 97.9 F | HEIGHT: 63 IN | OXYGEN SATURATION: 98 % | SYSTOLIC BLOOD PRESSURE: 108 MMHG | WEIGHT: 175 LBS | DIASTOLIC BLOOD PRESSURE: 50 MMHG | RESPIRATION RATE: 12 BRPM | HEART RATE: 79 BPM

## 2018-12-18 DIAGNOSIS — B37.9 ANTIBIOTIC-INDUCED YEAST INFECTION: ICD-10-CM

## 2018-12-18 DIAGNOSIS — N12 PYELONEPHRITIS: ICD-10-CM

## 2018-12-18 DIAGNOSIS — T36.95XA ANTIBIOTIC-INDUCED YEAST INFECTION: ICD-10-CM

## 2018-12-18 DIAGNOSIS — N17.9 ACUTE RENAL INJURY (HCC): ICD-10-CM

## 2018-12-18 DIAGNOSIS — N12 PYELONEPHRITIS: Primary | ICD-10-CM

## 2018-12-18 LAB
APPEARANCE UR: CLEAR
BACTERIA URNS QL MICRO: NEGATIVE /HPF
BILIRUB UR QL: NEGATIVE
BUN SERPL-MCNC: 27 MG/DL (ref 8–27)
BUN/CREAT SERPL: 16 (ref 12–28)
CALCIUM SERPL-MCNC: 9.3 MG/DL (ref 8.7–10.3)
CHLORIDE SERPL-SCNC: 102 MMOL/L (ref 96–106)
CO2 SERPL-SCNC: 22 MMOL/L (ref 20–29)
COLOR UR: YELLOW
CREAT SERPL-MCNC: 1.74 MG/DL (ref 0.57–1)
EPITH CASTS URNS QL MICRO: NORMAL /LPF (ref 0–5)
GLUCOSE SERPL-MCNC: 139 MG/DL (ref 65–99)
GLUCOSE UR STRIP.AUTO-MCNC: 100 MG/DL
HGB UR QL STRIP: NEGATIVE
INTERPRETATION: NORMAL
KETONES UR QL STRIP.AUTO: NEGATIVE MG/DL
LEUKOCYTE ESTERASE UR QL STRIP.AUTO: ABNORMAL
NITRITE UR QL STRIP.AUTO: NEGATIVE
PH UR STRIP: 5 [PH] (ref 5–8)
POTASSIUM SERPL-SCNC: 4.8 MMOL/L (ref 3.5–5.2)
PROT UR STRIP-MCNC: NEGATIVE MG/DL
RBC #/AREA URNS HPF: 0 /HPF (ref 0–5)
SODIUM SERPL-SCNC: 142 MMOL/L (ref 134–144)
SP GR UR REFRACTOMETRY: 1.02 (ref 1–1.03)
UROBILINOGEN UR QL STRIP.AUTO: 0.2 EU/DL (ref 0.2–1)
WBC URNS QL MICRO: NORMAL /HPF (ref 0–4)

## 2018-12-18 PROCEDURE — 87086 URINE CULTURE/COLONY COUNT: CPT

## 2018-12-18 PROCEDURE — 81001 URINALYSIS AUTO W/SCOPE: CPT

## 2018-12-18 RX ORDER — CIPROFLOXACIN 500 MG/1
500 TABLET ORAL DAILY
Qty: 7 TAB | Refills: 0 | Status: SHIPPED | OUTPATIENT
Start: 2018-12-18 | End: 2018-12-27 | Stop reason: ALTCHOICE

## 2018-12-18 NOTE — PROGRESS NOTES
Avril Ivey 70 y.o. female being seen for   Chief Complaint   Patient presents with    Other     Pyelonephritis F/u      Yeast infection since 12/14/18   Back still hurts   Still having frequency   Not sure if burning is related to yeast     1. Have you been to the ER, urgent care clinic since your last visit? Hospitalized since your last visit? No    2. Have you seen or consulted any other health care providers outside of the [address] [place] [address] since your last visit? Include any pap smears or colon screening.  No    Pharmacy has been verified  Care team has been verified/updated

## 2018-12-18 NOTE — PROGRESS NOTES
Glenis Weston is a 70 y.o. female who was seen in clinic today (12/18/2018). Assessment & Plan:       ICD-10-CM ICD-9-CM    1. Pyelonephritis N12 590.80 ciprofloxacin HCl (CIPRO) 500 mg tablet      CULTURE, URINE      URINALYSIS W/ RFLX MICROSCOPIC      METABOLIC PANEL, BASIC   2. Acute renal injury ([address] N17.9 584.9 ciprofloxacin HCl (CIPRO) 500 mg tablet      METABOLIC PANEL, BASIC   3. Antibiotic-induced yeast infection B37.9 112.9 clotrimazole (GYNE-LOTRIMIN) 2 % vaginal cream    T36.95XA E930.9    4. Uncontrolled type 2 diabetes mellitus with microalbuminuria, with long-term current use of insulin ([facility]) E11.29 250.42     E11.65 791.0     R80.9 V58.67     Z79.4       Pyelo: initial improvement then plateau with continued stable renal impairment. Repeat culture  Repeat renally adjusted ciprofloxacin empirically pending results    DM2 uncontrolled but improved modestly. No med change at present. Follow-up Disposition:  Return in about 9 days (around 12/27/2018) for kidney infection follow up, non fasting labs at [facility] the day prior. Subjective: Glenis Weston was seen today for Other (Pyelonephritis F/u )    Follow up pyelo, post cipro, last dose approx 5 days ago  Still feeling \"bleh\"    Still with flank pain, modestly improved then plateua  Less dysuria, but not resolved Now confounded by vulvar inflammation associated with cheesy vag discharge and vag itching x few days        DM2: fasting 143 today.  None >180 since last visit    Lab Results   Component Value Date/Time    Sodium 142 12/17/2018 09:29 AM    Potassium 4.8 12/17/2018 09:29 AM    Chloride 102 12/17/2018 09:29 AM    CO2 22 12/17/2018 09:29 AM    Anion gap 10 12/04/2018 10:10 AM    Glucose 139 (H) 12/17/2018 09:29 AM    BUN 27 12/17/2018 09:29 AM    Creatinine 1.74 (H) 12/17/2018 09:29 AM    BUN/Creatinine ratio 16 12/17/2018 09:29 AM    GFR est AA 34 (L) 12/17/2018 09:29 AM    GFR est non-AA 29 (L) 12/17/2018 09:29 AM Calcium 9.3 12/17/2018 09:29 AM            Outpatient Medications Marked as Taking for the 12/18/18 encounter (Office Visit) with Amalia Barnes MD   Medication Sig Dispense Refill    dulaglutide (TRULICITY) 7.71 WP/5.4 mL sub-q pen 0.5 mL by SubCUTAneous route every seven (7) days. 12 Pen 4    olmesartan-hydroCHLOROthiazide (BENICAR HCT) 40-25 mg per tablet TAKE ONE TABLET BY MOUTH EVERY DAY 90 Tab 4    esomeprazole (NEXIUM) 20 mg capsule Take  by mouth.  cholecalciferol, vitamin d3, (VITAMIN D3) 400 unit cap Take  by mouth.  insulin glargine (LANTUS SOLOSTAR U-100 INSULIN) 100 unit/mL (3 mL) inpn 60 Units by SubCUTAneous route daily. 10 Pen 2    sertraline (ZOLOFT) 50 mg tablet Take 1 Tab by mouth daily. 90 Tab 4    simvastatin (ZOCOR) 40 mg tablet Take 1 Tab by mouth nightly. 90 Tab 4    Blood-Glucose Meter (ACCU-CHEK CODY PLUS METER) misc 1 Device by Does Not Apply route four (4) times daily. 1 Each 0    glucose blood VI test strips (ACCU-CHEK CODY PLUS TEST STRP) strip Patient is to check blood sugar 3-4 times a day 300 Strip 11    insulin aspart U-100 (NOVOLOG) 100 unit/mL crtg Sliding Scale: 100-150 = 6 units; 150-200 = 8 units; 200-250 = 10 units; 250-300= 12 units; 300-350 = 14 units; 400 = Emergency room 8 Cartridge 2    glucose blood VI test strips (FREESTYLE LITE STRIPS) strip Blood sugar check 3-4 times a day due to fluctuating blood sugar 300 Strip 11    levothyroxine (SYNTHROID) 150 mcg tablet Take 1 Tab by mouth Daily (before breakfast).  80 Tab 1    BLADDER CONTROL PADS pads       ALCOHOL PREP PADS padm          Patient Active Problem List    Diagnosis    Class 1 obesity due to excess calories with serious comorbidity and body mass index (BMI) of 31.0 to 31.9 in adult    Vitamin D deficiency    Osteopenia of multiple sites     7/2018      Moderate major depression ([address]    Uncontrolled type 2 diabetes mellitus with microalbuminuria, with long-term current use of insulin ([facility]    Family history of malignant neoplasm of breast    Diabetic nephropathy associated with type 2 diabetes mellitus ([facility]    Lump or mass in breast    Hypertension    Acquired hypothyroidism    Hiatal hernia     EGD 11/14/2017 Brady Jones MD \"prn acid suppression\"      Hyperlipidemia    Depression    Migraines         Allergies   Allergen Reactions    Darvocet A500 [Propoxyphene N-Acetaminophen] Nausea and Vomiting    Gloves, Latex With Aloe Vera Contact Dermatitis     Rash from powder inside latex gloves      Metformin Diarrhea         Patient Care Team:  Bayele Almanzar MD as PCP - General (Family Practice)  Quinton Garcia MD (Gastroenterology)  Les Luz (Ophthalmology)    The following sections were reviewed & updated as appropriate: PMH, PSH, FH, and SH. Review of Systems   Constitutional: Negative for fever. Gastrointestinal: Negative for nausea and vomiting. Objective:     Visit Vitals  /50   Pulse 79   Temp 97.9 °F (36.6 °C) (Oral)   Resp 12   Ht 5' 3\" (1.6 m)   Wt 175 lb (79.4 kg)   LMP 11/17/1972 Comment: partial   SpO2 98%   BMI 31.00 kg/m²      Physical Exam   Constitutional: She is oriented to person, place, and time. She appears well-developed. No distress. HENT:   Head: Normocephalic and atraumatic. Pulmonary/Chest: Effort normal.   Abdominal: There is no tenderness. There is CVA tenderness (left). Neurological: She is alert and oriented to person, place, and time. Psychiatric: She has a normal mood and affect. Her behavior is normal. Judgment and thought content normal.         Disclaimer: The patient understands our medical plan. Alternatives have been explained and offered. The risks, benefits and significant side effects of all medications have been reviewed. Anticipated time course and progression of condition reviewed. All questions have been addressed.   She is encouraged to employ the information provided in the after visit summary, which was reviewed. Where appropriate, she is instructed to call the clinic if she has not been notified either by phone or through [address] with the results of her tests or with an appointment plan for any referrals within 1 week(s). No news is not good news; it's no news. The patient  is to call if her condition worsens or fails to improve or if significant side effects are experienced. Aspects of this note may have been generated using voice recognition software. Despite editing, there may be unrecognized errors.        Stanley Fernández MD

## 2018-12-21 LAB
BACTERIA SPEC CULT: NORMAL
SERVICE CMNT-IMP: NORMAL

## 2018-12-26 ENCOUNTER — HOSPITAL ENCOUNTER (OUTPATIENT)
Dept: LAB | Age: 68
Discharge: HOME | End: 2018-12-26

## 2018-12-26 LAB — XX-LABCORP SPECIMEN COL,LCBCF: NORMAL

## 2018-12-26 PROCEDURE — 99001 SPECIMEN HANDLING PT-LAB: CPT

## 2018-12-27 ENCOUNTER — OFFICE VISIT (OUTPATIENT)
Dept: FAMILY MEDICINE CLINIC | Age: 68
End: 2018-12-27

## 2018-12-27 VITALS
DIASTOLIC BLOOD PRESSURE: 60 MMHG | BODY MASS INDEX: 30.83 KG/M2 | HEIGHT: 63 IN | WEIGHT: 174 LBS | OXYGEN SATURATION: 98 % | SYSTOLIC BLOOD PRESSURE: 116 MMHG | HEART RATE: 86 BPM | TEMPERATURE: 97.5 F

## 2018-12-27 DIAGNOSIS — N17.9 ACUTE RENAL INJURY (HCC): Primary | ICD-10-CM

## 2018-12-27 LAB
BUN SERPL-MCNC: 33 MG/DL (ref 8–27)
BUN/CREAT SERPL: 16 (ref 12–28)
CALCIUM SERPL-MCNC: 9.7 MG/DL (ref 8.7–10.3)
CHLORIDE SERPL-SCNC: 106 MMOL/L (ref 96–106)
CO2 SERPL-SCNC: 21 MMOL/L (ref 20–29)
CREAT SERPL-MCNC: 2.04 MG/DL (ref 0.57–1)
GLUCOSE SERPL-MCNC: 152 MG/DL (ref 65–99)
INTERPRETATION: NORMAL
POTASSIUM SERPL-SCNC: 5.2 MMOL/L (ref 3.5–5.2)
SODIUM SERPL-SCNC: 144 MMOL/L (ref 134–144)
SPECIMEN STATUS REPORT, ROLRST: NORMAL

## 2018-12-27 RX ORDER — CHOLECALCIFEROL TAB 125 MCG (5000 UNIT) 125 MCG
TAB ORAL DAILY
COMMUNITY

## 2018-12-27 NOTE — PROGRESS NOTES
Patient here for  9 day follow up on her kidney infection she did finish her treatment on Monday 12/24/18. Has had labs done and still has some urinary incontinence. 1. Have you been to the ER, urgent care clinic since your last visit? Hospitalized since your last visit? No  2. Have you seen or consulted any other health care providers outside of the [address] [place] [address] since your last visit? Include any pap smears or colon screening. No    Medication reconciliation has been completed with patient. Care team discussed/updated as well as pharmacy. Planning to change GI providers but has not done so as of yet. Health Maintenance reviewed - Td is not covered by patient's insurance unless there is an injury, will discuss need for Shingrix with provider.

## 2018-12-27 NOTE — PROGRESS NOTES
Flower Navarrete is a 70 y.o. female who was seen in clinic today (12/27/2018). Assessment & Plan:       ICD-10-CM ICD-9-CM    1. Acute renal injury ([facility] E14.1 621.7 METABOLIC PANEL, BASIC      CANCELED: METABOLIC PANEL, BASIC   2. Uncontrolled type 2 diabetes mellitus with microalbuminuria, with long-term current use of insulin ([facility]) I25.07 736.98 METABOLIC PANEL, BASIC    C91.11 791.0 CANCELED: METABOLIC PANEL, BASIC    A57.8 V58.67     Z79.4         Not improved. Can no longer attribute to pyelo. Likely adverse reaction GLP1 agonist    Patient Instructions   Please stop the Trulicity. Stop all soda. Follow-up Disposition:  Return in about 2 weeks (around 1/10/2019) for kidney injury follow up, non fasting bloodwork at [facility] 1 day prior. Subjective: Flower Navarrete was seen today for Follow-up    Follow up EMPERATRIZ associated with pyelonephritis, now post 2 courses renally adjusted ciprofloxacin: no flank pain x few days    Reports consuming at least 24 diet priyanka/week    Lab Results   Component Value Date/Time    Sodium 144 12/26/2018 12:00 AM    Potassium 5.2 12/26/2018 12:00 AM    Chloride 106 12/26/2018 12:00 AM    CO2 21 12/26/2018 12:00 AM    Anion gap 10 12/04/2018 10:10 AM    Glucose 152 (H) 12/26/2018 12:00 AM    BUN 33 (H) 12/26/2018 12:00 AM    Creatinine 2.04 (H) 12/26/2018 12:00 AM    BUN/Creatinine ratio 16 12/26/2018 12:00 AM    GFR est AA 28 (L) 12/26/2018 12:00 AM    GFR est non-AA 24 (L) 12/26/2018 12:00 AM    Calcium 9.7 12/26/2018 12:00 AM       5/26/2015 08:53) Provider Status: Reviewed   Study Result     CT Abdomen And Pelvis with Intravenous Contrast     INDICATION: Left upper quadrant pain.     TECHNIQUE: 5 mm collimation axial images obtained from the diaphragm to the  level of the pubic symphysis following the uneventful administration of  100 cc  of low osmolar, nonionic intravenous contrast.     COMPARISON: Right upper quadrant ultrasound 10/24/13.      ABDOMEN FINDINGS:     Lung Bases: There is a small band of subsegmental atelectasis in the right lower  lobe. No evidence of pulmonary mass. Visualized portion of the mediastinum is  normal..     Liver: Normal attenuation. No evidence for mass.     Gallbladder: Present and appears normal. No biliary ductal dilatation.     Pancreas: Normal attenuation without mass or ductal dilatation.     Spleen: Normal in size. No evidence of mass. .     Adrenal Glands: No evidence for mass.     Kidneys: Symmetric enhancement. No cortical mass. No hydronephrosis.     Lymph Nodes: No lymphadenopathy.     PELVIS FINDINGS:      Bowel: Small Bowel: Normal in caliber with normal wall thickness. Large Bowel: Normal in caliber with normal wall thickness. There is a moderate  degree of fecal retention in the colon. No significant burden of diverticulosis. Appendix: Not visualized and may be absent. There is no evidence of hernia.     Bladder: Normal.     The uterus is absent. There are no adnexal masses.     Bones: There are scattered degenerative changes of the lower thoracic spine  consistent with age. .     IMPRESSION:     1. No evidence for bowel obstruction or inflammation. Moderate degree of  fecal retention in the colon is suggestive of constipation. 2.   Hysterectomy and possible appendectomy. 3. The remainder of the abdomen/pelvis is normal.         Results for orders placed or performed during the hospital encounter of 12/26/18   [facility] SPECIMEN COL   Result Value Ref Range    [facility] SPECIMEN [facility]LL[facility]. Specimens collected/sent to [facility]. Please direct inquiries to ([phone]). Outpatient Medications Marked as Taking for the 12/27/18 encounter (Office Visit) with Lilo Plaza MD   Medication Sig Dispense Refill    cholecalciferol, VITAMIN D3, (VITAMIN D3) 5,000 unit tab tablet Take  by mouth daily.  clotrimazole (GYNE-LOTRIMIN) 2 % vaginal cream Insert 1 Applicatorful into vagina nightly.  1 Tube 0    acetaminophen (TYLENOL EXTRA STRENGTH) 500 mg tablet Take  by mouth every six (6) hours as needed for Pain.  dulaglutide (TRULICITY) 0.25 SY/1.0 mL sub-q pen 0.5 mL by SubCUTAneous route every seven (7) days. 12 Pen 4    olmesartan-hydroCHLOROthiazide (BENICAR HCT) 40-25 mg per tablet TAKE ONE TABLET BY MOUTH EVERY DAY 90 Tab 4    NIFEdipine ER (ADALAT CC) 30 mg ER tablet Take 1 Tab by mouth daily. 90 Tab 4    esomeprazole (NEXIUM) 20 mg capsule Take  by mouth.  polyethylene glycol (MIRALAX) 17 gram/dose powder Take as a split dose as directed.  insulin glargine (LANTUS SOLOSTAR U-100 INSULIN) 100 unit/mL (3 mL) inpn 60 Units by SubCUTAneous route daily. 10 Pen 2    sertraline (ZOLOFT) 50 mg tablet Take 1 Tab by mouth daily. 90 Tab 4    simvastatin (ZOCOR) 40 mg tablet Take 1 Tab by mouth nightly. 90 Tab 4    Blood-Glucose Meter (ACCU-CHEK CODY PLUS METER) misc 1 Device by Does Not Apply route four (4) times daily. 1 Each 0    glucose blood VI test strips (ACCU-CHEK CODY PLUS TEST STRP) strip Patient is to check blood sugar 3-4 times a day 300 Strip 11    insulin aspart U-100 (NOVOLOG) 100 unit/mL crtg Sliding Scale: 100-150 = 6 units; 150-200 = 8 units; 200-250 = 10 units; 250-300= 12 units; 300-350 = 14 units; 400 = Emergency room 8 Cartridge 2    glucose blood VI test strips (FREESTYLE LITE STRIPS) strip Blood sugar check 3-4 times a day due to fluctuating blood sugar 300 Strip 11    levothyroxine (SYNTHROID) 150 mcg tablet Take 1 Tab by mouth Daily (before breakfast). 90 Tab 1    BLADDER CONTROL PADS pads       ALCOHOL PREP PADS padm       omeprazole (PRILOSEC) 40 mg capsule Take 1 Cap by mouth daily. 90 Cap 2    meclizine (ANTIVERT) 25 mg tablet Take  by mouth three (3) times daily as needed.  PSYLLIUM SEED, WITH DEXTROSE, (FIBER PO) Take  by mouth.          Patient Active Problem List    Diagnosis    Class 1 obesity due to excess calories with serious comorbidity and body mass index (BMI) of 31.0 to 31.9 in adult    Vitamin D deficiency    Osteopenia of multiple sites     7/2018      Moderate major depression (HCC)    Uncontrolled type 2 diabetes mellitus with microalbuminuria, with long-term current use of insulin ([facility]    Family history of malignant neoplasm of breast    Diabetic nephropathy associated with type 2 diabetes mellitus ([facility]    Lump or mass in breast    Hypertension    Acquired hypothyroidism    Hiatal hernia     EGD 11/14/2017 Мария Sweeney MD \"prn acid suppression\"      Hyperlipidemia    Depression    Migraines         Allergies   Allergen Reactions    Darvocet A500 [Propoxyphene N-Acetaminophen] Nausea and Vomiting    Gloves, Latex With Aloe Vera Contact Dermatitis     Rash from powder inside latex gloves      Metformin Diarrhea         Patient Care Team:  Surjit Rhodes MD as PCP - General (Family Practice)  Jennifer Mcgovern MD (Gastroenterology)  Shoshana Jennings (Ophthalmology)    The following sections were reviewed & updated as appropriate: PMH, PSH, FH, and SH. Review of Systems   Constitutional: Negative for fever. Gastrointestinal: Negative for nausea and vomiting. Objective:     Visit Vitals  /60   Pulse 86   Temp 97.5 °F (36.4 °C) (Oral)   Ht 5' 3\" (1.6 m)   Wt 174 lb (78.9 kg)   LMP 11/17/1972 Comment: partial   SpO2 98%   BMI 30.82 kg/m²      Physical Exam   Constitutional: She is oriented to person, place, and time. She appears well-developed. No distress. Pleasant obese white female   HENT:   Head: Normocephalic and atraumatic. Pulmonary/Chest: Effort normal.   Neurological: She is alert and oriented to person, place, and time. Psychiatric: She has a normal mood and affect. Her behavior is normal. Judgment and thought content normal.         Disclaimer: The patient understands our medical plan. Alternatives have been explained and offered.   The risks, benefits and significant side effects of all medications have been reviewed. Anticipated time course and progression of condition reviewed. All questions have been addressed. She is encouraged to employ the information provided in the after visit summary, which was reviewed. Where appropriate, she is instructed to call the clinic if she has not been notified either by phone or through [address] with the results of her tests or with an appointment plan for any referrals within 1 week(s). No news is not good news; it's no news. The patient  is to call if her condition worsens or fails to improve or if significant side effects are experienced. Aspects of this note may have been generated using voice recognition software. Despite editing, there may be unrecognized errors.        Stanley Fernández MD

## 2019-01-09 ENCOUNTER — HOSPITAL ENCOUNTER (OUTPATIENT)
Dept: LAB | Age: 69
Discharge: HOME | End: 2019-01-09

## 2019-01-09 LAB
ANION GAP BLD CALC-SCNC: 21 MMOL/L (ref 10–20)
BUN BLD-MCNC: 27 MG/DL (ref 7–18)
CA-I BLD-MCNC: 1.17 MMOL/L (ref 1.12–1.32)
CHLORIDE BLD-SCNC: 103 MMOL/L (ref 100–108)
CO2 BLD-SCNC: 24 MMOL/L (ref 19–24)
CREAT UR-MCNC: 1.6 MG/DL (ref 0.6–1.3)
GLUCOSE BLD STRIP.AUTO-MCNC: 176 MG/DL (ref 74–106)
HCT VFR BLD CALC: 39 % (ref 36–49)
HGB BLD-MCNC: 13.3 G/DL (ref 12–16)
POTASSIUM BLD-SCNC: 4.6 MMOL/L (ref 3.5–5.5)
SODIUM BLD-SCNC: 142 MMOL/L (ref 136–145)

## 2019-01-09 PROCEDURE — 36415 COLL VENOUS BLD VENIPUNCTURE: CPT

## 2019-01-09 PROCEDURE — 80047 BASIC METABLC PNL IONIZED CA: CPT

## 2019-01-10 ENCOUNTER — OFFICE VISIT (OUTPATIENT)
Dept: FAMILY MEDICINE CLINIC | Age: 69
End: 2019-01-10

## 2019-01-10 VITALS
SYSTOLIC BLOOD PRESSURE: 108 MMHG | TEMPERATURE: 98 F | OXYGEN SATURATION: 97 % | BODY MASS INDEX: 30.83 KG/M2 | RESPIRATION RATE: 15 BRPM | WEIGHT: 174 LBS | HEART RATE: 80 BPM | HEIGHT: 63 IN | DIASTOLIC BLOOD PRESSURE: 60 MMHG

## 2019-01-10 DIAGNOSIS — E11.21 DIABETIC NEPHROPATHY ASSOCIATED WITH TYPE 2 DIABETES MELLITUS (HCC): ICD-10-CM

## 2019-01-10 DIAGNOSIS — Z12.11 SCREENING FOR COLON CANCER: ICD-10-CM

## 2019-01-10 DIAGNOSIS — N17.9 ACUTE RENAL INJURY (HCC): Primary | ICD-10-CM

## 2019-01-10 RX ORDER — INSULIN GLARGINE 100 [IU]/ML
INJECTION, SOLUTION SUBCUTANEOUS
Qty: 10 PEN | Refills: 2 | Status: SHIPPED | OUTPATIENT
Start: 2019-01-10 | End: 2019-01-28

## 2019-01-10 RX ORDER — INSULIN ASPART 100 [IU]/ML
INJECTION, SOLUTION INTRAVENOUS; SUBCUTANEOUS
Qty: 8 CARTRIDGE | Refills: 2 | Status: SHIPPED | OUTPATIENT
Start: 2019-01-10 | End: 2019-01-28

## 2019-01-10 NOTE — PROGRESS NOTES
Pt in office for follow up on renal injury. 1. Have you been to the ER, urgent care clinic since your last visit? Hospitalized since your last visit? No    2. Have you seen or consulted any other health care providers outside of the [address] [place] [address] since your last visit? Include any pap smears or colon screening.  No

## 2019-01-10 NOTE — PATIENT INSTRUCTIONS
[place] for [facility]' diabetes classes by [phone]. If you have trouble with attending those for any reason, call your local health department and ask if/when they are conducting classes.

## 2019-01-10 NOTE — PROGRESS NOTES
Ila Plummer is a 70 y.o. female who was seen in clinic today (1/10/2019). Assessment & Plan:       ICD-10-CM ICD-9-CM    1. Acute renal injury ([address] E75.7 026.1 METABOLIC PANEL, BASIC   2. Uncontrolled type 2 diabetes mellitus with microalbuminuria, with long-term current use of insulin (HCC) E11.29 250.42 insulin glargine (LANTUS SOLOSTAR U-100 INSULIN) 100 unit/mL (3 mL) inpn    E11.65 791.0 insulin aspart U-100 (NOVOLOG) 100 unit/mL crtg    R80.9 V58.67     Z79.4     3. Diabetic nephropathy associated with type 2 diabetes mellitus (HCC) E11.21 250.40      583.81    4. Screening for colon cancer Z12.11 V76.51      Renal function has improved off GLP-1 agonist.  Not yet back to baseline. Reassess 2 weeks with plan to follow. If renal function has returned to baseline, will reinstitute GLP-1 agonist with short interval follow-up in hopes that the renal injury resulted from the combination of it in conjunction with pyelonephritis    Meanwhile, DM control worse without it  Schedule DM class  Continue long-acting insulin at 30 units in the morning, increase to 40 units in the evening  Scheduled short acting insulin 6 units with breakfast, no short acting insulin with lunch, 15 units with dinner    Encouraged to maintain a glucose log and reflect on potential variables, i.e. diet triggers    Follow-up Disposition:  Return for blood draw for kidney status, plan to follow based on results. Subjective: Ila Plummer was seen today for Follow-up (kidney labs)    Follow-up acute decline in renal function attendant with pyelonephritis. Subsequently did not recover.   GLP-1 agonist suspended    Feels well today      Follow up DM2 off GLP1 agonist  Basal insulin 30 units 2x/day - felt \"funny\" when single 60 units once daily  Home glucoses:  Fastin-160s since stopping GLP1 agonist  PP lunch: 140-150  PP dinner: 200-240 since stopping GLP1 agonist    Harder to control appetite off it as well    Giving short acting insulin 6 units with breakfast, 8-10 with dinner, then additional 8-10 units at that time    Giving SSI 4-5 units after lunch      Results for orders placed or performed during the hospital encounter of 01/09/19   POC CHEM8   Result Value Ref Range    CO2, POC 24 19 - 24 MMOL/L    Glucose,  (H) 74 - 106 MG/DL    BUN, POC 27 (H) 7 - 18 MG/DL    Creatinine, POC 1.6 (H) 0.6 - 1.3 MG/DL    GFRAA, POC 39 (L) >60 ml/min/1.73m2    GFRNA, POC 32 (L) >60 ml/min/1.73m2    Sodium,  136 - 145 MMOL/L    Potassium, POC 4.6 3.5 - 5.5 MMOL/L    Calcium, ionized (POC) 1.17 1.12 - 1.32 mmol/L    Chloride,  100 - 108 MMOL/L    Anion gap, POC 21 (H) 10 - 20      Hematocrit, POC 39 36 - 49 %    Hemoglobin, POC 13.3 12 - 16 G/DL        Outpatient Medications Marked as Taking for the 1/10/19 encounter (Office Visit) with Sheila Hussein MD   Medication Sig Dispense Refill    cholecalciferol, VITAMIN D3, (VITAMIN D3) 5,000 unit tab tablet Take  by mouth daily.  clotrimazole (GYNE-LOTRIMIN) 2 % vaginal cream Insert 1 Applicatorful into vagina nightly. 1 Tube 0    acetaminophen (TYLENOL EXTRA STRENGTH) 500 mg tablet Take  by mouth every six (6) hours as needed for Pain.  olmesartan-hydroCHLOROthiazide (BENICAR HCT) 40-25 mg per tablet TAKE ONE TABLET BY MOUTH EVERY DAY 90 Tab 4    NIFEdipine ER (ADALAT CC) 30 mg ER tablet Take 1 Tab by mouth daily. 90 Tab 4    esomeprazole (NEXIUM) 20 mg capsule Take  by mouth.  insulin glargine (LANTUS SOLOSTAR U-100 INSULIN) 100 unit/mL (3 mL) inpn 60 Units by SubCUTAneous route daily. 10 Pen 2    sertraline (ZOLOFT) 50 mg tablet Take 1 Tab by mouth daily. 90 Tab 4    simvastatin (ZOCOR) 40 mg tablet Take 1 Tab by mouth nightly. 90 Tab 4    Blood-Glucose Meter (ACCU-CHEK CODY PLUS METER) misc 1 Device by Does Not Apply route four (4) times daily.  1 Each 0    glucose blood VI test strips (ACCU-CHEK CODY PLUS TEST STRP) strip Patient is to check blood sugar 3-4 times a day 300 Strip 11    insulin aspart U-100 (NOVOLOG) 100 unit/mL crtg Sliding Scale: 100-150 = 6 units; 150-200 = 8 units; 200-250 = 10 units; 250-300= 12 units; 300-350 = 14 units; 400 = Emergency room 8 Cartridge 2    glucose blood VI test strips (FREESTYLE LITE STRIPS) strip Blood sugar check 3-4 times a day due to fluctuating blood sugar 300 Strip 11    ergocalciferol (VITAMIN D2) 50,000 unit capsule Take 1 Cap by mouth every seven (7) days. Start with 1 cap daily for 3 days 20 Cap 1    levothyroxine (SYNTHROID) 150 mcg tablet Take 1 Tab by mouth Daily (before breakfast). 90 Tab 1    BLADDER CONTROL PADS pads       ALCOHOL PREP PADS padm       omeprazole (PRILOSEC) 40 mg capsule Take 1 Cap by mouth daily. 90 Cap 2    meclizine (ANTIVERT) 25 mg tablet Take  by mouth three (3) times daily as needed.          Patient Active Problem List    Diagnosis    Class 1 obesity due to excess calories with serious comorbidity and body mass index (BMI) of 31.0 to 31.9 in adult    Vitamin D deficiency    Osteopenia of multiple sites     7/2018      Moderate major depression ([address]    Uncontrolled type 2 diabetes mellitus with microalbuminuria, with long-term current use of insulin ([address]    Family history of malignant neoplasm of breast    Diabetic nephropathy associated with type 2 diabetes mellitus ([address]    Lump or mass in breast    Hypertension    Acquired hypothyroidism    Hiatal hernia     EGD 11/14/2017 Chani Aguillon MD \"prn acid suppression\"      Hyperlipidemia    Depression    Migraines         Allergies   Allergen Reactions    Darvocet A500 [Propoxyphene N-Acetaminophen] Nausea and Vomiting    Gloves, Latex With Aloe Vera Contact Dermatitis     Rash from powder inside latex gloves      Metformin Diarrhea         Patient Care Team:  Ashley Hall MD as PCP - General (Family Practice)  Dick Holloway MD (Gastroenterology)  Gisella Ibarra (Ophthalmology)    The following sections were reviewed & updated as appropriate: PMH, PSH, FH, and SH. Objective:     Visit Vitals  /60   Pulse 80   Temp 98 °F (36.7 °C)   Resp 15   Ht 5' 3\" (1.6 m)   Wt 174 lb (78.9 kg)   LMP 11/17/1972 Comment: partial   SpO2 97%   BMI 30.82 kg/m²      Physical Exam   Constitutional: She is oriented to person, place, and time. She appears well-developed. No distress. Pleasant obese white female   HENT:   Head: Normocephalic and atraumatic. Pulmonary/Chest: Effort normal.   Neurological: She is alert and oriented to person, place, and time. Psychiatric: She has a normal mood and affect. Her behavior is normal. Judgment and thought content normal.         Disclaimer: The patient understands our medical plan. Alternatives have been explained and offered. The risks, benefits and significant side effects of all medications have been reviewed. Anticipated time course and progression of condition reviewed. All questions have been addressed. She is encouraged to employ the information provided in the after visit summary, which was reviewed. Where appropriate, she is instructed to call the clinic if she has not been notified either by phone or through [address] with the results of her tests or with an appointment plan for any referrals within 1 week(s). No news is not good news; it's no news. The patient  is to call if her condition worsens or fails to improve or if significant side effects are experienced. Aspects of this note may have been generated using voice recognition software. Despite editing, there may be unrecognized errors. Over 50% of the 30 minutes face to face with Pita Farfan consisted of counseling and/or discussing treatment plans.          Hussein Bhatti MD

## 2019-01-22 ENCOUNTER — HOSPITAL ENCOUNTER (OUTPATIENT)
Dept: LAB | Age: 69
Discharge: HOME | End: 2019-01-22
Payer: MEDICARE

## 2019-01-22 DIAGNOSIS — N17.9 ACUTE RENAL INJURY (HCC): ICD-10-CM

## 2019-01-22 LAB
ANION GAP SERPL CALC-SCNC: 6 MMOL/L (ref 3–18)
BUN SERPL-MCNC: 26 MG/DL (ref 7–18)
BUN/CREAT SERPL: 15 (ref 12–20)
CALCIUM SERPL-MCNC: 8.6 MG/DL (ref 8.5–10.1)
CHLORIDE SERPL-SCNC: 107 MMOL/L (ref 100–108)
CO2 SERPL-SCNC: 27 MMOL/L (ref 21–32)
CREAT SERPL-MCNC: 1.68 MG/DL (ref 0.6–1.3)
GLUCOSE SERPL-MCNC: 179 MG/DL (ref 74–99)
POTASSIUM SERPL-SCNC: 4.2 MMOL/L (ref 3.5–5.5)
SODIUM SERPL-SCNC: 140 MMOL/L (ref 136–145)

## 2019-01-22 PROCEDURE — 80048 BASIC METABOLIC PNL TOTAL CA: CPT

## 2019-01-24 NOTE — PROGRESS NOTES
Renal function continues to improve but still not back to baseline. Not yet safe to resume Trulicity. Plan recheck bloodwork in 2 weeks with plan to follow via phone unless she's having sugars >250, in which case I need to see her in the office for an interim plan. Please notify.  TIM

## 2019-01-24 NOTE — PROGRESS NOTES
Called Suhas MARIA 1950 on 2019 no answer left voice mail and our number requesting a call back.     SMD

## 2019-01-28 ENCOUNTER — OFFICE VISIT (OUTPATIENT)
Dept: FAMILY MEDICINE CLINIC | Age: 69
End: 2019-01-28

## 2019-01-28 VITALS
BODY MASS INDEX: 31.5 KG/M2 | HEIGHT: 63 IN | OXYGEN SATURATION: 98 % | HEART RATE: 86 BPM | DIASTOLIC BLOOD PRESSURE: 60 MMHG | TEMPERATURE: 97.5 F | RESPIRATION RATE: 14 BRPM | WEIGHT: 177.8 LBS | SYSTOLIC BLOOD PRESSURE: 142 MMHG

## 2019-01-28 DIAGNOSIS — E03.9 ACQUIRED HYPOTHYROIDISM: ICD-10-CM

## 2019-01-28 DIAGNOSIS — J02.9 VIRAL PHARYNGITIS: ICD-10-CM

## 2019-01-28 RX ORDER — INSULIN GLARGINE 100 [IU]/ML
INJECTION, SOLUTION SUBCUTANEOUS
Qty: 10 PEN | Refills: 2
Start: 2019-01-28 | End: 2019-02-21

## 2019-01-28 RX ORDER — LEVOTHYROXINE SODIUM 150 UG/1
150 TABLET ORAL
Qty: 90 TAB | Refills: 1 | Status: SHIPPED | OUTPATIENT
Start: 2019-01-28 | End: 2019-02-28 | Stop reason: DRUGHIGH

## 2019-01-28 RX ORDER — INSULIN ASPART 100 [IU]/ML
INJECTION, SOLUTION INTRAVENOUS; SUBCUTANEOUS
Qty: 8 CARTRIDGE | Refills: 2
Start: 2019-01-28 | End: 2019-02-21

## 2019-01-28 NOTE — PATIENT INSTRUCTIONS
Hypoglycemia: Care Instructions  Your Care Instructions    Hypoglycemia means that your blood sugar is low and your body is not getting enough fuel. Some people get low blood sugar from not eating often enough. Some medicines to treat diabetes can cause low blood sugar. People who have had surgery on their stomachs or intestines may get hypoglycemia. Problems with the pancreas, kidneys, or liver also can cause low blood sugar. A snack or drink with sugar in it will raise your blood sugar and should ease your symptoms right away. Your doctor may recommend that you change or stop your medicines until you can get your blood sugar levels under control. In the long run, you may need to change your diet and eating habits so that you get enough fuel for your body throughout the day. Follow-up care is a key part of your treatment and safety. Be sure to make and go to all appointments, and call your doctor if you are having problems. It's also a good idea to know your test results and keep a list of the medicines you take. How can you care for yourself at home? · Learn to recognize the early signs of low blood sugar. Signs include:  ? Nausea. ? Hunger. ? Feeling nervous, irritable, or shaky. ? Cold, clammy, wet skin. ? Sweating (when you are not exercising). ? A fast heartbeat.  ? Numbness or tingling of the fingertips or lips. · If you feel an episode of low blood sugar coming on, drink fruit juice or sugared (not diet) soda, or eat sugar in the form of candy, cubes, or tablets. [employer] are another [employer]. · Eat small, frequent meals so that you do not get too hungry between meals. · Balance extra exercise with eating more. · Keep a written record of your low blood sugar episodes, including when you last ate and what you ate, so that you can learn what causes your blood sugar to drop.   · Make sure your family, friends, and coworkers know the symptoms of low blood sugar and know what to do to get your sugar level up. · Wear medical alert jewelry that lists your condition. You can buy this at most drugstores. When should you call for help? Call 911 anytime you think you may need emergency care. For example, call if:    · You passed out (lost consciousness).     · You are confused or cannot think clearly.     · Your blood sugar is very high or very low.    Watch closely for changes in your health, and be sure to contact your doctor if:    · Your blood sugar stays outside the level your doctor set for you.     · You have any problems. Where can you learn more? Go to http://chasidy-ena.info/. Enter E001 in the search box to learn more about \"Hypoglycemia: Care Instructions. \"  Current as of: July 25, 2018  Content Version: 11.9  © 0189-4308 [employer]. Care instructions adapted under license by [employer] (which disclaims liability or warranty for this information). If you have questions about a medical condition or this instruction, always ask your healthcare professional. [address] any warranty or liability for your use of this information.

## 2019-01-28 NOTE — PROGRESS NOTES
Douglas Perez is a 70 y.o. female who was seen in clinic today (1/28/2019). Assessment & Plan:       ICD-10-CM ICD-9-CM    1. Uncontrolled type 2 diabetes mellitus with microalbuminuria, with long-term current use of insulin ([facility]) E11.29 250.42 insulin glargine (LANTUS SOLOSTAR U-100 INSULIN) 100 unit/mL (3 mL) inpn    E11.65 791.0 insulin aspart U-100 (NOVOLOG) 100 unit/mL crtg    R80.9 V58.67 glucose blood VI test strips (ACCU-CHEK CODY PLUS TEST STRP) strip    Z79.4     2. Acquired hypothyroidism E03.9 244.9 levothyroxine (SYNTHROID) 150 mcg tablet      TSH W/ REFLX FREE T4 IF ABNORMAL   3. Viral pharyngitis J02.9 462      variability primarily reflects diet  Scheduled for classes next week  Stopping SS insulin altogether  Continue short acting insulin 6 units with breakfast, 15 with dinner  Decreasing HS glargine to 30  Increase am glargine to 35    GLPI 1 agonist continues to be on hold pending resolution of acute renal injury    No evidence bacterial infection. Continue symptomatic care    Hypothyroidism: Well controlled , continue present management pending review of labs      Follow-up Disposition:  Return in about 2 weeks (around 2/11/2019) for lab visit (BMP and thyroid), plan to follow based on results. Subjective: Douglas Perez was seen today for Blood sugar problem (Patient states her blood glucose levels have been up and down since being taken off of her Trulicity. She does have her glucose log today); Mouth Lesions (C/o lesions in the back of her throat x 1 week.  She has tried gargling with salt water that has not helped. ); and Medication Refill (Asking for refills on Synthroid.)    FU uncontrolled DM2:  Home glucoses:  Fasting: most 120-160s, single 28 the morning after had given 27 units short acting the evening before (symptomatic, resolved with a couple of cookies), 3 values 69-77  PP lunch: most >200, a few >300  PP dinner: most >200, a few >300  HS: most >200, a few >300    Incomplete recording of insulin admin vs inconsistent admin of insulin, especially short acting at breakfast    1 week sore throat with pharyngeal lesions    FU hypothyroidism      Lab Results   Component Value Date/Time    TSH 2.70 07/30/2018 08:48 AM       Results for orders placed or performed during the hospital encounter of 16/04/79   METABOLIC PANEL, BASIC   Result Value Ref Range    Sodium 140 136 - 145 mmol/L    Potassium 4.2 3.5 - 5.5 mmol/L    Chloride 107 100 - 108 mmol/L    CO2 27 21 - 32 mmol/L    Anion gap 6 3.0 - 18 mmol/L    Glucose 179 (H) 74 - 99 mg/dL    BUN 26 (H) 7.0 - 18 MG/DL    Creatinine 1.68 (H) 0.6 - 1.3 MG/DL    BUN/Creatinine ratio 15 12 - 20      GFR est AA 37 (L) >60 ml/min/1.73m2    GFR est non-AA 30 (L) >60 ml/min/1.73m2    Calcium 8.6 8.5 - 10.1 MG/DL        Outpatient Medications Marked as Taking for the 1/28/19 encounter (Office Visit) with Isac Ro MD   Medication Sig Dispense Refill    insulin glargine (LANTUS SOLOSTAR U-100 INSULIN) 100 unit/mL (3 mL) inpn 30 Units daily (with breakfast) AND 40 Units daily (with dinner). 10 Pen 2    insulin aspart U-100 (NOVOLOG) 100 unit/mL crtg 6 units before breakfast, none with lunch, 15 units before dinner; SS: 200-250 = 10 units; 250-300= 12 units; 300-350 = 14 units; 400 = ER 8 Cartridge 2    cholecalciferol, VITAMIN D3, (VITAMIN D3) 5,000 unit tab tablet Take  by mouth daily.  acetaminophen (TYLENOL EXTRA STRENGTH) 500 mg tablet Take  by mouth every six (6) hours as needed for Pain.  olmesartan-hydroCHLOROthiazide (BENICAR HCT) 40-25 mg per tablet TAKE ONE TABLET BY MOUTH EVERY DAY 90 Tab 4    esomeprazole (NEXIUM) 20 mg capsule Take  by mouth.  sertraline (ZOLOFT) 50 mg tablet Take 1 Tab by mouth daily. 90 Tab 4    simvastatin (ZOCOR) 40 mg tablet Take 1 Tab by mouth nightly. 90 Tab 4    Blood-Glucose Meter (ACCU-CHEK CODY PLUS METER) misc 1 Device by Does Not Apply route four (4) times daily.  1 Each 0    glucose blood VI test strips (ACCU-CHEK CODY PLUS TEST STRP) strip Patient is to check blood sugar 3-4 times a day 300 Strip 11    glucose blood VI test strips (FREESTYLE LITE STRIPS) strip Blood sugar check 3-4 times a day due to fluctuating blood sugar 300 Strip 11    levothyroxine (SYNTHROID) 150 mcg tablet Take 1 Tab by mouth Daily (before breakfast). 80 Tab 1    BLADDER CONTROL PADS pads       ALCOHOL PREP PADS padm          Patient Active Problem List    Diagnosis    Class 1 obesity due to excess calories with serious comorbidity and body mass index (BMI) of 31.0 to 31.9 in adult    Vitamin D deficiency    Osteopenia of multiple sites     7/2018      Moderate major depression ([address]    Uncontrolled type 2 diabetes mellitus with microalbuminuria, with long-term current use of insulin ([address]    Family history of malignant neoplasm of breast    Diabetic nephropathy associated with type 2 diabetes mellitus ([address]    Lump or mass in breast    Hypertension    Acquired hypothyroidism    Hiatal hernia     EGD 11/14/2017 Charlie Moore MD \"prn acid suppression\"      Hyperlipidemia    Depression    Migraines         Allergies   Allergen Reactions    Darvocet A500 [Propoxyphene N-Acetaminophen] Nausea and Vomiting    Gloves, Latex With Aloe Vera Contact Dermatitis     Rash from powder inside latex gloves      Metformin Diarrhea         Patient Care Team:  Isac Ro MD as PCP - General (Family Practice)  Aarti Leung MD (Gastroenterology)  Otis Grimm (Ophthalmology)    The following sections were reviewed & updated as appropriate: PMH, PSH, FH, and SH. Review of Systems   Constitutional: Negative for fever. Respiratory: Negative for cough.     Psychiatric/Behavioral:        Confusion with hypoglycemia           Objective:     Visit Vitals  /60   Pulse 86   Temp 97.5 °F (36.4 °C) (Oral)   Resp 14   Ht 5' 3\" (1.6 m)   Wt 177 lb 12.8 oz (80.6 kg)   LMP 11/17/1972 Comment: partial   SpO2 98%   BMI 31.50 kg/m²      Physical Exam   Constitutional: She is oriented to person, place, and time. She appears well-developed. No distress. Pleasant obese white female   HENT:   Head: Normocephalic and atraumatic. Right Ear: Hearing and external ear normal.   Left Ear: Hearing and external ear normal.   Nose: Nose normal.   Mouth/Throat: Uvula is midline, oropharynx is clear and moist and mucous membranes are normal. No oropharyngeal exudate, posterior oropharyngeal edema or posterior oropharyngeal erythema. Eyes: Conjunctivae are normal. No scleral icterus. Neck: Neck supple. Pulmonary/Chest: Effort normal. She has no rhonchi. Lymphadenopathy:     She has no cervical adenopathy. Neurological: She is alert and oriented to person, place, and time. Skin: Skin is warm and dry. Psychiatric: She has a normal mood and affect. Her behavior is normal. Judgment and thought content normal.         Disclaimer: The patient understands our medical plan. Alternatives have been explained and offered. The risks, benefits and significant side effects of all medications have been reviewed. Anticipated time course and progression of condition reviewed. All questions have been addressed. She is encouraged to employ the information provided in the after visit summary, which was reviewed. Where appropriate, she is instructed to call the clinic if she has not been notified either by phone or through [address] with the results of her tests or with an appointment plan for any referrals within 1 week(s). No news is not good news; it's no news. The patient  is to call if her condition worsens or fails to improve or if significant side effects are experienced. Aspects of this note may have been generated using voice recognition software. Despite editing, there may be unrecognized errors.        Tracie Rose MD

## 2019-01-28 NOTE — PROGRESS NOTES
Spoke with Enrrique MARIA 1950 (confirmed) on 2019 informed the patient that her renal function continues to improve but is still not back to baseline. Not yet safe to resume Trulicity. Plan recheck bloodwork in 2 weeks with plan to follow via phone unless she's having sugars >250, in which case Dr. Celestino King needs to see her in the office. the patient stated her blood sugars have been chaotic and have been over 250 as well as twice below 80. the patient also stated she has white ulcers in the back of her throat. I scheduled her an appt to be seen by Dr. Celestino King today.     the patient thanked me and had no further questions    SMD

## 2019-01-28 NOTE — PROGRESS NOTES
Chief Complaint   Patient presents with    Blood sugar problem     Patient states her blood glucose levels have been up and down since being taken off of her Trulicity. She does have her glucose log today    Mouth Lesions     C/o lesions in the back of her throat x 1 week. She has tried gargling with salt water that has not helped.  Medication Refill     Asking for refills on Synthroid. 1. Have you been to the ER, urgent care clinic since your last visit? Hospitalized since your last visit? No  2. Have you seen or consulted any other health care providers outside of the [address] [place] [address] since your last visit? Include any pap smears or colon screening. No    Medication reconciliation has been completed with patient. Care team discussed/updated as well as pharmacy. Health Maintenance reviewed - Will discuss need for Shingrix, due for Tdap.

## 2019-02-12 ENCOUNTER — LAB ONLY (OUTPATIENT)
Dept: FAMILY MEDICINE CLINIC | Age: 69
End: 2019-02-12

## 2019-02-12 ENCOUNTER — HOSPITAL ENCOUNTER (OUTPATIENT)
Dept: LAB | Age: 69
Discharge: HOME | End: 2019-02-12
Payer: MEDICARE

## 2019-02-12 DIAGNOSIS — E03.9 ACQUIRED HYPOTHYROIDISM: ICD-10-CM

## 2019-02-12 DIAGNOSIS — E03.9 ACQUIRED HYPOTHYROIDISM: Primary | ICD-10-CM

## 2019-02-12 DIAGNOSIS — N17.9 ACUTE RENAL FAILURE, UNSPECIFIED ACUTE RENAL FAILURE TYPE (HCC): ICD-10-CM

## 2019-02-12 LAB
ANION GAP SERPL CALC-SCNC: 13 MMOL/L (ref 3–18)
BUN SERPL-MCNC: 30 MG/DL (ref 7–18)
BUN/CREAT SERPL: 16 (ref 12–20)
CALCIUM SERPL-MCNC: 8.8 MG/DL (ref 8.5–10.1)
CHLORIDE SERPL-SCNC: 106 MMOL/L (ref 100–108)
CO2 SERPL-SCNC: 23 MMOL/L (ref 21–32)
CREAT SERPL-MCNC: 1.85 MG/DL (ref 0.6–1.3)
GLUCOSE SERPL-MCNC: 220 MG/DL (ref 74–99)
POTASSIUM SERPL-SCNC: 5.1 MMOL/L (ref 3.5–5.5)
SODIUM SERPL-SCNC: 142 MMOL/L (ref 136–145)

## 2019-02-12 PROCEDURE — 80048 BASIC METABOLIC PNL TOTAL CA: CPT

## 2019-02-12 PROCEDURE — 84443 ASSAY THYROID STIM HORMONE: CPT

## 2019-02-12 PROCEDURE — 84481 FREE ASSAY (FT-3): CPT

## 2019-02-12 PROCEDURE — 84439 ASSAY OF FREE THYROXINE: CPT

## 2019-02-13 LAB
T3FREE SERPL-MCNC: 2.5 PG/ML (ref 2.18–3.98)
T4 FREE SERPL-MCNC: 1.2 NG/DL (ref 0.7–1.5)
TSH SERPL-ACNC: 0.29 UIU/ML (ref 0.36–3.74)

## 2019-02-19 ENCOUNTER — DOCUMENTATION ONLY (OUTPATIENT)
Dept: PHARMACY | Age: 69
End: 2019-02-19

## 2019-02-19 NOTE — PROGRESS NOTES
Pharmacy Note - Diabetes Education    Chuyita Fernández is a 70 y.o. female seen today for group diabetes education class at [facility]. Education provided today was focused on medications and included: types of medications used to treat diabetes, their mechanism of action, adverse effects, benefits, and appropriate administration. Information on the importance of medication adherence, self-monitoring of blood glucose, glycemic goals, and immunizations were discussed. In addition, I reviewed the signs and symptoms of hypoglycemia and treatment. A handout of medications and the above information was provided. Patient verbalized agreement to the information presented and all of patient's questions were answered.     Thank you,  Antonia Reed, PharmD, BCPS, BCACP, BC-ADM

## 2019-02-21 ENCOUNTER — OFFICE VISIT (OUTPATIENT)
Dept: FAMILY MEDICINE CLINIC | Age: 69
End: 2019-02-21

## 2019-02-21 VITALS
DIASTOLIC BLOOD PRESSURE: 80 MMHG | RESPIRATION RATE: 14 BRPM | BODY MASS INDEX: 32.32 KG/M2 | OXYGEN SATURATION: 98 % | HEART RATE: 95 BPM | WEIGHT: 182.4 LBS | HEIGHT: 63 IN | SYSTOLIC BLOOD PRESSURE: 154 MMHG | TEMPERATURE: 97.8 F

## 2019-02-21 DIAGNOSIS — E11.21 DIABETIC NEPHROPATHY ASSOCIATED WITH TYPE 2 DIABETES MELLITUS (HCC): ICD-10-CM

## 2019-02-21 DIAGNOSIS — I10 ESSENTIAL HYPERTENSION: ICD-10-CM

## 2019-02-21 RX ORDER — INSULIN GLARGINE 100 [IU]/ML
INJECTION, SOLUTION SUBCUTANEOUS
Qty: 10 PEN | Refills: 2
Start: 2019-02-21 | End: 2019-02-28

## 2019-02-21 RX ORDER — OLMESARTAN MEDOXOMIL 40 MG/1
40 TABLET ORAL DAILY
Qty: 90 TAB | Refills: 4 | Status: SHIPPED | OUTPATIENT
Start: 2019-02-21 | End: 2019-10-01 | Stop reason: SDUPTHER

## 2019-02-21 RX ORDER — INSULIN ASPART 100 [IU]/ML
4 INJECTION, SOLUTION INTRAVENOUS; SUBCUTANEOUS 2 TIMES DAILY
Qty: 8 CARTRIDGE | Refills: 2
Start: 2019-02-21 | End: 2019-03-14 | Stop reason: DRUGHIGH

## 2019-02-21 RX ORDER — NIFEDIPINE 60 MG/1
60 TABLET, EXTENDED RELEASE ORAL DAILY
Qty: 90 TAB | Refills: 0
Start: 2019-02-21 | End: 2019-02-28 | Stop reason: SDUPTHER

## 2019-02-21 NOTE — PATIENT INSTRUCTIONS
STOP your Benicar HCT immediately  Instead take olmesartan 40 mg daily  Double your nifedipine to 60 mg daily. Make insulin dose changes as per this handout. Please seek [employer]  Please be CONSIST with your diet.

## 2019-02-21 NOTE — PROGRESS NOTES
Patient here for follow up on her DM and lab results. She was seen at [facility] ER for elevated glucose, she states she has some nausea still that has not resolved since her ER visit. Fasting glucose this .    1. Have you been to the ER, urgent care clinic since your last visit? Hospitalized since your last visit? Yes When: 2/17/19 Where: [address] [place] [address] ER Reason for visit: Elevated blood sugar  2. Have you seen or consulted any other health care providers outside of the [address] [place] [address] since your last visit? Include any pap smears or colon screening. No    Medication reconciliation has been completed with patient. Care team discussed/updated as well as pharmacy. Health Maintenance reviewed - Will discuss need for Shingrix with provider, Tdap not covered with this visit, due for colonoscopy.

## 2019-02-21 NOTE — PROGRESS NOTES
Ayan Ortega is a 70 y.o. female who was seen in clinic today (2/22/2019). Assessment & Plan:       ICD-10-CM ICD-9-CM    1. Uncontrolled type 2 diabetes mellitus with microalbuminuria, with long-term current use of insulin ([facility]) E11.29 250.42 REFERRAL TO PHARMACIST    E11.65 791.0 insulin glargine (LANTUS SOLOSTAR U-100 INSULIN) 100 unit/mL (3 mL) inpn    R80.9 V58.67 insulin aspart U-100 (NOVOLOG) 100 unit/mL crtg    Z79.4     2. Diabetic nephropathy associated with type 2 diabetes mellitus ([facility]) X01.28 244.06 METABOLIC PANEL, BASIC     583.81    3. Essential hypertension I10 401.9 NIFEdipine ER (ADALAT CC) 60 mg ER tablet      olmesartan (BENICAR) 40 mg tablet       Eileen Spivey conversation that simply reading materials and attending class is insufficient. Concerted, consistent application of dietary management is essential as it is not possible to devise a medication regimen around a moving target. Her current experience with BS ranging from 56 to >500 directly reflect her diet and place her at great risk of harm, including death. No more eating out  Over[employer] Anonymous    Her kidney function appears to have essentially stabilized and not recovered from her pyelonephritis. Most recent values in the CKD4 range:    Stopping hydrochlorothiazide as unlikely to be effective with this impairment and possibly harmful. Doubling CCB to attend to her uncontrolled hypertension. Retrial of GLP1 agonist, which she found very helpful in appetite suppression BUT could also lead to renal impairment. Will monitor weekly for now. Will refer nephrology unless returns to CKD3 range. Patient Instructions   STOP your Benicar HCT immediately  Instead take olmesartan 40 mg daily  Double your nifedipine to 60 mg daily. Make insulin dose changes as per this handout. Please seek Overeaters Anonymous  Please be CONSIST with your diet.         Decreasing basal insulin to 25 units at breakfast and 20 units with dinner (split dosing not necessary, but it's how she's always preferred to do it, made sense at higher doses, trying to minimize confusion)  Decrease mealtime short acting insulin to 4 units breakfast and dinner only    Follow-up Disposition:  Return in about 1 week (around 2/28/2019) for diabetes and kidney disease follow up, non fasting labs 2 days prior, (30). After she left the office, I noted we'd not discussed her suppressed TSH. Will have staff call, advise to skip 1 dose. Will adjust replacement dose at next office visit. Subjective: Ayan Ortega was seen today for Diabetes and Hospital Follow Up (ER)    Follow up DM2 with nephropathy:    Had been well. Compliant with meds. Had a much smaller dinner than usual last weekend. Woke 1 am in a cold sweat: BS 56. Treated. Returned to usual state. Next day BS>500 a few hours after a Mandeep sandwich from [employer] which she has \"once in a blue moon\". Manifested as headache, shakiness and disoriented. Called EMS without other intervention. They arrived  approx 20 minutes later. Clinical condition unchanged. EMS glucometer showed 290s. Taken to [facility] [address]. Given saline, ice water, 10 units Novolog. 229 by time she got madina. \"I'm learning a lot from your classes. \"      Key Antihyperglycemic Medications             insulin glargine (LANTUS SOLOSTAR U-100 INSULIN) 100 unit/mL (3 mL) inpn  (Taking) 35 Units daily (with breakfast) AND 30 Units daily (with dinner).     insulin aspart U-100 (NOVOLOG) 100 unit/mL crtg  (Taking) 6 units before breakfast, none with lunch, 15 units before dinner            Diabetic Foot and Eye Exam HM Status   Topic Date Due    Diabetic Foot Care  05/07/2019    Eye Exam  09/07/2019         Lab Results   Component Value Date/Time    Hemoglobin A1c 8.4 (H) 11/19/2018 09:06 AM    Hemoglobin A1c 8.3 (H) 09/18/2018 09:40 AM    Hemoglobin A1c 10.6 (H) 07/30/2018 08:48 AM    Hemoglobin A1c, External 11.1 03/21/2016    Glucose 220 (H) 02/12/2019 08:20 AM    Glucose,  (H) 01/09/2019 10:30 AM    Microalbumin/Creat ratio (mg/g creat) 48 (H) 07/30/2018 08:41 AM    Microalbumin,urine random 6.20 (H) 07/30/2018 08:41 AM    LDL, calculated 84.2 07/30/2018 08:48 AM    Creatinine, POC 1.6 (H) 01/09/2019 10:30 AM    Creatinine 1.85 (H) 02/12/2019 08:20 AM       Magnesium   Date Value Ref Range Status   07/30/2018 2.0 1.6 - 2.6 mg/dL Final     Lab Results   Component Value Date/Time    Vitamin B12 263 07/30/2018 08:48 AM    Folate 19.6 (H) 07/30/2018 08:48 AM           Results for orders placed or performed during the hospital encounter of 02/12/19   TSH W/ REFLX FREE T4 IF ABNORMAL   Result Value Ref Range    TSH 0.29 (L) 0.36 - 4.94 uIU/mL   METABOLIC PANEL, BASIC   Result Value Ref Range    Sodium 142 136 - 145 mmol/L    Potassium 5.1 3.5 - 5.5 mmol/L    Chloride 106 100 - 108 mmol/L    CO2 23 21 - 32 mmol/L    Anion gap 13 3.0 - 18 mmol/L    Glucose 220 (H) 74 - 99 mg/dL    BUN 30 (H) 7.0 - 18 MG/DL    Creatinine 1.85 (H) 0.6 - 1.3 MG/DL    BUN/Creatinine ratio 16 12 - 20      GFR est AA 33 (L) >60 ml/min/1.73m2    GFR est non-AA 27 (L) >60 ml/min/1.73m2    Calcium 8.8 8.5 - 10.1 MG/DL   T4, FREE   Result Value Ref Range    T4, Free 1.2 0.7 - 1.5 NG/DL   T3, FREE   Result Value Ref Range    Triiodothyronine (T3), free 2.5 2.18 - 3.98 PG/ML        Outpatient Medications Marked as Taking for the 2/21/19 encounter (Office Visit) with Mariana Bass MD   Medication Sig Dispense Refill    NOÉ OIL, BULK, by Does Not Apply route. Using topical for Vertigo      levothyroxine (SYNTHROID) 150 mcg tablet Take 1 Tab by mouth Daily (before breakfast). 90 Tab 1    insulin glargine (LANTUS SOLOSTAR U-100 INSULIN) 100 unit/mL (3 mL) inpn 35 Units daily (with breakfast) AND 30 Units daily (with dinner).  10 Pen 2    insulin aspart U-100 (NOVOLOG) 100 unit/mL crtg 6 units before breakfast, none with lunch, 15 units before dinner 8 Cartridge 2    glucose blood VI test strips (ACCU-CHEK CODY PLUS TEST STRP) strip Patient is to check blood sugar 3-4 times a day 300 Strip 11    cholecalciferol, VITAMIN D3, (VITAMIN D3) 5,000 unit tab tablet Take  by mouth daily.  acetaminophen (TYLENOL EXTRA STRENGTH) 500 mg tablet Take  by mouth every six (6) hours as needed for Pain.  olmesartan-hydroCHLOROthiazide (BENICAR HCT) 40-25 mg per tablet TAKE ONE TABLET BY MOUTH EVERY DAY 90 Tab 4    NIFEdipine ER (ADALAT CC) 30 mg ER tablet Take 1 Tab by mouth daily. 90 Tab 4    esomeprazole (NEXIUM) 20 mg capsule Take  by mouth.  sertraline (ZOLOFT) 50 mg tablet Take 1 Tab by mouth daily. 90 Tab 4    simvastatin (ZOCOR) 40 mg tablet Take 1 Tab by mouth nightly. 90 Tab 4    Blood-Glucose Meter (ACCU-CHEK CODY PLUS METER) misc 1 Device by Does Not Apply route four (4) times daily. 1 Each 0    BLADDER CONTROL PADS pads       ALCOHOL PREP PADS padm       omeprazole (PRILOSEC) 40 mg capsule Take 1 Cap by mouth daily.  80 Cap 2       Patient Active Problem List    Diagnosis    Class 1 obesity due to excess calories with serious comorbidity and body mass index (BMI) of 31.0 to 31.9 in adult    Vitamin D deficiency    Osteopenia of multiple sites     7/2018      Moderate major depression ([address]    Uncontrolled type 2 diabetes mellitus with microalbuminuria, with long-term current use of insulin ([address]    Family history of malignant neoplasm of breast    Diabetic nephropathy associated with type 2 diabetes mellitus ([address]    Lump or mass in breast    Hypertension    Acquired hypothyroidism    Hiatal hernia     EGD 11/14/2017 Shady Huitron MD \"prn acid suppression\"      Hyperlipidemia    Depression    Migraines         Allergies   Allergen Reactions    Darvocet A500 [Propoxyphene N-Acetaminophen] Nausea and Vomiting    Gloves, Latex With Aloe Vera Contact Dermatitis     Rash from powder inside latex gloves      Metformin Diarrhea         Patient Care Team:  Madalyn Mckeon MD as PCP - General (Family Practice)  Madeline Kraft MD (Gastroenterology)  Lesley Ziegler (Ophthalmology)    The following sections were reviewed & updated as appropriate: PMH, PSH, FH, and SH. ROS - per HPI       Objective:     Visit Vitals  /80   Pulse 95   Temp 97.8 °F (36.6 °C) (Oral)   Resp 14   Ht 5' 3\" (1.6 m)   Wt 182 lb 6.4 oz (82.7 kg)   LMP 11/17/1972 Comment: partial   SpO2 98%   BMI 32.31 kg/m²      Physical Exam   Constitutional: She is oriented to person, place, and time. She appears well-developed. No distress. Pleasant obese white female   HENT:   Head: Normocephalic and atraumatic. Pulmonary/Chest: Effort normal.   Neurological: She is alert and oriented to person, place, and time. Psychiatric: She has a normal mood and affect. Her behavior is normal. Judgment and thought content normal.         Disclaimer: The patient understands our medical plan. Alternatives have been explained and offered. The risks, benefits and significant side effects of all medications have been reviewed. Anticipated time course and progression of condition reviewed. All questions have been addressed. She is encouraged to employ the information provided in the after visit summary, which was reviewed. Where appropriate, she is instructed to call the clinic if she has not been notified either by phone or through [address] with the results of her tests or with an appointment plan for any referrals within 1 week(s). No news is not good news; it's no news. The patient  is to call if her condition worsens or fails to improve or if significant side effects are experienced. Aspects of this note may have been generated using voice recognition software. Despite editing, there may be unrecognized errors. Over 50% of the 32 minutes face to face with Corrie Collins consisted of counseling and/or discussing treatment plans.          Deonna Alvarez MD

## 2019-02-22 ENCOUNTER — TELEPHONE (OUTPATIENT)
Dept: FAMILY MEDICINE CLINIC | Age: 69
End: 2019-02-22

## 2019-02-22 NOTE — TELEPHONE ENCOUNTER
After she left the office, I noted we'd not discussed her suppressed TSH. Please call her and direct her to skip her thyroid dose 1 day. We will adjust her daily replacement dose at next office visit.   TIM

## 2019-02-26 ENCOUNTER — HOSPITAL ENCOUNTER (OUTPATIENT)
Dept: LAB | Age: 69
Discharge: HOME | End: 2019-02-26
Payer: MEDICARE

## 2019-02-26 ENCOUNTER — DOCUMENTATION ONLY (OUTPATIENT)
Dept: DIABETES SERVICES | Age: 69
End: 2019-02-26

## 2019-02-26 ENCOUNTER — LAB ONLY (OUTPATIENT)
Dept: FAMILY MEDICINE CLINIC | Age: 69
End: 2019-02-26

## 2019-02-26 DIAGNOSIS — E11.21 DIABETIC NEPHROPATHY ASSOCIATED WITH TYPE 2 DIABETES MELLITUS (HCC): ICD-10-CM

## 2019-02-26 PROCEDURE — 80048 BASIC METABOLIC PNL TOTAL CA: CPT

## 2019-02-26 NOTE — PROGRESS NOTES
[facility] Diabetes Education Class    Patient attended outpatient [address] [facility] Diabetes Education Classes. Dates and class topics attended are noted below. Location: [facility]    Date Attended Class/Topic   2/05/2019 Class 1: Understanding Diabetes and Foot Care  Strategies to staying healthy with diabetes were covered including prevention and treatment of acute and chronic complications of diabetes, principles of self-monitoring of blood glucose, target blood glucose and A1C levels and sick day management. Principles of good foot care were reviewed, including proper foot inspections, how to select appropriate shoes and socks and ways to keep the feet healthy. 2/12/2019 Class 2: Nutrition and Diabetes  Principles of meal planning with diabetes were covered including carbohydrate counting, label reading, portion control, recipes and where needed, weight control. Choose Your Foods, Exchange List For Diabetes reference books were provided. 2/18/2019 Class 3: Diabetes Medications and Exercise  This class was facilitated by a staff pharmacist and included information on diabetes medications (oral and subcutaneous). In addition, interactions of common over the counter and prescription medications with diabetes management were reviewed. Also discussed exercise strategies that can help with blood glucose control. Please contact me if you have questions.     [address] [place] [address],      Thanh Stephenston, [address], [email or web address]

## 2019-02-26 NOTE — PROGRESS NOTES
Pt came in to have blood drawn. Name/ verified. Venipuncture performed on right arm. Pt tolerated it well.      Paolo Angel LPN

## 2019-02-27 LAB
ANION GAP SERPL CALC-SCNC: 5 MMOL/L (ref 3–18)
BUN SERPL-MCNC: 21 MG/DL (ref 7–18)
BUN/CREAT SERPL: 14 (ref 12–20)
CALCIUM SERPL-MCNC: 8.4 MG/DL (ref 8.5–10.1)
CHLORIDE SERPL-SCNC: 110 MMOL/L (ref 100–108)
CO2 SERPL-SCNC: 26 MMOL/L (ref 21–32)
CREAT SERPL-MCNC: 1.48 MG/DL (ref 0.6–1.3)
GLUCOSE SERPL-MCNC: 90 MG/DL (ref 74–99)
POTASSIUM SERPL-SCNC: 4.5 MMOL/L (ref 3.5–5.5)
SODIUM SERPL-SCNC: 141 MMOL/L (ref 136–145)

## 2019-02-27 NOTE — TELEPHONE ENCOUNTER
Called patient had her verify her  she has been told per Dr. Solomon Files about her suppressed TSH patient advised to skip a dose of her thyroid medication for one day only then resume it normally, Dr. Solomon Files will address her daily replacement at her next OV, patient states that would be tomorrow at 2:45 PM.

## 2019-02-28 ENCOUNTER — OFFICE VISIT (OUTPATIENT)
Dept: FAMILY MEDICINE CLINIC | Age: 69
End: 2019-02-28

## 2019-02-28 VITALS
RESPIRATION RATE: 16 BRPM | HEART RATE: 87 BPM | HEIGHT: 63 IN | DIASTOLIC BLOOD PRESSURE: 60 MMHG | OXYGEN SATURATION: 97 % | SYSTOLIC BLOOD PRESSURE: 122 MMHG | WEIGHT: 183 LBS | TEMPERATURE: 97.9 F | BODY MASS INDEX: 32.43 KG/M2

## 2019-02-28 DIAGNOSIS — I10 ESSENTIAL HYPERTENSION: ICD-10-CM

## 2019-02-28 DIAGNOSIS — E11.21 DIABETIC NEPHROPATHY ASSOCIATED WITH TYPE 2 DIABETES MELLITUS (HCC): ICD-10-CM

## 2019-02-28 DIAGNOSIS — E03.9 ACQUIRED HYPOTHYROIDISM: ICD-10-CM

## 2019-02-28 DIAGNOSIS — R19.7 DIARRHEA, UNSPECIFIED TYPE: ICD-10-CM

## 2019-02-28 RX ORDER — NIFEDIPINE 60 MG/1
60 TABLET, EXTENDED RELEASE ORAL DAILY
Qty: 90 TAB | Refills: 4 | Status: SHIPPED | OUTPATIENT
Start: 2019-02-28 | End: 2019-07-09

## 2019-02-28 RX ORDER — LEVOTHYROXINE SODIUM 125 UG/1
125 TABLET ORAL
Qty: 90 TAB | Refills: 1 | Status: SHIPPED | OUTPATIENT
Start: 2019-02-28 | End: 2019-03-28

## 2019-02-28 RX ORDER — INSULIN GLARGINE 100 [IU]/ML
INJECTION, SOLUTION SUBCUTANEOUS
Qty: 10 PEN | Refills: 2
Start: 2019-02-28 | End: 2019-03-14 | Stop reason: SDUPTHER

## 2019-02-28 NOTE — PROGRESS NOTES
Chuyita Fernández is a 70 y.o. female who was seen in clinic today (2/28/2019). Assessment & Plan:       ICD-10-CM ICD-9-CM    1. Uncontrolled type 2 diabetes mellitus with microalbuminuria, with long-term current use of insulin ([facility]) E11.29 250.42 insulin glargine (LANTUS SOLOSTAR U-100 INSULIN) 100 unit/mL (3 mL) inpn    E11.65 791.0 HEMOGLOBIN A1C WITH EAG    R80.9 V58.67     Z79.4     2. Diabetic nephropathy associated with type 2 diabetes mellitus ([facility]) U36.26 741.67 METABOLIC PANEL, BASIC     583.81    3. Acquired hypothyroidism E03.9 244.9 levothyroxine (SYNTHROID) 125 mcg tablet      TSH W/ REFLX FREE T4 IF ABNORMAL   4. Essential hypertension I10 401.9 NIFEdipine ER (ADALAT CC) 60 mg ER tablet   5. Diarrhea, unspecified type R19.7 787.91      DM2: Much improved with resumption of GLP1 agonist. Emphasized relationship between dining out and glucose results and therefore need for dietary consistency/compliance. continue present management  Decreasing HS Lantus  Continue other insulins unchanged    Will be seeing Dwayne Waters PharmD in a couple weeks    CKD: renal indices improved with elimination of hydrochlorothiazide. Cautioned to suspend GLP1 agonist for excessive losses (vomiting, diarrhea) and to contact me for surveillance. Hypertension: improved, well controlled, continue present management    Hypothyroidism: over replaced. Decreasing dose. Alternating bowel function. Likely also related to dietary inconsistency. Add daily fiber. Schedule colo    Follow-up Disposition:  Return in about 1 month (around 3/28/2019) for diabetes and thyroid follow up, non fasting labs 1 week prior. Subjective: Chuyita Fernández was seen today for Diabetes and Chronic Kidney Disease      Notes loose stools, headache and upset stomach today. \"I've had a few accidents\" within the past week. Not on any fiber.        Follow up uncontrolled DM2 with Nephropathy   Resumed GLP1 agonist for better control last week  Key Antihyperglycemic Medications             insulin glargine (LANTUS SOLOSTAR U-100 INSULIN) 100 unit/mL (3 mL) inpn  (Taking) 25 Units daily (with breakfast) AND 20 Units daily (with dinner). insulin aspart U-100 (NOVOLOG) 100 unit/mL crtg  (Taking) 4 Units by SubCUTAneous route two (2) times a day. With breakfast and dinner    dulaglutide (TRULICITY) 8.89 VQ/8.0 mL sub-q pen  (Taking) 0.5 mL by SubCUTAneous route every seven (7) days. Home glucoses:  Fastin-96  PP lunch: 110-160  PP dinner: 150-209 (single value when went out to dinner)  HS: 120-160    Not shaky, not trying to sleep all the time.     Hypertension/Nephropathy: stopped hydrochlorothiazide and doubled CCB    Component      Latest Ref Rng & Units 2018           8:30 AM  8:20 AM  9:07 AM 12:00 AM   Sodium      136 - 145 mmol/L 141 142 140 144   Potassium      3.5 - 5.5 mmol/L 4.5 5.1 4.2 5.2   Chloride      100 - 108 mmol/L 110 (H) 106 107 106   CO2      21 - 32 mmol/L 26 23 27 21   Anion gap      3.0 - 18 mmol/L 5 13 6    Glucose      74 - 99 mg/dL 90 220 (H) 179 (H) 152 (H)   BUN      7.0 - 18 MG/DL 21 (H) 30 (H) 26 (H) 33 (H)   Creatinine      0.6 - 1.3 MG/DL 1.48 (H) 1.85 (H) 1.68 (H) 2.04 (H)   BUN/Creatinine ratio      12 - 20   14 16 15 16   GFR est AA      >60 ml/min/1.73m2 42 (L) 33 (L) 37 (L) 28 (L)   GFR est non-AA      >60 ml/min/1.73m2 35 (L) 27 (L) 30 (L) 24 (L)   Calcium      8.5 - 10.1 MG/DL 8.4 (L) 8.8 8.6 9.7         Lab Results   Component Value Date/Time    Hemoglobin A1c 8.4 (H) 2018 09:06 AM    Hemoglobin A1c 8.3 (H) 2018 09:40 AM    Hemoglobin A1c 10.6 (H) 2018 08:48 AM    Hemoglobin A1c, External 11.1 2016    Glucose 90 2019 08:30 AM    Glucose,  (H) 2019 10:30 AM    Microalbumin/Creat ratio (mg/g creat) 48 (H) 2018 08:41 AM    Microalbumin,urine random 6.20 (H) 2018 08:41 AM    LDL, calculated 84.2 07/30/2018 08:48 AM    Creatinine, POC 1.6 (H) 01/09/2019 10:30 AM    Creatinine 1.48 (H) 02/26/2019 08:30 AM       follow up hypothyroidism on replacement:    Lab Results   Component Value Date/Time    TSH 0.29 (L) 02/12/2019 08:20 AM                Outpatient Medications Marked as Taking for the 2/28/19 encounter (Office Visit) with Tony Tamayo MD   Medication Sig Dispense Refill    levothyroxine (SYNTHROID) 150 mcg tablet Take 1 Tab by mouth Daily (before breakfast). 90 Tab 1    insulin glargine (LANTUS SOLOSTAR U-100 INSULIN) 100 unit/mL (3 mL) inpn 25 Units daily (with breakfast) AND 20 Units daily (with dinner). 10 Pen 2    NIFEdipine ER (ADALAT CC) 60 mg ER tablet Take 1 Tab by mouth daily. 90 Tab 4    GINGER OIL, BULK, by Does Not Apply route. Using topical for Vertigo      olmesartan (BENICAR) 40 mg tablet Take 1 Tab by mouth daily. 90 Tab 4    insulin aspart U-100 (NOVOLOG) 100 unit/mL crtg 4 Units by SubCUTAneous route two (2) times a day. With breakfast and dinner 8 Cartridge 2    glucose blood VI test strips (ACCU-CHEK CODY PLUS TEST STRP) strip Patient is to check blood sugar 3-4 times a day 300 Strip 11    cholecalciferol, VITAMIN D3, (VITAMIN D3) 5,000 unit tab tablet Take  by mouth daily.  acetaminophen (TYLENOL EXTRA STRENGTH) 500 mg tablet Take  by mouth every six (6) hours as needed for Pain.  dulaglutide (TRULICITY) 1.22 TE/2.5 mL sub-q pen 0.5 mL by SubCUTAneous route every seven (7) days. 12 Pen 4    esomeprazole (NEXIUM) 20 mg capsule Take  by mouth.  sertraline (ZOLOFT) 50 mg tablet Take 1 Tab by mouth daily. 90 Tab 4    simvastatin (ZOCOR) 40 mg tablet Take 1 Tab by mouth nightly. 90 Tab 4    Blood-Glucose Meter (ACCU-CHEK CODY PLUS METER) misc 1 Device by Does Not Apply route four (4) times daily. 1 Each 0    BLADDER CONTROL PADS pads       ALCOHOL PREP PADS padm       omeprazole (PRILOSEC) 40 mg capsule Take 1 Cap by mouth daily.  90 Cap 2 Patient Active Problem List    Diagnosis    Diarrhea    Class 1 obesity due to excess calories with serious comorbidity and body mass index (BMI) of 31.0 to 31.9 in adult    Vitamin D deficiency    Osteopenia of multiple sites     7/2018      Moderate major depression ([facility]    Uncontrolled type 2 diabetes mellitus with microalbuminuria, with long-term current use of insulin ([address]    Family history of malignant neoplasm of breast    Diabetic nephropathy associated with type 2 diabetes mellitus ([address]    Lump or mass in breast    Essential hypertension    Acquired hypothyroidism    Hiatal hernia     EGD 11/14/2017 Jimmy Cordero MD \"prn acid suppression\"      Hyperlipidemia    Migraines         Allergies   Allergen Reactions    Darvocet A500 [Propoxyphene N-Acetaminophen] Nausea and Vomiting    Gloves, Latex With Aloe Vera Contact Dermatitis     Rash from powder inside latex gloves      Metformin Diarrhea         Patient Care Team:  Campos Fuentes MD as PCP - General (Family Practice)  Cy Lombard, MD (Gastroenterology)  Viktoria Lopez (Ophthalmology)    The following sections were reviewed & updated as appropriate: PMH, PSH, FH, and SH. Review of Systems   Constitutional: Negative for fever. Gastrointestinal: Negative for vomiting. Genitourinary: Negative for dysuria. Objective:     Visit Vitals  /60   Pulse 87   Temp 97.9 °F (36.6 °C) (Oral)   Resp 16   Ht 5' 3\" (1.6 m)   Wt 183 lb (83 kg)   LMP 11/17/1972 Comment: partial   SpO2 97%   BMI 32.42 kg/m²      Physical Exam   Constitutional: She is oriented to person, place, and time. She appears well-developed. No distress. Pleasant obese white female   HENT:   Head: Normocephalic and atraumatic. Pulmonary/Chest: Effort normal.   Neurological: She is alert and oriented to person, place, and time. Psychiatric: She has a normal mood and affect.  Her behavior is normal. Judgment and thought content normal. Disclaimer: The patient understands our medical plan. Alternatives have been explained and offered. The risks, benefits and significant side effects of all medications have been reviewed. Anticipated time course and progression of condition reviewed. All questions have been addressed. She is encouraged to employ the information provided in the after visit summary, which was reviewed. Where applicable, she is instructed to call the clinic if she has not been notified either by phone or through [address] with the results of her tests or with an appointment plan for any referrals within 1 week(s). No news is not good news; it's no news. The patient  is to call if her condition worsens or fails to improve or if significant side effects are experienced. Aspects of this note may have been generated using voice recognition software. Despite editing, there may be unrecognized errors.        Eugenia Chang MD

## 2019-02-28 NOTE — PROGRESS NOTES
Patient here for f/u on her kidney disease and DM. She has not taken her Levothyroxine for today. 1. Have you been to the ER, urgent care clinic since your last visit? Hospitalized since your last visit? No  2. Have you seen or consulted any other health care providers outside of the [address] [place] [address] since your last visit? Include any pap smears or colon screening. No    Medication reconciliation has been completed with patient. Care team discussed/updated as well as pharmacy. Health Maintenance reviewed - Will discuss need for Shingrix with provider, due for colonoscopy.

## 2019-02-28 NOTE — PATIENT INSTRUCTIONS
Metamucil or Citrucel: plan to start relatively low, tweak it to a good balance between not diarrhea and not constipation and then STAY ON IT! Kegel Exercises: Care Instructions  Your Care Instructions    Kegel exercises strengthen muscles around the bladder. These muscles control the flow of urine. Kegel exercises are sometime called \"pelvic floor\" exercises. They can help prevent urine leakage and keep the pelvic organs in place. A woman who just had a baby might want to try Kegel exercises. They can strengthen pelvic muscles that have been weakened by pregnancy and childbirth. A man or woman may use Kegel exercises to treat urine leakage. You do Kegel exercises by tightening the muscles you use when you urinate. You will likely need to do these exercises for several weeks to get better. Follow-up care is a key part of your treatment and safety. Be sure to make and go to all appointments, and call your doctor if you are having problems. It's also a good idea to know your test results and keep a list of the medicines you take. How can you care for yourself at home? · Do Kegel exercises. ? Find the muscles you need to strengthen. To do this, tighten the muscles that stop your urine while you are going to the bathroom. These are the same muscles you squeeze during Kegel exercises. ? Squeeze the muscles as hard as you can. Your belly and thighs should not move. ? Hold the squeeze for 3 seconds. Then relax for 3 seconds. ? Start with 3 seconds, and then add 1 second each week until you are able to squeeze for 10 seconds. ? Repeat the exercise 10 to 15 times for each session. Do three or more sessions each day. · You can check to see if you are using the right muscles. Place a finger in your vagina and squeeze around it. You are doing them right when you feel pressure around your finger. Your doctor may also suggest that you put special weights in your vagina while you do the exercises.   · Do not smoke. It can irritate the bladder. If you need help quitting, talk to your doctor about stop-smoking programs and medicines. These can increase your chances of quitting for good. Where can you learn more? Go to http://chasidy-ena.info/. Enter P172 in the search box to learn more about \"Kegel Exercises: Care Instructions. \"  Current as of: March 20, 2018  Content Version: 11.9  © 6897-8368 [employer]. Care instructions adapted under license by [employer] (which disclaims liability or warranty for this information). If you have questions about a medical condition or this instruction, always ask your healthcare professional. [address] any warranty or liability for your use of this information.

## 2019-03-13 ENCOUNTER — OFFICE VISIT (OUTPATIENT)
Dept: INTERNAL MEDICINE CLINIC | Age: 69
End: 2019-03-13

## 2019-03-13 NOTE — PATIENT INSTRUCTIONS
Lantus 30 units once a day   Novolog 5 units with breakfast  Novolog 8 units with dinner  Continue Trulicity at 0.98 mg once a week

## 2019-03-13 NOTE — PROGRESS NOTES
Pharmacy Note - Diabetes   03/13/19       Patient name: Sg Plunkett (73 y.o., female)  YOB: 1950    Referred by: Sandra Alexander MD for diabetes education / management   Past Medical History:   Diagnosis Date    Depression     Diabetes ([address]     GERD (gastroesophageal reflux disease)     Hiatal hernia     Hypercholesterolemia     Hypertension     Migraines     Recurrent UTI     VCUG reportedly nl 90s, +/- pyelo, no hospitalizations, no surgeries, no scopes    Thyroid disease      Allergies: Allergies   Allergen Reactions    Darvocet A500 [Propoxyphene N-Acetaminophen] Nausea and Vomiting    Gloves, Latex With Aloe Vera Contact Dermatitis     Rash from powder inside latex gloves      Metformin Diarrhea     Subjective / Objective      Medications:   Current medications for diabetes include:      Key Antihyperglycemic Medications             insulin glargine (LANTUS SOLOSTAR U-100 INSULIN) 100 unit/mL (3 mL) inpn 25 Units daily (with breakfast) AND 10 Units daily (with dinner). insulin aspart U-100 (NOVOLOG) 100 unit/mL crtg 4 Units by SubCUTAneous route two (2) times a day. With breakfast and dinner    dulaglutide (TRULICITY) 7.05 YO/0.3 mL sub-q pen 0.5 mL by SubCUTAneous route every seven (7) days. Patient reports adherence with her medications. Blood Glucose Findings:   She checks her blood glucose readings 3 times daily. Patient did bring her blood sugar log to today's visit. Fasting Pre-Prandial    Breakfast Lunch Dinner   84  125 126   89  184 148   102  140 158   134  159 127   118  114 145   104  104 168   81  117 190   11  104 152   99  74 232   85  228 209   89  203 186   94  160 223   102  104 192   120  95 149   96  150 177   84  133 209   89  93    69      73      84      Average = 90  134 174      Overall Average = 129     Post prandial readings: patient takes these occasionally when she 'feels funny'.  Readings recorded by patient are: 127, 98, 167, 114, 236, 177    Hypoglycemic episodes: None reported by patient.  Does have an occasional low reading per records above (once or twice per month)     Her last A1c value(s) noted to be:      Lab Results   Component Value Date/Time    Hemoglobin A1c 8.4 (H) 11/19/2018 09:06 AM    Hemoglobin A1c 8.3 (H) 09/18/2018 09:40 AM    Hemoglobin A1c 10.6 (H) 07/30/2018 08:48 AM    Hemoglobin A1c, External 11.1 03/21/2016     Screenings/Prevention Parameters:  -Diabetic Eye and Foot Exams:      Diabetic Foot and Eye Exam HM Status   Topic Date Due    Diabetic Foot Care  05/07/2019    Eye Exam  09/07/2019     -Microalbumin / Creatinine ratio:       Lab Results   Component Value Date/Time    Microalbumin/Creat ratio (mg/g creat) 48 (H) 07/30/2018 08:41 AM    Microalbumin,urine random 6.20 (H) 07/30/2018 08:41 AM     -Immunizations:      Immunization History   Administered Date(s) Administered    Hep B Vaccine (Adult) 02/01/2019    Influenza High Dose Vaccine PF 11/11/2015, 01/09/2017, 11/08/2017    Influenza Vaccine (Quad) PF 10/06/2014    Influenza Vaccine (Tri) Adjuvanted 08/27/2018    Influenza Vaccine PF 10/31/2013    Pneumococcal Conjugate (PCV-13) 01/09/2017    Pneumococcal Polysaccharide (PPSV-23) 10/31/2013, 05/14/2015    Td, Adsorbed PF 12/22/2014    Zoster Recombinant 07/18/2018       Additional Laboratory Parameters of Interest:     Estimation of renal function:  Lab Results   Component Value Date/Time    Creatinine 1.48 (H) 02/26/2019 08:30 AM    Creatinine 1.85 (H) 02/12/2019 08:20 AM    Creatinine 1.68 (H) 01/22/2019 09:07 AM    GFR est AA 42 (L) 02/26/2019 08:30 AM    GFR est AA 33 (L) 02/12/2019 08:20 AM    GFR est AA 37 (L) 01/22/2019 09:07 AM    GFR est non-AA 35 (L) 02/26/2019 08:30 AM    GFR est non-AA 27 (L) 02/12/2019 08:20 AM    GFR est non-AA 30 (L) 01/22/2019 09:07 AM     Wt Readings from Last 3 Encounters:   02/28/19 183 lb (83 kg)   02/21/19 182 lb 6.4 oz (82.7 kg)   01/28/19 177 lb 12.8 oz (80.6 kg)     Ht Readings from Last 1 Encounters:   02/28/19 5' 3\" (1.6 m)     Assessment / Plan        1. Diabetes:  Goal A1C: < 7%. Patient's most recent A1c is not at their current goal. During the visit today reviewed with patient: self-monitoring blood glucose fasting/post-prandial goals, importance of blood glucose control in avoidance of diabetic complications or further progression if already present, signs/symptoms/management of hypoglycemia, impact of exercise on glucose control, and diet and health maintenance items explained below. Reviewed pre-prandial numbers patient had brought in above. Decreased overall Lantus dose to 30 units once daily as her fasting numbers appear to be well controlled on most days. Also making adjustments to mealtime insulin and needed to avoid hypoglycemic events overnight. Patient checks pre-prandial readings before her lunch and dinner. Average lunch reading is at the higher end of goal so will have patient increase breakfast Novolog to 5 units. Average pre-prandial dinner readings are outside of goal range. Will have patient increase dinner time insulin dose to 8 units. Follow up visit scheduled with patient in roughly 1 month. She will also see her PCP in this time for scheduled follow up. Patient verbalized understanding of the information presented and all of the patients questions were answered. AVS was handed to the patient and information reviewed. Patient advised to call the office with any additional questions or concerns. Notification of recommendations will be sent to Shawna Sparrow MD for review.     Future Appointments   Date Time Provider Watson Dhaliwal   3/21/2019  8:50 AM Radha Madrid MD [facility]   3/28/2019 10:15 AM MD Ash Matos   4/17/2019 11:00 AM David Bain [facility]   7/18/2019  9:45 AM Radha Madrid MD [facility]         Thank you for the consult,  Lemon Mooring, PharmD, BCPS, BCACP, BC-ADM

## 2019-03-14 RX ORDER — INSULIN GLARGINE 100 [IU]/ML
30 INJECTION, SOLUTION SUBCUTANEOUS DAILY
Qty: 5 PEN | Refills: 2
Start: 2019-03-14 | End: 2019-10-01 | Stop reason: SDUPTHER

## 2019-03-14 RX ORDER — INSULIN ASPART 100 [IU]/ML
INJECTION, SOLUTION INTRAVENOUS; SUBCUTANEOUS
Qty: 5 PEN | Refills: 2 | Status: SHIPPED | OUTPATIENT
Start: 2019-03-14 | End: 2019-11-12 | Stop reason: SDUPTHER

## 2019-03-21 ENCOUNTER — HOSPITAL ENCOUNTER (OUTPATIENT)
Dept: LAB | Age: 69
Discharge: HOME | End: 2019-03-21
Payer: MEDICARE

## 2019-03-21 ENCOUNTER — LAB ONLY (OUTPATIENT)
Dept: FAMILY MEDICINE CLINIC | Age: 69
End: 2019-03-21

## 2019-03-21 DIAGNOSIS — E03.9 ACQUIRED HYPOTHYROIDISM: ICD-10-CM

## 2019-03-21 DIAGNOSIS — E03.9 ACQUIRED HYPOTHYROIDISM: Primary | ICD-10-CM

## 2019-03-21 DIAGNOSIS — E11.21 DIABETIC NEPHROPATHY ASSOCIATED WITH TYPE 2 DIABETES MELLITUS (HCC): ICD-10-CM

## 2019-03-21 LAB
ANION GAP SERPL CALC-SCNC: 9 MMOL/L (ref 3–18)
BUN SERPL-MCNC: 28 MG/DL (ref 7–18)
BUN/CREAT SERPL: 18 (ref 12–20)
CALCIUM SERPL-MCNC: 8.9 MG/DL (ref 8.5–10.1)
CHLORIDE SERPL-SCNC: 107 MMOL/L (ref 100–108)
CO2 SERPL-SCNC: 25 MMOL/L (ref 21–32)
CREAT SERPL-MCNC: 1.57 MG/DL (ref 0.6–1.3)
EST. AVERAGE GLUCOSE BLD GHB EST-MCNC: 163 MG/DL
GLUCOSE SERPL-MCNC: 84 MG/DL (ref 74–99)
HBA1C MFR BLD: 7.3 % (ref 4.2–5.6)
POTASSIUM SERPL-SCNC: 4.6 MMOL/L (ref 3.5–5.5)
SODIUM SERPL-SCNC: 141 MMOL/L (ref 136–145)

## 2019-03-21 PROCEDURE — 83036 HEMOGLOBIN GLYCOSYLATED A1C: CPT

## 2019-03-21 PROCEDURE — 84439 ASSAY OF FREE THYROXINE: CPT

## 2019-03-21 PROCEDURE — 84443 ASSAY THYROID STIM HORMONE: CPT

## 2019-03-21 PROCEDURE — 80048 BASIC METABOLIC PNL TOTAL CA: CPT

## 2019-03-23 LAB
T4 FREE SERPL-MCNC: 1.6 NG/DL (ref 0.7–1.5)
TSH SERPL-ACNC: 0.02 UIU/ML (ref 0.36–3.74)

## 2019-03-28 ENCOUNTER — PATIENT OUTREACH (OUTPATIENT)
Dept: FAMILY MEDICINE CLINIC | Age: 69
End: 2019-03-28

## 2019-03-28 ENCOUNTER — OFFICE VISIT (OUTPATIENT)
Dept: FAMILY MEDICINE CLINIC | Age: 69
End: 2019-03-28

## 2019-03-28 VITALS
DIASTOLIC BLOOD PRESSURE: 60 MMHG | HEIGHT: 63 IN | TEMPERATURE: 97.7 F | HEART RATE: 85 BPM | RESPIRATION RATE: 14 BRPM | BODY MASS INDEX: 32.42 KG/M2 | OXYGEN SATURATION: 97 % | SYSTOLIC BLOOD PRESSURE: 104 MMHG

## 2019-03-28 DIAGNOSIS — E66.09 CLASS 1 OBESITY DUE TO EXCESS CALORIES WITH SERIOUS COMORBIDITY AND BODY MASS INDEX (BMI) OF 31.0 TO 31.9 IN ADULT: ICD-10-CM

## 2019-03-28 DIAGNOSIS — R19.7 DIARRHEA, UNSPECIFIED TYPE: ICD-10-CM

## 2019-03-28 DIAGNOSIS — E03.9 ACQUIRED HYPOTHYROIDISM: ICD-10-CM

## 2019-03-28 DIAGNOSIS — E11.21 DIABETIC NEPHROPATHY ASSOCIATED WITH TYPE 2 DIABETES MELLITUS (HCC): ICD-10-CM

## 2019-03-28 RX ORDER — LEVOTHYROXINE SODIUM 88 UG/1
88 TABLET ORAL
Qty: 90 TAB | Refills: 1 | Status: SHIPPED | OUTPATIENT
Start: 2019-03-28 | End: 2019-05-09 | Stop reason: ALTCHOICE

## 2019-03-28 NOTE — PATIENT INSTRUCTIONS
Start adding mealtime insulin to lunch WHEN YOUR LUNCHES ARE BIG    Continue NOT to use insulin when you are having a small lunch      Use fiber EVERY DAY to PREVENT diarrhea    When you are having periods of diarrhea, you must stop the dulaglutide (Trulicity) or it will injure your kidneys further.  Call my office for guidance    Schedule your colonoscopy

## 2019-03-28 NOTE — PROGRESS NOTES
Pt in office for f/u on diabetes and thyroid. Pt reports feeling like her 'heart is going to beat out of her chest'. 1. Have you been to the ER, urgent care clinic since your last visit? Hospitalized since your last visit? No    2. Have you seen or consulted any other health care providers outside of the [address] [place] [address] since your last visit? Include any pap smears or colon screening.  No

## 2019-03-28 NOTE — Clinical Note
Dai Montanez, she's citing ride issues as a barrier to scheduling her colo. Any assistance you can offer would be appreciated.  Thanks, TIM

## 2019-03-28 NOTE — PROGRESS NOTES
Isabel Hamilton is a 70 y.o. female who was seen in clinic today (3/28/2019). Assessment & Plan:       ICD-10-CM ICD-9-CM    1. Uncontrolled type 2 diabetes mellitus with microalbuminuria, with long-term current use of insulin (HCC) E11.29 250.42     E11.65 791.0     R80.9 V58.67     Z79.4     2. Acquired hypothyroidism E03.9 244.9 levothyroxine (SYNTHROID) 88 mcg tablet      TSH W/ REFLX FREE T4 IF ABNORMAL   3. Diabetic nephropathy associated with type 2 diabetes mellitus (HCC) E11.21 250.40      583.81    4. Class 1 obesity due to excess calories with serious comorbidity and body mass index (BMI) of 31.0 to 31.9 in adult E66.09 278.00     Z68.31 V85.31    5. Diarrhea, unspecified type R19.7 787.91        Patient Instructions   Start adding mealtime insulin to lunch WHEN YOUR LUNCHES ARE BIG    Continue NOT to use insulin when you are having a small lunch      Use fiber EVERY DAY to PREVENT diarrhea    When you are having periods of diarrhea, you must stop the dulaglutide (Trulicity) or it will injure your kidneys further. Call my office for guidance    Schedule your colonoscopy      CKD overall stable in wake of pyelonephritis. I suspect she will not return to pre infection renal status. Continue to monitor for stability with GLP1 agonist use. Hypothyroidism: over replaced with TSH suppression, likely related to weight loss. Decreasing replacement, which may also help with episodic diarrhea     Follow-up and Dispositions    · Return in about 6 weeks (around 5/9/2019) for thyroid follow up, non fasting labs 1 week prior. Subjective: Isabel Hamilton was seen today for Diabetes and Thyroid Problem        Follow up hypothyroidism on replacement: episodic racing heart with pulse in 90s in recent weeks.        Follow up DM2 with Nephropathy post med adjustments through Dr. Moe Polo, who she will be seeing again mid April    Key Antihyperglycemic Medications             insulin glargine (LANTUS SOLOSTAR U-100 INSULIN) 100 unit/mL (3 mL) inpn (Taking) 30 Units by SubCUTAneous route daily. insulin aspart U-100 (NOVOLOG) 100 unit/mL inpn (Taking) 5 Units daily (with breakfast) AND 8 Units daily (with dinner). dulaglutide (TRULICITY) 2.44 MT/5.4 mL sub-q pen (Taking) 0.5 mL by SubCUTAneous route every seven (7) days.           Home glucoses:  Fastin-110s  Pre lunch:   PP lunch: usually 120-180, single 240 (gave 5 units ->137) - HUGE variability in food consumption at lunch time  Pre dinner: 120-233  PP dinner: 160-200s    Hypoglycemic episodes: fasting 78 with symptoms approx 2 weeks ago (a couple of days after dose change)- 6 oz apple juice, scrambled eggs with cheese and ham. None since then        Diabetic Foot and Eye Exam HM Status   Topic Date Due    Diabetic Foot Care  2019    Eye Exam  2019         Lab Results   Component Value Date/Time    Hemoglobin A1c 7.3 (H) 2019 08:55 AM    Hemoglobin A1c 8.4 (H) 2018 09:06 AM    Hemoglobin A1c 8.3 (H) 2018 09:40 AM    Hemoglobin A1c, External 11.1 2016    Glucose 84 2019 08:55 AM    Glucose,  (H) 2019 10:30 AM    Microalbumin/Creat ratio (mg/g creat) 48 (H) 2018 08:41 AM    Microalbumin,urine random 6.20 (H) 2018 08:41 AM    LDL, calculated 84.2 2018 08:48 AM    Creatinine, POC 1.6 (H) 2019 10:30 AM    Creatinine 1.57 (H) 2019 08:55 AM       Magnesium   Date Value Ref Range Status   2018 2.0 1.6 - 2.6 mg/dL Final     Lab Results   Component Value Date/Time    Vitamin B12 263 2018 08:48 AM    Folate 19.6 (H) 2018 08:48 AM           Results for orders placed or performed during the hospital encounter of 98/15/87   METABOLIC PANEL, BASIC   Result Value Ref Range    Sodium 141 136 - 145 mmol/L    Potassium 4.6 3.5 - 5.5 mmol/L    Chloride 107 100 - 108 mmol/L    CO2 25 21 - 32 mmol/L    Anion gap 9 3.0 - 18 mmol/L    Glucose 84 74 - 99 mg/dL    BUN 28 (H) 7.0 - 18 MG/DL    Creatinine 1.57 (H) 0.6 - 1.3 MG/DL    BUN/Creatinine ratio 18 12 - 20      GFR est AA 40 (L) >60 ml/min/1.73m2    GFR est non-AA 33 (L) >60 ml/min/1.73m2    Calcium 8.9 8.5 - 10.1 MG/DL   TSH W/ REFLX FREE T4 IF ABNORMAL   Result Value Ref Range    TSH 0.02 (L) 0.36 - 3.74 uIU/mL   HEMOGLOBIN A1C WITH EAG   Result Value Ref Range    Hemoglobin A1c 7.3 (H) 4.2 - 5.6 %    Est. average glucose 163 mg/dL   T4, FREE   Result Value Ref Range    T4, Free 1.6 (H) 0.7 - 1.5 NG/DL        Outpatient Medications Marked as Taking for the 3/28/19 encounter (Office Visit) with Ren Danielson MD   Medication Sig Dispense Refill    insulin glargine (LANTUS SOLOSTAR U-100 INSULIN) 100 unit/mL (3 mL) inpn 30 Units by SubCUTAneous route daily. 5 Pen 2    insulin aspart U-100 (NOVOLOG) 100 unit/mL inpn 5 Units daily (with breakfast) AND 8 Units daily (with dinner). 5 Pen 2    levothyroxine (SYNTHROID) 125 mcg tablet Take 1 Tab by mouth Daily (before breakfast). 90 Tab 1    NIFEdipine ER (ADALAT CC) 60 mg ER tablet Take 1 Tab by mouth daily. 90 Tab 4    GINGER OIL, BULK, by Does Not Apply route. Using topical for Vertigo      olmesartan (BENICAR) 40 mg tablet Take 1 Tab by mouth daily. 90 Tab 4    glucose blood VI test strips (ACCU-CHEK CODY PLUS TEST STRP) strip Patient is to check blood sugar 3-4 times a day 300 Strip 11    cholecalciferol, VITAMIN D3, (VITAMIN D3) 5,000 unit tab tablet Take  by mouth daily.  acetaminophen (TYLENOL EXTRA STRENGTH) 500 mg tablet Take  by mouth every six (6) hours as needed for Pain.  dulaglutide (TRULICITY) 6.34 KI/6.9 mL sub-q pen 0.5 mL by SubCUTAneous route every seven (7) days. 12 Pen 4    sertraline (ZOLOFT) 50 mg tablet Take 1 Tab by mouth daily. 90 Tab 4    simvastatin (ZOCOR) 40 mg tablet Take 1 Tab by mouth nightly.  90 Tab 4    Blood-Glucose Meter (ACCU-CHEK CODY PLUS METER) misc 1 Device by Does Not Apply route four (4) times daily. 1 Each 0    BLADDER CONTROL PADS pads       ALCOHOL PREP PADS padm       omeprazole (PRILOSEC) 40 mg capsule Take 1 Cap by mouth daily. 80 Cap 2       Patient Active Problem List    Diagnosis    Diarrhea    Class 1 obesity due to excess calories with serious comorbidity and body mass index (BMI) of 31.0 to 31.9 in adult    Vitamin D deficiency    Osteopenia of multiple sites     7/2018      Moderate major depression ([address]    Uncontrolled type 2 diabetes mellitus with microalbuminuria, with long-term current use of insulin ([address]    Family history of malignant neoplasm of breast    Diabetic nephropathy associated with type 2 diabetes mellitus ([address]    Lump or mass in breast    Essential hypertension    Acquired hypothyroidism    Hiatal hernia     EGD 11/14/2017 Dustin Varghese MD \"prn acid suppression\"      Hyperlipidemia    Migraines         Allergies   Allergen Reactions    Darvocet A500 [Propoxyphene N-Acetaminophen] Nausea and Vomiting    Gloves, Latex With Aloe Vera Contact Dermatitis     Rash from powder inside latex gloves      Metformin Diarrhea         Patient Care Team:  Ainsley Gonsalez MD as PCP - General (Family Practice)  Mikael Newton MD (Gastroenterology)  Kenneth Springer (Ophthalmology)    The following sections were reviewed & updated as appropriate: PMH, PSH, FH, and SH. Review of Systems   Constitutional: Negative for fever. HENT: Positive for sore throat (+/-, mild). Cardiovascular: Negative for chest pain. Gastrointestinal: Positive for diarrhea (episodic). Objective:     Visit Vitals  /60   Pulse 85   Temp 97.7 °F (36.5 °C)   Resp 14   Ht 5' 3\" (1.6 m)   LMP 11/17/1972 Comment: partial   SpO2 97%   BMI 32.42 kg/m²      Physical Exam   Constitutional: She is oriented to person, place, and time. She appears well-developed. No distress. Pleasant obese white female   HENT:   Head: Normocephalic and atraumatic. Neck: Neck supple.  No thyromegaly (non tender) present. Cardiovascular: Normal rate, regular rhythm and normal heart sounds. Exam reveals no gallop and no friction rub. No murmur heard. Pulmonary/Chest: Effort normal and breath sounds normal. No respiratory distress. She has no wheezes. She has no rhonchi. She has no rales. Neurological: She is alert and oriented to person, place, and time. Skin: Skin is warm and dry. She is not diaphoretic. No cyanosis. Nails show no clubbing. Psychiatric: She has a normal mood and affect. Her behavior is normal. Judgment and thought content normal.         Disclaimer: The patient understands our medical plan. Alternatives have been explained and offered. The risks, benefits and significant side effects of all medications have been reviewed. Anticipated time course and progression of condition reviewed. All questions have been addressed. She is encouraged to employ the information provided in the after visit summary, which was reviewed. Where applicable, she is instructed to call the clinic if she has not been notified either by phone or through [address] with the results of her tests or with an appointment plan for any referrals within 1 week(s). No news is not good news; it's no news. The patient  is to call if her condition worsens or fails to improve or if significant side effects are experienced. Aspects of this note may have been generated using voice recognition software. Despite editing, there may be unrecognized errors.        Joe Ballard MD

## 2019-03-30 ENCOUNTER — PATIENT OUTREACH (OUTPATIENT)
Dept: FAMILY MEDICINE CLINIC | Age: 69
End: 2019-03-30

## 2019-04-02 ENCOUNTER — PATIENT OUTREACH (OUTPATIENT)
Dept: FAMILY MEDICINE CLINIC | Age: 69
End: 2019-04-02

## 2019-04-02 NOTE — PROGRESS NOTES
[facility] health screening:    Offered her gas cards, which pt stated \"might be a good option for me but I might not be able to get the procedure done until the end of June. \" Seems she has a few options, her roommate Lena Baez could drive her then or her dtr, as both work in the [address] [place] [address] and would be out for the summer. \" Offered up Dr. Samantha Fleming contact info as well because he is closer to [place] which pt thanked me and will check to see if he is in network. We agreed I would call again the first week of June to see where she is in the process of completion. Ms Soraida Lopez plans to complete her mammogram soon \"I will contact them tomorrow to go ahead and get that scheduled. \" Maternal g-pa had colon cancer & family member had breast cancer.

## 2019-04-23 ENCOUNTER — OFFICE VISIT (OUTPATIENT)
Dept: INTERNAL MEDICINE CLINIC | Age: 69
End: 2019-04-23

## 2019-04-23 NOTE — PROGRESS NOTES
Pharmacy Note - Diabetes   04/23/19       Patient name: Yamile Berg (89 y.o., female)  YOB: 1950    Referred by: Kamar Flowers MD for diabetes education / management   Past Medical History:   Diagnosis Date    Depression     Diabetes ([address]     GERD (gastroesophageal reflux disease)     Hiatal hernia     Hypercholesterolemia     Hypertension     Migraines     Recurrent UTI     VCUG reportedly nl 90s, +/- pyelo, no hospitalizations, no surgeries, no scopes    Thyroid disease      Allergies: Allergies   Allergen Reactions    Darvocet A500 [Propoxyphene N-Acetaminophen] Nausea and Vomiting    Gloves, Latex With Aloe Vera Contact Dermatitis     Rash from powder inside latex gloves      Metformin Diarrhea     Subjective / Objective      Medications:   Current medications for diabetes include:      Key Antihyperglycemic Medications             insulin glargine (LANTUS SOLOSTAR U-100 INSULIN) 100 unit/mL (3 mL) inpn 30 Units by SubCUTAneous route daily. insulin aspart U-100 (NOVOLOG) 100 unit/mL inpn 5 Units daily (with breakfast) AND 8 Units daily (with dinner). dulaglutide (TRULICITY) 0.02 KP/2.5 mL sub-q pen 0.5 mL by SubCUTAneous route every seven (7) days. Patient reports adherence with her medications. Hasn't needed to use the Novolog very often, maybe 4-5 times. Blood Glucose Findings:   She checks her blood glucose readings 1-3 times daily. Patient did bring her blood sugar log to today's visit.        Fasting Pre-Prandial Post-Prandial    Lunch Dinner Lunch Dinner   148 133 173 158 160   130 103 148 158 183   124 108 206 216 226   157 121 130 197 197   106 75 124 154 149   113 112 184 151 154   149 150 141 164 141   111 154 160 132 160   130   197 150   108    205   104       86       105       91       106       91       106       123       93       145       113       Average = 116 119 158 169 172      Overall Average = 141     Hypoglycemic episodes: None noted     Her last A1c value(s) noted to be:      Lab Results   Component Value Date/Time    Hemoglobin A1c 7.3 (H) 03/21/2019 08:55 AM    Hemoglobin A1c 8.4 (H) 11/19/2018 09:06 AM    Hemoglobin A1c 8.3 (H) 09/18/2018 09:40 AM    Hemoglobin A1c, External 11.1 03/21/2016     Assessment / Plan      1. Diabetes: Readings a little higher than usual given recent holiday. Overall numbers appear similar to last A1c. No adjustments made to medications at this time. Patient will follow with her PCP frequently over the next few months. She will continue to track and monitor her blood sugars over this time. If further control is needed during this time, would suggest increasing the dose of Trulicity to 1.5 mg/week as her pre/post prandial numbers seem to be the biggest contributor of control. May also be able to increase Lantus by a few units if morning readings consistently fall towards the upper end of the range. Advised patient I would be available if needed in the future and she has my contact information. Patient verbalized understanding of the information presented and all of the patients questions were answered. AVS was handed to the patient and information reviewed. Patient advised to call the office with any additional questions or concerns. Notification of recommendations will be sent to Caroline Li MD for review.     Future Appointments   Date Time Provider Watson Dhaliwal   5/2/2019  8:50 AM Gino Jackson MD [facility]   5/9/2019  9:00 AM MD Ash Davies   7/18/2019  9:45 AM Gino Jackson MD [facility]     Thank you for the consult,  Dee Dee Benitez, PharmD, BCPS, BCACP, BC-ADM

## 2019-05-02 ENCOUNTER — HOSPITAL ENCOUNTER (OUTPATIENT)
Dept: LAB | Age: 69
Discharge: HOME | End: 2019-05-02
Payer: MEDICARE

## 2019-05-02 DIAGNOSIS — E03.9 ACQUIRED HYPOTHYROIDISM: ICD-10-CM

## 2019-05-02 PROCEDURE — 84439 ASSAY OF FREE THYROXINE: CPT

## 2019-05-02 PROCEDURE — 84443 ASSAY THYROID STIM HORMONE: CPT

## 2019-05-03 LAB
T4 FREE SERPL-MCNC: 1.5 NG/DL (ref 0.7–1.5)
TSH SERPL-ACNC: 0.05 UIU/ML (ref 0.36–3.74)

## 2019-05-09 ENCOUNTER — OFFICE VISIT (OUTPATIENT)
Dept: FAMILY MEDICINE CLINIC | Age: 69
End: 2019-05-09

## 2019-05-09 VITALS
RESPIRATION RATE: 16 BRPM | WEIGHT: 180.2 LBS | HEART RATE: 79 BPM | HEIGHT: 63 IN | BODY MASS INDEX: 31.93 KG/M2 | TEMPERATURE: 97.8 F | DIASTOLIC BLOOD PRESSURE: 62 MMHG | SYSTOLIC BLOOD PRESSURE: 130 MMHG | OXYGEN SATURATION: 97 %

## 2019-05-09 DIAGNOSIS — R80.9 TYPE 2 DIABETES MELLITUS WITH MICROALBUMINURIA, WITH LONG-TERM CURRENT USE OF INSULIN (HCC): ICD-10-CM

## 2019-05-09 DIAGNOSIS — E11.29 TYPE 2 DIABETES MELLITUS WITH MICROALBUMINURIA, WITH LONG-TERM CURRENT USE OF INSULIN (HCC): ICD-10-CM

## 2019-05-09 DIAGNOSIS — Z79.4 TYPE 2 DIABETES MELLITUS WITH MICROALBUMINURIA, WITH LONG-TERM CURRENT USE OF INSULIN (HCC): ICD-10-CM

## 2019-05-09 DIAGNOSIS — F33.0 MILD EPISODE OF RECURRENT MAJOR DEPRESSIVE DISORDER (HCC): ICD-10-CM

## 2019-05-09 DIAGNOSIS — E03.9 ACQUIRED HYPOTHYROIDISM: Primary | ICD-10-CM

## 2019-05-09 DIAGNOSIS — L21.9 SEBORRHEIC DERMATITIS: ICD-10-CM

## 2019-05-09 NOTE — PROGRESS NOTES
Candace Em is a 70 y.o. female who was seen in clinic today (5/9/2019). Assessment & Plan:       ICD-10-CM ICD-9-CM    1. Acquired hypothyroidism E03.9 244.9 TSH W/ REFLX FREE T4 IF ABNORMAL   2. Seborrheic dermatitis L21.9 690.10    3. Type 2 diabetes mellitus with microalbuminuria, with long-term current use of insulin (HCC) E11.29 250.40 HEMOGLOBIN A1C WITH EAG    U67.9 991.9 METABOLIC PANEL, BASIC    J01.9 V58.67 MICROALBUMIN, UR, RAND W/ MICROALB/CREAT RATIO   4. Mild episode of recurrent major depressive disorder (HCC) F33.0 296.31          Hypothyroidism: TSH remains suppressed. Will suspend replacement altogether and reassess    Seborrheic dermatitis: Encouraged to try dandruff shampoo as a face wash ensuring to avoid the eyes themselves    DM2: Strongly encouraged to pursue Overeaters Anonymous to learn alternative stress management strategies    MDD: Well-controlled - not a factor in DM2 management currently. Keep current planned annual follow up. Follow-up and Dispositions    · Return in about 2 months (around 7/11/2019) for thyroid and diabetes follow up, non fasting labs 1 week prior, MWV same day. Subjective: Candace Em was seen today for Thyroid Problem    Follow-up hypothyroidism post replacement dose reduction    Notes some improvement in diarrhea with dose reduction, resumption of fiber    complains of episodic dry itchy scales along her lid margins, between brows and behind left ear. None currently. + ho dandruff    Follow up DM2: notes a few BS values >200 as a result of life stressors triggering overeating. Hasn't followed through with finding Overeaters Anonymous group (one of the stressors is that she is currently without a car)    Denies decline in control of her MDD as potential factor. \"I'm doing good! \"            Results for orders placed or performed during the hospital encounter of 05/02/19   TSH W/ REFLX FREE T4 IF ABNORMAL   Result Value Ref Range    TSH 0.05 (L) 0.36 - 3.74 uIU/mL   T4, FREE   Result Value Ref Range    T4, Free 1.5 0.7 - 1.5 NG/DL        Lab Results   Component Value Date/Time    Hemoglobin A1c 7.3 (H) 03/21/2019 08:55 AM    Hemoglobin A1c 8.4 (H) 11/19/2018 09:06 AM    Hemoglobin A1c 8.3 (H) 09/18/2018 09:40 AM    Hemoglobin A1c, External 11.1 03/21/2016    Glucose 84 03/21/2019 08:55 AM    Glucose,  (H) 01/09/2019 10:30 AM    Microalbumin/Creat ratio (mg/g creat) 48 (H) 07/30/2018 08:41 AM    Microalbumin,urine random 6.20 (H) 07/30/2018 08:41 AM    LDL, calculated 84.2 07/30/2018 08:48 AM    Creatinine, POC 1.6 (H) 01/09/2019 10:30 AM    Creatinine 1.57 (H) 03/21/2019 08:55 AM         Outpatient Medications Marked as Taking for the 5/9/19 encounter (Office Visit) with Billie Da Silva MD   Medication Sig Dispense Refill    psyllium seed, with dextrose, (FIBER PO) Take  by mouth.  levothyroxine (SYNTHROID) 88 mcg tablet Take 1 Tab by mouth Daily (before breakfast). 90 Tab 1    insulin glargine (LANTUS SOLOSTAR U-100 INSULIN) 100 unit/mL (3 mL) inpn 30 Units by SubCUTAneous route daily. 5 Pen 2    insulin aspart U-100 (NOVOLOG) 100 unit/mL inpn 5 Units daily (with breakfast) AND 8 Units daily (with dinner). 5 Pen 2    NIFEdipine ER (ADALAT CC) 60 mg ER tablet Take 1 Tab by mouth daily. 90 Tab 4    GINGER OIL, BULK, by Does Not Apply route. Using topical for Vertigo      olmesartan (BENICAR) 40 mg tablet Take 1 Tab by mouth daily. 90 Tab 4    glucose blood VI test strips (ACCU-CHEK CODY PLUS TEST STRP) strip Patient is to check blood sugar 3-4 times a day 300 Strip 11    cholecalciferol, VITAMIN D3, (VITAMIN D3) 5,000 unit tab tablet Take  by mouth daily.  acetaminophen (TYLENOL EXTRA STRENGTH) 500 mg tablet Take  by mouth every six (6) hours as needed for Pain.  dulaglutide (TRULICITY) 2.78 LQ/2.3 mL sub-q pen 0.5 mL by SubCUTAneous route every seven (7) days.  12 Pen 4    esomeprazole (NEXIUM) 20 mg capsule Take  by mouth.  sertraline (ZOLOFT) 50 mg tablet Take 1 Tab by mouth daily. 90 Tab 4    simvastatin (ZOCOR) 40 mg tablet Take 1 Tab by mouth nightly. 90 Tab 4    Blood-Glucose Meter (ACCU-CHEK CODY PLUS METER) misc 1 Device by Does Not Apply route four (4) times daily. 1 Each 0    BLADDER CONTROL PADS pads       ALCOHOL PREP PADS padm       omeprazole (PRILOSEC) 40 mg capsule Take 1 Cap by mouth daily. 80 Cap 2       Patient Active Problem List    Diagnosis    Diarrhea    Class 1 obesity due to excess calories with serious comorbidity and body mass index (BMI) of 31.0 to 31.9 in adult    Vitamin D deficiency    Osteopenia of multiple sites     7/2018      Moderate major depression ([address]    Uncontrolled type 2 diabetes mellitus with microalbuminuria, with long-term current use of insulin ([address]    Family history of malignant neoplasm of breast    Diabetic nephropathy associated with type 2 diabetes mellitus ([address]    Lump or mass in breast    Essential hypertension    Acquired hypothyroidism    Hiatal hernia     EGD 11/14/2017 Del Young MD \"prn acid suppression\"      Hyperlipidemia    Migraines         Allergies   Allergen Reactions    Darvocet A500 [Propoxyphene N-Acetaminophen] Nausea and Vomiting    Gloves, Latex With Aloe Vera Contact Dermatitis     Rash from powder inside latex gloves      Metformin Diarrhea         Patient Care Team:  Mele Wright MD as PCP - General (Family Practice)  Dasha Perez MD (Gastroenterology)  Jannet Lopes (Ophthalmology)    The following sections were reviewed & updated as appropriate: PMH, PSH, FH, and SH. Review of Systems   Cardiovascular: Positive for palpitations (rare). Gastrointestinal: Positive for diarrhea. Psychiatric/Behavioral: Negative for depression.            Objective:     Visit Vitals  /62   Pulse 79   Temp 97.8 °F (36.6 °C) (Oral)   Resp 16   Ht 5' 3\" (1.6 m)   Wt 180 lb 3.2 oz (81.7 kg)   LMP 11/17/1972 Comment: partial   SpO2 97%   BMI 31.92 kg/m²      Physical Exam   Constitutional: She is oriented to person, place, and time. She appears well-developed. No distress. Pleasant obese elderly white female   HENT:   Head: Normocephalic and atraumatic. Neck: Neck supple. No thyromegaly present. Cardiovascular: Normal rate, regular rhythm and normal heart sounds. Exam reveals no gallop and no friction rub. No murmur heard. Pulmonary/Chest: Effort normal and breath sounds normal. No respiratory distress. She has no wheezes. She has no rhonchi. She has no rales. Musculoskeletal: She exhibits no edema. Neurological: She is alert and oriented to person, place, and time. She displays no tremor. Skin: Skin is warm and dry. She is not diaphoretic. No cyanosis. Nails show no clubbing. Psychiatric: She has a normal mood and affect. Her behavior is normal. Judgment and thought content normal.         Disclaimer: The patient understands our medical plan. Alternatives have been explained and offered. The risks, benefits and significant side effects of all medications have been reviewed. Anticipated time course and progression of condition reviewed. All questions have been addressed. She is encouraged to employ the information provided in the after visit summary, which was reviewed. Where applicable, she is instructed to call the clinic if she has not been notified either by phone or through [address] with the results of her tests or with an appointment plan for any referrals within 1 week(s). No news is not good news; it's no news. The patient  is to call if her condition worsens or fails to improve or if significant side effects are experienced. Aspects of this note may have been generated using voice recognition software. Despite editing, there may be unrecognized errors.        Fidencio Parra MD

## 2019-05-09 NOTE — PROGRESS NOTES
Patient here for f/u on her thyroid and lab results. 1. Have you been to the ER, urgent care clinic since your last visit? Hospitalized since your last visit? No  2. Have you seen or consulted any other health care providers outside of the [address] [place] [address] since your last visit? Include any pap smears or colon screening. No    Medication reconciliation has been completed with patient. Care team discussed/updated as well as pharmacy.     Health Maintenance reviewed - Due for colonoscopy patient is aware she is due not ready to schedule yet, due for DM foot exam.

## 2019-05-09 NOTE — PATIENT INSTRUCTIONS
Seborrheic Dermatitis: Care Instructions  Your Care Instructions  Seborrheic dermatitis (say \"ofl-ing-YGO-ick gfc-myj-DP-tus\") is a skin problem that causes a reddish rash with greasy, flaky, yellow skin patches. The rash may appear on many parts of the body. It may be on the scalp, face (especially the eyebrow area and between the nose and mouth), ears, breasts, underarms, and genital area. The flaky skin on the scalp is called dandruff. This rash is often a long-term (chronic) condition. It may last for years. But the symptoms may come and go. Symptoms can be treated with special creams, shampoos, or other skin care. The cause of seborrheic dermatitis is not fully understood. It may occur when skin glands make too much oil. It may get worse in cold weather or with stress. A type of skin fungus, or yeast, may also be linked with this condition. Follow-up care is a key part of your treatment and safety. Be sure to make and go to all appointments, and call your doctor if you are having problems. It's also a good idea to know your test results and keep a list of the medicines you take. How can you care for yourself at home? · If your doctor prescribes a steroid cream, dandruff shampoo, or antifungal cream or medicine, use it as directed. If your doctor prescribes other medicine, take it as directed. · Use a dandruff shampoo if seborrheic dermatitis affects your scalp. This includes Head & Shoulders, Sebulex, and Selsun Blue. You may need to try a few kinds of shampoo to find the one that works best for you. · To help with itching:  ? Use hydrocortisone cream. Follow the directions on the label. ? Use cold, wet cloths. ? Take an over-the-counter antihistamine, such as diphenhydramine (Benadryl) or loratadine (Claritin). Read and follow all instructions on the label. When should you call for help?   Call your doctor now or seek immediate medical care if:    · You have signs of infection, such as:  ? Increased pain, swelling, warmth, or redness. ? Red streaks leading from the rash. ? Pus draining from the rash. ? A fever.    Watch closely for changes in your health, and be sure to contact your doctor if:    · The rash gets worse or spreads to other parts of your body.     · You do not get better as expected. Where can you learn more? Go to http://chasidy-ena.info/. Enter B518 in the search box to learn more about \"Seborrheic Dermatitis: Care Instructions. \"  Current as of: April 17, 2018  Content Version: 11.9  © 8948-1325 [employer]. Care instructions adapted under license by [employer] (which disclaims liability or warranty for this information). If you have questions about a medical condition or this instruction, always ask your healthcare professional. [address] any warranty or liability for your use of this information.

## 2019-05-23 ENCOUNTER — HOSPITAL ENCOUNTER (OUTPATIENT)
Dept: MAMMOGRAPHY | Age: 69
Discharge: HOME | End: 2019-05-23
Attending: FAMILY MEDICINE
Payer: MEDICARE

## 2019-05-23 DIAGNOSIS — Z12.39 BREAST SCREENING, UNSPECIFIED: ICD-10-CM

## 2019-05-23 PROCEDURE — 77063 BREAST TOMOSYNTHESIS BI: CPT

## 2019-05-23 NOTE — PROGRESS NOTES
Reassuring results, Yesica Nicole. Remember that no test is perfect. Plan to be seen if you should develop any breast symptoms such as pain, skin changes or masses. Take good care.  TIM

## 2019-06-04 ENCOUNTER — PATIENT OUTREACH (OUTPATIENT)
Dept: FAMILY MEDICINE CLINIC | Age: 69
End: 2019-06-04

## 2019-06-04 NOTE — PROGRESS NOTES
health screening:    Ms Chyna Gracia stated she had \"issues\" with the previous referral for this procedure and wishes to not return to that group. Her previous GI doctor no longer accepts her insurance. I've contacted Dr. Lazar Fraction office who does accept this patients insurance but has a waiting list well into August but patient will be out of town for the month of August & by September she will have issues with transportation again as dtr will be back to work. Ms Vicky Mohan thinks she might have a another option, will investigate that option and if successful she will call me to provide her with gas cards. Noted mammogram has been completed and updated in  as negative for malignancy.

## 2019-07-02 ENCOUNTER — HOSPITAL ENCOUNTER (OUTPATIENT)
Dept: LAB | Age: 69
Discharge: HOME | End: 2019-07-02
Payer: MEDICARE

## 2019-07-02 ENCOUNTER — LAB ONLY (OUTPATIENT)
Dept: FAMILY MEDICINE CLINIC | Age: 69
End: 2019-07-02

## 2019-07-02 DIAGNOSIS — E03.9 ACQUIRED HYPOTHYROIDISM: ICD-10-CM

## 2019-07-02 DIAGNOSIS — Z79.4 TYPE 2 DIABETES MELLITUS WITH MICROALBUMINURIA, WITH LONG-TERM CURRENT USE OF INSULIN (HCC): ICD-10-CM

## 2019-07-02 DIAGNOSIS — E03.9 ACQUIRED HYPOTHYROIDISM: Primary | ICD-10-CM

## 2019-07-02 DIAGNOSIS — E11.29 TYPE 2 DIABETES MELLITUS WITH MICROALBUMINURIA, WITH LONG-TERM CURRENT USE OF INSULIN (HCC): ICD-10-CM

## 2019-07-02 DIAGNOSIS — R80.9 TYPE 2 DIABETES MELLITUS WITH MICROALBUMINURIA, WITH LONG-TERM CURRENT USE OF INSULIN (HCC): ICD-10-CM

## 2019-07-02 LAB
ANION GAP SERPL CALC-SCNC: 5 MMOL/L (ref 3–18)
BUN SERPL-MCNC: 29 MG/DL (ref 7–18)
BUN/CREAT SERPL: 17 (ref 12–20)
CALCIUM SERPL-MCNC: 8.4 MG/DL (ref 8.5–10.1)
CHLORIDE SERPL-SCNC: 109 MMOL/L (ref 100–108)
CO2 SERPL-SCNC: 27 MMOL/L (ref 21–32)
CREAT SERPL-MCNC: 1.67 MG/DL (ref 0.6–1.3)
EST. AVERAGE GLUCOSE BLD GHB EST-MCNC: 154 MG/DL
GLUCOSE SERPL-MCNC: 136 MG/DL (ref 74–99)
HBA1C MFR BLD: 7 % (ref 4.2–5.6)
POTASSIUM SERPL-SCNC: 5 MMOL/L (ref 3.5–5.5)
SODIUM SERPL-SCNC: 141 MMOL/L (ref 136–145)
T4 FREE SERPL-MCNC: 0.7 NG/DL (ref 0.7–1.5)
TSH SERPL-ACNC: 7.36 UIU/ML (ref 0.36–3.74)

## 2019-07-02 PROCEDURE — 84439 ASSAY OF FREE THYROXINE: CPT

## 2019-07-02 PROCEDURE — 84443 ASSAY THYROID STIM HORMONE: CPT

## 2019-07-02 PROCEDURE — 80048 BASIC METABOLIC PNL TOTAL CA: CPT

## 2019-07-02 PROCEDURE — 83036 HEMOGLOBIN GLYCOSYLATED A1C: CPT

## 2019-07-02 PROCEDURE — 82043 UR ALBUMIN QUANTITATIVE: CPT

## 2019-07-03 LAB
CREAT UR-MCNC: 121 MG/DL (ref 30–125)
MICROALBUMIN UR-MCNC: 7.57 MG/DL (ref 0–3)
MICROALBUMIN/CREAT UR-RTO: 63 MG/G (ref 0–30)

## 2019-07-03 NOTE — PROGRESS NOTES
Sintia Reese 1950 came in to have blood drawn. Name/ verified. Venipuncture performed on right arm. Pt tolerated it well.      Jeni Hackett LPN

## 2019-07-09 ENCOUNTER — OFFICE VISIT (OUTPATIENT)
Dept: FAMILY MEDICINE CLINIC | Age: 69
End: 2019-07-09

## 2019-07-09 VITALS
SYSTOLIC BLOOD PRESSURE: 100 MMHG | OXYGEN SATURATION: 96 % | HEART RATE: 83 BPM | BODY MASS INDEX: 32.32 KG/M2 | WEIGHT: 182.4 LBS | HEIGHT: 63 IN | DIASTOLIC BLOOD PRESSURE: 60 MMHG | TEMPERATURE: 97.6 F | RESPIRATION RATE: 14 BRPM

## 2019-07-09 VITALS
WEIGHT: 182 LBS | RESPIRATION RATE: 14 BRPM | SYSTOLIC BLOOD PRESSURE: 100 MMHG | BODY MASS INDEX: 32.25 KG/M2 | OXYGEN SATURATION: 96 % | TEMPERATURE: 97.6 F | HEART RATE: 83 BPM | DIASTOLIC BLOOD PRESSURE: 60 MMHG | HEIGHT: 63 IN

## 2019-07-09 DIAGNOSIS — Z13.39 SCREENING FOR ALCOHOLISM: ICD-10-CM

## 2019-07-09 DIAGNOSIS — E11.29 TYPE 2 DIABETES MELLITUS WITH MICROALBUMINURIA, WITH LONG-TERM CURRENT USE OF INSULIN (HCC): Primary | ICD-10-CM

## 2019-07-09 DIAGNOSIS — R80.9 TYPE 2 DIABETES MELLITUS WITH MICROALBUMINURIA, WITH LONG-TERM CURRENT USE OF INSULIN (HCC): Primary | ICD-10-CM

## 2019-07-09 DIAGNOSIS — Z79.4 TYPE 2 DIABETES MELLITUS WITH MICROALBUMINURIA, WITH LONG-TERM CURRENT USE OF INSULIN (HCC): Primary | ICD-10-CM

## 2019-07-09 DIAGNOSIS — N18.30 CKD (CHRONIC KIDNEY DISEASE) STAGE 3, GFR 30-59 ML/MIN (HCC): ICD-10-CM

## 2019-07-09 DIAGNOSIS — E78.5 HYPERLIPIDEMIA, UNSPECIFIED HYPERLIPIDEMIA TYPE: ICD-10-CM

## 2019-07-09 DIAGNOSIS — I10 ESSENTIAL HYPERTENSION: ICD-10-CM

## 2019-07-09 DIAGNOSIS — F33.42 RECURRENT MAJOR DEPRESSIVE DISORDER, IN FULL REMISSION (HCC): ICD-10-CM

## 2019-07-09 DIAGNOSIS — Z00.00 MEDICARE ANNUAL WELLNESS VISIT, SUBSEQUENT: ICD-10-CM

## 2019-07-09 DIAGNOSIS — E03.9 ACQUIRED HYPOTHYROIDISM: ICD-10-CM

## 2019-07-09 RX ORDER — SIMVASTATIN 40 MG/1
40 TABLET, FILM COATED ORAL
Qty: 90 TAB | Refills: 4 | Status: SHIPPED | OUTPATIENT
Start: 2019-07-09 | End: 2020-05-08 | Stop reason: SDUPTHER

## 2019-07-09 RX ORDER — SERTRALINE HYDROCHLORIDE 50 MG/1
50 TABLET, FILM COATED ORAL DAILY
Qty: 90 TAB | Refills: 4 | Status: SHIPPED | OUTPATIENT
Start: 2019-07-09 | End: 2020-05-08

## 2019-07-09 RX ORDER — LEVOTHYROXINE SODIUM 25 UG/1
25 TABLET ORAL
Qty: 90 TAB | Refills: 1 | Status: SHIPPED | OUTPATIENT
Start: 2019-07-09 | End: 2019-12-03 | Stop reason: SDUPTHER

## 2019-07-09 RX ORDER — NIFEDIPINE 30 MG/1
30 TABLET, FILM COATED, EXTENDED RELEASE ORAL DAILY
Qty: 90 TAB | Refills: 4 | Status: SHIPPED | OUTPATIENT
Start: 2019-07-09 | End: 2019-11-12 | Stop reason: SDUPTHER

## 2019-07-09 NOTE — PROGRESS NOTES
Georgi Cohen is a 70 y.o. female who was seen in clinic today (7/9/2019). Assessment & Plan:       ICD-10-CM ICD-9-CM    1. Type 2 diabetes mellitus with microalbuminuria, with long-term current use of insulin ([facility]) K02.45 340.07 METABOLIC PANEL, BASIC    T37.1 791.0 HEMOGLOBIN A1C WITH EAG    Z79.4 V58.67 LIPID PANEL W/ REFLX DIRECT LDL   2. Acquired hypothyroidism E03.9 244.9 levothyroxine (SYNTHROID) 25 mcg tablet      TSH W/ REFLX FREE T4 IF ABNORMAL   3. Essential hypertension I10 401.9 NIFEdipine ER (ADALAT CC) 30 mg ER tablet   4. CKD (chronic kidney disease) stage 3, GFR 30-59 ml/min ([facility]) M84.5 022.4 METABOLIC PANEL, BASIC   5. Hyperlipidemia, unspecified hyperlipidemia type E78.5 272.4 simvastatin (ZOCOR) 40 mg tablet   6. Mild episode of recurrent major depressive disorder ([facility]) F33.0 296.31 sertraline (ZOLOFT) 50 mg tablet       DM2: Well controlled, continue present management      Relatively hypotensive now, presumably now that it's not driven by excessive thyroid replacement. Decreasing CCB  Hopefully will find that better renal perfusion improves renal function. Hydration also encouraged to that end    Will remain vigilant re potential impact of GLP1 agonist, but her DM control has done so well with it, I'm loathe to change it. MDD: Well controlled, continue present management    Hypothyroidism: Uncontrolled. Resuming replacement at much lower dose with anticipated improvement in constipation and energy and make it easier to continue to tackle her obesity. Obesity: Great job improving diet. Continue focus on real food, plant based diet, not too much. Encouraged regular exercise    Follow-up and Dispositions    · Return in about 3 months (around 10/9/2019) for thyroid, BP and kidney follow up, non fasting labs 1 week prior, (30), separate visit  for knee pain. Subjective:    Georgi Cohen was seen today for Diabetes    Follow up DM2   Key Antihyperglycemic Medications             insulin glargine (LANTUS SOLOSTAR U-100 INSULIN) 100 unit/mL (3 mL) inpn (Taking) 30 Units by SubCUTAneous route daily. insulin aspart U-100 (NOVOLOG) 100 unit/mL inpn (Taking) 5 Units daily (with breakfast) AND 8 Units daily (with dinner). dulaglutide (TRULICITY) 3.90 QQ/3.5 mL sub-q pen (Taking) 0.5 mL by SubCUTAneous route every seven (7) days.         Doing well with diet: fresh fruits and vegetables          Diabetic Foot and Eye Exam HM Status   Topic Date Due    Diabetic Foot Care  05/07/2019    Eye Exam  09/07/2019         Lab Results   Component Value Date/Time    Hemoglobin A1c 7.0 (H) 07/02/2019 08:55 AM    Hemoglobin A1c 7.3 (H) 03/21/2019 08:55 AM    Hemoglobin A1c 8.4 (H) 11/19/2018 09:06 AM    Hemoglobin A1c, External 11.1 03/21/2016    Glucose 136 (H) 07/02/2019 08:55 AM    Glucose,  (H) 01/09/2019 10:30 AM    Microalbumin/Creat ratio (mg/g creat) 63 (H) 07/02/2019 08:55 AM    Microalbumin,urine random 7.57 (H) 07/02/2019 08:55 AM    LDL, calculated 84.2 07/30/2018 08:48 AM    Creatinine, POC 1.6 (H) 01/09/2019 10:30 AM    Creatinine 1.67 (H) 07/02/2019 08:55 AM       Magnesium   Date Value Ref Range Status   07/30/2018 2.0 1.6 - 2.6 mg/dL Final     Lab Results   Component Value Date/Time    Vitamin B12 263 07/30/2018 08:48 AM    Folate 19.6 (H) 07/30/2018 08:48 AM     FU hypothyroidism: replacement completely suspended in May due to continued TSH suppression    FU MDD on SSRI: asymptomatic  3 most recent PHQ Screens 7/9/2019   PHQ Not Done -   Little interest or pleasure in doing things Not at all   Feeling down, depressed, irritable, or hopeless Not at all   Total Score PHQ 2 0   Trouble falling or staying asleep, or sleeping too much Several days   Feeling tired or having little energy Nearly every day   Poor appetite, weight loss, or overeating Not at all   Feeling bad about yourself - or that you are a failure or have let yourself or your family down Not at all   Trouble concentrating on things such as school, work, reading, or watching TV Not at all   Moving or speaking so slowly that other people could have noticed; or the opposite being so fidgety that others notice Not at all   Thoughts of being better off dead, or hurting yourself in some way Not at all   PHQ 9 Score 4   How difficult have these problems made it for you to do your work, take care of your home and get along with others Not difficult at all         Results for orders placed or performed during the hospital encounter of 07/02/19   TSH W/ REFLX FREE T4 IF ABNORMAL   Result Value Ref Range    TSH 7.36 (H) 0.36 - 3.74 uIU/mL   HEMOGLOBIN A1C WITH EAG   Result Value Ref Range    Hemoglobin A1c 7.0 (H) 4.2 - 5.6 %    Est. average glucose 115 mg/dL   METABOLIC PANEL, BASIC   Result Value Ref Range    Sodium 141 136 - 145 mmol/L    Potassium 5.0 3.5 - 5.5 mmol/L    Chloride 109 (H) 100 - 108 mmol/L    CO2 27 21 - 32 mmol/L    Anion gap 5 3.0 - 18 mmol/L    Glucose 136 (H) 74 - 99 mg/dL    BUN 29 (H) 7.0 - 18 MG/DL    Creatinine 1.67 (H) 0.6 - 1.3 MG/DL    BUN/Creatinine ratio 17 12 - 20      GFR est AA 37 (L) >60 ml/min/1.73m2    GFR est non-AA 30 (L) >60 ml/min/1.73m2    Calcium 8.4 (L) 8.5 - 10.1 MG/DL   MICROALBUMIN, UR, RAND W/ MICROALB/CREAT RATIO   Result Value Ref Range    Microalbumin,urine random 7.57 (H) 0 - 3.0 MG/DL    Creatinine, urine 121.00 30 - 125 mg/dL    Microalbumin/Creat ratio (mg/g creat) 63 (H) 0 - 30 mg/g   T4, FREE   Result Value Ref Range    T4, Free 0.7 0.7 - 1.5 NG/DL        Outpatient Medications Marked as Taking for the 7/9/19 encounter (Office Visit) with Rhonda Gaxiola MD   Medication Sig Dispense Refill    psyllium seed, with dextrose, (FIBER PO) Take  by mouth.  insulin glargine (LANTUS SOLOSTAR U-100 INSULIN) 100 unit/mL (3 mL) inpn 30 Units by SubCUTAneous route daily.  5 Pen 2    insulin aspart U-100 (NOVOLOG) 100 unit/mL inpn 5 Units daily (with breakfast) AND 8 Units daily (with dinner). 5 Pen 2    NIFEdipine ER (ADALAT CC) 60 mg ER tablet Take 1 Tab by mouth daily. 90 Tab 4    GINGER OIL, BULK, by Does Not Apply route. Using topical for Vertigo      olmesartan (BENICAR) 40 mg tablet Take 1 Tab by mouth daily. 90 Tab 4    glucose blood VI test strips (ACCU-CHEK CODY PLUS TEST STRP) strip Patient is to check blood sugar 3-4 times a day 300 Strip 11    cholecalciferol, VITAMIN D3, (VITAMIN D3) 5,000 unit tab tablet Take  by mouth daily.  acetaminophen (TYLENOL EXTRA STRENGTH) 500 mg tablet Take  by mouth every six (6) hours as needed for Pain.  dulaglutide (TRULICITY) 0.63 JA/2.4 mL sub-q pen 0.5 mL by SubCUTAneous route every seven (7) days. 12 Pen 4    esomeprazole (NEXIUM) 20 mg capsule Take  by mouth.  sertraline (ZOLOFT) 50 mg tablet Take 1 Tab by mouth daily. 90 Tab 4    simvastatin (ZOCOR) 40 mg tablet Take 1 Tab by mouth nightly. 90 Tab 4    Blood-Glucose Meter (ACCU-CHEK CODY PLUS METER) misc 1 Device by Does Not Apply route four (4) times daily.  1 Each 0    BLADDER CONTROL PADS pads       ALCOHOL PREP PADS padm          Patient Active Problem List    Diagnosis    Diarrhea    Class 1 obesity due to excess calories with serious comorbidity and body mass index (BMI) of 31.0 to 31.9 in adult    Vitamin D deficiency    Osteopenia of multiple sites     7/2018      Moderate major depression ([address]    Type 2 diabetes mellitus with microalbuminuria, with long-term current use of insulin ([address]    Family history of malignant neoplasm of breast    Diabetic nephropathy associated with type 2 diabetes mellitus ([address]    Lump or mass in breast    Essential hypertension    Acquired hypothyroidism    Hiatal hernia     EGD 11/14/2017 Krystle Avila MD \"prn acid suppression\"      Hyperlipidemia    Migraines         Allergies   Allergen Reactions    Darvocet A500 [Propoxyphene N-Acetaminophen] Nausea and Vomiting    Gloves, Latex With Aloe Vera Contact Dermatitis     Rash from powder inside latex gloves      Metformin Diarrhea         Patient Care Team:  Chyna Merrill MD as PCP - General (Family Practice)  Antonia Felix MD (Gastroenterology)  Celia Hernandez (Ophthalmology)    The following sections were reviewed & updated as appropriate: PMH, PSH, FH, and SH. Review of Systems   Constitutional: Positive for malaise/fatigue. Respiratory: Negative for shortness of breath. Cardiovascular: Negative for chest pain. Gastrointestinal: Positive for constipation. Neurological: Negative for sensory change, speech change, focal weakness and headaches. Objective:     Visit Vitals  /60   Pulse 83   Temp 97.6 °F (36.4 °C) (Oral)   Resp 14   Ht 5' 3\" (1.6 m)   Wt 182 lb (82.6 kg)   LMP 11/17/1972 Comment: partial   SpO2 96%   BMI 32.24 kg/m²      Physical Exam   Constitutional: She is oriented to person, place, and time. She appears well-developed. No distress. Pleasant obese white female   HENT:   Head: Normocephalic and atraumatic. Pulmonary/Chest: Effort normal.   Neurological: She is alert and oriented to person, place, and time. Psychiatric: She has a normal mood and affect. Her behavior is normal. Judgment and thought content normal.   Diabetic foot exam:     Left Foot:   Visual Exam: normal    Pulse DP: 2+ (normal)   Filament test: normal sensation          Right Foot:   Visual Exam: normal    Pulse DP: 2+ (normal)   Filament test: normal sensation          Disclaimer: The patient understands our medical plan. Alternatives have been explained and offered. The risks, benefits and significant side effects of all medications have been reviewed. Anticipated time course and progression of condition reviewed. All questions have been addressed. She is encouraged to employ the information provided in the after visit summary, which was reviewed.       Where applicable, she is instructed to call the clinic if she has not been notified either by phone or through [address] with the results of her tests or with an appointment plan for any referrals within 1 week(s). No news is not good news; it's no news. The patient  is to call if her condition worsens or fails to improve or if significant side effects are experienced. Aspects of this note may have been generated using voice recognition software. Despite editing, there may be unrecognized errors.        Barb Collazo MD

## 2019-07-09 NOTE — ACP (ADVANCE CARE PLANNING)
Advance Care Planning (ACP) Provider Note - Comprehensive     Date of ACP Conversation: 07/09/19  Persons included in Conversation:  patient  Length of ACP Conversation in minutes: <16 minutes (Non-Billable)    Authorized Decision Maker (if patient is incapable of making informed decisions): This person is: Other Legally Authorized Decision Maker (e.g. Next of Kin)    Daughter: Kaley Clinton    She has NO advanced directive  - add't info provided. General ACP for ALL Patients with Decision Making Capacity:  Importance of advance care planning, including choosing a healthcare agent to communicate patient's healthcare decisions if patient lost the ability to make decisions, such as after a sudden illness or accident  Understanding of the healthcare agent role was assessed and information provided  Opportunity offered to explore how cultural, Samaritan, spiritual, or personal beliefs would affect decisions for future care  Exploration of values, goals, and preferences if recovery is not expected, even with continued medical treatment in the event of: Imminent death or severe, permanent brain injury: \"In these circumstances, what matters most to you? \"  Care focused more on comfort or quality of life.         Interventions Provided:  Recommended communicating the plan and making copies for the healthcare agent, personal physician, and others as appropriate (e.g., health system)  Recommended review of completed ACP document annually or upon change in health status

## 2019-07-09 NOTE — PROGRESS NOTES
This is the Subsequent Medicare Annual Wellness Exam, performed 12 months or more after the Initial AWV or the last Subsequent AWV    I have reviewed the patient's medical history in detail and updated the computerized patient record. History     Past Medical History:   Diagnosis Date    Depression     Diabetes ([address]     GERD (gastroesophageal reflux disease)     Hiatal hernia     Hypercholesterolemia     Hypertension     Migraines     Recurrent UTI     VCUG reportedly nl 90s, +/- pyelo, no hospitalizations, no surgeries, no scopes    Thyroid disease       Past Surgical History:   Procedure Laterality Date    HX BREAST BIOPSY  1993    right breast, benign    HX HYSTERECTOMY      HX MOHS PROCEDURES Right 09/2001    HX OTHER SURGICAL  2001    HEAD LACERATION DUE TO A FALL    HX PARTIAL HYSTERECTOMY  11/17/1972    HX TUBAL LIGATION  11/1971     Current Outpatient Medications   Medication Sig Dispense Refill    psyllium seed, with dextrose, (FIBER PO) Take  by mouth.  insulin glargine (LANTUS SOLOSTAR U-100 INSULIN) 100 unit/mL (3 mL) inpn 30 Units by SubCUTAneous route daily. 5 Pen 2    insulin aspart U-100 (NOVOLOG) 100 unit/mL inpn 5 Units daily (with breakfast) AND 8 Units daily (with dinner). 5 Pen 2    GINGER OIL, BULK, by Does Not Apply route. Using topical for Vertigo      olmesartan (BENICAR) 40 mg tablet Take 1 Tab by mouth daily. 90 Tab 4    glucose blood VI test strips (ACCU-CHEK CODY PLUS TEST STRP) strip Patient is to check blood sugar 3-4 times a day 300 Strip 11    cholecalciferol, VITAMIN D3, (VITAMIN D3) 5,000 unit tab tablet Take  by mouth daily.  acetaminophen (TYLENOL EXTRA STRENGTH) 500 mg tablet Take  by mouth every six (6) hours as needed for Pain.  dulaglutide (TRULICITY) 0.15 AA/0.5 mL sub-q pen 0.5 mL by SubCUTAneous route every seven (7) days. 12 Pen 4    esomeprazole (NEXIUM) 20 mg capsule Take  by mouth.       sertraline (ZOLOFT) 50 mg tablet Take 1 Tab by mouth daily. 90 Tab 4    Blood-Glucose Meter (ACCU-CHEK CODY PLUS METER) misc 1 Device by Does Not Apply route four (4) times daily. 1 Each 0    BLADDER CONTROL PADS pads       ALCOHOL PREP PADS padm       simvastatin (ZOCOR) 40 mg tablet Take 1 Tab by mouth nightly. 90 Tab 4    NIFEdipine ER (ADALAT CC) 30 mg ER tablet Take 1 Tab by mouth daily. 90 Tab 4    levothyroxine (SYNTHROID) 25 mcg tablet Take 1 Tab by mouth Daily (before breakfast). 90 Tab 1    bisacodyl (DULCOLAX, BISACODYL,) 5 mg EC tablet Take as directed      polyethylene glycol (MIRALAX) 17 gram/dose powder Take as a split dose as directed.  omeprazole (PRILOSEC) 40 mg capsule Take 1 Cap by mouth daily.  90 Cap 2     Allergies   Allergen Reactions    [de-identified] A500 [Propoxyphene N-Acetaminophen] Nausea and Vomiting    Gloves, Latex With Aloe Vera Contact Dermatitis     Rash from powder inside latex gloves      Metformin Diarrhea     Family History   Problem Relation Age of Onset    Diabetes Mother     Hypertension Mother     Cancer Mother     Kidney Disease Mother     Diabetes Father     Heart Disease Father     Hypertension Father     Diabetes Maternal Grandmother     Breast Cancer Maternal Grandmother     Cancer Maternal Grandfather     Breast Cancer Maternal Aunt      Social History     Tobacco Use    Smoking status: Never Smoker    Smokeless tobacco: Never Used   Substance Use Topics    Alcohol use: No     Alcohol/week: 0.0 oz     Patient Active Problem List   Diagnosis Code    Essential hypertension I10    Acquired hypothyroidism E03.9    Hiatal hernia K44.9    Hyperlipidemia E78.5    Migraines G43.909    Lump or mass in breast N63.0    Family history of malignant neoplasm of breast Z80.3    Type 2 diabetes mellitus with microalbuminuria, with long-term current use of insulin (HCC) E11.29, R80.9, Z79.4    Diabetic nephropathy associated with type 2 diabetes mellitus ([facility] [address] E11.21    Moderate major depression ([facility] F32.1    Osteopenia of multiple sites M85.89    Vitamin D deficiency E55.9    Class 1 obesity due to excess calories with serious comorbidity and body mass index (BMI) of 31.0 to 31.9 in adult E66.09, Z68.31    Diarrhea R19.7       Depression Risk Factor Screening:    being treated, screening not indicated    Alcohol Risk Factor Screening: You do not drink alcohol or very rarely. Functional Ability and Level of Safety:   Hearing Loss  The patient needs further evaluation. Patient states she has ringing in both ears and has noticed her Television has gotten louder. Activities of Daily Living  The home contains: handrails, grab bars and Has ramp  Patient does total self care    Fall Risk  Fall Risk Assessment, last 12 mths 7/9/2019   Able to walk? Yes   Fall in past 12 months? No   TUG test 10 seconds    Abuse Screen  Patient is not abused    Cognitive Screening   Evaluation of Cognitive Function:  Has your family/caregiver stated any concerns about your memory: no  Normal      No breast mass/pain/skin changes/discharge  No abnormal vaginal bleeding/discharge/lesions, vaginal or pelvic pain/discomfort    BREAST EXAM: breasts appear normal, no suspicious masses, no skin or nipple changes or axillary nodes      Patient Care Team   Patient Care Team:  Mihir Uribe MD as PCP - General (Family Practice)  Abigail Trevino MD (Gastroenterology)  Carline Castillo (Ophthalmology)    Assessment/Plan   Education and counseling provided:  End-of-Life planning (with patient's consent)    Diagnoses and all orders for this visit:    1. Medicare annual wellness visit, subsequent    2.  Screening for alcoholism  -     DC ANNUAL ALCOHOL SCREEN 15 MIN          Health Maintenance Due   Topic Date Due    COLONOSCOPY  02/11/2019    FOOT EXAM Q1  05/07/2019    MEDICARE YEARLY EXAM  07/10/2019    LIPID PANEL Q1  07/30/2019     Wait list for colo  Referring to [facility]    Follow-up and Dispositions · Return in about 1 year (around 7/9/2020), or if symptoms worsen or fail to improve.

## 2019-07-09 NOTE — PATIENT INSTRUCTIONS
Medicare Wellness Visit, Female     The best way to live healthy is to have a lifestyle where you eat a well-balanced diet, exercise regularly, limit alcohol use, and quit all forms of tobacco/nicotine, if applicable. Regular preventive services are another way to keep healthy. Preventive services (vaccines, screening tests, monitoring & exams) can help personalize your care plan, which helps you manage your own care. Screening tests can find health problems at the earliest stages, when they are easiest to treat. [facility] follows the current, evidence-based guidelines published by the [facility] ([facility]) when recommending preventive services for our patients. Because we follow these guidelines, sometimes recommendations change over time as research supports it. (For example, mammograms used to be recommended annually. Even though Medicare will still pay for an annual mammogram, the newer guidelines recommend a mammogram every two years for women of average risk.)  Of course, you and your doctor may decide to screen more often for some diseases, based on your risk and your health status. Preventive services for you include:  - Medicare offers their members a free annual wellness visit, which is time for you and your primary care provider to discuss and plan for your preventive service needs. Take advantage of this benefit every year!  -All adults over the age of 72 should receive the recommended pneumonia vaccines. Current USPSTF guidelines recommend a series of two vaccines for the best pneumonia protection.   -All adults should have a flu vaccine yearly and a tetanus vaccine every 10 years. All adults age 61 and older should receive a shingles vaccine once in their lifetime.    -A bone mass density test is recommended when a woman turns 65 to screen for osteoporosis. This test is only recommended one time, as a screening.  Some providers will use this same test as a disease monitoring tool if you already have osteoporosis. -All adults age 38-68 who are overweight should have a diabetes screening test once every three years.   -Other screening tests and preventive services for persons with diabetes include: an eye exam to screen for diabetic retinopathy, a kidney function test, a foot exam, and stricter control over your cholesterol.   -Cardiovascular screening for adults with routine risk involves an electrocardiogram (ECG) at intervals determined by your doctor.   -Colorectal cancer screenings should be done for adults age 54-65 with no increased risk factors for colorectal cancer. There are a number of acceptable methods of screening for this type of cancer. Each test has its own benefits and drawbacks. Discuss with your doctor what is most appropriate for you during your annual wellness visit. The different tests include: colonoscopy (considered the best screening method), a fecal occult blood test, a fecal DNA test, and sigmoidoscopy. -Breast cancer screenings are recommended every other year for women of normal risk, age 54-69.  -Cervical cancer screenings for women over age 72 are only recommended with certain risk factors.   -All adults born between [facility] should be screened once for Hepatitis C. Here is a list of your current Health Maintenance items (your personalized list of preventive services) with a due date:  Health Maintenance Due   Topic Date Due    Colonoscopy  02/11/2019    Diabetic Foot Care  05/07/2019    Annual Well Visit  07/10/2019    Cholesterol Test   07/30/2019              Learning About Breast Cancer Screening  What is breast cancer screening? Breast cancer occurs when cells that are not normal grow in one or both of your breasts. Screening tests can help find breast cancer early. Cancer is easier to treat when it's found early. Having concerns about breast cancer is common.  That's why it's important to talk with your doctor about when to start and how often to get screened for breast cancer. How is breast cancer screening done? Several screening tests can be used to check for breast cancer. · Mammograms check for signs of cancer using X-rays. They can show tumors that are too small for you or your doctor to feel. During a mammogram, a machine squeezes your breasts to make them flatter and easier to X-ray. At least two pictures are taken of each breast. One is taken from the top and one from the side. · 3-D mammograms are also called digital breast tomosynthesis. Your breast is positioned on a flat plate. A top plate is pressed against your breast to keep it in position. The X-ray arm then moves in an arc above the breast and takes many pictures. A computer uses these X-rays to create a three-dimensional image. · Clinical breast exams are a doctor's exam. Your doctor carefully feels your breasts and under your arms to check for lumps or other changes. After the screening, your doctor will tell you the results. You will also be told if you need any follow-up tests. When should you get screened? Talk with your doctor about when you should start being tested for breast cancer. How often you get tested and the kind of tests you get will depend on your age and your risk. The guidelines that follow are for women who have an average risk for breast cancer. If you have a higher risk for breast cancer, such as having a family history of breast cancer in multiple relatives or at a young age, your doctor may recommend different screening for you. · Ages 21 to 44: Some experts recommend that women have a clinical breast exam every 3 years, starting at age 21. Ask your doctor how often you should have this test. If you have a high risk for breast cancer, talk with your doctor about when to start yearly mammograms and other screening tests.   · Ages 36 and older: Talk with your doctor about how often you should have mammograms and clinical breast exams. What is your risk for breast cancer? If you don't already know your risk of breast cancer, you can ask your doctor about it. You can also look it up at www.cancer.gov/bcrisktool/. If your doctor says that you have a high or very high risk, ask about ways to reduce your risk. These could include getting extra screening, taking medicine, or having surgery. If you have a strong family history of breast cancer, ask your doctor about genetic testing. What steps can you take to stay healthy? Some things that increase your risk of breast cancer, such as your age and being female, cannot be controlled. But you can do some things to stay as healthy as you can. · Learn what your breasts normally look and feel like. If you notice any changes, tell your doctor. · Drink alcohol wisely. Your risk goes up the more you drink. For the best health, women should have no more than 1 drink a day or 7 drinks a week. · If you smoke, quit. When you quit smoking, you lower your chances of getting many types of cancer. You can also do your best to eat well, be active, and stay at a healthy weight. Eating healthy foods and being active every day, as well as staying at a healthy weight, may help prevent cancer. Where can you learn more? Go to http://chasidy-ena.info/. Enter F720 in the search box to learn more about \"Learning About Breast Cancer Screening. \"  Current as of: March 28, 2018  Content Version: 11.8  © 0799-3829 [employer], [employer]. Care instructions adapted under license by [employer] (which disclaims liability or warranty for this information). If you have questions about a medical condition or this instruction, always ask your healthcare professional. [place] [address] any warranty or liability for your use of this information.            Advance Care Planning: Care Instructions  Your Care Instructions  It can be hard to live with an illness that cannot be cured. But if your health is getting worse, you may want to make decisions about end-of-life care. Planning for the end of your life does not mean that you are giving up. It is a way to make sure that your wishes are met. Clearly stating your wishes can make it easier for your loved ones. Making plans while you are still able may also ease your mind and make your final days less stressful and more meaningful. Follow-up care is a key part of your treatment and safety. Be sure to make and go to all appointments, and call your doctor if you are having problems. It's also a good idea to know your test results and keep a list of the medicines you take. What can you do to plan for the end of life? · You can bring these issues up with your doctor. You do not need to wait until your doctor starts the conversation. You might start with \"I would not be willing to live with . Nisreen Gutierrez Nisreen Gutierrez Nisreen Gutierrez \" When you complete this sentence it helps your doctor understand your wishes. · Talk openly and honestly with your doctor. This is the best way to understand the decisions you will need to make as your health changes. Know that you can always change your mind. · Ask your doctor about commonly used life-support measures. These include tube feedings, breathing machines, and fluids given through a vein (IV). Understanding these treatments will help you decide whether you want them. · You may choose to have these life-supporting treatments for a limited time. This allows a trial period to see whether they will help you. You may also decide that you want your doctor to take only certain measures to keep you alive. It is important to spell out these conditions so that your doctor and family understand them. · Talk to your doctor about how long you are likely to live. He or she may be able to give you an idea of what usually happens with your specific illness. · Think about preparing papers that state your wishes.  This way there will not be any confusion about what you want. You can change your instructions at any time. Which papers should you prepare? Advance directives are legal papers that tell doctors how you want to be cared for at the end of your life. You do not need a  to write these papers. Ask your doctor or your [facility] department for information on how to write your advance directives. They may have the forms for each of these types of papers. Make sure your doctor has a copy of these on file, and give a copy to a family member or close friend. · Consider a do-not-resuscitate order (DNR). This order asks that no extra treatments be done if your heart stops or you stop breathing. Extra treatments may include cardiopulmonary resuscitation (CPR), electrical shock to restart your heart, or a machine to breathe for you. If you decide to have a DNR order, ask your doctor to explain and write it. Place the order in your home where everyone can easily see it. · Consider a living will. A living will explains your wishes about life support and other treatments at the end of your life if you become unable to speak for yourself. Living finch tell doctors to use or not use treatments that would keep you alive. You must have one or two witnesses or a notary present when you sign this form. · Consider a durable power of  for health care. This allows you to name a person to make decisions about your care if you are not able to. Most people ask a close friend or family member. Talk to this person about the kinds of treatments you want and those that you do not want. Make sure this person understands your wishes. These legal papers are simple to change. Tell your doctor what you want to change, and ask him or her to make a note in your medical file. Give your family updated copies of the papers. Where can you learn more? Go to http://chasidy-ena.info/.   Enter P184 in the search box to learn more about \"Advance Care Planning: Care Instructions. \"  Current as of: November 17, 2016  Content Version: 11.3  © 5666-0253 [employer]. Care instructions adapted under license by [employer] (which disclaims liability or warranty for this information). If you have questions about a medical condition or this instruction, always ask your healthcare professional. [address] any warranty or liability for your use of this information. Well Visit, Over 72: Care Instructions  Your Care Instructions    Physical exams can help you stay healthy. Your doctor has checked your overall health and may have suggested ways to take good care of yourself. He or she also may have recommended tests. At home, you can help prevent illness with healthy eating, regular exercise, and other steps. Follow-up care is a key part of your treatment and safety. Be sure to make and go to all appointments, and call your doctor if you are having problems. It's also a good idea to know your test results and keep a list of the medicines you take. How can you care for yourself at home? · Reach and stay at a healthy weight. This will lower your risk for many problems, such as obesity, diabetes, heart disease, and high blood pressure. · Get at least 30 minutes of exercise on most days of the week. Walking is a good choice. You also may want to do other activities, such as running, swimming, cycling, or playing tennis or team sports. · Do not smoke. Smoking can make health problems worse. If you need help quitting, talk to your doctor about stop-smoking programs and medicines. These can increase your chances of quitting for good. · Protect your skin from too much sun. When you're outdoors from 10 a.m. to 4 p.m., stay in the shade or cover up with clothing and a hat with a wide brim. Wear sunglasses that block UV rays. Even when it's cloudy, put broad-spectrum sunscreen (SPF 30 or higher) on any exposed skin.   · See a dentist one or two times a year for checkups and to have your teeth cleaned. · Wear a seat belt in the car. · Limit alcohol to 2 drinks a day for men and 1 drink a day for women. Too much alcohol can cause health problems. Follow your doctor's advice about when to have certain tests. These tests can spot problems early. For men and women  · Cholesterol. Your doctor will tell you how often to have this done based on your overall health and other things that can increase your risk for heart attack and stroke. · Blood pressure. Have your blood pressure checked during a routine doctor visit. Your doctor will tell you how often to check your blood pressure based on your age, your blood pressure results, and other factors. · Diabetes. Ask your doctor whether you should have tests for diabetes. · Vision. Experts recommend that you have yearly exams for glaucoma and other age-related eye problems. · Hearing. Tell your doctor if you notice any change in your hearing. You can have tests to find out how well you hear. · Colon cancer tests. Keep having colon cancer tests as your doctor recommends. You can have one of several types of tests. · Heart attack and stroke risk. At least every 4 to 6 years, you should have your risk for heart attack and stroke assessed. Your doctor uses factors such as your age, blood pressure, cholesterol, and whether you smoke or have diabetes to show what your risk for a heart attack or stroke is over the next 10 years. · Osteoporosis. Talk to your doctor about whether you should have a bone density test to find out whether you have thinning bones. Also ask your doctor about whether you should take calcium and vitamin D supplements. For women  · Pap test and pelvic exam. You may no longer need a Pap test. Talk with your doctor about whether to stop or continue to have Pap tests. · Breast exam and mammogram. Ask how often you should have a mammogram, which is an X-ray of your breasts.  A mammogram can spot breast cancer before it can be felt and when it is easiest to treat. · Thyroid disease. Talk to your doctor about whether to have your thyroid checked as part of a regular physical exam. Women have an increased chance of a thyroid problem. For men  · Prostate exam. Talk to your doctor about whether you should have a blood test (called a PSA test) for prostate cancer. Experts disagree on whether men should have this test. Some experts recommend that you discuss the benefits and risks of the test with your doctor. · Abdominal aortic aneurysm. Ask your doctor whether you should have a test to check for an aneurysm. You may need a test if you ever smoked or if your parent, brother, sister, or child has had an aneurysm. When should you call for help? Watch closely for changes in your health, and be sure to contact your doctor if you have any problems or symptoms that concern you. Where can you learn more? Go to http://chasidy-ena.info/. Enter G614 in the search box to learn more about \"Well Visit, Over 65: Care Instructions. \"  Current as of: March 28, 2018  Content Version: 11.9  © 4847-9074 [employer], [employer]. Care instructions adapted under license by [employer] (which disclaims liability or warranty for this information). If you have questions about a medical condition or this instruction, always ask your healthcare professional. [address] any warranty or liability for your use of this information.

## 2019-07-09 NOTE — PROGRESS NOTES
Patient here for 2 month follow up on her DM. 1. Have you been to the ER, urgent care clinic since your last visit? Hospitalized since your last visit? No  2. Have you seen or consulted any other health care providers outside of the [address] [place] [address] since your last visit? Include any pap smears or colon screening. Patient says she had her mammogram done at [facility] last month. Medication reconciliation has been completed with patient. Care team discussed/updated as well as pharmacy. Health Maintenance reviewed - Due for Foot exam..

## 2019-07-16 ENCOUNTER — OFFICE VISIT (OUTPATIENT)
Dept: FAMILY MEDICINE CLINIC | Age: 69
End: 2019-07-16

## 2019-07-16 ENCOUNTER — HOSPITAL ENCOUNTER (OUTPATIENT)
Dept: GENERAL RADIOLOGY | Age: 69
Discharge: HOME | End: 2019-07-16
Payer: MEDICARE

## 2019-07-16 VITALS
DIASTOLIC BLOOD PRESSURE: 70 MMHG | HEIGHT: 64 IN | TEMPERATURE: 97.7 F | RESPIRATION RATE: 14 BRPM | OXYGEN SATURATION: 96 % | WEIGHT: 181.4 LBS | HEART RATE: 87 BPM | BODY MASS INDEX: 30.97 KG/M2 | SYSTOLIC BLOOD PRESSURE: 120 MMHG

## 2019-07-16 DIAGNOSIS — M25.561 CHRONIC PAIN OF RIGHT KNEE: Primary | ICD-10-CM

## 2019-07-16 DIAGNOSIS — G89.29 CHRONIC PAIN OF RIGHT KNEE: Primary | ICD-10-CM

## 2019-07-16 DIAGNOSIS — M25.561 CHRONIC PAIN OF RIGHT KNEE: ICD-10-CM

## 2019-07-16 DIAGNOSIS — L82.1 SEBORRHEIC KERATOSIS: ICD-10-CM

## 2019-07-16 DIAGNOSIS — G89.29 CHRONIC PAIN OF RIGHT KNEE: ICD-10-CM

## 2019-07-16 PROCEDURE — 73564 X-RAY EXAM KNEE 4 OR MORE: CPT

## 2019-07-16 NOTE — PROGRESS NOTES
Verónica Torres is a 70 y.o. female who was seen in clinic today (7/16/2019). Assessment & Plan:       ICD-10-CM ICD-9-CM    1. Chronic pain of right knee M25.561 719.46 XR KNEE RT MIN 4 V    G89.29 338.29 REFERRAL TO PHYSICAL THERAPY   2. Seborrheic keratosis L82.1 702.19 DESTRUC BENIGN LESION, UP TO 14 LESIONS           We discussed this procedure, including option of not performing surgery, technique of surgery and potential for scarring    Procedure Note:   cryotherapy with liquid nitrogen was performed. Wound care instructions were provided. Be alert for any signs of cutaneous infection. The procedure was well tolerated without complications. Follow up: the patient may return prn. Sun protection recommended        Follow-up and Dispositions    · Return in about 3 months (around 10/16/2019) for knee pain if needed. Subjective: Verónica Torres was seen today for Knee Pain (Right)      complains of anterior right knee pain x few months    fell about 4 years ago landing on anterior right knee, did not have any pain at the time. \"knot\" on her right knee x 1 year at site of blunt trauma. Gradual discomfort x few months, triggered by ambulation or prolonged standing, rarely at rest.      Also with patellar subluxation in recent months. Spontaneous reduction. Also complains of itchy skin lesion near bra strap right back. Lesion present for uncertain duration. Itchy x few weeks. Outpatient Medications Marked as Taking for the 7/16/19 encounter (Office Visit) with Jesse Banuelos MD   Medication Sig Dispense Refill    simvastatin (ZOCOR) 40 mg tablet Take 1 Tab by mouth nightly. 90 Tab 4    NIFEdipine ER (ADALAT CC) 30 mg ER tablet Take 1 Tab by mouth daily. 90 Tab 4    levothyroxine (SYNTHROID) 25 mcg tablet Take 1 Tab by mouth Daily (before breakfast). 90 Tab 1    sertraline (ZOLOFT) 50 mg tablet Take 1 Tab by mouth daily.  90 Tab 4    psyllium seed, with dextrose, (FIBER PO) Take  by mouth.  insulin glargine (LANTUS SOLOSTAR U-100 INSULIN) 100 unit/mL (3 mL) inpn 30 Units by SubCUTAneous route daily. 5 Pen 2    insulin aspart U-100 (NOVOLOG) 100 unit/mL inpn 5 Units daily (with breakfast) AND 8 Units daily (with dinner). 5 Pen 2    olmesartan (BENICAR) 40 mg tablet Take 1 Tab by mouth daily. 90 Tab 4    glucose blood VI test strips (ACCU-CHEK CODY PLUS TEST STRP) strip Patient is to check blood sugar 3-4 times a day 300 Strip 11    cholecalciferol, VITAMIN D3, (VITAMIN D3) 5,000 unit tab tablet Take  by mouth daily.  dulaglutide (TRULICITY) 3.11 PE/2.6 mL sub-q pen 0.5 mL by SubCUTAneous route every seven (7) days. 12 Pen 4    esomeprazole (NEXIUM) 20 mg capsule Take  by mouth.  Blood-Glucose Meter (ACCU-CHEK CODY PLUS METER) misc 1 Device by Does Not Apply route four (4) times daily. 1 Each 0    BLADDER CONTROL PADS pads       ALCOHOL PREP PADS padm       omeprazole (PRILOSEC) 40 mg capsule Take 1 Cap by mouth daily.  90 Cap 2       Patient Active Problem List    Diagnosis    CKD (chronic kidney disease) stage 3, GFR 30-59 ml/min ([facility])    Diarrhea    Class 1 obesity due to excess calories with serious comorbidity and body mass index (BMI) of 31.0 to 31.9 in adult    Vitamin D deficiency    Osteopenia of multiple sites     7/2018      Moderate major depression (HCC)    Type 2 diabetes mellitus with microalbuminuria, with long-term current use of insulin ([address]    Family history of malignant neoplasm of breast    Diabetic nephropathy associated with type 2 diabetes mellitus ([address]    Lump or mass in breast    Essential hypertension    Acquired hypothyroidism    Hiatal hernia     EGD 11/14/2017 Gabrielle Tavarez MD \"prn acid suppression\"      Hyperlipidemia    Migraines         Allergies   Allergen Reactions    Darvocet A500 [Propoxyphene N-Acetaminophen] Nausea and Vomiting    Gloves, Latex With Aloe Vera Contact Dermatitis     Rash from powder inside latex gloves      Metformin Diarrhea         Patient Care Team:  Imelda Caceres MD as PCP - General (Family Practice)  Anita Alegre MD (Gastroenterology)  Josiah Messer (Ophthalmology)    The following sections were reviewed & updated as appropriate: PMH, PSH, FH, and SH. Review of Systems   Musculoskeletal:        No locking or instability  No redness, swelling or heat   Skin:        No bleeding           Objective:     Visit Vitals  /70   Pulse 87   Temp 97.7 °F (36.5 °C) (Oral)   Resp 14   Ht 5' 4\" (1.626 m)   Wt 181 lb 6.4 oz (82.3 kg)   LMP 11/17/1972 Comment: partial   SpO2 96%   BMI 31.14 kg/m²      Physical Exam   Constitutional: She is oriented to person, place, and time. She appears well-developed. No distress. Pleasant obese white female   HENT:   Head: Normocephalic and atraumatic. Pulmonary/Chest: Effort normal.   Musculoskeletal:        Right knee: She exhibits deformity (bony enlargement prox lateral aspect of patella at reported site remote trauma). She exhibits normal range of motion, no effusion, no ecchymosis, no erythema and no bony tenderness. No tenderness found. No medial joint line, no lateral joint line, no MCL, no LCL and no patellar tendon tenderness noted. +patellar apprehension with mild lateral retropatellar tenderness   Neurological: She is alert and oriented to person, place, and time. Skin: Skin is warm and dry. Lesion noted. Rash: right thoracic near bra strap, light brown 5x8 cm bumpy surface stuck on. Psychiatric: She has a normal mood and affect. Her behavior is normal. Judgment and thought content normal.         Disclaimer: The patient understands our medical plan. Alternatives have been explained and offered. The risks, benefits and significant side effects of all medications have been reviewed. Anticipated time course and progression of condition reviewed. All questions have been addressed.   She is encouraged to employ the information provided in the after visit summary, which was reviewed. Where applicable, she is instructed to call the clinic if she has not been notified either by phone or through [address] with the results of her tests or with an appointment plan for any referrals within 1 week(s). No news is not good news; it's no news. The patient  is to call if her condition worsens or fails to improve or if significant side effects are experienced. Aspects of this note may have been generated using voice recognition software. Despite editing, there may be unrecognized errors.        Mukesh Houston MD

## 2019-07-16 NOTE — PROGRESS NOTES
Patient c/o right knee pain she states she fell about 2 years ago landing on her right knee she states she did not have any pain at the time of her fall in her right knee. She has noticed a \"kont\" on her right knee x 1 year that has recently began to cause her some discomfort. She says she most of the time when she is walking but does have pain at rest. She also has a spot on her back she would like Dr. Caden Carson to look at today for possible referral to Dermatology. 1. Have you been to the ER, urgent care clinic since your last visit? Hospitalized since your last visit? No  2. Have you seen or consulted any other health care providers outside of the [address] [place] [address] since your last visit? Include any pap smears or colon screening. No    Medication reconciliation has been completed with patient. Care team discussed/updated as well as pharmacy. Health Maintenance reviewed - Patient aware she is due for her colonoscopy and states she is on a waiting list and waiting to be scheduled. Yamil Benitez

## 2019-07-16 NOTE — PATIENT INSTRUCTIONS
Seborrheic Keratosis: Care Instructions  Your Care Instructions  Seborrheic keratoses are raised skin growths that look scaly or warty. They usually look like they were stuck onto the skin. They most often grow in groups on the back or chest and are more common in older people. A seborrheic keratosis can be tan or dark brown. A seborrheic keratosis is not a mole and is almost always harmless. But it is still a good idea to check your skin regularly. Sometimes a seborrheic keratosis can itch. Scratching it can cause it to bleed and sometimes even scar. A seborrheic keratosis is removed only if it bothers you. The doctor will freeze it or scrape it off with a tool. The doctor can also use a laser to remove a seborrheic keratosis. Treatment usually results in normal-looking skin, but it can leave a light or dark angie or even a scar on the skin. Follow-up care is a key part of your treatment and safety. Be sure to make and go to all appointments, and call your doctor if you are having problems. It's also a good idea to know your test results and keep a list of the medicines you take. How can you care for yourself at home? · If clothing irritates your seborrheic keratosis, cover it with a bandage to prevent rubbing and bleeding. · If you have a seborrheic keratosis removed, clean the area with soap and water two times a day unless your doctor gives you different instructions. Don't use hydrogen peroxide or alcohol, which can slow healing. ? You may cover the wound with a thin layer of petroleum jelly, such as Vaseline, and a nonstick bandage. · Check all the skin on your body once a month for skin growths or other changes, such as color and feel of the skin. ?  front of a full-length mirror. Look carefully at the front and back of your body. Then look at your right and left sides with your arms raised. ?  Bend your elbows and look carefully at your forearms, the back of your upper arms, and your palms.  ? Look at your feet, the soles of your feet, and the spaces between your toes. ? Use a hand mirror to look at the back of your legs, the back of your neck, and your back, rear end (buttocks), and genital area. Part the hair on your head to look at your scalp. · If you see a change in a skin growth, contact your doctor. Look for:  ? A mole that bleeds. ? A fast-growing mole. ? A scaly or crusted growth on the skin. ? A sore that will not heal.  When should you call for help? Call your doctor now or seek immediate medical care if:    · You have an area of normal skin that suddenly changes in shape, size, or how it looks.     · Your skin is badly broken from scratching.     · You have signs of infection such as:  ? Pain, warmth, or swelling in your skin. ? Red streaks near a wound in your skin. ? Pus coming from a wound in your skin. ? A fever not due to the flu or other illness.    Watch closely for changes in your health, and be sure to contact your doctor if:    · You do not get better as expected. Where can you learn more? Go to http://chasidy-ena.info/. Enter U740 in the search box to learn more about \"Seborrheic Keratosis: Care Instructions. \"  Current as of: April 17, 2018  Content Version: 11.9  © 6145-2481 [employer]. Care instructions adapted under license by [employer] (which disclaims liability or warranty for this information). If you have questions about a medical condition or this instruction, always ask your healthcare professional. [address] any warranty or liability for your use of this information.

## 2019-07-30 ENCOUNTER — APPOINTMENT (OUTPATIENT)
Dept: PHYSICAL THERAPY | Age: 69
End: 2019-07-30

## 2019-07-30 ENCOUNTER — PATIENT OUTREACH (OUTPATIENT)
Dept: FAMILY MEDICINE CLINIC | Age: 69
End: 2019-07-30

## 2019-07-30 NOTE — PROGRESS NOTES
NN health screening: In regard to her CRC screening we agreed I will call again \"sometime in [place]" to f/u as she \"might be keeping Dr. Arsen Theodore as her pcp. \" She plans to discuss this with Dr. Arsen Theodore during her OV this week. Provided Ms Rajat Cameron with Dr. Priya Yost [facility]) contact info @ her request and should she contract with him she will have the report sent to Dr. Arsen Theodore. Discussed gas cards to assist with transportation as well and she feels she would benefit with this assistance.

## 2019-08-01 ENCOUNTER — HOSPITAL ENCOUNTER (OUTPATIENT)
Dept: LAB | Age: 69
Discharge: HOME | End: 2019-08-01
Payer: MEDICARE

## 2019-08-01 ENCOUNTER — OFFICE VISIT (OUTPATIENT)
Dept: FAMILY MEDICINE CLINIC | Age: 69
End: 2019-08-01

## 2019-08-01 VITALS
SYSTOLIC BLOOD PRESSURE: 112 MMHG | WEIGHT: 182 LBS | OXYGEN SATURATION: 97 % | RESPIRATION RATE: 14 BRPM | HEART RATE: 96 BPM | DIASTOLIC BLOOD PRESSURE: 62 MMHG | BODY MASS INDEX: 31.24 KG/M2 | TEMPERATURE: 97.9 F

## 2019-08-01 DIAGNOSIS — R30.0 BURNING WITH URINATION: ICD-10-CM

## 2019-08-01 DIAGNOSIS — N18.30 CKD (CHRONIC KIDNEY DISEASE) STAGE 3, GFR 30-59 ML/MIN (HCC): ICD-10-CM

## 2019-08-01 DIAGNOSIS — K59.00 CONSTIPATION, UNSPECIFIED CONSTIPATION TYPE: ICD-10-CM

## 2019-08-01 DIAGNOSIS — E11.29 TYPE 2 DIABETES MELLITUS WITH MICROALBUMINURIA, WITH LONG-TERM CURRENT USE OF INSULIN (HCC): ICD-10-CM

## 2019-08-01 DIAGNOSIS — N12 PYELONEPHRITIS: Primary | ICD-10-CM

## 2019-08-01 DIAGNOSIS — Z79.4 TYPE 2 DIABETES MELLITUS WITH MICROALBUMINURIA, WITH LONG-TERM CURRENT USE OF INSULIN (HCC): ICD-10-CM

## 2019-08-01 DIAGNOSIS — N12 PYELONEPHRITIS: ICD-10-CM

## 2019-08-01 DIAGNOSIS — R80.9 TYPE 2 DIABETES MELLITUS WITH MICROALBUMINURIA, WITH LONG-TERM CURRENT USE OF INSULIN (HCC): ICD-10-CM

## 2019-08-01 LAB
ANION GAP SERPL CALC-SCNC: 8 MMOL/L (ref 3–18)
BASOPHILS # BLD: 0 K/UL (ref 0–0.1)
BASOPHILS NFR BLD: 0 % (ref 0–2)
BILIRUB UR QL STRIP: NEGATIVE
BUN SERPL-MCNC: 30 MG/DL (ref 7–18)
BUN/CREAT SERPL: 16 (ref 12–20)
CALCIUM SERPL-MCNC: 9.4 MG/DL (ref 8.5–10.1)
CHLORIDE SERPL-SCNC: 108 MMOL/L (ref 100–111)
CO2 SERPL-SCNC: 28 MMOL/L (ref 21–32)
CREAT SERPL-MCNC: 1.82 MG/DL (ref 0.6–1.3)
DIFFERENTIAL METHOD BLD: ABNORMAL
EOSINOPHIL # BLD: 0.1 K/UL (ref 0–0.4)
EOSINOPHIL NFR BLD: 1 % (ref 0–5)
ERYTHROCYTE [DISTWIDTH] IN BLOOD BY AUTOMATED COUNT: 12.6 % (ref 11.6–14.5)
GLUCOSE SERPL-MCNC: 290 MG/DL (ref 74–99)
GLUCOSE UR-MCNC: NEGATIVE MG/DL
HCT VFR BLD AUTO: 41.2 % (ref 35–45)
HGB BLD-MCNC: 13.3 G/DL (ref 12–16)
KETONES P FAST UR STRIP-MCNC: NORMAL MG/DL
LYMPHOCYTES # BLD: 2 K/UL (ref 0.9–3.6)
LYMPHOCYTES NFR BLD: 22 % (ref 21–52)
MCH RBC QN AUTO: 29.2 PG (ref 24–34)
MCHC RBC AUTO-ENTMCNC: 32.3 G/DL (ref 31–37)
MCV RBC AUTO: 90.4 FL (ref 74–97)
MONOCYTES # BLD: 0.5 K/UL (ref 0.05–1.2)
MONOCYTES NFR BLD: 5 % (ref 3–10)
NEUTS SEG # BLD: 6.7 K/UL (ref 1.8–8)
NEUTS SEG NFR BLD: 72 % (ref 40–73)
PH UR STRIP: 6 [PH] (ref 4.6–8)
PLATELET # BLD AUTO: 358 K/UL (ref 135–420)
PMV BLD AUTO: 12.1 FL (ref 9.2–11.8)
POTASSIUM SERPL-SCNC: 5.2 MMOL/L (ref 3.5–5.5)
PROT UR QL STRIP: NORMAL
RBC # BLD AUTO: 4.56 M/UL (ref 4.2–5.3)
SODIUM SERPL-SCNC: 144 MMOL/L (ref 136–145)
SP GR UR STRIP: 1.02 (ref 1–1.03)
UA UROBILINOGEN AMB POC: NORMAL (ref 0.2–1)
URINALYSIS CLARITY POC: NORMAL
URINALYSIS COLOR POC: YELLOW
URINE BLOOD POC: NORMAL
URINE LEUKOCYTES POC: NORMAL
URINE NITRITES POC: POSITIVE
WBC # BLD AUTO: 9.4 K/UL (ref 4.6–13.2)

## 2019-08-01 PROCEDURE — 87186 SC STD MICRODIL/AGAR DIL: CPT

## 2019-08-01 PROCEDURE — 87086 URINE CULTURE/COLONY COUNT: CPT

## 2019-08-01 PROCEDURE — 80048 BASIC METABOLIC PNL TOTAL CA: CPT

## 2019-08-01 PROCEDURE — 87077 CULTURE AEROBIC IDENTIFY: CPT

## 2019-08-01 PROCEDURE — 85025 COMPLETE CBC W/AUTO DIFF WBC: CPT

## 2019-08-01 RX ORDER — CIPROFLOXACIN 500 MG/1
500 TABLET ORAL DAILY
Qty: 10 TAB | Refills: 0 | Status: SHIPPED | OUTPATIENT
Start: 2019-08-01 | End: 2019-11-12 | Stop reason: ALTCHOICE

## 2019-08-01 RX ORDER — POLYETHYLENE GLYCOL 3350 17 G/17G
17 POWDER, FOR SOLUTION ORAL DAILY
Qty: 510 G | Refills: 11 | Status: SHIPPED | OUTPATIENT
Start: 2019-08-01 | End: 2019-11-12 | Stop reason: ALTCHOICE

## 2019-08-01 NOTE — PROGRESS NOTES
Moisés Morse is a 70 y.o. female who was seen in clinic today (8/1/2019). Assessment & Plan:       ICD-10-CM ICD-9-CM    1. Pyelonephritis N12 590.80 CULTURE, URINE      ciprofloxacin HCl (CIPRO) 500 mg tablet      CBC WITH AUTOMATED DIFF      METABOLIC PANEL, BASIC   2. Burning with urination R30.0 788.1 AMB POC URINALYSIS DIP STICK AUTO W/O MICRO      CULTURE, URINE   3. Constipation, unspecified constipation type K59.00 564.00 polyethylene glycol (MIRALAX) 17 gram/dose powder   4. CKD (chronic kidney disease) stage 3, GFR 30-59 ml/min ([facility]) N18.3 585.3 ciprofloxacin HCl (CIPRO) 500 mg tablet   5. Type 2 diabetes mellitus with microalbuminuria, with long-term current use of insulin ([facility]) E11.29 250.40     R80.9 791.0     Z79.4 V58.67        Suspending dulaglutide given EMPERATRIZ with last pyelonephritis  Renally adjusted dose of ciprofloxacin pending results    Constipation may be increasing susceptibility  Instructed to continue fiber  start taking MiraLax every day. Follow-up and Dispositions    · Return in about 3 weeks (around 8/22/2019) for kidney infection follow up, lab plan to follow, subject to change based on results, (30). Subjective: Moisés Morse was seen today for Urinary Burning      Urinary Tract Infection  Patient complains of dysuria. Onset was 4 days ago, unchanged since that time. Patient complains of back pain and pelvic pressure. Patient does not have a history of recurrent UTI. Patient does have a history of pyelonephritis.     Tolerating PO    Lab Results   Component Value Date/Time    Sodium 141 07/02/2019 08:55 AM    Potassium 5.0 07/02/2019 08:55 AM    Chloride 109 (H) 07/02/2019 08:55 AM    CO2 27 07/02/2019 08:55 AM    Anion gap 5 07/02/2019 08:55 AM    Glucose 136 (H) 07/02/2019 08:55 AM    BUN 29 (H) 07/02/2019 08:55 AM    Creatinine 1.67 (H) 07/02/2019 08:55 AM    BUN/Creatinine ratio 17 07/02/2019 08:55 AM    GFR est AA 37 (L) 07/02/2019 08:55 AM    GFR est non-AA 30 (L) 07/02/2019 08:55 AM    Calcium 8.4 (L) 07/02/2019 08:55 AM         Results for orders placed or performed in visit on 08/01/19   AMB POC URINALYSIS DIP STICK AUTO W/O MICRO   Result Value Ref Range    Color (UA POC) Yellow     Clarity (UA POC) Cloudy     Glucose (UA POC) Negative Negative    Bilirubin (UA POC) Negative Negative    Ketones (UA POC) Trace Negative    Specific gravity (UA POC) 1.020 1.001 - 1.035    Blood (UA POC) 2+ Negative    pH (UA POC) 6.0 4.6 - 8.0    Protein (UA POC) 2+ Negative    Urobilinogen (UA POC) 0.2 mg/dL 0.2 - 1    Nitrites (UA POC) Positive Negative    Leukocyte esterase (UA POC) 2+ Negative        Outpatient Medications Marked as Taking for the 8/1/19 encounter (Office Visit) with Brayan Soto MD   Medication Sig Dispense Refill    simvastatin (ZOCOR) 40 mg tablet Take 1 Tab by mouth nightly. 90 Tab 4    NIFEdipine ER (ADALAT CC) 30 mg ER tablet Take 1 Tab by mouth daily. 90 Tab 4    levothyroxine (SYNTHROID) 25 mcg tablet Take 1 Tab by mouth Daily (before breakfast). 90 Tab 1    sertraline (ZOLOFT) 50 mg tablet Take 1 Tab by mouth daily. 90 Tab 4    psyllium seed, with dextrose, (FIBER PO) Take  by mouth.  insulin aspart U-100 (NOVOLOG) 100 unit/mL inpn 5 Units daily (with breakfast) AND 8 Units daily (with dinner). 5 Pen 2    GINGER OIL, BULK, by Does Not Apply route. Using topical for Vertigo      olmesartan (BENICAR) 40 mg tablet Take 1 Tab by mouth daily. 90 Tab 4    glucose blood VI test strips (ACCU-CHEK CODY PLUS TEST STRP) strip Patient is to check blood sugar 3-4 times a day 300 Strip 11    cholecalciferol, VITAMIN D3, (VITAMIN D3) 5,000 unit tab tablet Take  by mouth daily.  acetaminophen (TYLENOL EXTRA STRENGTH) 500 mg tablet Take  by mouth every six (6) hours as needed for Pain.  dulaglutide (TRULICITY) 3.21 OY/8.5 mL sub-q pen 0.5 mL by SubCUTAneous route every seven (7) days.  12 Pen 4    esomeprazole (NEXIUM) 20 mg capsule Take  by mouth.  Blood-Glucose Meter (ACCU-CHEK CODY PLUS METER) misc 1 Device by Does Not Apply route four (4) times daily. 1 Each 0    BLADDER CONTROL PADS pads       ALCOHOL PREP PADS padm       omeprazole (PRILOSEC) 40 mg capsule Take 1 Cap by mouth daily. 80 Cap 2       Patient Active Problem List    Diagnosis    CKD (chronic kidney disease) stage 3, GFR 30-59 ml/min (HCC)    Diarrhea    Class 1 obesity due to excess calories with serious comorbidity and body mass index (BMI) of 31.0 to 31.9 in adult    Vitamin D deficiency    Osteopenia of multiple sites     7/2018      Moderate major depression ([facility])    Type 2 diabetes mellitus with microalbuminuria, with long-term current use of insulin ([address]    Family history of malignant neoplasm of breast    Diabetic nephropathy associated with type 2 diabetes mellitus ([address]    Lump or mass in breast    Essential hypertension    Acquired hypothyroidism    Hiatal hernia     EGD 11/14/2017 Danisha Coyne MD \"prn acid suppression\"      Hyperlipidemia    Migraines         Allergies   Allergen Reactions    Darvocet A500 [Propoxyphene N-Acetaminophen] Nausea and Vomiting    Gloves, Latex With Aloe Vera Contact Dermatitis     Rash from powder inside latex gloves      Metformin Diarrhea         Patient Care Team:  Harpreet Blair MD as PCP - General (Family Practice)  Roxana Ortega MD (Gastroenterology)  Xin Rodriguez (Ophthalmology)    The following sections were reviewed & updated as appropriate: PMH, PSH, FH, and SH. Review of Systems   Constitutional: Negative for fever. Gastrointestinal: Positive for constipation (BM 2x/week x few weeks). Negative for diarrhea, nausea and vomiting. Genitourinary: Negative for hematuria.            Objective:     Visit Vitals  /62   Pulse 96   Temp 97.9 °F (36.6 °C) (Oral)   Resp 14   Wt 182 lb (82.6 kg)   LMP 11/17/1972 Comment: partial   SpO2 97%   BMI 31.24 kg/m²      Physical Exam   Constitutional: She is oriented to person, place, and time. She appears well-developed. No distress. Pleasant obese white female   HENT:   Head: Normocephalic and atraumatic. Eyes: Conjunctivae are normal.   Neck: Neck supple. Cardiovascular: Normal rate, regular rhythm and normal heart sounds. Exam reveals no gallop and no friction rub. No murmur heard. Pulmonary/Chest: Effort normal and breath sounds normal. No respiratory distress. She has no wheezes. She has no rhonchi. She has no rales. Abdominal: Soft. Bowel sounds are normal. She exhibits no distension and no mass. There is no hepatosplenomegaly. There is tenderness in the right lower quadrant, suprapubic area and left lower quadrant. There is CVA tenderness (left). There is no rebound and no guarding. Musculoskeletal: She exhibits no edema. Lymphadenopathy:     She has no cervical adenopathy. Neurological: She is alert and oriented to person, place, and time. Skin: Skin is warm and dry. She is not diaphoretic. No cyanosis. Nails show no clubbing. Psychiatric: She has a normal mood and affect. Her behavior is normal. Judgment and thought content normal.         Disclaimer: The patient understands our medical plan. Alternatives have been explained and offered. The risks, benefits and significant side effects of all medications have been reviewed. Anticipated time course and progression of condition reviewed. All questions have been addressed. She is encouraged to employ the information provided in the after visit summary, which was reviewed. Where applicable, she is instructed to call the clinic if she has not been notified either by phone or through [address] with the results of her tests or with an appointment plan for any referrals within 1 week(s). No news is not good news; it's no news. The patient  is to call if her condition worsens or fails to improve or if significant side effects are experienced.      Aspects of this note may have been generated using voice recognition software. Despite editing, there may be unrecognized errors.        Karolyn Monroy MD

## 2019-08-01 NOTE — PATIENT INSTRUCTIONS
Stop your Trulicity for now. Start taking MiraLax every day. Kidney Infection: Care Instructions  Your Care Instructions    A kidney infection (pyelonephritis) is a type of urinary tract infection, or UTI. Most UTIs are bladder infections. Kidney infections tend to make people much sicker than bladder infections do. A kidney infection is also more serious because it can cause lasting damage if it is not treated quickly. Follow-up care is a key part of your treatment and safety. Be sure to make and go to all appointments, and call your doctor if you are having problems. It's also a good idea to know your test results and keep a list of the medicines you take. How can you care for yourself at home? · Take your antibiotics as directed. Do not stop taking them just because you feel better. You need to take the full course of antibiotics. · Drink plenty of water, enough so that your urine is light yellow or clear like water. This may help wash out bacteria that are causing the infection. If you have kidney, heart, or liver disease and have to limit fluids, talk with your doctor before you increase the amount of fluids you drink. · Urinate often. Try to empty your bladder each time. · To relieve pain, take a hot shower or lay a heating pad (set on low) over your lower belly. Never go to sleep with a heating pad in place. Put a thin cloth between the heating pad and your skin. To help prevent kidney infections  · Drink plenty of water each day. This helps you urinate often, which clears bacteria from your system. If you have kidney, heart, or liver disease and have to limit fluids, talk with your doctor before you increase the amount of fluids you drink. · Urinate when you have the urge. Do not hold your urine for a long time. Urinate before you go to sleep.   · If you have symptoms of a bladder infection, such as burning when you urinate or having to urinate often, call your doctor so you can treat the problem before it gets worse. If you do not treat a bladder infection quickly, it can spread to the kidney. · Men should keep the tip of the penis clean. If you are a woman, keep these ideas in mind:  · Urinate right after you have sex. · Change sanitary pads often. Avoid douches, feminine hygiene sprays, and other feminine hygiene products that have deodorants. · After going to the bathroom, wipe from front to back. When should you call for help? Call your doctor now or seek immediate medical care if:    · You have symptoms that a kidney infection is getting worse. These may include:  ? Pain or burning when you urinate. ? A frequent need to urinate without being able to pass much urine. ? Pain in the flank, which is just below the rib cage and above the waist on either side of the back. ? Blood in the urine. ? A fever.     · You are vomiting or nauseated.    Watch closely for changes in your health, and be sure to contact your doctor if:    · You do not get better as expected. Where can you learn more? Go to http://chasidy-ena.info/. Enter I521 in the search box to learn more about \"Kidney Infection: Care Instructions. \"  Current as of: October 31, 2018  Content Version: 12.1  © 4992-7377 [employer]. Care instructions adapted under license by [employer] (which disclaims liability or warranty for this information). If you have questions about a medical condition or this instruction, always ask your healthcare professional. [place] [address] any warranty or liability for your use of this information.

## 2019-08-01 NOTE — PROGRESS NOTES
Patient c/o burning with urination that began late Sunday afternoon. She is having some back pain and is feeling pelvic pressure. She has not noticed any blood in her urine. 1. Have you been to the ER, urgent care clinic since your last visit? Hospitalized since your last visit? No  2. Have you seen or consulted any other health care providers outside of the [address] [place] [address] since your last visit? Include any pap smears or colon screening. No    Medication reconciliation has been completed with patient. Care team discussed/updated as well as pharmacy.     Health Maintenance Due   Topic Date Due    COLONOSCOPY  02/11/2019    LIPID PANEL Q1  07/30/2019    Influenza Age 5 to Adult  08/01/2019

## 2019-08-02 NOTE — PROGRESS NOTES
No change in plan Princess. Call the office for me to see you if you're not starting to feel better by Monday.  TIM

## 2019-08-04 LAB
BACTERIA SPEC CULT: ABNORMAL
BACTERIA SPEC CULT: ABNORMAL
SERVICE CMNT-IMP: ABNORMAL

## 2019-08-06 ENCOUNTER — OFFICE VISIT (OUTPATIENT)
Dept: FAMILY MEDICINE CLINIC | Age: 69
End: 2019-08-06

## 2019-08-06 VITALS
RESPIRATION RATE: 12 BRPM | SYSTOLIC BLOOD PRESSURE: 106 MMHG | TEMPERATURE: 97.8 F | HEIGHT: 64 IN | BODY MASS INDEX: 30.83 KG/M2 | HEART RATE: 86 BPM | OXYGEN SATURATION: 96 % | WEIGHT: 180.6 LBS | DIASTOLIC BLOOD PRESSURE: 62 MMHG

## 2019-08-06 DIAGNOSIS — Z79.4 TYPE 2 DIABETES MELLITUS WITH MICROALBUMINURIA, WITH LONG-TERM CURRENT USE OF INSULIN (HCC): ICD-10-CM

## 2019-08-06 DIAGNOSIS — R80.9 TYPE 2 DIABETES MELLITUS WITH MICROALBUMINURIA, WITH LONG-TERM CURRENT USE OF INSULIN (HCC): ICD-10-CM

## 2019-08-06 DIAGNOSIS — E11.29 TYPE 2 DIABETES MELLITUS WITH MICROALBUMINURIA, WITH LONG-TERM CURRENT USE OF INSULIN (HCC): ICD-10-CM

## 2019-08-06 DIAGNOSIS — T36.95XA ANTIBIOTIC-INDUCED YEAST INFECTION: ICD-10-CM

## 2019-08-06 DIAGNOSIS — N12 PYELONEPHRITIS: Primary | ICD-10-CM

## 2019-08-06 DIAGNOSIS — N18.30 CKD (CHRONIC KIDNEY DISEASE) STAGE 3, GFR 30-59 ML/MIN (HCC): ICD-10-CM

## 2019-08-06 DIAGNOSIS — B37.9 ANTIBIOTIC-INDUCED YEAST INFECTION: ICD-10-CM

## 2019-08-06 RX ORDER — CEFDINIR 300 MG/1
300 CAPSULE ORAL 2 TIMES DAILY
Qty: 20 CAP | Refills: 0 | Status: SHIPPED | OUTPATIENT
Start: 2019-08-06 | End: 2019-08-16

## 2019-08-06 RX ORDER — CEFTRIAXONE 1 G/1
1 INJECTION, POWDER, FOR SOLUTION INTRAMUSCULAR; INTRAVENOUS ONCE
Qty: 1 VIAL | Refills: 0
Start: 2019-08-06 | End: 2019-08-06

## 2019-08-06 NOTE — PROGRESS NOTES
Varun Mcgee is a 70 y.o. female who was seen in clinic today (8/6/2019). Assessment & Plan:       ICD-10-CM ICD-9-CM    1. Pyelonephritis N12 590.80 cefTRIAXone (ROCEPHIN) 1 gram injection      CEFTRIAXONE SODIUM INJECTION  MG      [place] THER/PROPH/DIAG INJECTION, SUBCUT/IM      cefdinir (OMNICEF) 300 mg capsule   2. CKD (chronic kidney disease) stage 3, GFR 30-59 ml/min ([facility]) N00.3 162.9 METABOLIC PANEL, BASIC   3. Type 2 diabetes mellitus with microalbuminuria, with long-term current use of insulin ([facility]) E11.29 250.40     R80.9 791.0     Z79.4 V58.67    4. Antibiotic-induced yeast infection B37.9 112.9 clotrimazole (GYNE-LOTRIMIN) 2 % vaginal cream    T36.95XA E930.9        Pyelonephritis: some clinical improvement. Proteus not sensitive to ciprofloxaxin  DM2: remains adequately controlled without dulaglutide at present. Patient Instructions   Take the full course of both antibiotics. Come to the office for blood work before you leave on your trip. Make sure we have a good working phone number for you. Take your dulaglutide (Trulicity) with you but do not start taking it until after I've reviewed your lab results and my staff has called you with instructions. Both antibiotics renally adjusted            Subjective:    Varun Mcgee was seen today for Follow-up    FU pyelo:  Back pain and dysuria improving with ciprofloxacin    DM2: sugars off dulaglutide   105-152 fasting  160s, single value 200      Soc: going to [facility] 8/19-23    Results for orders placed or performed during the hospital encounter of 08/01/19   CULTURE, URINE   Result Value Ref Range    Special Requests: NO SPECIAL REQUESTS      Culture result: >100,000 COLONIES/mL PROTEUS MIRABILIS (A)      Culture result: 52656 COLONIES/mL ESCHERICHIA COLI (A)         Susceptibility    Escherichia coli - CODY     Ampicillin ($) >=32 Resistant ug/mL     Ampicillin/sulbactam ($) 16 Intermediate ug/mL     Cefazolin ($) <=4 Susceptible ug/mL     Cefepime ($$) <=1 Susceptible ug/mL     Ceftazidime ($) <=1 Susceptible ug/mL     Ceftriaxone ($) <=1 Susceptible ug/mL     Ciprofloxacin ($) <=0.25 Susceptible ug/mL     Gentamicin ($) <=1 Susceptible ug/mL     Imipenem <=0.25 Susceptible ug/mL     Levofloxacin ($) <=0.12 Susceptible ug/mL     Nitrofurantoin <=16 Susceptible ug/mL     Piperacillin/Tazobac ($) <=4 Susceptible ug/mL     Tobramycin ($) <=1 Susceptible ug/mL     Trimeth-Sulfamethoxa <=20 Susceptible ug/mL    Proteus mirabilis - CODY     Ampicillin ($) 16 Resistant ug/mL     Ampicillin/sulbactam ($) <=2 Susceptible ug/mL     Cefazolin ($) <=4 Susceptible ug/mL     Cefepime ($$) <=1 Susceptible ug/mL     Ceftazidime ($) <=1 Susceptible ug/mL     Ceftriaxone ($) <=1 Susceptible ug/mL     Ciprofloxacin ($) >=4 Resistant ug/mL     Gentamicin ($) <=1 Susceptible ug/mL     Imipenem 2 Susceptible ug/mL     Levofloxacin ($) >=8 Resistant ug/mL     Nitrofurantoin 128 Resistant ug/mL     Piperacillin/Tazobac ($) <=4 Susceptible ug/mL     Tobramycin ($) <=1 Susceptible ug/mL     Trimeth-Sulfamethoxa >=320 Resistant ug/mL   CBC WITH AUTOMATED DIFF   Result Value Ref Range    WBC 9.4 4.6 - 13.2 K/uL    RBC 4.56 4.20 - 5.30 M/uL    HGB 13.3 12.0 - 16.0 g/dL    HCT 41.2 35.0 - 45.0 %    MCV 90.4 74.0 - 97.0 FL    MCH 29.2 24.0 - 34.0 PG    MCHC 32.3 31.0 - 37.0 g/dL    RDW 12.6 11.6 - 14.5 %    PLATELET 339 144 - 421 K/uL    MPV 12.1 (H) 9.2 - 11.8 FL    NEUTROPHILS 72 40 - 73 %    LYMPHOCYTES 22 21 - 52 %    MONOCYTES 5 3 - 10 %    EOSINOPHILS 1 0 - 5 %    BASOPHILS 0 0 - 2 %    ABS. NEUTROPHILS 6.7 1.8 - 8.0 K/UL    ABS. LYMPHOCYTES 2.0 0.9 - 3.6 K/UL    ABS. MONOCYTES 0.5 0.05 - 1.2 K/UL    ABS. EOSINOPHILS 0.1 0.0 - 0.4 K/UL    ABS.  BASOPHILS 0.0 0.0 - 0.1 K/UL    DF AUTOMATED     METABOLIC PANEL, BASIC   Result Value Ref Range    Sodium 144 136 - 145 mmol/L    Potassium 5.2 3.5 - 5.5 mmol/L    Chloride 108 100 - 111 mmol/L    CO2 28 21 - 32 mmol/L Anion gap 8 3.0 - 18 mmol/L    Glucose 290 (H) 74 - 99 mg/dL    BUN 30 (H) 7.0 - 18 MG/DL    Creatinine 1.82 (H) 0.6 - 1.3 MG/DL    BUN/Creatinine ratio 16 12 - 20      GFR est AA 33 (L) >60 ml/min/1.73m2    GFR est non-AA 28 (L) >60 ml/min/1.73m2    Calcium 9.4 8.5 - 10.1 MG/DL        Outpatient Medications Marked as Taking for the 8/6/19 encounter (Office Visit) with Meena Trinidad MD   Medication Sig Dispense Refill    polyethylene glycol (MIRALAX) 17 gram/dose powder Take 17 g by mouth daily. 510 g 11    ciprofloxacin HCl (CIPRO) 500 mg tablet Take 1 Tab by mouth daily. 10 Tab 0    simvastatin (ZOCOR) 40 mg tablet Take 1 Tab by mouth nightly. 90 Tab 4    NIFEdipine ER (ADALAT CC) 30 mg ER tablet Take 1 Tab by mouth daily. 90 Tab 4    levothyroxine (SYNTHROID) 25 mcg tablet Take 1 Tab by mouth Daily (before breakfast). 90 Tab 1    sertraline (ZOLOFT) 50 mg tablet Take 1 Tab by mouth daily. 90 Tab 4    psyllium seed, with dextrose, (FIBER PO) Take  by mouth.  insulin glargine (LANTUS SOLOSTAR U-100 INSULIN) 100 unit/mL (3 mL) inpn 30 Units by SubCUTAneous route daily. 5 Pen 2    insulin aspart U-100 (NOVOLOG) 100 unit/mL inpn 5 Units daily (with breakfast) AND 8 Units daily (with dinner). 5 Pen 2    NOÉ OIL, BULK, by Does Not Apply route. Using topical for Vertigo      olmesartan (BENICAR) 40 mg tablet Take 1 Tab by mouth daily. 90 Tab 4    glucose blood VI test strips (ACCU-CHEK CODY PLUS TEST STRP) strip Patient is to check blood sugar 3-4 times a day 300 Strip 11    cholecalciferol, VITAMIN D3, (VITAMIN D3) 5,000 unit tab tablet Take  by mouth daily.  dulaglutide (TRULICITY) 9.65 MZ/7.4 mL sub-q pen 0.5 mL by SubCUTAneous route every seven (7) days. 12 Pen 4    bisacodyl (DULCOLAX, BISACODYL,) 5 mg EC tablet Take as directed      esomeprazole (NEXIUM) 20 mg capsule Take  by mouth.       Blood-Glucose Meter (ACCU-CHEK CODY PLUS METER) misc 1 Device by Does Not Apply route four (4) times daily. 1 Each 0    BLADDER CONTROL PADS pads       ALCOHOL PREP PADS padm       omeprazole (PRILOSEC) 40 mg capsule Take 1 Cap by mouth daily. [address] Cap 2       Patient Active Problem List    Diagnosis    CKD (chronic kidney disease) stage 3, GFR 30-59 ml/min [facility]    Diarrhea    Class 1 obesity due to excess calories with serious comorbidity and body mass index (BMI) of 31.0 to 31.9 in adult    Vitamin D deficiency    Osteopenia of multiple sites     7/2018      Moderate major depression ([facility])    Type 2 diabetes mellitus with microalbuminuria, with long-term current use of insulin ([address]    Family history of malignant neoplasm of breast    Diabetic nephropathy associated with type 2 diabetes mellitus ([address]    Lump or mass in breast    Essential hypertension    Acquired hypothyroidism    Hiatal hernia     EGD 11/14/2017 Eliud Lorenzana MD \"prn acid suppression\"      Hyperlipidemia    Migraines         Allergies   Allergen Reactions    Darvocet A500 [Propoxyphene N-Acetaminophen] Nausea and Vomiting    Gloves, Latex With Aloe Vera Contact Dermatitis     Rash from powder inside latex gloves      Metformin Diarrhea         Patient Care Team:  Laura Allen MD as PCP - General (Family Practice)  William Hollins MD (Gastroenterology)  Viola Dwyer (Ophthalmology)    The following sections were reviewed & updated as appropriate: PMH, PSH, FH, and SH. Review of Systems   Constitutional: Negative for fever. Gastrointestinal: Negative for nausea and vomiting. Genitourinary:        No vaginal itching or discharge - yet           Objective:     Visit Vitals  /62   Pulse 86   Temp 97.8 °F (36.6 °C) (Oral)   Resp 12   Ht 5' 4\" (1.626 m)   Wt 180 lb 9.6 oz (81.9 kg)   LMP 11/17/1972 Comment: partial   SpO2 96%   BMI 31.00 kg/m²      Physical Exam   Constitutional: She is oriented to person, place, and time. She appears well-developed. No distress.    Pleasant obese white female HENT:   Head: Normocephalic and atraumatic. Pulmonary/Chest: Effort normal.   Neurological: She is alert and oriented to person, place, and time. Psychiatric: She has a normal mood and affect. Her behavior is normal. Judgment and thought content normal.         Disclaimer: The patient understands our medical plan. Alternatives have been explained and offered. The risks, benefits and significant side effects of all medications have been reviewed. Anticipated time course and progression of condition reviewed. All questions have been addressed. She is encouraged to employ the information provided in the after visit summary, which was reviewed. Where applicable, she is instructed to call the clinic if she has not been notified either by phone or through [address] with the results of her tests or with an appointment plan for any referrals within 1 week(s). No news is not good news; it's no news. The patient  is to call if her condition worsens or fails to improve or if significant side effects are experienced. Aspects of this note may have been generated using voice recognition software. Despite editing, there may be unrecognized errors.        Yfn Owen MD

## 2019-08-06 NOTE — PATIENT INSTRUCTIONS
Take the full course of both antibiotics. Come to the office for blood work before you leave on your trip. Make sure we have a good working phone number for you. Take your dulaglutide (Trulicity) with you but do not start taking it until after I've reviewed your lab results and my staff has called you with instructions.

## 2019-08-06 NOTE — PROGRESS NOTES
Patient here for follow up on her urine culture results. She states she still has some burning with urination but this has gotten a little better. 1. Have you been to the ER, urgent care clinic since your last visit? Hospitalized since your last visit? No  2. Have you seen or consulted any other health care providers outside of the [address] [place] [address] since your last visit? Include any pap smears or colon screening. No    Medication reconciliation has been completed with patient. Care team discussed/updated as well as pharmacy. Health Maintenance Due   Topic Date Due    COLONOSCOPY  02/11/2019    LIPID PANEL Q1  07/30/2019    Influenza Age 5 to Adult  08/01/2019     1 gm of Ceftriaxone administered IM to left gluteus keegan, patient was observed in the office for 10 mins after administration of injection, no adverse reactions/side effects noted. Patient was d/c from office.

## 2019-08-16 ENCOUNTER — LAB ONLY (OUTPATIENT)
Dept: FAMILY MEDICINE CLINIC | Age: 69
End: 2019-08-16

## 2019-08-16 ENCOUNTER — HOSPITAL ENCOUNTER (OUTPATIENT)
Dept: LAB | Age: 69
Discharge: HOME | End: 2019-08-16
Payer: MEDICARE

## 2019-08-16 DIAGNOSIS — N18.30 CKD (CHRONIC KIDNEY DISEASE) STAGE 3, GFR 30-59 ML/MIN (HCC): ICD-10-CM

## 2019-08-16 DIAGNOSIS — N18.30 CKD (CHRONIC KIDNEY DISEASE) STAGE 3, GFR 30-59 ML/MIN (HCC): Primary | ICD-10-CM

## 2019-08-16 LAB
ANION GAP SERPL CALC-SCNC: 9 MMOL/L (ref 3–18)
BUN SERPL-MCNC: 22 MG/DL (ref 7–18)
BUN/CREAT SERPL: 13 (ref 12–20)
CALCIUM SERPL-MCNC: 8.9 MG/DL (ref 8.5–10.1)
CHLORIDE SERPL-SCNC: 107 MMOL/L (ref 100–111)
CO2 SERPL-SCNC: 28 MMOL/L (ref 21–32)
CREAT SERPL-MCNC: 1.7 MG/DL (ref 0.6–1.3)
GLUCOSE SERPL-MCNC: 161 MG/DL (ref 74–99)
POTASSIUM SERPL-SCNC: 4.6 MMOL/L (ref 3.5–5.5)
SODIUM SERPL-SCNC: 144 MMOL/L (ref 136–145)

## 2019-08-16 PROCEDURE — 80048 BASIC METABOLIC PNL TOTAL CA: CPT

## 2019-09-24 ENCOUNTER — PATIENT OUTREACH (OUTPATIENT)
Dept: FAMILY MEDICINE CLINIC | Age: 69
End: 2019-09-24

## 2019-09-24 DIAGNOSIS — E03.9 ACQUIRED HYPOTHYROIDISM: ICD-10-CM

## 2019-09-24 RX ORDER — LEVOTHYROXINE SODIUM 88 UG/1
TABLET ORAL
Qty: 90 TAB | Refills: 1 | OUTPATIENT
Start: 2019-09-24

## 2019-09-30 DIAGNOSIS — I10 ESSENTIAL HYPERTENSION: ICD-10-CM

## 2019-09-30 NOTE — TELEPHONE ENCOUNTER
Ms. Xenabraulio Mena called. She moved to [facility]. She won't be here for quite some time and is in search for a new pcp. She wants to know if Dr. Josué Sidhu will fill her Lantus & Benicar at least for a 30 day supply. She will call us with the information of where she will be going. She is requesting it to be sent to the [facility] on [address]. Her daughter will  and deliver to her, she's going to visit. Please review and call her on cell [phone].

## 2019-10-01 NOTE — TELEPHONE ENCOUNTER
Patient is moving to [place] and is asking for a 30 day supply on her Benicar, and Lantus. Medication have been pended please review and sign if appropriate.

## 2019-10-03 RX ORDER — OLMESARTAN MEDOXOMIL 40 MG/1
40 TABLET ORAL DAILY
Qty: 30 TAB | Refills: 0 | Status: SHIPPED | OUTPATIENT
Start: 2019-10-03 | End: 2019-11-12 | Stop reason: SDUPTHER

## 2019-10-03 RX ORDER — INSULIN GLARGINE 100 [IU]/ML
30 INJECTION, SOLUTION SUBCUTANEOUS DAILY
Qty: 5 PEN | Refills: 0 | Status: SHIPPED | OUTPATIENT
Start: 2019-10-03 | End: 2019-11-12 | Stop reason: SDUPTHER

## 2019-10-03 NOTE — TELEPHONE ENCOUNTER
Called patient no answer left message letting her know her medication has been sent in to her preferred pharmacy.

## 2019-11-12 ENCOUNTER — OFFICE VISIT (OUTPATIENT)
Dept: FAMILY MEDICINE CLINIC | Age: 69
End: 2019-11-12

## 2019-11-12 ENCOUNTER — HOSPITAL ENCOUNTER (OUTPATIENT)
Dept: LAB | Age: 69
Discharge: HOME | End: 2019-11-12
Payer: MEDICARE

## 2019-11-12 VITALS
WEIGHT: 178.2 LBS | SYSTOLIC BLOOD PRESSURE: 122 MMHG | RESPIRATION RATE: 14 BRPM | OXYGEN SATURATION: 97 % | BODY MASS INDEX: 30.42 KG/M2 | TEMPERATURE: 98 F | HEIGHT: 64 IN | HEART RATE: 93 BPM | DIASTOLIC BLOOD PRESSURE: 64 MMHG

## 2019-11-12 DIAGNOSIS — E11.29 TYPE 2 DIABETES MELLITUS WITH MICROALBUMINURIA, WITH LONG-TERM CURRENT USE OF INSULIN (HCC): ICD-10-CM

## 2019-11-12 DIAGNOSIS — R80.9 TYPE 2 DIABETES MELLITUS WITH MICROALBUMINURIA, WITH LONG-TERM CURRENT USE OF INSULIN (HCC): ICD-10-CM

## 2019-11-12 DIAGNOSIS — Z23 ENCOUNTER FOR IMMUNIZATION: ICD-10-CM

## 2019-11-12 DIAGNOSIS — E55.9 VITAMIN D DEFICIENCY: ICD-10-CM

## 2019-11-12 DIAGNOSIS — R80.9 TYPE 2 DIABETES MELLITUS WITH MICROALBUMINURIA, WITH LONG-TERM CURRENT USE OF INSULIN (HCC): Primary | ICD-10-CM

## 2019-11-12 DIAGNOSIS — Z79.4 TYPE 2 DIABETES MELLITUS WITH MICROALBUMINURIA, WITH LONG-TERM CURRENT USE OF INSULIN (HCC): ICD-10-CM

## 2019-11-12 DIAGNOSIS — E11.21 DIABETIC NEPHROPATHY ASSOCIATED WITH TYPE 2 DIABETES MELLITUS (HCC): ICD-10-CM

## 2019-11-12 DIAGNOSIS — E11.29 TYPE 2 DIABETES MELLITUS WITH MICROALBUMINURIA, WITH LONG-TERM CURRENT USE OF INSULIN (HCC): Primary | ICD-10-CM

## 2019-11-12 DIAGNOSIS — E03.9 ACQUIRED HYPOTHYROIDISM: ICD-10-CM

## 2019-11-12 DIAGNOSIS — Z79.4 TYPE 2 DIABETES MELLITUS WITH MICROALBUMINURIA, WITH LONG-TERM CURRENT USE OF INSULIN (HCC): Primary | ICD-10-CM

## 2019-11-12 DIAGNOSIS — I10 ESSENTIAL HYPERTENSION: ICD-10-CM

## 2019-11-12 LAB
ALBUMIN SERPL-MCNC: 4 G/DL (ref 3.4–5)
ALBUMIN/GLOB SERPL: 1.2 {RATIO} (ref 0.8–1.7)
ALP SERPL-CCNC: 112 U/L (ref 45–117)
ALT SERPL-CCNC: 23 U/L (ref 13–56)
ANION GAP SERPL CALC-SCNC: 3 MMOL/L (ref 3–18)
AST SERPL-CCNC: 14 U/L (ref 10–38)
BILIRUB SERPL-MCNC: 0.4 MG/DL (ref 0.2–1)
BUN SERPL-MCNC: 27 MG/DL (ref 7–18)
BUN/CREAT SERPL: 17 (ref 12–20)
CALCIUM SERPL-MCNC: 8.7 MG/DL (ref 8.5–10.1)
CHLORIDE SERPL-SCNC: 107 MMOL/L (ref 100–111)
CO2 SERPL-SCNC: 31 MMOL/L (ref 21–32)
CREAT SERPL-MCNC: 1.56 MG/DL (ref 0.6–1.3)
GLOBULIN SER CALC-MCNC: 3.3 G/DL (ref 2–4)
GLUCOSE SERPL-MCNC: 205 MG/DL (ref 74–99)
POTASSIUM SERPL-SCNC: 4.3 MMOL/L (ref 3.5–5.5)
PROT SERPL-MCNC: 7.3 G/DL (ref 6.4–8.2)
SODIUM SERPL-SCNC: 141 MMOL/L (ref 136–145)

## 2019-11-12 PROCEDURE — 83036 HEMOGLOBIN GLYCOSYLATED A1C: CPT

## 2019-11-12 PROCEDURE — 84443 ASSAY THYROID STIM HORMONE: CPT

## 2019-11-12 PROCEDURE — 80053 COMPREHEN METABOLIC PANEL: CPT

## 2019-11-12 PROCEDURE — 80061 LIPID PANEL: CPT

## 2019-11-12 PROCEDURE — 82306 VITAMIN D 25 HYDROXY: CPT

## 2019-11-12 PROCEDURE — 82043 UR ALBUMIN QUANTITATIVE: CPT

## 2019-11-12 RX ORDER — NIFEDIPINE 30 MG/1
30 TABLET, FILM COATED, EXTENDED RELEASE ORAL DAILY
Qty: 90 TAB | Refills: 1 | Status: SHIPPED | OUTPATIENT
Start: 2019-11-12 | End: 2019-12-03 | Stop reason: SDUPTHER

## 2019-11-12 RX ORDER — INSULIN GLARGINE 100 [IU]/ML
30 INJECTION, SOLUTION SUBCUTANEOUS DAILY
Qty: 15 PEN | Refills: 2 | Status: SHIPPED | OUTPATIENT
Start: 2019-11-12 | End: 2020-02-03 | Stop reason: SDUPTHER

## 2019-11-12 RX ORDER — OLMESARTAN MEDOXOMIL 40 MG/1
40 TABLET ORAL DAILY
Qty: 90 TAB | Refills: 1 | Status: SHIPPED | OUTPATIENT
Start: 2019-11-12 | End: 2019-12-03 | Stop reason: SDUPTHER

## 2019-11-12 RX ORDER — LEVOTHYROXINE SODIUM 25 UG/1
25 TABLET ORAL
Qty: 90 TAB | Refills: 1 | Status: CANCELLED | OUTPATIENT
Start: 2019-11-12

## 2019-11-12 RX ORDER — INSULIN ASPART 100 [IU]/ML
INJECTION, SOLUTION INTRAVENOUS; SUBCUTANEOUS
Qty: 5 PEN | Refills: 2 | Status: SHIPPED | OUTPATIENT
Start: 2019-11-12 | End: 2020-02-03 | Stop reason: SDUPTHER
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
## 2019-11-12 NOTE — PROGRESS NOTES
Patient here for follow up on her DM, she is asking for refills today. 1. Have you been to the ER, urgent care clinic since your last visit? Hospitalized since your last visit? No  2. Have you seen or consulted any other health care providers outside of the [address] [place] [address] since your last visit? Include any pap smears or colon screening. No    Medication reconciliation has been completed with patient. Care team discussed/updated as well as pharmacy. Health Maintenance Due   Topic Date Due    COLONOSCOPY  02/11/2019    LIPID PANEL Q1  07/30/2019    Influenza Age 5 to Adult  08/01/2019    EYE EXAM RETINAL OR DILATED  09/07/2019     Health Maintenance reviewed - Will get flu vaccine today.

## 2019-11-13 LAB
25(OH)D3 SERPL-MCNC: 31.2 NG/ML (ref 30–100)
CHOLEST SERPL-MCNC: 251 MG/DL
CREAT UR-MCNC: 118 MG/DL (ref 30–125)
EST. AVERAGE GLUCOSE BLD GHB EST-MCNC: 157 MG/DL
HBA1C MFR BLD: 7.1 % (ref 4.2–5.6)
HDLC SERPL-MCNC: 44 MG/DL (ref 40–60)
HDLC SERPL: 5.7 {RATIO} (ref 0–5)
LDLC SERPL CALC-MCNC: 168.4 MG/DL (ref 0–100)
LIPID PROFILE,FLP: ABNORMAL
MICROALBUMIN UR-MCNC: 11 MG/DL (ref 0–3)
MICROALBUMIN/CREAT UR-RTO: 93 MG/G (ref 0–30)
TRIGL SERPL-MCNC: 193 MG/DL (ref ?–150)
TSH SERPL-ACNC: 0.57 UIU/ML
VLDLC SERPL CALC-MCNC: 38.6 MG/DL

## 2019-12-03 ENCOUNTER — OFFICE VISIT (OUTPATIENT)
Dept: FAMILY MEDICINE CLINIC | Age: 69
End: 2019-12-03

## 2019-12-03 VITALS
RESPIRATION RATE: 14 BRPM | TEMPERATURE: 97.6 F | HEIGHT: 64 IN | SYSTOLIC BLOOD PRESSURE: 126 MMHG | WEIGHT: 181 LBS | DIASTOLIC BLOOD PRESSURE: 68 MMHG | BODY MASS INDEX: 30.9 KG/M2 | HEART RATE: 62 BPM

## 2019-12-03 DIAGNOSIS — R68.89 OTHER GENERAL SYMPTOMS AND SIGNS: ICD-10-CM

## 2019-12-03 DIAGNOSIS — R00.2 HEART PALPITATIONS: ICD-10-CM

## 2019-12-03 DIAGNOSIS — E03.9 ACQUIRED HYPOTHYROIDISM: ICD-10-CM

## 2019-12-03 DIAGNOSIS — R80.9 TYPE 2 DIABETES MELLITUS WITH MICROALBUMINURIA, WITH LONG-TERM CURRENT USE OF INSULIN (HCC): Primary | ICD-10-CM

## 2019-12-03 DIAGNOSIS — Z79.4 TYPE 2 DIABETES MELLITUS WITH MICROALBUMINURIA, WITH LONG-TERM CURRENT USE OF INSULIN (HCC): Primary | ICD-10-CM

## 2019-12-03 DIAGNOSIS — N18.30 CKD (CHRONIC KIDNEY DISEASE) STAGE 3, GFR 30-59 ML/MIN (HCC): ICD-10-CM

## 2019-12-03 DIAGNOSIS — E11.29 TYPE 2 DIABETES MELLITUS WITH MICROALBUMINURIA, WITH LONG-TERM CURRENT USE OF INSULIN (HCC): Primary | ICD-10-CM

## 2019-12-03 DIAGNOSIS — I10 ESSENTIAL HYPERTENSION: ICD-10-CM

## 2019-12-03 DIAGNOSIS — N18.9 CHRONIC KIDNEY DISEASE, UNSPECIFIED CKD STAGE: ICD-10-CM

## 2019-12-03 DIAGNOSIS — E11.21 DIABETIC NEPHROPATHY ASSOCIATED WITH TYPE 2 DIABETES MELLITUS (HCC): ICD-10-CM

## 2019-12-03 RX ORDER — OLMESARTAN MEDOXOMIL 40 MG/1
40 TABLET ORAL DAILY
Qty: 90 TAB | Refills: 4 | Status: SHIPPED | OUTPATIENT
Start: 2019-12-03 | End: 2021-02-07

## 2019-12-03 RX ORDER — LEVOTHYROXINE SODIUM 25 UG/1
25 TABLET ORAL
Qty: 90 TAB | Refills: 4 | Status: SHIPPED | OUTPATIENT
Start: 2019-12-03 | End: 2021-02-02 | Stop reason: SDUPTHER

## 2019-12-03 RX ORDER — NIFEDIPINE 30 MG/1
30 TABLET, FILM COATED, EXTENDED RELEASE ORAL DAILY
Qty: 90 TAB | Refills: 4 | Status: SHIPPED | OUTPATIENT
Start: 2019-12-03 | End: 2020-02-03 | Stop reason: SDUPTHER

## 2019-12-03 NOTE — PATIENT INSTRUCTIONS
Schedule eye exam  Call our office if you've not been called about the Event Monitor or the Nutrition referral within 2 weeks  Have your EKG done.

## 2019-12-03 NOTE — PROGRESS NOTES
Patient here for follow up on her thyroid and DM. No concerns. 1. Have you been to the ER, urgent care clinic since your last visit? Hospitalized since your last visit? No  2. Have you seen or consulted any other health care providers outside of the [address] [place] [address] since your last visit? Include any pap smears or colon screening. No    Medication reconciliation has been completed with patient. Care team discussed/updated as well as pharmacy.     Health Maintenance Due   Topic Date Due    COLONOSCOPY  02/11/2019    EYE EXAM RETINAL OR DILATED  09/07/2019

## 2019-12-03 NOTE — PROGRESS NOTES
Scout Wasserman is a 78 y.o. female who was seen in clinic today (12/3/2019). Assessment & Plan:   Diagnoses and all orders for this visit:    1. Type 2 diabetes mellitus with microalbuminuria, with long-term current use of insulin ([facility])  -     REFERRAL TO NUTRITION  -     LIPID PANEL W/ REFLX DIRECT LDL; Future  -     METABOLIC PANEL, COMPREHENSIVE; Future    2. CKD (chronic kidney disease) stage 3, GFR 30-59 ml/min ([facility])  -     PTH INTACT; Future  -     PHOSPHORUS; Future  -     CBC WITH AUTOMATED DIFF; Future  -     FERRITIN; Future  -     IRON PROFILE; Future  -     US RETROPERITONEUM COMP; Future  -     REFERRAL TO NUTRITION    3. Diabetic nephropathy associated with type 2 diabetes mellitus (HCC)  -     REFERRAL TO NUTRITION    4. Essential hypertension  -     olmesartan (BENICAR) 40 mg tablet; Take 1 Tab by mouth daily.  -     NIFEdipine ER (ADALAT CC) 30 mg ER tablet; Take 1 Tab by mouth daily. 5. Other general symptoms and signs  -     VITAMIN B12 & FOLATE; Future    6. Heart palpitations  -     EVENT MONITOR POST SYMPTOMS; Future  -     EKG, 12 LEAD, INITIAL; Future    7. Acquired hypothyroidism  -     levothyroxine (SYNTHROID) 25 mcg tablet; Take 1 Tab by mouth Daily (before breakfast). 8. Chronic kidney disease, unspecified CKD stage   -     FERRITIN; Future  -     IRON PROFILE; Future      DM2 with nephropathy: Well controlled, continue present management    CKD 3: essentially stable, modestly better indices with resumption of ARB. Dedicated ancillary testing due    Lipids uncontrolled due to lapse in medication: Start taking statin in the morning    Hypothyroidism: Well controlled, continue present management    Follow-up and Dispositions    · Return in about 2 months (around 2/3/2020) for palpitations and kidney disease follow up, (30), non fasting labs 1 week prior, at [facility]. Subjective:    Scout Wasserman was seen today for Thyroid Problem and Diabetes    Pending transition from [place] Medicaid to [facility], should be January States racing heart rate off and on x approx 1 year, may last minutes to hour. Associated with lightheadedness (mild). Had been daily occurrence while in [place]. Much less since then. Most recently approx 1.5 weeks ago x 45 minutes . Follow-up DM2 with microalbuminuria  Key Antihyperglycemic Medications             insulin glargine (LANTUS SOLOSTAR U-100 INSULIN) 100 unit/mL (3 mL) inpn (Taking) 30 Units by SubCUTAneous route daily. dulaglutide (TRULICITY) 6.36 MN/2.5 mL sub-q pen (Taking) 0.5 mL by SubCUTAneous route every seven (7) days. insulin aspart U-100 (NOVOLOG) 100 unit/mL (3 mL) inpn (Taking) 5 Units daily (with breakfast) AND 8 Units daily (with dinner). No hypoglycemia  No sugars >200 with Thanksgiving  Limiting protein    Been forgetting statin more often than not since moved other meds to morning admin    Lab Results   Component Value Date/Time    Hemoglobin A1c 7.1 (H) 11/12/2019 10:50 AM    Hemoglobin A1c 7.0 (H) 07/02/2019 08:55 AM    Hemoglobin A1c 7.3 (H) 03/21/2019 08:55 AM    Hemoglobin A1c, External 11.1 03/21/2016    Glucose 205 (H) 11/12/2019 10:50 AM    Glucose,  (H) 01/09/2019 10:30 AM    Microalbumin/Creat ratio (mg/g creat) 93 (H) 11/12/2019 10:50 AM    Microalbumin,urine random 11.00 (H) 11/12/2019 10:50 AM    LDL, calculated 168.4 (H) 11/12/2019 10:50 AM    Creatinine, POC 1.6 (H) 01/09/2019 10:30 AM    Creatinine 1.56 (H) 11/12/2019 10:50 AM     Follow-up hypertension with CKD: ARB had lapsed last visit  Key CAD CHF Meds             olmesartan (BENICAR) 40 mg tablet (Taking) Take 1 Tab by mouth daily. NIFEdipine ER (ADALAT CC) 30 mg ER tablet (Taking) Take 1 Tab by mouth daily. simvastatin (ZOCOR) 40 mg tablet (Taking) Take 1 Tab by mouth nightly.         Component      Latest Ref Rng & Units 11/12/2019 8/16/2019 8/1/2019          10:50 AM 11:04 AM 12:12 PM   Sodium      136 - 145 mmol/L 141 144 144   Potassium      3.5 - 5.5 mmol/L 4.3 4.6 5.2   Chloride      100 - 111 mmol/L 107 107 108   CO2      21 - 32 mmol/L 31 28 28   Anion gap      3.0 - 18 mmol/L 3 9 8   Glucose      74 - 99 mg/dL 205 (H) 161 (H) 290 (H)   BUN      7.0 - 18 MG/DL 27 (H) 22 (H) 30 (H)   Creatinine      0.6 - 1.3 MG/DL 1.56 (H) 1.70 (H) 1.82 (H)   BUN/Creatinine ratio      12 - 20   17 13 16   GFR est AA      >60 ml/min/1.73m2 40 (L) 36 (L) 33 (L)   GFR est non-AA      >60 ml/min/1.73m2 33 (L) 30 (L) 28 (L)   Calcium      8.5 - 10.1 MG/DL 8.7 8.9 9.4           CT Results (most recent):  Results from Hospital Encounter encounter on 05/26/15   CT ABD PELV W CONT    Narrative CT Abdomen And Pelvis with Intravenous Contrast    INDICATION: Left upper quadrant pain. TECHNIQUE: 5 mm collimation axial images obtained from the diaphragm to the  level of the pubic symphysis following the uneventful administration of  100 cc  of low osmolar, nonionic intravenous contrast.    COMPARISON: Right upper quadrant ultrasound 10/24/13. ABDOMEN FINDINGS:    Lung Bases: There is a small band of subsegmental atelectasis in the right lower  lobe. No evidence of pulmonary mass. Visualized portion of the mediastinum is  normal..    Liver: Normal attenuation. No evidence for mass. Gallbladder: Present and appears normal. No biliary ductal dilatation. Pancreas: Normal attenuation without mass or ductal dilatation. Spleen: Normal in size. No evidence of mass. .    Adrenal Glands: No evidence for mass. Kidneys: Symmetric enhancement. No cortical mass. No hydronephrosis. Lymph Nodes: No lymphadenopathy. PELVIS FINDINGS:     Bowel: Small Bowel: Normal in caliber with normal wall thickness. Large Bowel: Normal in caliber with normal wall thickness. There is a moderate  degree of fecal retention in the colon. No significant burden of diverticulosis. Appendix: Not visualized and may be absent.   There is no evidence of hernia. Bladder: Normal.    The uterus is absent. There are no adnexal masses. Bones: There are scattered degenerative changes of the lower thoracic spine  consistent with age. .      Impression IMPRESSION:    1. No evidence for bowel obstruction or inflammation. Moderate degree of  fecal retention in the colon is suggestive of constipation. 2.   Hysterectomy and possible appendectomy. 3. The remainder of the abdomen/pelvis is normal.         follow up hypothyroidism on replacement  Follow up vit d def off replacement:    Lab Results   Component Value Date/Time    Vitamin D 25-Hydroxy 31.2 11/12/2019 10:50 AM    Vitamin D 25-Hydroxy 57.5 07/30/2018 08:48 AM    Vitamin D 25-Hydroxy 19.3 (L) 06/07/2018 12:40 PM           Results for orders placed or performed during the hospital encounter of 97/12/73   METABOLIC PANEL, COMPREHENSIVE   Result Value Ref Range    Sodium 141 136 - 145 mmol/L    Potassium 4.3 3.5 - 5.5 mmol/L    Chloride 107 100 - 111 mmol/L    CO2 31 21 - 32 mmol/L    Anion gap 3 3.0 - 18 mmol/L    Glucose 205 (H) 74 - 99 mg/dL    BUN 27 (H) 7.0 - 18 MG/DL    Creatinine 1.56 (H) 0.6 - 1.3 MG/DL    BUN/Creatinine ratio 17 12 - 20      GFR est AA 40 (L) >60 ml/min/1.73m2    GFR est non-AA 33 (L) >60 ml/min/1.73m2    Calcium 8.7 8.5 - 10.1 MG/DL    Bilirubin, total 0.4 0.2 - 1.0 MG/DL    ALT (SGPT) 23 13 - 56 U/L    AST (SGOT) 14 10 - 38 U/L    Alk.  phosphatase 112 45 - 117 U/L    Protein, total 7.3 6.4 - 8.2 g/dL    Albumin 4.0 3.4 - 5.0 g/dL    Globulin 3.3 2.0 - 4.0 g/dL    A-G Ratio 1.2 0.8 - 1.7     HEMOGLOBIN A1C WITH EAG   Result Value Ref Range    Hemoglobin A1c 7.1 (H) 4.2 - 5.6 %    Est. average glucose 157 mg/dL   MICROALBUMIN, UR, RAND W/ MICROALB/CREAT RATIO   Result Value Ref Range    Microalbumin,urine random 11.00 (H) 0 - 3.0 MG/DL    Creatinine, urine 118.00 30 - 125 mg/dL    Microalbumin/Creat ratio (mg/g creat) 93 (H) 0 - 30 mg/g   TSH W/ REFLX FREE T4 IF ABNORMAL   Result Value Ref Range    TSH 0.57 uIU/mL   VITAMIN D, 25 HYDROXY   Result Value Ref Range    Vitamin D 25-Hydroxy 31.2 30 - 100 ng/mL   LIPID PANEL W/ REFLX DIRECT LDL   Result Value Ref Range    LIPID PROFILE          Cholesterol, total 251 (H) <200 MG/DL    Triglyceride 193 (H) <150 MG/DL    HDL Cholesterol 44 40 - 60 MG/DL    LDL, calculated 168.4 (H) 0 - 100 MG/DL    VLDL, calculated 38.6 MG/DL    CHOL/HDL Ratio 5.7 (H) 0 - 5.0          Prior to Admission medications    Medication Sig Start Date End Date Taking? Authorizing Provider   insulin glargine (LANTUS SOLOSTAR U-100 INSULIN) 100 unit/mL (3 mL) inpn 30 Units by SubCUTAneous route daily. 11/12/19  Yes Aubree Butterfield MD   olmesartan (BENICAR) 40 mg tablet Take 1 Tab by mouth daily. 11/12/19  Yes Aubree Butterfield MD   dulaglutide (TRULICITY) 8.29 JA/7.5 mL sub-q pen 0.5 mL by SubCUTAneous route every seven (7) days. 11/12/19  Yes Aubree Butterfield MD   NIFEdipine ER (ADALAT CC) 30 mg ER tablet Take 1 Tab by mouth daily. 11/12/19  Yes Aubree Butterfield MD   insulin aspart U-100 (NOVOLOG) 100 unit/mL (3 mL) inpn 5 Units daily (with breakfast) AND 8 Units daily (with dinner). 11/12/19  Yes Aubree Butterfield MD   simvastatin (ZOCOR) 40 mg tablet Take 1 Tab by mouth nightly. 7/9/19  Yes Aubree Butterfield MD   levothyroxine (SYNTHROID) 25 mcg tablet Take 1 Tab by mouth Daily (before breakfast). 7/9/19  Yes Aubree Butterfield MD   sertraline (ZOLOFT) 50 mg tablet Take 1 Tab by mouth daily. 7/9/19  Yes Aubree Butterfield MD   glucose blood VI test strips (ACCU-CHEK CODY PLUS TEST STRP) strip Patient is to check blood sugar 3-4 times a day 1/28/19  Yes Aubree Butterfield MD   acetaminophen (TYLENOL EXTRA STRENGTH) 500 mg tablet Take  by mouth every six (6) hours as needed for Pain. Yes Provider, Historical   esomeprazole (NEXIUM) 20 mg capsule Take  by mouth.    Yes Provider, Historical   Blood-Glucose Meter (ACCU-CHEK CODY PLUS METER) misc 1 Device by Does Not Apply route four (4) times daily. 6/8/18  Yes Alex Hampton MD   BLADDER CONTROL PADS pads  5/16/17  Yes Provider, Historical   ALCOHOL PREP PADS padm  5/16/17  Yes Provider, Historical   omeprazole (PRILOSEC) 40 mg capsule Take 1 Cap by mouth daily. 5/8/17  Yes Tracee Henley MD   psyllium seed, with dextrose, (FIBER PO) Take  by mouth. Provider, Historical   NOÉ OIL, BULK, by Does Not Apply route. Using topical for Vertigo    Provider, Historical   cholecalciferol, VITAMIN D3, (VITAMIN D3) 5,000 unit tab tablet Take  by mouth daily.     Provider, Historical         Patient Active Problem List    Diagnosis    CKD (chronic kidney disease) stage 3, GFR 30-59 ml/min ([facility])    Diarrhea    Class 1 obesity due to excess calories with serious comorbidity and body mass index (BMI) of 31.0 to 31.9 in adult    Vitamin D deficiency    Osteopenia of multiple sites     7/2018      Moderate major depression ([address]    Type 2 diabetes mellitus with microalbuminuria, with long-term current use of insulin ([address]    Family history of malignant neoplasm of breast    Diabetic nephropathy associated with type 2 diabetes mellitus ([address]    Lump or mass in breast    Essential hypertension    Acquired hypothyroidism    Hiatal hernia     EGD 11/14/2017 Chales Epley, MD \"prn acid suppression\"      Hyperlipidemia    Migraines         Allergies   Allergen Reactions    Darvocet A500 [Propoxyphene N-Acetaminophen] Nausea and Vomiting    Gloves, Latex With Aloe Vera Contact Dermatitis     Rash from powder inside latex gloves      Metformin Diarrhea        Social History     Tobacco Use    Smoking status: Never Smoker    Smokeless tobacco: Never Used   Substance Use Topics    Alcohol use: No     Alcohol/week: 0.0 standard drinks       Patient Care Team:  Anup Díaz MD (Gastroenterology)  Scout Dan (Ophthalmology)    The following sections were reviewed & updated as appropriate: PMH, PSH, FH, and SH.      Review of Systems   Constitutional: Negative for diaphoresis. Gastrointestinal: Negative for nausea. Neurological: Negative for loss of consciousness. Objective:     Visit Vitals  /68   Pulse 62   Temp 97.6 °F (36.4 °C) (Oral)   Resp 14   Ht 5' 4\" (1.626 m)   Wt 181 lb (82.1 kg)   LMP 11/17/1972 Comment: partial   BMI 31.07 kg/m²      Physical Exam  Constitutional:       General: She is not in acute distress. Appearance: She is well-developed. She is obese. She is not diaphoretic. HENT:      Head: Normocephalic and atraumatic. Cardiovascular:      Rate and Rhythm: Normal rate and regular rhythm. Heart sounds: Normal heart sounds. No murmur. No friction rub. No gallop. Pulmonary:      Effort: Pulmonary effort is normal. No respiratory distress. Breath sounds: Normal breath sounds. No wheezing, rhonchi or rales. Skin:     General: Skin is warm and dry. Nails: There is no clubbing. Neurological:      Mental Status: She is alert and oriented to person, place, and time. Psychiatric:         Behavior: Behavior normal.         Thought Content: Thought content normal.         Judgment: Judgment normal.           Disclaimer: The patient understands our medical plan. Alternatives have been explained and offered. The risks, benefits and significant side effects of all medications have been reviewed. Anticipated time course and progression of condition reviewed. All questions have been addressed. She is encouraged to employ the information provided in the after visit summary, which was reviewed. Where applicable, she is instructed to call the clinic if she has not been notified either by phone or through [address] with the results of her tests or with an appointment plan for any referrals within 1 week(s). No news is not good news; it's no news.  The patient  is to call if her condition worsens or fails to improve or if significant side effects are experienced. Aspects of this note may have been generated using voice recognition software. Despite editing, there may be unrecognized errors.        Kaylyn Rivera MD

## 2019-12-12 ENCOUNTER — HOSPITAL ENCOUNTER (OUTPATIENT)
Dept: ULTRASOUND IMAGING | Age: 69
Discharge: HOME | End: 2019-12-12
Attending: FAMILY MEDICINE
Payer: MEDICARE

## 2019-12-12 ENCOUNTER — HOSPITAL ENCOUNTER (OUTPATIENT)
Dept: LAB | Age: 69
Discharge: HOME | End: 2019-12-12
Payer: MEDICARE

## 2019-12-12 DIAGNOSIS — R00.2 HEART PALPITATIONS: ICD-10-CM

## 2019-12-12 DIAGNOSIS — N18.30 CKD (CHRONIC KIDNEY DISEASE) STAGE 3, GFR 30-59 ML/MIN (HCC): ICD-10-CM

## 2019-12-12 LAB
ATRIAL RATE: 68 BPM
CALCULATED P AXIS, ECG09: 26 DEGREES
CALCULATED R AXIS, ECG10: 5 DEGREES
CALCULATED T AXIS, ECG11: 7 DEGREES
DIAGNOSIS, 93000: NORMAL
P-R INTERVAL, ECG05: 146 MS
Q-T INTERVAL, ECG07: 392 MS
QRS DURATION, ECG06: 86 MS
QTC CALCULATION (BEZET), ECG08: 416 MS
VENTRICULAR RATE, ECG03: 68 BPM

## 2019-12-12 PROCEDURE — 76770 US EXAM ABDO BACK WALL COMP: CPT

## 2019-12-12 PROCEDURE — 93005 ELECTROCARDIOGRAM TRACING: CPT

## 2019-12-16 NOTE — PROGRESS NOTES
Called patient had her verify  she has been made aware of reassuring EKG, says she has not been contacted for her event monitor as of yet told I will f/u on this and will call her back if I need to.

## 2019-12-26 LAB — TSH SERPL-ACNC: 0.57 UIU/ML (ref 0.36–3.74)

## 2020-01-06 ENCOUNTER — TELEPHONE (OUTPATIENT)
Dept: FAMILY MEDICINE CLINIC | Age: 70
End: 2020-01-06

## 2020-01-06 NOTE — TELEPHONE ENCOUNTER
Called patient to see if she has been contacted to set up her event monitor she says she has not been contacted as of yet. Request has been re-faxed, confirmation received, will follow up status in 24 hours.

## 2020-01-06 NOTE — PROGRESS NOTES
Called patient had her verify her  she still has not been contacted to set up the event monitor, see telephone encounter for further documentation.

## 2020-01-09 NOTE — TELEPHONE ENCOUNTER
Called [employer] to follow up status if event monitor, spoke with Ellaree Holstein who states she did see the order in their system and will reach out to Noris who is the [employer] rep for [employer] because she has not been able to find an account for [facility] in their system. She will have Noris call me back at the office.

## 2020-01-16 NOTE — TELEPHONE ENCOUNTER
Called patient to see if she has been contacted for her event monitor no answer left message asking her to call the office back.

## 2020-01-16 NOTE — TELEPHONE ENCOUNTER
Patient called the office back she has not been contacted about the event monitor. Told I will follow up on this and call her back.

## 2020-01-22 NOTE — TELEPHONE ENCOUNTER
Called central scheduling to check if they can schedule the event monitoring was told they do not and was given the telephone number to [facility] [phone] and [employer] [phone]. Will call [employer] to follow up status.

## 2020-01-29 NOTE — TELEPHONE ENCOUNTER
Called [employer] to f/u on status of event monitor was told there was no order in the system for patient was given contact number for Andrewgerson Bennett [phone], no answer left message telling we have been trying to get this done for a few weeks asked that she please call me back to discuss office number has been left.

## 2020-01-30 ENCOUNTER — HOSPITAL ENCOUNTER (OUTPATIENT)
Dept: LAB | Age: 70
Discharge: HOME | End: 2020-01-30
Payer: MEDICARE

## 2020-01-30 DIAGNOSIS — R80.9 TYPE 2 DIABETES MELLITUS WITH MICROALBUMINURIA, WITH LONG-TERM CURRENT USE OF INSULIN (HCC): ICD-10-CM

## 2020-01-30 DIAGNOSIS — Z79.4 TYPE 2 DIABETES MELLITUS WITH MICROALBUMINURIA, WITH LONG-TERM CURRENT USE OF INSULIN (HCC): ICD-10-CM

## 2020-01-30 DIAGNOSIS — R68.89 OTHER GENERAL SYMPTOMS AND SIGNS: ICD-10-CM

## 2020-01-30 DIAGNOSIS — N18.30 CKD (CHRONIC KIDNEY DISEASE) STAGE 3, GFR 30-59 ML/MIN (HCC): ICD-10-CM

## 2020-01-30 DIAGNOSIS — E11.29 TYPE 2 DIABETES MELLITUS WITH MICROALBUMINURIA, WITH LONG-TERM CURRENT USE OF INSULIN (HCC): ICD-10-CM

## 2020-01-30 LAB
ALBUMIN SERPL-MCNC: 3.6 G/DL (ref 3.4–5)
ALBUMIN/GLOB SERPL: 1 {RATIO} (ref 0.8–1.7)
ALP SERPL-CCNC: 105 U/L (ref 45–117)
ALT SERPL-CCNC: 17 U/L (ref 13–56)
ANION GAP SERPL CALC-SCNC: 2 MMOL/L (ref 3–18)
AST SERPL-CCNC: 16 U/L (ref 10–38)
BASOPHILS # BLD: 0.1 K/UL (ref 0–0.1)
BASOPHILS NFR BLD: 1 % (ref 0–2)
BILIRUB SERPL-MCNC: 0.4 MG/DL (ref 0.2–1)
BUN SERPL-MCNC: 25 MG/DL (ref 7–18)
BUN/CREAT SERPL: 17 (ref 12–20)
CALCIUM SERPL-MCNC: 9 MG/DL (ref 8.5–10.1)
CALCIUM SERPL-MCNC: 9.1 MG/DL (ref 8.5–10.1)
CHLORIDE SERPL-SCNC: 113 MMOL/L (ref 100–111)
CHOLEST SERPL-MCNC: 164 MG/DL
CO2 SERPL-SCNC: 30 MMOL/L (ref 21–32)
CREAT SERPL-MCNC: 1.49 MG/DL (ref 0.6–1.3)
DIFFERENTIAL METHOD BLD: ABNORMAL
EOSINOPHIL # BLD: 0.1 K/UL (ref 0–0.4)
EOSINOPHIL NFR BLD: 2 % (ref 0–5)
ERYTHROCYTE [DISTWIDTH] IN BLOOD BY AUTOMATED COUNT: 12.5 % (ref 11.6–14.5)
FOLATE SERPL-MCNC: 17.2 NG/ML (ref 3.1–17.5)
GLOBULIN SER CALC-MCNC: 3.5 G/DL (ref 2–4)
GLUCOSE SERPL-MCNC: 93 MG/DL (ref 74–99)
HCT VFR BLD AUTO: 38.9 % (ref 35–45)
HDLC SERPL-MCNC: 42 MG/DL (ref 40–60)
HDLC SERPL: 3.9 {RATIO} (ref 0–5)
HGB BLD-MCNC: 12.6 G/DL (ref 12–16)
LDLC SERPL CALC-MCNC: 94.4 MG/DL (ref 0–100)
LIPID PROFILE,FLP: NORMAL
LYMPHOCYTES # BLD: 2 K/UL (ref 0.9–3.6)
LYMPHOCYTES NFR BLD: 26 % (ref 21–52)
MCH RBC QN AUTO: 28.5 PG (ref 24–34)
MCHC RBC AUTO-ENTMCNC: 32.4 G/DL (ref 31–37)
MCV RBC AUTO: 88 FL (ref 74–97)
MONOCYTES # BLD: 0.5 K/UL (ref 0.05–1.2)
MONOCYTES NFR BLD: 6 % (ref 3–10)
NEUTS SEG # BLD: 4.9 K/UL (ref 1.8–8)
NEUTS SEG NFR BLD: 65 % (ref 40–73)
PHOSPHATE SERPL-MCNC: 3.5 MG/DL (ref 2.5–4.9)
PLATELET # BLD AUTO: 285 K/UL (ref 135–420)
PMV BLD AUTO: 12.2 FL (ref 9.2–11.8)
POTASSIUM SERPL-SCNC: 5.2 MMOL/L (ref 3.5–5.5)
PROT SERPL-MCNC: 7.1 G/DL (ref 6.4–8.2)
PTH-INTACT SERPL-MCNC: 76.1 PG/ML (ref 18.4–88)
RBC # BLD AUTO: 4.42 M/UL (ref 4.2–5.3)
SODIUM SERPL-SCNC: 145 MMOL/L (ref 136–145)
TRIGL SERPL-MCNC: 138 MG/DL (ref ?–150)
VIT B12 SERPL-MCNC: 316 PG/ML (ref 211–911)
VLDLC SERPL CALC-MCNC: 27.6 MG/DL
WBC # BLD AUTO: 7.5 K/UL (ref 4.6–13.2)

## 2020-01-30 PROCEDURE — 80053 COMPREHEN METABOLIC PANEL: CPT

## 2020-01-30 PROCEDURE — 85025 COMPLETE CBC W/AUTO DIFF WBC: CPT

## 2020-01-30 PROCEDURE — 83970 ASSAY OF PARATHORMONE: CPT

## 2020-01-30 PROCEDURE — 84100 ASSAY OF PHOSPHORUS: CPT

## 2020-01-30 PROCEDURE — 36415 COLL VENOUS BLD VENIPUNCTURE: CPT

## 2020-01-30 PROCEDURE — 80061 LIPID PANEL: CPT

## 2020-01-30 PROCEDURE — 82607 VITAMIN B-12: CPT

## 2020-01-31 NOTE — TELEPHONE ENCOUNTER
Received verbal notification from [facility] stating a gentleman had returned my call and when I was not available he said he would call me back.

## 2020-02-03 ENCOUNTER — OFFICE VISIT (OUTPATIENT)
Dept: FAMILY MEDICINE CLINIC | Age: 70
End: 2020-02-03

## 2020-02-03 VITALS
WEIGHT: 182.8 LBS | OXYGEN SATURATION: 98 % | TEMPERATURE: 97.6 F | RESPIRATION RATE: 14 BRPM | BODY MASS INDEX: 31.21 KG/M2 | HEIGHT: 64 IN | DIASTOLIC BLOOD PRESSURE: 78 MMHG | HEART RATE: 82 BPM | SYSTOLIC BLOOD PRESSURE: 148 MMHG

## 2020-02-03 DIAGNOSIS — I10 ESSENTIAL HYPERTENSION: ICD-10-CM

## 2020-02-03 DIAGNOSIS — E11.21 DIABETIC NEPHROPATHY ASSOCIATED WITH TYPE 2 DIABETES MELLITUS (HCC): ICD-10-CM

## 2020-02-03 DIAGNOSIS — N18.30 CKD (CHRONIC KIDNEY DISEASE) STAGE 3, GFR 30-59 ML/MIN (HCC): Primary | ICD-10-CM

## 2020-02-03 DIAGNOSIS — E11.29 TYPE 2 DIABETES MELLITUS WITH MICROALBUMINURIA, WITH LONG-TERM CURRENT USE OF INSULIN (HCC): ICD-10-CM

## 2020-02-03 DIAGNOSIS — Z79.4 TYPE 2 DIABETES MELLITUS WITH MICROALBUMINURIA, WITH LONG-TERM CURRENT USE OF INSULIN (HCC): ICD-10-CM

## 2020-02-03 DIAGNOSIS — R00.2 HEART PALPITATIONS: ICD-10-CM

## 2020-02-03 DIAGNOSIS — R80.9 TYPE 2 DIABETES MELLITUS WITH MICROALBUMINURIA, WITH LONG-TERM CURRENT USE OF INSULIN (HCC): ICD-10-CM

## 2020-02-03 RX ORDER — NIFEDIPINE 30 MG/1
30 TABLET, FILM COATED, EXTENDED RELEASE ORAL DAILY
Qty: 90 TAB | Refills: 4 | Status: SHIPPED | OUTPATIENT
Start: 2020-02-03 | End: 2021-03-23 | Stop reason: SDUPTHER

## 2020-02-03 RX ORDER — INSULIN ASPART 100 [IU]/ML
INJECTION, SOLUTION INTRAVENOUS; SUBCUTANEOUS
Qty: 5 PEN | Refills: 2 | Status: SHIPPED | OUTPATIENT
Start: 2020-02-03 | End: 2021-02-02 | Stop reason: SDUPTHER

## 2020-02-03 RX ORDER — INSULIN GLARGINE 100 [IU]/ML
30 INJECTION, SOLUTION SUBCUTANEOUS DAILY
Qty: 15 PEN | Refills: 2 | Status: SHIPPED | OUTPATIENT
Start: 2020-02-03 | End: 2020-10-20

## 2020-02-03 RX ORDER — PEN NEEDLE, DIABETIC 31 GX3/16"
NEEDLE, DISPOSABLE MISCELLANEOUS
Qty: 100 PEN NEEDLE | Refills: 4 | Status: SHIPPED | OUTPATIENT
Start: 2020-02-03 | End: 2021-02-12

## 2020-02-03 NOTE — TELEPHONE ENCOUNTER
Received an email from Marianne Hopper with [employer] 1993 called the office and wanted me to call him back at [phone]. Spoke with Yusuf about this ongoing issue trying to get patient set up with an event monitor. He states he is the new rep for this area and asked that I email the form to him and he will take care of the order for patient.

## 2020-02-03 NOTE — PROGRESS NOTES
Patient here for 2 month follow up on her palpitations and kidney disease. She says she is still having issues with palpitations that are unchanged. Has not been contacted to set up her event monitor. 1. Have you been to the ER, urgent care clinic since your last visit? Hospitalized since your last visit? No  2. Have you seen or consulted any other health care providers outside of the [address] [place] [address] since your last visit? Include any pap smears or colon screening. No    Medication reconciliation has been completed with patient. Care team discussed/updated as well as pharmacy.     Health Maintenance Due   Topic Date Due    EYE EXAM RETINAL OR DILATED  09/07/2018    COLONOSCOPY  02/11/2019

## 2020-02-03 NOTE — PROGRESS NOTES
Kervin Stock is a 78 y.o. female who was seen in clinic today (2/3/2020). Assessment & Plan:   Diagnoses and all orders for this visit:    1. CKD (chronic kidney disease) stage 3, GFR 30-59 ml/min ([facility])    2. Type 2 diabetes mellitus with microalbuminuria, with long-term current use of insulin ([facility])  -     dulaglutide (TRULICITY) 7.29 OO/6.6 mL sub-q pen; 0.5 mL by SubCUTAneous route every seven (7) days. -     Insulin Needles, Disposable, (BD ULTRA-FINE MINI PEN NEEDLE) 31 gauge x 3/16\" ndle; Use as directed  -     insulin glargine (LANTUS SOLOSTAR U-100 INSULIN) 100 unit/mL (3 mL) inpn; 30 Units by SubCUTAneous route daily. -     insulin aspart U-100 (NOVOLOG) 100 unit/mL (3 mL) inpn; 5 Units daily (with breakfast) AND 8 Units daily (with dinner). -     REFERRAL TO OPHTHALMOLOGY  -     HEMOGLOBIN A1C WITH EAG; Future  -     LIPID PANEL W/ REFLX DIRECT LDL; Future  -     METABOLIC PANEL, COMPREHENSIVE; Future  -     MICROALBUMIN, UR, RAND W/ MICROALB/CREAT RATIO; Future    3. Diabetic nephropathy associated with type 2 diabetes mellitus ([address]    4. Essential hypertension  -     NIFEdipine ER (ADALAT CC) 30 mg ER tablet; Take 1 Tab by mouth daily. 5. Heart palpitations      DM2: Well controlled, continue present management  Scheduling eye exam    Nephropathy: Stable, continue present management   Hypertension: uncontrolled due to lapse in CCB. Resume    Palpitations: uncontrolled. Brandon Banuelos working logistical issue for event monitor    Scheduling [place]    Follow-up and Dispositions    · Return in about 2 weeks (around 2/17/2020) for nurse visit blood pressure check, May for DM2, non fasting labs 1 week prior, (30). Subjective: Kervin Stock was seen today for Palpitations and Follow-up      follow up palpitations: Unchanged. Normal EKG. Event monitor pending.     follow up hypertension: ran out of CCB a few days ago    follow up CKD 3 in DM2    Results for orders placed or performed during the hospital encounter of 01/30/20   PTH INTACT   Result Value Ref Range    Calcium 9.1 8.5 - 10.1 MG/DL    PTH, Intact 76.1 18.4 - 88.0 pg/mL   PHOSPHORUS   Result Value Ref Range    Phosphorus 3.5 2.5 - 4.9 MG/DL   CBC WITH AUTOMATED DIFF   Result Value Ref Range    WBC 7.5 4.6 - 13.2 K/uL    RBC 4.42 4.20 - 5.30 M/uL    HGB 12.6 12.0 - 16.0 g/dL    HCT 38.9 35.0 - 45.0 %    MCV 88.0 74.0 - 97.0 FL    MCH 28.5 24.0 - 34.0 PG    MCHC 32.4 31.0 - 37.0 g/dL    RDW 12.5 11.6 - 14.5 %    PLATELET 489 337 - 868 K/uL    MPV 12.2 (H) 9.2 - 11.8 FL    NEUTROPHILS 65 40 - 73 %    LYMPHOCYTES 26 21 - 52 %    MONOCYTES 6 3 - 10 %    EOSINOPHILS 2 0 - 5 %    BASOPHILS 1 0 - 2 %    ABS. NEUTROPHILS 4.9 1.8 - 8.0 K/UL    ABS. LYMPHOCYTES 2.0 0.9 - 3.6 K/UL    ABS. MONOCYTES 0.5 0.05 - 1.2 K/UL    ABS. EOSINOPHILS 0.1 0.0 - 0.4 K/UL    ABS. BASOPHILS 0.1 0.0 - 0.1 K/UL    DF AUTOMATED     LIPID PANEL W/ REFLX DIRECT LDL   Result Value Ref Range    LIPID PROFILE          Cholesterol, total 164 <200 MG/DL    Triglyceride 138 <150 MG/DL    HDL Cholesterol 42 40 - 60 MG/DL    LDL, calculated 94.4 0 - 100 MG/DL    VLDL, calculated 27.6 MG/DL    CHOL/HDL Ratio 3.9 0 - 5.0     METABOLIC PANEL, COMPREHENSIVE   Result Value Ref Range    Sodium 145 136 - 145 mmol/L    Potassium 5.2 3.5 - 5.5 mmol/L    Chloride 113 (H) 100 - 111 mmol/L    CO2 30 21 - 32 mmol/L    Anion gap 2 (L) 3.0 - 18 mmol/L    Glucose 93 74 - 99 mg/dL    BUN 25 (H) 7.0 - 18 MG/DL    Creatinine 1.49 (H) 0.6 - 1.3 MG/DL    BUN/Creatinine ratio 17 12 - 20      GFR est AA 42 (L) >60 ml/min/1.73m2    GFR est non-AA 35 (L) >60 ml/min/1.73m2    Calcium 9.0 8.5 - 10.1 MG/DL    Bilirubin, total 0.4 0.2 - 1.0 MG/DL    ALT (SGPT) 17 13 - 56 U/L    AST (SGOT) 16 10 - 38 U/L    Alk.  phosphatase 105 45 - 117 U/L    Protein, total 7.1 6.4 - 8.2 g/dL    Albumin 3.6 3.4 - 5.0 g/dL    Globulin 3.5 2.0 - 4.0 g/dL    A-G Ratio 1.0 0.8 - 1.7     VITAMIN B12 & FOLATE Result Value Ref Range    Vitamin B12 316 211 - 911 pg/mL    Folate 17.2 3.10 - 17.50 ng/mL        Prior to Admission medications    Medication Sig Start Date End Date Taking? Authorizing Provider   olmesartan (BENICAR) 40 mg tablet Take 1 Tab by mouth daily. 12/3/19  Yes Billie Da Silva MD   NIFEdipine ER (ADALAT CC) 30 mg ER tablet Take 1 Tab by mouth daily. 12/3/19  Yes Billie Da Silva MD   levothyroxine (SYNTHROID) 25 mcg tablet Take 1 Tab by mouth Daily (before breakfast). 12/3/19  Yes Billie Da Silva MD   insulin glargine (LANTUS SOLOSTAR U-100 INSULIN) 100 unit/mL (3 mL) inpn 30 Units by SubCUTAneous route daily. 11/12/19  Yes Billie Da Silva MD   dulaglutide (TRULICITY) 5.28 WW/8.0 mL sub-q pen 0.5 mL by SubCUTAneous route every seven (7) days. 11/12/19  Yes Billie Da Silva MD   insulin aspart U-100 (NOVOLOG) 100 unit/mL (3 mL) inpn 5 Units daily (with breakfast) AND 8 Units daily (with dinner). 11/12/19  Yes Billie Da Silva MD   simvastatin (ZOCOR) 40 mg tablet Take 1 Tab by mouth nightly. 7/9/19  Yes Billie Da Silva MD   sertraline (ZOLOFT) 50 mg tablet Take 1 Tab by mouth daily. 7/9/19  Yes Billie Da Silva MD   psyllium seed, with dextrose, (FIBER PO) Take  by mouth. Yes Provider, Historical   NOÉ OIL, BULK, by Does Not Apply route. Using topical for Vertigo   Yes Provider, Historical   glucose blood VI test strips (ACCU-CHEK CODY PLUS TEST STRP) strip Patient is to check blood sugar 3-4 times a day 1/28/19  Yes Billie Da Silva MD   Blood-Glucose Meter (ACCU-CHEK CODY PLUS METER) misc 1 Device by Does Not Apply route four (4) times daily. 6/8/18  Yes Billie Da Silva MD   BLADDER CONTROL PADS pads  5/16/17  Yes Provider, Historical   ALCOHOL PREP PADS padm  5/16/17  Yes Provider, Historical   omeprazole (PRILOSEC) 40 mg capsule Take 1 Cap by mouth daily.  5/8/17  Yes Gerardo Bhandari MD   cholecalciferol, VITAMIN D3, (VITAMIN D3) 5,000 unit tab tablet Take  by mouth daily.    Provider, Historical   acetaminophen (TYLENOL EXTRA STRENGTH) 500 mg tablet Take  by mouth every six (6) hours as needed for Pain. Provider, Historical   esomeprazole (NEXIUM) 20 mg capsule Take  by mouth. Provider, Historical         Patient Active Problem List    Diagnosis    CKD (chronic kidney disease) stage 3, GFR 30-59 ml/min ([facility])    Diarrhea    Class 1 obesity due to excess calories with serious comorbidity and body mass index (BMI) of 31.0 to 31.9 in adult    Vitamin D deficiency    Osteopenia of multiple sites     7/2018      Moderate major depression ([facility]    Type 2 diabetes mellitus with microalbuminuria, with long-term current use of insulin ([facility]    Family history of malignant neoplasm of breast    Diabetic nephropathy associated with type 2 diabetes mellitus ([facility]    Lump or mass in breast    Essential hypertension    Acquired hypothyroidism    Hiatal hernia     EGD 11/14/2017 Ketan Beaver MD \"prn acid suppression\"      Hyperlipidemia    Migraines         Allergies   Allergen Reactions    Darvocet A500 [Propoxyphene N-Acetaminophen] Nausea and Vomiting    Gloves, Latex With Aloe Vera Contact Dermatitis     Rash from powder inside latex gloves      Metformin Diarrhea        Social History     Tobacco Use    Smoking status: Never Smoker    Smokeless tobacco: Never Used   Substance Use Topics    Alcohol use: No     Alcohol/week: 0.0 standard drinks       Patient Care Team:  Tereza Mccloud MD as PCP - General (Family Practice)  Tereza Mccloud MD as PCP - [facility]  Samaria Bailey MD (Gastroenterology)  Fazal Fallon (Ophthalmology)    The following sections were reviewed & updated as appropriate: PMH, PSH, FH, and SH. Review of Systems   Constitutional: Negative for malaise/fatigue. Respiratory: Negative for shortness of breath. Cardiovascular: Negative for chest pain.    Neurological: Negative for sensory change, speech change, focal weakness and headaches. Objective:     Visit Vitals  /78   Pulse 82   Temp 97.6 °F (36.4 °C) (Oral)   Resp 14   Ht 5' 4\" (1.626 m)   Wt 182 lb 12.8 oz (82.9 kg)   LMP 11/17/1972 Comment: partial   SpO2 98%   BMI 31.38 kg/m²      Physical Exam  Constitutional:       General: She is not in acute distress. Appearance: She is well-developed. HENT:      Head: Normocephalic and atraumatic. Pulmonary:      Effort: Pulmonary effort is normal.   Neurological:      Mental Status: She is alert and oriented to person, place, and time. Psychiatric:         Behavior: Behavior normal.         Thought Content: Thought content normal.         Judgment: Judgment normal.           Disclaimer: The patient understands our medical plan. Alternatives have been explained and offered. The risks, benefits and significant side effects of all medications have been reviewed. Anticipated time course and progression of condition reviewed. All questions have been addressed. She is encouraged to employ the information provided in the after visit summary, which was reviewed. Where applicable, she is instructed to call the clinic if she has not been notified either by phone or through [address] with the results of her tests or with an appointment plan for any referrals within 1 week(s). No news is not good news; it's no news. The patient  is to call if her condition worsens or fails to improve or if significant side effects are experienced. Aspects of this note may have been generated using voice recognition software. Despite editing, there may be unrecognized errors.        David Wood MD

## 2020-02-10 NOTE — TELEPHONE ENCOUNTER
Called patient to see if she had been contacted to set up her event monitoring she says no one has contacted her yet. Told I will f/u on status of this, patient states she did have some palpitations the other night with chest pain, she says this lasted about 15 mins. She did not have any other symptoms with this and says she thinks the pain could have been her hernia acting up. She says her tabby resolved after taking two tums. She has not had any issues since, patient says she is back on her blood pressure medication and has been taking this every morning.

## 2020-02-11 NOTE — TELEPHONE ENCOUNTER
Received an e-mail from [employer] stating he has been working on this however he is having problems finding someone that can process the old forms. He has attached the new form and asked that this be completed and sent back to him.

## 2020-02-11 NOTE — TELEPHONE ENCOUNTER
Form has been filled out and needs to be signed by Dr. Nadine Benitez, this has been placed in her office.

## 2020-02-17 ENCOUNTER — CLINICAL SUPPORT (OUTPATIENT)
Dept: FAMILY MEDICINE CLINIC | Age: 70
End: 2020-02-17

## 2020-02-17 VITALS — HEART RATE: 68 BPM | DIASTOLIC BLOOD PRESSURE: 62 MMHG | SYSTOLIC BLOOD PRESSURE: 128 MMHG

## 2020-02-17 DIAGNOSIS — I10 ESSENTIAL HYPERTENSION: Primary | ICD-10-CM

## 2020-02-17 NOTE — PROGRESS NOTES
Patient here for blood pressure check only. She was placed in exam room and allowed to sit quietly for approximately 10 mins. Blood pressure was done manually on left arm while patient was in seated position.

## 2020-05-04 ENCOUNTER — HOSPITAL ENCOUNTER (OUTPATIENT)
Dept: LAB | Age: 70
Discharge: HOME | End: 2020-05-04

## 2020-05-04 LAB — SENTARA SPECIMEN COL,SENBCF: NORMAL

## 2020-05-04 PROCEDURE — 99001 SPECIMEN HANDLING PT-LAB: CPT

## 2020-05-08 ENCOUNTER — VIRTUAL VISIT (OUTPATIENT)
Dept: FAMILY MEDICINE CLINIC | Age: 70
End: 2020-05-08

## 2020-05-08 VITALS — WEIGHT: 184 LBS | BODY MASS INDEX: 31.58 KG/M2

## 2020-05-08 DIAGNOSIS — E11.29 TYPE 2 DIABETES MELLITUS WITH MICROALBUMINURIA, WITH LONG-TERM CURRENT USE OF INSULIN (HCC): Primary | ICD-10-CM

## 2020-05-08 DIAGNOSIS — I10 ESSENTIAL HYPERTENSION: ICD-10-CM

## 2020-05-08 DIAGNOSIS — N18.30 CKD (CHRONIC KIDNEY DISEASE) STAGE 3, GFR 30-59 ML/MIN (HCC): ICD-10-CM

## 2020-05-08 DIAGNOSIS — E66.09 CLASS 1 OBESITY DUE TO EXCESS CALORIES WITH SERIOUS COMORBIDITY IN ADULT, UNSPECIFIED BMI: ICD-10-CM

## 2020-05-08 DIAGNOSIS — R80.9 TYPE 2 DIABETES MELLITUS WITH MICROALBUMINURIA, WITH LONG-TERM CURRENT USE OF INSULIN (HCC): Primary | ICD-10-CM

## 2020-05-08 DIAGNOSIS — E78.5 HYPERLIPIDEMIA, UNSPECIFIED HYPERLIPIDEMIA TYPE: ICD-10-CM

## 2020-05-08 DIAGNOSIS — Z79.4 TYPE 2 DIABETES MELLITUS WITH MICROALBUMINURIA, WITH LONG-TERM CURRENT USE OF INSULIN (HCC): Primary | ICD-10-CM

## 2020-05-08 DIAGNOSIS — E11.21 DIABETIC NEPHROPATHY ASSOCIATED WITH TYPE 2 DIABETES MELLITUS (HCC): ICD-10-CM

## 2020-05-08 DIAGNOSIS — N18.9 CHRONIC KIDNEY DISEASE, UNSPECIFIED CKD STAGE: ICD-10-CM

## 2020-05-08 DIAGNOSIS — F32.1 MODERATE MAJOR DEPRESSION (HCC): ICD-10-CM

## 2020-05-08 RX ORDER — SIMVASTATIN 40 MG/1
40 TABLET, FILM COATED ORAL
Qty: 90 TAB | Refills: 4 | Status: SHIPPED | OUTPATIENT
Start: 2020-05-08 | End: 2021-03-23 | Stop reason: SDUPTHER

## 2020-05-08 NOTE — PROGRESS NOTES
Darrel Tate is a 78 y.o. female who was seen by synchronous (real-time) audio-video technology on 5/8/2020. Consent: Darrel Tate, who was seen by synchronous (real-time) audio-video technology, and/or her healthcare decision maker, is aware that this patient-initiated, Telehealth encounter on 5/8/2020 is a billable service, with coverage as determined by her insurance carrier. She is aware that she may receive a bill and has provided verbal consent to proceed: Yes. Assessment & Plan:     Diagnoses and all orders for this visit:    1. Type 2 diabetes mellitus with microalbuminuria, with long-term current use of insulin ([facility]    2. CKD (chronic kidney disease) stage 3, GFR 30-59 ml/min ([facility])  -     PTH INTACT; Future  -     PHOSPHORUS; Future  -     METABOLIC PANEL, COMPREHENSIVE; Future  -     VITAMIN D, 25 HYDROXY; Future  -     CBC WITH AUTOMATED DIFF; Future  -     FERRITIN; Future  -     IRON PROFILE; Future  -     MICROALBUMIN, UR, RAND W/ MICROALB/CREAT RATIO; Future    3. Chronic kidney disease, unspecified CKD stage   -     FERRITIN; Future  -     IRON PROFILE; Future    4. Diabetic nephropathy associated with type 2 diabetes mellitus ([facility]    5. Hyperlipidemia, unspecified hyperlipidemia type  -     simvastatin (ZOCOR) 40 mg tablet; Take 1 Tab by mouth nightly. 6. Essential hypertension    7. Moderate major depression ([facility] [address]      DM2 with nephropathy/Obesity:GLP1 agonist helps with appetite control. Review of synopsis tracks decline in kidney function with pyelo, not initiation of GLP1 agonist. Reiterated to suspend it for n/v/d. Well controlled, continue present management. CKD 3: waxing and waning, overall stable. Continue present management  Hypertension: find BP cuff! Lipids: Well controlled, continue present management    Constipation: resume fiber    MDD: doing well off meds. Monitor.       Follow-up and Dispositions    · Return in about 2 months (around 7/8/2020) for CKD follow up, non fasting labs 1 week prior, MWV same day. Subjective: Aaron Worrell was seen today for Diabetes      Follow-up DM2 with microalbuminuria  Key Antihyperglycemic Medications             dulaglutide (TRULICITY) 8.67 NV/8.5 mL sub-q pen 0.5 mL by SubCUTAneous route every seven (7) days. insulin glargine (LANTUS SOLOSTAR U-100 INSULIN) 100 unit/mL (3 mL) inpn 30 Units by SubCUTAneous route daily. insulin aspart U-100 (NOVOLOG) 100 unit/mL (3 mL) inpn 5 Units daily (with breakfast) AND 8 Units daily (with dinner). Been forgetting statin more often than not since moved other meds to morning admin    Labs 5.4 through Care Everywhere   HBA1c 7.2  Lab Results   Component Value Date/Time    Hemoglobin A1c 7.1 (H) 11/12/2019 10:50 AM    Hemoglobin A1c 7.0 (H) 07/02/2019 08:55 AM    Hemoglobin A1c 7.3 (H) 03/21/2019 08:55 AM    Hemoglobin A1c, External 11.1 03/21/2016    Glucose 93 01/30/2020 10:23 AM    Glucose,  (H) 01/09/2019 10:30 AM    Microalbumin/Creat ratio (mg/g creat) 93 (H) 11/12/2019 10:50 AM    Microalbumin,urine random 11.00 (H) 11/12/2019 10:50 AM    LDL, calculated 94.4 01/30/2020 10:23 AM    Creatinine, POC 1.6 (H) 01/09/2019 10:30 AM    Creatinine 1.49 (H) 01/30/2020 10:23 AM     Follow-up hypertension with CKD: lost home cuff in recent move \"In a box somewhere\"  Key CAD CHF Meds             NIFEdipine ER (ADALAT CC) 30 mg ER tablet Take 1 Tab by mouth daily. olmesartan (BENICAR) 40 mg tablet Take 1 Tab by mouth daily. simvastatin (ZOCOR) 40 mg tablet Take 1 Tab by mouth nightly. GFR 31, Cr 1.6  , HDl 47  Microalb 48         MDD: sertraline was making her feel groggy. Didn't think she needed it anymore. Stopped it Dec. Feels fine.     3 most recent PHQ Screens 5/8/2020   PHQ Not Done -   Little interest or pleasure in doing things Not at all   Feeling down, depressed, irritable, or hopeless Not at all   Total Score PHQ 2 0   Trouble falling or staying asleep, or sleeping too much -   Feeling tired or having little energy -   Poor appetite, weight loss, or overeating -   Feeling bad about yourself - or that you are a failure or have let yourself or your family down -   Trouble concentrating on things such as school, work, reading, or watching TV -   Moving or speaking so slowly that other people could have noticed; or the opposite being so fidgety that others notice -   Thoughts of being better off dead, or hurting yourself in some way -   PHQ 9 Score -   How difficult have these problems made it for you to do your work, take care of your home and get along with others -     Constipation: uncontrolled. Results for orders placed or performed during the hospital encounter of 05/04/20   [address] [place] [address]. Result Value Ref Range    [facility] Specimens collected/sent to [facility]          Prior to Admission medications    Medication Sig Start Date End Date Taking? Authorizing Provider   calcium carbonate (CALCIUM 500 PO) Take  by mouth. Yes Provider, Historical   NIFEdipine ER (ADALAT CC) 30 mg ER tablet Take 1 Tab by mouth daily. 2/3/20  Yes Kristin Chisholm MD   dulaglutide (TRULICITY) 6.18 QS/1.4 mL sub-q pen 0.5 mL by SubCUTAneous route every seven (7) days. 2/3/20  Yes Kristin Chisholm MD   Insulin Needles, Disposable, (BD ULTRA-FINE MINI PEN NEEDLE) 31 gauge x 3/16\" ndle Use as directed 2/3/20  Yes Kristin Chisholm MD   insulin glargine (LANTUS SOLOSTAR U-100 INSULIN) 100 unit/mL (3 mL) inpn 30 Units by SubCUTAneous route daily. 2/3/20  Yes Kristin Chisholm MD   insulin aspart U-100 (NOVOLOG) 100 unit/mL (3 mL) inpn 5 Units daily (with breakfast) AND 8 Units daily (with dinner). 2/3/20  Yes Kristin Chisholm MD   olmesartan (BENICAR) 40 mg tablet Take 1 Tab by mouth daily. 12/3/19  Yes Kristin Chisholm MD   levothyroxine (SYNTHROID) 25 mcg tablet Take 1 Tab by mouth Daily (before breakfast).  12/3/19 Yes Charu Bell MD   simvastatin (ZOCOR) 40 mg tablet Take 1 Tab by mouth nightly. 7/9/19  Yes MD Fredi Eugeney, by Does Not Apply route. Using topical for Vertigo   Yes Provider, Historical   glucose blood VI test strips (ACCU-CHEK CODY PLUS TEST STRP) strip Patient is to check blood sugar 3-4 times a day 1/28/19  Yes Charu Bell MD   cholecalciferol, VITAMIN D3, (VITAMIN D3) 5,000 unit tab tablet Take  by mouth daily. Yes Provider, Historical   acetaminophen (TYLENOL EXTRA STRENGTH) 500 mg tablet Take  by mouth every six (6) hours as needed for Pain. Yes Provider, Historical   Blood-Glucose Meter (ACCU-CHEK CODY PLUS METER) misc 1 Device by Does Not Apply route four (4) times daily. 6/8/18  Yes Charu Bell MD   omeprazole (PRILOSEC) 40 mg capsule Take 1 Cap by mouth daily. 5/8/17  Yes Triny Veliz MD   sertraline (ZOLOFT) 50 mg tablet Take 1 Tab by mouth daily. 7/9/19 5/8/20  Charu Bell MD   psyllium seed, with dextrose, (FIBER PO) Take  by mouth. Provider, Historical   esomeprazole (NEXIUM) 20 mg capsule Take  by mouth.     Provider, Historical   BLADDER CONTROL PADS pads  5/16/17   Provider, Historical   ALCOHOL PREP PADS padm  5/16/17   Provider, Historical         Patient Active Problem List    Diagnosis    CKD (chronic kidney disease) stage 3, GFR 30-59 ml/min ([facility]    Diarrhea    Class 1 obesity due to excess calories with serious comorbidity and body mass index (BMI) of 31.0 to 31.9 in adult    Vitamin D deficiency    Osteopenia of multiple sites     7/2018      Moderate major depression ([address]    Type 2 diabetes mellitus with microalbuminuria, with long-term current use of insulin ([address]    Family history of malignant neoplasm of breast    Diabetic nephropathy associated with type 2 diabetes mellitus ([address]    Lump or mass in breast    Essential hypertension    Acquired hypothyroidism    Hiatal hernia     EGD 11/14/2017 Tarik Eliseo West MD \"prn acid suppression\"      Hyperlipidemia    Migraines         Allergies   Allergen Reactions    Darvocet A500 [Propoxyphene N-Acetaminophen] Nausea and Vomiting    Gloves, Latex With Aloe Vera Contact Dermatitis     Rash from powder inside latex gloves      Metformin Diarrhea        Social History     Tobacco Use    Smoking status: Never Smoker    Smokeless tobacco: Never Used   Substance Use Topics    Alcohol use: No     Alcohol/week: 0.0 standard drinks       Patient Care Team:  Sharrie Koyanagi, MD as PCP - General (Family Practice)  Sharrie Koyanagi, MD as PCP - [facility] Empaneled Provider  Matthew Gomez MD (Gastroenterology)  Annel Cobb (Ophthalmology)        Review of Systems   Constitutional: Negative for diaphoresis. Gastrointestinal: Negative for nausea. Neurological: Negative for loss of consciousness. Objective:     Visit Vitals  Wt 184 lb (83.5 kg)   LMP 11/17/1972 Comment: partial   BMI 31.58 kg/m²      General: alert, cooperative, no distress   Mental  status: normal mood, behavior, speech, dress, motor activity, and thought processes, able to follow commands   HENT: NCAT   Neck: no visualized mass   Resp: no respiratory distress   Neuro: no gross deficits   Skin: no discoloration or lesions of concern on visible areas   Psychiatric: normal affect, consistent with stated mood, no evidence of hallucinations     Additional exam findings:             Disclaimer: The patient understands our medical plan. Alternatives have been explained and offered. The risks, benefits and significant side effects of all medications have been reviewed. Anticipated time course and progression of condition reviewed. All questions have been addressed. She is encouraged to employ the information provided in the after visit summary, which was reviewed. Cleo Sainz is a 78 y.o. female who was evaluated by an audio-video encounter for concerns as above.  Patient identification was verified prior to start of the visit. A caregiver was present when appropriate. Due to this being a TeleHealth encounter (During [place]DEX-06 public health emergency), evaluation of the following organ systems was limited: Vitals/Constitutional/EENT/Resp/CV/GI//MS/Neuro/Skin/Heme-Lymph-Imm. Pursuant to the emergency declaration under the [address] [place] [address], [facility] waiver authority and the [employer] Act, this Virtual Visit was conducted, with patient's (and/or legal guardian's) consent, to reduce the patient's risk of exposure to COVID-19 and provide necessary medical care. Services were provided through a synchronous discussion virtually to substitute for in-person clinic visit. I was at home. The patient was at home.       Lemuel Elder MD

## 2020-07-20 ENCOUNTER — HOSPITAL ENCOUNTER (OUTPATIENT)
Dept: LAB | Age: 70
Discharge: HOME | End: 2020-07-20

## 2020-07-20 DIAGNOSIS — N18.9 CHRONIC KIDNEY DISEASE, UNSPECIFIED CKD STAGE: ICD-10-CM

## 2020-07-20 DIAGNOSIS — N18.30 CKD (CHRONIC KIDNEY DISEASE) STAGE 3, GFR 30-59 ML/MIN (HCC): ICD-10-CM

## 2020-07-20 LAB — SENTARA SPECIMEN COL,SENBCF: NORMAL

## 2020-07-20 PROCEDURE — 99001 SPECIMEN HANDLING PT-LAB: CPT

## 2020-07-21 LAB
25(OH)D3 SERPL-MCNC: 65.6 NG/ML (ref 32–100)
A-G RATIO,AGRAT: 2 RATIO (ref 1.1–2.6)
ABSOLUTE LYMPHOCYTE COUNT, 10803: 2.4 K/UL (ref 1–4.8)
ALBUMIN SERPL-MCNC: 4.5 G/DL (ref 3.5–5)
ALP SERPL-CCNC: 103 U/L (ref 40–120)
ALT SERPL-CCNC: 16 U/L (ref 5–40)
ANION GAP SERPL CALC-SCNC: 14 MMOL/L (ref 3–15)
AST SERPL W P-5'-P-CCNC: 19 U/L (ref 10–37)
BASOPHILS # BLD: 0.1 K/UL (ref 0–0.2)
BASOPHILS NFR BLD: 1 % (ref 0–2)
BILIRUB SERPL-MCNC: 0.2 MG/DL (ref 0.2–1.2)
BUN SERPL-MCNC: 24 MG/DL (ref 6–22)
CALCIUM SERPL-MCNC: 9.2 MG/DL (ref 8.4–10.5)
CHLORIDE SERPL-SCNC: 105 MMOL/L (ref 98–110)
CO2 SERPL-SCNC: 26 MMOL/L (ref 20–32)
CREAT SERPL-MCNC: 1.8 MG/DL (ref 0.8–1.4)
CREATININE, URINE: 70 MG/DL
EOSINOPHIL # BLD: 0.2 K/UL (ref 0–0.5)
EOSINOPHIL NFR BLD: 2 % (ref 0–6)
ERYTHROCYTE [DISTWIDTH] IN BLOOD BY AUTOMATED COUNT: 12.6 % (ref 10–15.5)
FE % SATURATION,PSAT: 15 % (ref 20–50)
FERRITIN SERPL-MCNC: 37 NG/ML (ref 10–291)
GFRAA, 66117: 33.3
GFRNA, 66118: 27.5
GLOBULIN,GLOB: 2.3 G/DL (ref 2–4)
GLUCOSE SERPL-MCNC: 82 MG/DL (ref 70–99)
GRANULOCYTES,GRANS: 61 % (ref 40–75)
HCT VFR BLD AUTO: 41.3 % (ref 35.1–48.3)
HGB BLD-MCNC: 12.4 G/DL (ref 11.7–16.1)
IRON,IRN: 54 MCG/DL (ref 30–160)
LYMPHOCYTES, LYMLT: 29 % (ref 20–45)
MCH RBC QN AUTO: 28 PG (ref 26–34)
MCHC RBC AUTO-ENTMCNC: 30 G/DL (ref 31–36)
MCV RBC AUTO: 95 FL (ref 81–99)
MICROALB/CREAT RATIO, 140286: 95.7 (ref 0–30)
MICROALBUMIN,URINE RANDOM 140054: 67 MG/L (ref 0.1–17)
MONOCYTES # BLD: 0.6 K/UL (ref 0.1–1)
MONOCYTES NFR BLD: 7 % (ref 3–12)
NEUTROPHILS # BLD AUTO: 5.1 K/UL (ref 1.8–7.7)
PHOSPHATE SERPL-MCNC: 3.4 MG/DL (ref 2.5–4.5)
PLATELET # BLD AUTO: 332 K/UL (ref 140–440)
PMV BLD AUTO: 12.4 FL (ref 9–13)
POTASSIUM SERPL-SCNC: 4.9 MMOL/L (ref 3.5–5.5)
PROT SERPL-MCNC: 6.8 G/DL (ref 6.2–8.1)
PTH INTACT,IPTH: 52 PG/ML (ref 15–65)
RBC # BLD AUTO: 4.36 M/UL (ref 3.8–5.2)
SODIUM SERPL-SCNC: 145 MMOL/L (ref 133–145)
TIBC,TIBC: 352 MCG/DL (ref 228–428)
UIBC SERPL-MCNC: 298 MCG/DL (ref 110–370)
WBC # BLD AUTO: 8.2 K/UL (ref 4–11)

## 2020-07-24 ENCOUNTER — VIRTUAL VISIT (OUTPATIENT)
Dept: FAMILY MEDICINE CLINIC | Age: 70
End: 2020-07-24

## 2020-07-24 DIAGNOSIS — Z13.31 SCREENING FOR DEPRESSION: ICD-10-CM

## 2020-07-24 DIAGNOSIS — D50.0 IRON DEFICIENCY ANEMIA DUE TO CHRONIC BLOOD LOSS: ICD-10-CM

## 2020-07-24 DIAGNOSIS — N17.9 AKI (ACUTE KIDNEY INJURY) (HCC): Primary | ICD-10-CM

## 2020-07-24 DIAGNOSIS — Z13.39 SCREENING FOR ALCOHOLISM: ICD-10-CM

## 2020-07-24 DIAGNOSIS — D12.6 TUBULAR ADENOMA OF COLON: ICD-10-CM

## 2020-07-24 DIAGNOSIS — Z71.89 ADVANCED DIRECTIVES, COUNSELING/DISCUSSION: ICD-10-CM

## 2020-07-24 DIAGNOSIS — Z00.00 MEDICARE ANNUAL WELLNESS VISIT, SUBSEQUENT: Primary | ICD-10-CM

## 2020-07-24 RX ORDER — LANOLIN ALCOHOL/MO/W.PET/CERES
325 CREAM (GRAM) TOPICAL
Qty: 270 TAB | Refills: 1 | Status: SHIPPED | OUTPATIENT
Start: 2020-07-24 | End: 2022-01-04

## 2020-07-24 NOTE — ACP (ADVANCE CARE PLANNING)
Advance Care Planning       Advance Care Planning (ACP) Physician/NP/PA (Provider) Conversation        Date of ACP Conversation: 7/24/2020    Conversation Conducted with:   Patient with Decision Making [address] Decision Maker:    Current Designated Health Care Decision Maker:   Primary Decision Maker: Corey Olvera - Daughter - [phone]    Secondary Decision Maker: Hernan Vizcarra - Child - [phone]  (If there is a [address] named in the [facility] Makers\" box in the ACP activity, but it is not visible above, be sure to open that field and then select the health care decision maker relationship (ie \"primary\") in the blank space to the right of the name.)    \"If I get brain dead, I just don't want to be on, you know, don't keep me alive if there's nothing there. \"    Daughter's Malachtristan Dacia recently passed away    Care Preferences:    Hospitalization: \"If your health worsens and it becomes clear that your chance of recovery is unlikely, what would your preference be regarding hospitalization? \"  If the patient would want hospitalization, answer \"yes\". If the patient would prefer comfort-focused treatment without hospitalization, answer \"no\". yes      Ventilation: \"If your health worsens and it becomes clear that your chance of recovery is unlikely, what would your preference be about the use of a ventilator (breathing machine) if it was available to you? \"   no ventilator      Resuscitation:  \"CPR works best to restart the heart when there is a sudden event, like a heart attack, in someone who is otherwise healthy. Unfortunately, CPR does not typically restart the heart for people who have serious health conditions or who are very sick. \"    \"In the event your heart stopped as a result of an underlying serious health condition, would you want attempts to be made to restart your heart (answer \"yes\" for attempt to resuscitate) or would you prefer a natural death (answer \"no\" for do not attempt to resuscitate)? \"   Really not interested in CPR, but not interested in DDNR order at present    NOTE: If the patient has a valid advance directive AND provides care preference(s) that are inconsistent with that prior directive, advise the patient to consider either: creating a new advance directive that complies with state-specific requirements; or, if that is not possible, orally revoking that prior directive in accordance with state-specific requirements, which must be documented in the EHR.     Conversation Outcomes / Follow-Up Plan:   conversation with her children to share her wishes      Length of Voluntary ACP Conversation in minutes:  12 minutes      Kathy Brito MD

## 2020-07-24 NOTE — PROGRESS NOTES
This is the Subsequent Medicare Annual Wellness Exam, performed 12 months or more after the Initial AWV or the last Subsequent AWV    I have reviewed the patient's medical history in detail and updated the computerized patient record. History     Patient Active Problem List   Diagnosis Code    Essential hypertension I10    Acquired hypothyroidism E03.9    Hiatal hernia K44.9    Hyperlipidemia E78.5    Migraines G43.909    Lump or mass in breast N63.0    Family history of malignant neoplasm of breast Z80.3    Type 2 diabetes mellitus with microalbuminuria, with long-term current use of insulin ([facility]) E11.29, R80.9, Z79.4    Diabetic nephropathy associated with type 2 diabetes mellitus ([facility] E11.21    Moderate major depression ([facility] [address] F32.1    Osteopenia of multiple sites M85.89    Vitamin D deficiency E55.9    Class 1 obesity due to excess calories with serious comorbidity and body mass index (BMI) of 31.0 to 31.9 in adult E66.09, Z68.31    Diarrhea R19.7    CKD (chronic kidney disease) stage 3, GFR 30-59 ml/min ([facility]) N18.3     Past Medical History:   Diagnosis Date    Depression     Diabetes ([facility] [address] [facility]     GERD (gastroesophageal reflux disease)     Hiatal hernia     Hypercholesterolemia     Hypertension     Migraines     Recurrent UTI     VCUG reportedly nl 90s, +/- pyelo, no hospitalizations, no surgeries, no scopes    Thyroid disease       Past Surgical History:   Procedure Laterality Date    HX BREAST BIOPSY  1993    right breast, benign    HX HYSTERECTOMY      HX MOHS PROCEDURES Right 09/2001    HX OTHER SURGICAL  2001    HEAD LACERATION DUE TO A FALL    HX PARTIAL HYSTERECTOMY  11/17/1972    HX TUBAL LIGATION  11/1971     Current Outpatient Medications   Medication Sig Dispense Refill    calcium carbonate (CALCIUM 500 PO) Take  by mouth.  simvastatin (ZOCOR) 40 mg tablet Take 1 Tab by mouth nightly. 90 Tab 4    NIFEdipine ER (ADALAT CC) 30 mg ER tablet Take 1 Tab by mouth daily. 90 Tab 4    Insulin Needles, Disposable, (BD ULTRA-FINE MINI PEN NEEDLE) 31 gauge x 3/16\" ndle Use as directed 100 Pen Needle 4    insulin glargine (LANTUS SOLOSTAR U-100 INSULIN) 100 unit/mL (3 mL) inpn 30 Units by SubCUTAneous route daily. 15 Pen 2    insulin aspart U-100 (NOVOLOG) 100 unit/mL (3 mL) inpn 5 Units daily (with breakfast) AND 8 Units daily (with dinner). 5 Pen 2    olmesartan (BENICAR) 40 mg tablet Take 1 Tab by mouth daily. 90 Tab 4    levothyroxine (SYNTHROID) 25 mcg tablet Take 1 Tab by mouth Daily (before breakfast). 90 Tab 4    psyllium seed, with dextrose, (FIBER PO) Take  by mouth.  glucose blood VI test strips (ACCU-CHEK CODY PLUS TEST STRP) strip Patient is to check blood sugar 3-4 times a day 300 Strip 11    cholecalciferol, VITAMIN D3, (VITAMIN D3) 5,000 unit tab tablet Take  by mouth daily.  esomeprazole (NEXIUM) 20 mg capsule Take  by mouth.  Blood-Glucose Meter (ACCU-CHEK CODY PLUS METER) misc 1 Device by Does Not Apply route four (4) times daily. 1 Each 0    BLADDER CONTROL PADS pads       ALCOHOL PREP PADS padm       GINGER OIL, BULK, by Does Not Apply route. Using topical for Vertigo      acetaminophen (TYLENOL EXTRA STRENGTH) 500 mg tablet Take  by mouth every six (6) hours as needed for Pain.        Allergies   Allergen Reactions    [de-identified] A500 [Propoxyphene N-Acetaminophen] Nausea and Vomiting    Gloves, Latex With Aloe Vera Contact Dermatitis     Rash from powder inside latex gloves      Metformin Diarrhea       Family History   Problem Relation Age of Onset    Diabetes Mother     Hypertension Mother     Cancer Mother     Kidney Disease Mother     Diabetes Father     Heart Disease Father     Hypertension Father     Diabetes Maternal Grandmother     Breast Cancer Maternal Grandmother     Cancer Maternal Grandfather     Breast Cancer Maternal Aunt      Social History     Tobacco Use    Smoking status: Never Smoker    Smokeless tobacco: Never Used   Substance Use Topics    Alcohol use: No     Alcohol/week: 0.0 standard drinks       Depression Risk Factor Screening:     3 most recent PHQ Screens 7/24/2020   PHQ Not Done -   Little interest or pleasure in doing things Not at all   Feeling down, depressed, irritable, or hopeless Not at all   Total Score PHQ 2 0   Trouble falling or staying asleep, or sleeping too much Not at all   Feeling tired or having little energy Not at all   Poor appetite, weight loss, or overeating Not at all   Feeling bad about yourself - or that you are a failure or have let yourself or your family down Not at all   Trouble concentrating on things such as school, work, reading, or watching TV Not at all   Moving or speaking so slowly that other people could have noticed; or the opposite being so fidgety that others notice Not at all   Thoughts of being better off dead, or hurting yourself in some way Not at all   PHQ 9 Score 0   How difficult have these problems made it for you to do your work, take care of your home and get along with others -       Alcohol Risk Factor Screening:   Do you average 1 drink per night or more than 7 drinks a week:  No    On any one occasion in the past three months have you have had more than 3 drinks containing alcohol:  No      Functional Ability and Level of Safety:   Hearing: Hearing is good. Activities of Daily Living: The home contains: handrails, grab bars and ramp on back porch  Patient does total self care     Ambulation: with no difficulty     Fall Risk:  Fall Risk Assessment, last 12 mths 7/24/2020   Able to walk? Yes   Fall in past 12 months? Yes   Fall with injury?  No   Number of falls in past 12 months 1   Fall Risk Score 1     Abuse Screen:  Patient is not abused       Cognitive Screening   Has your family/caregiver stated any concerns about your memory: no    Cognitive Screening: Normal - obs    Patient Care Team   Patient Care Team:  Getachew Moreau MD as PCP - General (Family Medicine)  Iveth Heath MD as PCP - [facility] Empaneled Provider  Kevin Flowers (Ophthalmology)    Assessment/Plan   Education and counseling provided:  Patient says she is working on ACP with her daughter    Diagnoses and all orders for this visit:    1. Medicare annual wellness visit, subsequent    2. Screening for alcoholism  -     MA ANNUAL ALCOHOL SCREEN 15 MIN    3. Screening for depression  -     DEPRESSION SCREEN ANNUAL    4. Advanced directives, counseling/discussion  -     ADVANCE CARE PLANNING FIRST 27 MINS        Health Maintenance Due   Topic Date Due    Colonoscopy  02/11/2019    Eye Exam Retinal or Dilated  09/07/2019    GLAUCOMA SCREENING Q2Y  06/05/2020    Foot Exam Q1  07/09/2020    Medicare Yearly Exam  07/09/2020     Scheduling eye exam    Pebbles Doyle, who was evaluated through a synchronous (real-time) audio-video encounter, and/or her healthcare decision maker, is aware that it is a billable service, with coverage as determined by her insurance carrier. She provided verbal consent to proceed: n/a- consent obtained within past 12 months, and patient identification was verified. It was conducted pursuant to the emergency declaration under the [facility], [facility] waiver authority and the [employer] and [employer] General Act. A caregiver was present when appropriate. Ability to conduct physical exam was limited. I was at home. The patient was at home.     Yasir Matute MD

## 2020-07-24 NOTE — PROGRESS NOTES
Jorge Blas is a 78 y.o. female who was seen by synchronous (real-time) audio-video technology on 7/24/2020. Assessment & Plan:     Diagnoses and all orders for this visit:    1. EMPERATRIZ (acute kidney injury) ([facility] [address]  -     METABOLIC PANEL, BASIC; Future    2. Iron deficiency anemia due to chronic blood loss  -     REFERRAL TO GASTROENTEROLOGY  -     ferrous sulfate 325 mg (65 mg iron) tablet; Take 1 Tab by mouth three (3) times daily (with meals). -     docusate sodium (COLACE) 50 mg capsule; Take 1 Cap by mouth two (2) times a day for 90 days. 3. Tubular adenoma of colon  -     REFERRAL TO GASTROENTEROLOGY  -     ferrous sulfate 325 mg (65 mg iron) tablet; Take 1 Tab by mouth three (3) times daily (with meals). -     docusate sodium (COLACE) 50 mg capsule; Take 1 Cap by mouth two (2) times a day for 90 days. Renal indices worse. Stopping GLP1 agonist.     Iron def: new, uncontrolled. Returning to Dr. Walton Confer to uyriy for source and follow up on adenoma         Follow-up and Dispositions    · Return in about 2 weeks (around 8/7/2020) for kidney disease and diabetes follow up, non fasting labs 1 a few days prior. Subjective: Jorge Blas was seen today for Chronic Kidney Disease    Follow-up DM2 with CKD. Most recent dose of dulaglutide 4 days ago. Labs 5/4/20 through Care Everywhere   HBA1c 7.2    Follow-up hypertension with CKD: home pressures well controlled  Key CAD CHF Meds             simvastatin (ZOCOR) 40 mg tablet (Taking) Take 1 Tab by mouth nightly. NIFEdipine ER (ADALAT CC) 30 mg ER tablet (Taking) Take 1 Tab by mouth daily. olmesartan (BENICAR) 40 mg tablet (Taking) Take 1 Tab by mouth daily. [facility] Everywhere labs 5/4/20  GFR 31, Cr 1.6  , HDl 47  Microalb 48    Iron def: notes had been treated in her youth. Endorses fatigue x a couple of months. Interrupted sleep. On a 5 year colo schedule due to colon polyps. Was due last year.  Has bleeding hemorrhoids. Occasional streaks of blood in stools. \"Darker than normal\" x 1 week. No early satiety      Results for orders placed or performed in visit on 26/24/61   METABOLIC PANEL, COMPREHENSIVE   Result Value Ref Range    Glucose 82 70 - 99 mg/dL    BUN 24 (H) 6 - 22 mg/dL    Creatinine 1.8 (H) 0.8 - 1.4 mg/dL    Sodium 145 133 - 145 mmol/L    Potassium 4.9 3.5 - 5.5 mmol/L    Chloride 105 98 - 110 mmol/L    CO2 26 20 - 32 mmol/L    AST (SGOT) 19 10 - 37 U/L    ALT (SGPT) 16 5 - 40 U/L    Alk. phosphatase 103 40 - 120 U/L    Bilirubin, total 0.2 0.2 - 1.2 mg/dL    Calcium 9.2 8.4 - 10.5 mg/dL    Protein, total 6.8 6.2 - 8.1 g/dL    Albumin 4.5 3.5 - 5.0 g/dL    A-G Ratio 2.0 1.1 - 2.6 ratio    Globulin 2.3 2.0 - 4.0 g/dL    Anion gap 14.0 3.0 - 15.0 mmol/L    GFRAA 33.3 (L) >60.0    GFRNA 27.5 (L) >60.0   FERRITIN   Result Value Ref Range    Ferritin 37 10 - 291 ng/mL   IRON PROFILE   Result Value Ref Range    Iron 54 30 - 160 mcg/dL    UIBC 298 110 - 370 mcg/dL    TIBC 352 228 - 428 mcg/dL    Iron % saturation 15 (L) 20 - 50 %   VITAMIN D, 25 HYDROXY   Result Value Ref Range    VITAMIN D, 25-HYDROXY 65.6 32.0 - 100.0 ng/mL   PTH INTACT   Result Value Ref Range    PTH, Intact 52 15 - 65 pg/mL   CBC WITH AUTOMATED DIFF   Result Value Ref Range    WBC 8.2 4.0 - 11.0 K/uL    RBC 4.36 3.80 - 5.20 M/uL    HGB 12.4 11.7 - 16.1 g/dL    HCT 41.3 35.1 - 48.3 %    MCV 95 81 - 99 fL    MCH 28 26 - 34 pg    MCHC 30 (L) 31 - 36 g/dL    RDW 12.6 10.0 - 15.5 %    PLATELET 232 693 - 729 K/uL    MPV 12.4 9.0 - 13.0 fL    NEUTROPHILS 61 40 - 75 %    Lymphocytes 29 20 - 45 %    MONOCYTES 7 3 - 12 %    EOSINOPHILS 2 0 - 6 %    BASOPHILS 1 0 - 2 %    ABS. NEUTROPHILS 5.1 1.8 - 7.7 K/uL    ABSOLUTE LYMPHOCYTE COUNT 2.4 1.0 - 4.8 K/uL    ABS. MONOCYTES 0.6 0.1 - 1.0 K/uL    ABS. EOSINOPHILS 0.2 0.0 - 0.5 K/uL    ABS.  BASOPHILS 0.1 0.0 - 0.2 K/uL   MICROALBUMIN, UR, RAND W/ MICROALB/CREAT RATIO   Result Value Ref Range Creatinine, urine 70 mg/dL    Microalbumin, urine 67.0 (H) 0.1 - 17.0 mg/L    Microalb/Creat ratio (ug/mg creat.) 95.7 (H) 0.0 - 30.0   PHOSPHORUS   Result Value Ref Range    Phosphorus 3.4 2.5 - 4.5 mg/dL        Prior to Admission medications    Medication Sig Start Date End Date Taking? Authorizing Provider   calcium carbonate (CALCIUM 500 PO) Take  by mouth. Yes Provider, Historical   simvastatin (ZOCOR) 40 mg tablet Take 1 Tab by mouth nightly. 5/8/20  Yes Wilmer Ford MD   NIFEdipine ER (ADALAT CC) 30 mg ER tablet Take 1 Tab by mouth daily. 2/3/20  Yes Wilmer Ford MD   dulaglutide (TRULICITY) 9.08 MOSHE/1.7 mL sub-q pen 0.5 mL by SubCUTAneous route every seven (7) days. 2/3/20  Yes Wilmer Ford MD   Insulin Needles, Disposable, (BD ULTRA-FINE MINI PEN NEEDLE) 31 gauge x 3/16\" ndle Use as directed 2/3/20  Yes Wilmer Ford MD   insulin glargine (LANTUS SOLOSTAR U-100 INSULIN) 100 unit/mL (3 mL) inpn 30 Units by SubCUTAneous route daily. 2/3/20  Yes Wilmer Ford MD   insulin aspart U-100 (NOVOLOG) 100 unit/mL (3 mL) inpn 5 Units daily (with breakfast) AND 8 Units daily (with dinner). 2/3/20  Yes Wilmer Ford MD   olmesartan (BENICAR) 40 mg tablet Take 1 Tab by mouth daily. 12/3/19  Yes Wilmer Ford MD   levothyroxine (SYNTHROID) 25 mcg tablet Take 1 Tab by mouth Daily (before breakfast). 12/3/19  Yes Wilmer Ford MD   psyllium seed, with dextrose, (FIBER PO) Take  by mouth. Yes Provider, Historical   glucose blood VI test strips (ACCU-CHEK CODY PLUS TEST STRP) strip Patient is to check blood sugar 3-4 times a day 1/28/19  Yes Wilmer Ford MD   cholecalciferol, VITAMIN D3, (VITAMIN D3) 5,000 unit tab tablet Take  by mouth daily. Yes Provider, Historical   acetaminophen (TYLENOL EXTRA STRENGTH) 500 mg tablet Take  by mouth every six (6) hours as needed for Pain. Yes Provider, Historical   esomeprazole (NEXIUM) 20 mg capsule Take  by mouth.    Yes Provider, Historical   Blood-Glucose Meter (ACCU-CHEK CODY PLUS METER) misc 1 Device by Does Not Apply route four (4) times daily. 6/8/18  Yes Aubree Butterfield MD   BLADDER CONTROL PADS pads  5/16/17  Yes Provider, Historical   ALCOHOL PREP PADS padm  5/16/17  Yes Provider, Historical   NOÉ OIL, BULK, by Does Not Apply route. Using topical for Vertigo    Provider, Historical   omeprazole (PRILOSEC) 40 mg capsule Take 1 Cap by mouth daily.  5/8/17 7/24/20  Juanita Aguirre MD         Patient Active Problem List    Diagnosis    CKD (chronic kidney disease) stage 3, GFR 30-59 ml/min ([facility])    Diarrhea    Class 1 obesity due to excess calories with serious comorbidity and body mass index (BMI) of 31.0 to 31.9 in adult    Vitamin D deficiency    Osteopenia of multiple sites     7/2018      Moderate major depression ([facility] [address]    Type 2 diabetes mellitus with microalbuminuria, with long-term current use of insulin ([address]    Family history of malignant neoplasm of breast    Diabetic nephropathy associated with type 2 diabetes mellitus ([address]    Lump or mass in breast    Essential hypertension    Acquired hypothyroidism    Hiatal hernia     EGD 11/14/2017 Vesta Danielson MD \"prn acid suppression\"      Hyperlipidemia    Migraines         Allergies   Allergen Reactions    Darvocet A500 [Propoxyphene N-Acetaminophen] Nausea and Vomiting    Gloves, Latex With Aloe Vera Contact Dermatitis     Rash from powder inside latex gloves      Metformin Diarrhea        Social History     Tobacco Use    Smoking status: Never Smoker    Smokeless tobacco: Never Used   Substance Use Topics    Alcohol use: No     Alcohol/week: 0.0 standard drinks       Patient Care Team:  Aubree Butterfield MD as PCP - General (Family Medicine)  Aubree Butterfield MD as PCP - [facility] Empaneled Provider  Nakul Martin MD (Gastroenterology)  Mikki Dasilva (Ophthalmology)        Review of Systems   Constitutional: Negative for diaphoresis and weight loss.   HENT: Negative for nosebleeds. Respiratory: Negative for hemoptysis. Gastrointestinal: Negative for nausea. Genitourinary: Negative for hematuria. No vaginal bleeding   Neurological: Negative for loss of consciousness. Endo/Heme/Allergies: Does not bruise/bleed easily. Objective:     Visit Vitals  LMP 11/17/1972 Comment: partial      General: alert, cooperative, no distress   Mental  status: normal mood, behavior, speech, dress, motor activity, and thought processes, able to follow commands   HENT: NCAT   Neck: no visualized mass   Resp: no respiratory distress   Neuro: no gross deficits   Skin: no discoloration or lesions of concern on visible areas   Psychiatric: normal affect, consistent with stated mood, no evidence of hallucinations     Additional exam findings:             Disclaimer: The patient understands our medical plan. Alternatives have been explained and offered. The risks, benefits and significant side effects of all medications have been reviewed. Anticipated time course and progression of condition reviewed. All questions have been addressed. She is encouraged to employ the information provided in the after visit summary, which was reviewed. Issa Garg, who was evaluated through a synchronous (real-time) audio-video encounter, and/or her healthcare decision maker, is aware that it is a billable service, with coverage as determined by her insurance carrier. She provided verbal consent to proceed: n/a- consent obtained within past 12 months, and patient identification was verified. It was conducted pursuant to the emergency declaration under the [address] [place] [address], [address] [place] [address] authority and the [employer] General Act. A caregiver was present when appropriate. Ability to conduct physical exam was limited. I was at home. The patient was at home.           Nima Puga MD

## 2020-07-24 NOTE — PROGRESS NOTES
Patient being seen today for follow up on her CKD, she does not have any concerns. 1. Have you been to the ER, urgent care clinic since your last visit? Hospitalized since your last visit? No  2. Have you seen or consulted any other health care providers outside of the [address] [place] [address] since your last visit? Include any pap smears or colon screening. No    Medication reconciliation has been completed with patient. Care team discussed/updated as well as pharmacy.     Health Maintenance Due   Topic Date Due    Colonoscopy  02/11/2019    Eye Exam Retinal or Dilated  09/07/2019    GLAUCOMA SCREENING Q2Y  06/05/2020    Foot Exam Q1  07/09/2020    Medicare Yearly Exam  07/09/2020

## 2020-07-24 NOTE — PATIENT INSTRUCTIONS
Medicare Wellness Visit, Female     The best way to live healthy is to have a lifestyle where you eat a well-balanced diet, exercise regularly, limit alcohol use, and quit all forms of tobacco/nicotine, if applicable. Regular preventive services are another way to keep healthy. Preventive services (vaccines, screening tests, monitoring & exams) can help personalize your care plan, which helps you manage your own care. Screening tests can find health problems at the earliest stages, when they are easiest to treat. [facility] follows the current, evidence-based guidelines published by the [facility] Kathleen ([facility]) when recommending preventive services for our patients. Because we follow these guidelines, sometimes recommendations change over time as research supports it. (For example, mammograms used to be recommended annually. Even though Medicare will still pay for an annual mammogram, the newer guidelines recommend a mammogram every two years for women of average risk). Of course, you and your doctor may decide to screen more often for some diseases, based on your risk and your co-morbidities (chronic disease you are already diagnosed with). Preventive services for you include:  - Medicare offers their members a free annual wellness visit, which is time for you and your primary care provider to discuss and plan for your preventive service needs. Take advantage of this benefit every year!  -All adults over the age of 72 should receive the recommended pneumonia vaccines. Current USPSTF guidelines recommend a series of two vaccines for the best pneumonia protection.   -All adults should have a flu vaccine yearly and a tetanus vaccine every 10 years.   -All adults age 48 and older should receive the shingles vaccines (series of two vaccines).       -All adults age 38-68 who are overweight should have a diabetes screening test once every three years.   -All adults born between 80 and 1965 should be screened once for Hepatitis C.  -Other screening tests and preventive services for persons with diabetes include: an eye exam to screen for diabetic retinopathy, a kidney function test, a foot exam, and stricter control over your cholesterol.   -Cardiovascular screening for adults with routine risk involves an electrocardiogram (ECG) at intervals determined by your doctor.   -Colorectal cancer screenings should be done for adults age 54-65 with no increased risk factors for colorectal cancer. There are a number of acceptable methods of screening for this type of cancer. Each test has its own benefits and drawbacks. Discuss with your doctor what is most appropriate for you during your annual wellness visit. The different tests include: colonoscopy (considered the best screening method), a fecal occult blood test, a fecal DNA test, and sigmoidoscopy.    -A bone mass density test is recommended when a woman turns 65 to screen for osteoporosis. This test is only recommended one time, as a screening. Some providers will use this same test as a disease monitoring tool if you already have osteoporosis. -Breast cancer screenings are recommended every other year for women of normal risk, age 54-69.  -Cervical cancer screenings for women over age 72 are only recommended with certain risk factors. Here is a list of your current Health Maintenance items (your personalized list of preventive services) with a due date:  Health Maintenance Due   Topic Date Due    Colonoscopy  02/11/2019    Eye Exam  09/07/2019    Glaucoma Screening   06/05/2020    Diabetic Foot Care  07/09/2020    Annual Well Visit  07/09/2020         Medicare Wellness Visit, Female     The best way to live healthy is to have a lifestyle where you eat a well-balanced diet, exercise regularly, limit alcohol use, and quit all forms of tobacco/nicotine, if applicable.      Regular preventive services are another way to keep healthy. Preventive services (vaccines, screening tests, monitoring & exams) can help personalize your care plan, which helps you manage your own care. Screening tests can find health problems at the earliest stages, when they are easiest to treat. [facility] follows the current, evidence-based guidelines published by the [facility] Kathleen ([facility]) when recommending preventive services for our patients. Because we follow these guidelines, sometimes recommendations change over time as research supports it. (For example, mammograms used to be recommended annually. Even though Medicare will still pay for an annual mammogram, the newer guidelines recommend a mammogram every two years for women of average risk). Of course, you and your doctor may decide to screen more often for some diseases, based on your risk and your co-morbidities (chronic disease you are already diagnosed with). Preventive services for you include:  - Medicare offers their members a free annual wellness visit, which is time for you and your primary care provider to discuss and plan for your preventive service needs. Take advantage of this benefit every year!  -All adults over the age of 72 should receive the recommended pneumonia vaccines. Current USPSTF guidelines recommend a series of two vaccines for the best pneumonia protection.   -All adults should have a flu vaccine yearly and a tetanus vaccine every 10 years.   -All adults age 48 and older should receive the shingles vaccines (series of two vaccines).       -All adults age 38-68 who are overweight should have a diabetes screening test once every three years.   -All adults born between 80 and 1965 should be screened once for Hepatitis C.  -Other screening tests and preventive services for persons with diabetes include: an eye exam to screen for diabetic retinopathy, a kidney function test, a foot exam, and stricter control over your cholesterol.   -Cardiovascular screening for adults with routine risk involves an electrocardiogram (ECG) at intervals determined by your doctor.   -Colorectal cancer screenings should be done for adults age 54-65 with no increased risk factors for colorectal cancer. There are a number of acceptable methods of screening for this type of cancer. Each test has its own benefits and drawbacks. Discuss with your doctor what is most appropriate for you during your annual wellness visit. The different tests include: colonoscopy (considered the best screening method), a fecal occult blood test, a fecal DNA test, and sigmoidoscopy.    -A bone mass density test is recommended when a woman turns 65 to screen for osteoporosis. This test is only recommended one time, as a screening. Some providers will use this same test as a disease monitoring tool if you already have osteoporosis. -Breast cancer screenings are recommended every other year for women of normal risk, age 54-69.  -Cervical cancer screenings for women over age 72 are only recommended with certain risk factors.      Here is a list of your current Health Maintenance items (your personalized list of preventive services) with a due date:  Health Maintenance Due   Topic Date Due    Colonoscopy  02/11/2019    Eye Exam  09/07/2019    Glaucoma Screening   06/05/2020    Diabetic Foot Care  07/09/2020    Annual Well Visit  07/09/2020

## 2020-10-05 ENCOUNTER — TELEPHONE (OUTPATIENT)
Dept: FAMILY MEDICINE CLINIC | Age: 70
End: 2020-10-05

## 2020-10-05 NOTE — TELEPHONE ENCOUNTER
I spoke with patient and she is scheduled for labs this Friday and then f/u on 10/16. Patient wanted me to ask if a UA could be done also because she is having some UTI symptoms. I let her know Dr. Demetri Nix would want a visit for this and suggested she try an evisit which she states she will do once she gets back home. She is currently at her daughter's house out of town. She also asked if an A1c was ordered b/c she states she has been off of the Trulicity since her July appt. I told her I would send a message back to clarify. I see some labs still pending from Feb 2020 but most recently I only see a BMP.

## 2020-10-06 ENCOUNTER — E-VISIT (OUTPATIENT)
Dept: FAMILY MEDICINE CLINIC | Age: 70
End: 2020-10-06
Payer: MEDICARE

## 2020-10-06 DIAGNOSIS — E55.9 VITAMIN D DEFICIENCY: ICD-10-CM

## 2020-10-06 DIAGNOSIS — N17.9 AKI (ACUTE KIDNEY INJURY) (HCC): Primary | ICD-10-CM

## 2020-10-06 DIAGNOSIS — B37.9 ANTIBIOTIC-INDUCED YEAST INFECTION: ICD-10-CM

## 2020-10-06 DIAGNOSIS — T36.95XA ANTIBIOTIC-INDUCED YEAST INFECTION: ICD-10-CM

## 2020-10-06 DIAGNOSIS — E11.29 TYPE 2 DIABETES MELLITUS WITH MICROALBUMINURIA, WITH LONG-TERM CURRENT USE OF INSULIN (HCC): ICD-10-CM

## 2020-10-06 DIAGNOSIS — I10 ESSENTIAL HYPERTENSION: ICD-10-CM

## 2020-10-06 DIAGNOSIS — R30.0 DYSURIA: ICD-10-CM

## 2020-10-06 DIAGNOSIS — N18.30 STAGE 3 CHRONIC KIDNEY DISEASE, UNSPECIFIED WHETHER STAGE 3A OR 3B CKD (HCC): ICD-10-CM

## 2020-10-06 DIAGNOSIS — D50.0 IRON DEFICIENCY ANEMIA DUE TO CHRONIC BLOOD LOSS: ICD-10-CM

## 2020-10-06 DIAGNOSIS — E03.9 ACQUIRED HYPOTHYROIDISM: ICD-10-CM

## 2020-10-06 DIAGNOSIS — N12 PYELONEPHRITIS: Primary | ICD-10-CM

## 2020-10-06 DIAGNOSIS — R68.89 OTHER GENERAL SYMPTOMS AND SIGNS: ICD-10-CM

## 2020-10-06 DIAGNOSIS — R80.9 TYPE 2 DIABETES MELLITUS WITH MICROALBUMINURIA, WITH LONG-TERM CURRENT USE OF INSULIN (HCC): ICD-10-CM

## 2020-10-06 DIAGNOSIS — Z79.4 TYPE 2 DIABETES MELLITUS WITH MICROALBUMINURIA, WITH LONG-TERM CURRENT USE OF INSULIN (HCC): ICD-10-CM

## 2020-10-06 DIAGNOSIS — Z51.81 ENCOUNTER FOR MEDICATION MONITORING: ICD-10-CM

## 2020-10-06 PROCEDURE — 99422 OL DIG E/M SVC 11-20 MIN: CPT | Performed by: FAMILY MEDICINE

## 2020-10-06 RX ORDER — CIPROFLOXACIN 500 MG/1
500 TABLET ORAL 2 TIMES DAILY
Qty: 14 TAB | Refills: 0 | Status: SHIPPED | OUTPATIENT
Start: 2020-10-06 | End: 2020-10-13

## 2020-10-06 NOTE — TELEPHONE ENCOUNTER
Carlos Mclaughlin (1950) initiated an asynchronous digital communication through [employer]. HPI: per patient questionnaire     Exam: not applicable    Diagnoses and all orders for this visit:  Diagnoses and all orders for this visit:    1. Pyelonephritis  -     ciprofloxacin HCl (CIPRO) 500 mg tablet; Take 1 Tab by mouth two (2) times a day for 7 days. 2. Antibiotic-induced yeast infection  -     clotrimazole (GYNE-LOTRIMIN) 2 % vaginal cream; Insert 1 Applicatorful into vagina nightly. 3. Stage 3 chronic kidney disease, unspecified whether stage 3a or 3b CKD          Time: EV2 - 11-20 minutes were spent on the digital evaluation and management of this patient.        Deanna Ray MD

## 2020-10-06 NOTE — TELEPHONE ENCOUNTER
Patient called the office back  verified she has been made aware lab orders have been put in for her she has appt on Friday for this. She has not started an e-visit with Dr. Vaibhav Be yet but is planning to this afternoon.

## 2020-10-06 NOTE — TELEPHONE ENCOUNTER
Yes, she had been expected to follow up a while back. New lab orders placed. Yes, evisit or vv will be needed for acute concern. Well done. Thank you.  TIM

## 2020-10-09 ENCOUNTER — TELEPHONE (OUTPATIENT)
Dept: FAMILY MEDICINE CLINIC | Age: 70
End: 2020-10-09

## 2020-10-09 ENCOUNTER — LAB ONLY (OUTPATIENT)
Dept: FAMILY MEDICINE CLINIC | Age: 70
End: 2020-10-09
Payer: MEDICARE

## 2020-10-09 ENCOUNTER — HOSPITAL ENCOUNTER (OUTPATIENT)
Dept: LAB | Age: 70
Discharge: HOME | End: 2020-10-09

## 2020-10-09 DIAGNOSIS — Z79.4 TYPE 2 DIABETES MELLITUS WITH MICROALBUMINURIA, WITH LONG-TERM CURRENT USE OF INSULIN (HCC): ICD-10-CM

## 2020-10-09 DIAGNOSIS — E11.29 TYPE 2 DIABETES MELLITUS WITH MICROALBUMINURIA, WITH LONG-TERM CURRENT USE OF INSULIN (HCC): ICD-10-CM

## 2020-10-09 DIAGNOSIS — N18.30 STAGE 3 CHRONIC KIDNEY DISEASE, UNSPECIFIED WHETHER STAGE 3A OR 3B CKD (HCC): ICD-10-CM

## 2020-10-09 DIAGNOSIS — I10 ESSENTIAL HYPERTENSION: ICD-10-CM

## 2020-10-09 DIAGNOSIS — R80.9 TYPE 2 DIABETES MELLITUS WITH MICROALBUMINURIA, WITH LONG-TERM CURRENT USE OF INSULIN (HCC): ICD-10-CM

## 2020-10-09 DIAGNOSIS — E03.9 ACQUIRED HYPOTHYROIDISM: Primary | ICD-10-CM

## 2020-10-09 DIAGNOSIS — E55.9 VITAMIN D DEFICIENCY: ICD-10-CM

## 2020-10-09 DIAGNOSIS — D50.0 IRON DEFICIENCY ANEMIA DUE TO CHRONIC BLOOD LOSS: ICD-10-CM

## 2020-10-09 LAB — SENTARA SPECIMEN COL,SENBCF: NORMAL

## 2020-10-09 PROCEDURE — 36415 COLL VENOUS BLD VENIPUNCTURE: CPT | Performed by: FAMILY MEDICINE

## 2020-10-09 PROCEDURE — 99001 SPECIMEN HANDLING PT-LAB: CPT

## 2020-10-09 NOTE — PROGRESS NOTES
Patient here for lab draw only COVID screening completed by front office, venipuncture performed on patients right arm was successful patient tolerated well.

## 2020-10-13 LAB
25(OH)D3 SERPL-MCNC: 61.1 NG/ML (ref 32–100)
A-G RATIO,AGRAT: 1.8 RATIO (ref 1.1–2.6)
ALBUMIN SERPL-MCNC: 4.4 G/DL (ref 3.5–5)
ALP SERPL-CCNC: 106 U/L (ref 40–120)
ALT SERPL-CCNC: 14 U/L (ref 5–40)
ANION GAP SERPL CALC-SCNC: 12 MMOL/L (ref 3–15)
AST SERPL W P-5'-P-CCNC: 17 U/L (ref 10–37)
ATYPICAL LYMPHS,ATYPLM: 2 %
AVG GLU, 10930: 195 MG/DL (ref 91–123)
BACTERIA,BACTU: PRESENT
BASOPHILS ABS-DIF,2030: 0.1 K/UL (ref 0–0.2)
BASOPHILS%-DIF,2033: 1 % (ref 0–2)
BILIRUB SERPL-MCNC: 0.3 MG/DL (ref 0.2–1.2)
BILIRUB UR QL: NEGATIVE
BUN SERPL-MCNC: 23 MG/DL (ref 6–22)
CALCIUM SERPL-MCNC: 9.5 MG/DL (ref 8.4–10.5)
CHLORIDE SERPL-SCNC: 104 MMOL/L (ref 98–110)
CHOLEST SERPL-MCNC: 179 MG/DL (ref 110–200)
CLARITY: ABNORMAL
CO2 SERPL-SCNC: 26 MMOL/L (ref 20–32)
COLOR UR: YELLOW
CREAT SERPL-MCNC: 1.6 MG/DL (ref 0.8–1.4)
CREATININE, URINE: 106 MG/DL
EOS ABS-DIF,2069: 0.1 K/UL (ref 0–0.5)
EOSINOPHILS%-DIF,2072: 1 % (ref 0–6)
EPITHELIAL,EPSU: ABNORMAL /HPF (ref 0–2)
ERYTHROCYTE [DISTWIDTH] IN BLOOD BY AUTOMATED COUNT: 13.1 % (ref 10–15.5)
FE % SATURATION,PSAT: 19 % (ref 20–50)
FERRITIN SERPL-MCNC: 88 NG/ML (ref 10–291)
FOLATE,FOL: 13.4 NG/ML
GFRAA, 66117: 37.8
GFRNA, 66118: 31.2
GLOBULIN,GLOB: 2.4 G/DL (ref 2–4)
GLUCOSE SERPL-MCNC: 132 MG/DL (ref 70–99)
GLUCOSE UR QL: NEGATIVE MG/DL
HBA1C MFR BLD HPLC: 8.4 % (ref 4.8–5.6)
HCT VFR BLD AUTO: 42.5 % (ref 35.1–48.3)
HDLC SERPL-MCNC: 4.3 MG/DL (ref 0–5)
HDLC SERPL-MCNC: 42 MG/DL
HGB BLD-MCNC: 12.8 G/DL (ref 11.7–16.1)
HGB UR QL STRIP: NEGATIVE
IRON,IRN: 57 MCG/DL (ref 30–160)
KETONES UR QL STRIP.AUTO: NEGATIVE MG/DL
LDL/HDL RATIO,LDHD: 2.6
LDLC SERPL CALC-MCNC: 107 MG/DL (ref 50–99)
LEUKOCYTE ESTERASE: ABNORMAL
LYMPHOCYTES%-DIF,2108: 23 % (ref 20–45)
LYMPHS ABS-DIF,2105: 2.1 K/UL (ref 1–4.8)
MACROCYTOSIS,MACRO: ABNORMAL
MAGNESIUM SERPL-MCNC: 2 MG/DL (ref 1.6–2.5)
MCH RBC QN AUTO: 29 PG (ref 26–34)
MCHC RBC AUTO-ENTMCNC: 30 G/DL (ref 31–36)
MCV RBC AUTO: 97 FL (ref 81–99)
MICROALB/CREAT RATIO, 140286: 184.9 (ref 0–30)
MICROALBUMIN,URINE RANDOM 140054: 196 MG/L (ref 0.1–17)
MONOCYTES ABS-DIF,2141: 0.7 K/UL (ref 0.1–1)
MONOCYTES%-DIF,2144: 8 % (ref 3–12)
NEUTROPHILS ABS,2156: 5.5 K/UL (ref 1.8–7.7)
NEUTROPHILS%-DIF,2159: 65 % (ref 40–75)
NITRITE UR QL STRIP.AUTO: NEGATIVE
NON-HDL CHOLESTEROL, 011976: 137 MG/DL
PH UR STRIP: 5 PH (ref 5–8)
PLATELET # BLD AUTO: 326 K/UL (ref 140–440)
PMV BLD AUTO: 12.2 FL (ref 9–13)
POTASSIUM SERPL-SCNC: 5 MMOL/L (ref 3.5–5.5)
PROT SERPL-MCNC: 6.8 G/DL (ref 6.2–8.1)
PROT UR QL STRIP: ABNORMAL MG/DL
RBC # BLD AUTO: 4.39 M/UL (ref 3.8–5.2)
RBC #/AREA URNS HPF: ABNORMAL /HPF
RESULT: ABNORMAL
SMEAR EVAL, 1131: ABNORMAL
SODIUM SERPL-SCNC: 142 MMOL/L (ref 133–145)
SP GR UR: 1.01 (ref 1–1.03)
T4 FREE SERPL-MCNC: 1.2 NG/DL (ref 0.9–1.8)
TIBC,TIBC: 299 MCG/DL (ref 228–428)
TOTAL CELLS COUNTED, 12021: 100
TRIGL SERPL-MCNC: 151 MG/DL (ref 40–149)
TSH SERPL-ACNC: 9.11 MCU/ML (ref 0.27–4.2)
UCLWBC, 6192: ABNORMAL
UIBC SERPL-MCNC: 242 MCG/DL (ref 110–370)
URINE ASCORBIC ACID: NEGATIVE MG/DL
UROBILINOGEN UR STRIP-MCNC: <2 MG/DL
VIT B12 SERPL-MCNC: 317 PG/ML (ref 211–911)
VLDLC SERPL CALC-MCNC: 30 MG/DL (ref 8–30)
WBC # BLD AUTO: 8.5 K/UL (ref 4–11)
WBC URNS QL MICRO: ABNORMAL /HPF (ref 0–2)

## 2020-10-16 ENCOUNTER — VIRTUAL VISIT (OUTPATIENT)
Dept: FAMILY MEDICINE CLINIC | Age: 70
End: 2020-10-16
Payer: MEDICARE

## 2020-10-16 DIAGNOSIS — N18.30 STAGE 3 CHRONIC KIDNEY DISEASE, UNSPECIFIED WHETHER STAGE 3A OR 3B CKD (HCC): ICD-10-CM

## 2020-10-16 DIAGNOSIS — N12 PYELONEPHRITIS: ICD-10-CM

## 2020-10-16 DIAGNOSIS — E11.21 TYPE 2 DIABETES MELLITUS WITH DIABETIC NEPHROPATHY, WITH LONG-TERM CURRENT USE OF INSULIN (HCC): Primary | ICD-10-CM

## 2020-10-16 DIAGNOSIS — E11.21 DIABETIC NEPHROPATHY ASSOCIATED WITH TYPE 2 DIABETES MELLITUS (HCC): ICD-10-CM

## 2020-10-16 DIAGNOSIS — E03.9 ACQUIRED HYPOTHYROIDISM: ICD-10-CM

## 2020-10-16 DIAGNOSIS — Z79.4 TYPE 2 DIABETES MELLITUS WITH DIABETIC NEPHROPATHY, WITH LONG-TERM CURRENT USE OF INSULIN (HCC): Primary | ICD-10-CM

## 2020-10-16 PROCEDURE — G9717 DOC PT DX DEP/BP F/U NT REQ: HCPCS | Performed by: FAMILY MEDICINE

## 2020-10-16 PROCEDURE — G8399 PT W/DXA RESULTS DOCUMENT: HCPCS | Performed by: FAMILY MEDICINE

## 2020-10-16 PROCEDURE — 99214 OFFICE O/P EST MOD 30 MIN: CPT | Performed by: FAMILY MEDICINE

## 2020-10-16 PROCEDURE — 2022F DILAT RTA XM EVC RTNOPTHY: CPT | Performed by: FAMILY MEDICINE

## 2020-10-16 PROCEDURE — 1100F PTFALLS ASSESS-DOCD GE2>/YR: CPT | Performed by: FAMILY MEDICINE

## 2020-10-16 PROCEDURE — G9899 SCRN MAM PERF RSLTS DOC: HCPCS | Performed by: FAMILY MEDICINE

## 2020-10-16 PROCEDURE — 3017F COLORECTAL CA SCREEN DOC REV: CPT | Performed by: FAMILY MEDICINE

## 2020-10-16 PROCEDURE — 3288F FALL RISK ASSESSMENT DOCD: CPT | Performed by: FAMILY MEDICINE

## 2020-10-16 PROCEDURE — G8756 NO BP MEASURE DOC: HCPCS | Performed by: FAMILY MEDICINE

## 2020-10-16 PROCEDURE — 1090F PRES/ABSN URINE INCON ASSESS: CPT | Performed by: FAMILY MEDICINE

## 2020-10-16 PROCEDURE — G8427 DOCREV CUR MEDS BY ELIG CLIN: HCPCS | Performed by: FAMILY MEDICINE

## 2020-10-16 PROCEDURE — 3052F HG A1C>EQUAL 8.0%<EQUAL 9.0%: CPT | Performed by: FAMILY MEDICINE

## 2020-10-16 RX ORDER — CIPROFLOXACIN 500 MG/1
TABLET ORAL 2 TIMES DAILY
COMMUNITY
End: 2020-12-11 | Stop reason: ALTCHOICE

## 2020-10-16 NOTE — PROGRESS NOTES
Patient being seen today for follow up on her DM and Kidney Dz. She says her fasting sugars have elevated as well as her evening sugars. She also c/o weight gain. 1. Have you been to the ER, urgent care clinic since your last visit? Hospitalized since your last visit? No  2. Have you seen or consulted any other health care providers outside of the [address] [place] [address] since your last visit? Include any pap smears or colon screening. Yes When: Oct.2020 Where: Eye doctor Reason for visit: Laser surgery bilateral    Medication reconciliation has been completed with patient. Care team discussed/updated as well as pharmacy. Health Maintenance Due   Topic Date Due    Colonoscopy  02/11/2019    Eye Exam Retinal or Dilated  09/07/2019    GLAUCOMA SCREENING Q2Y  06/05/2020    Foot Exam Q1  07/09/2020    Flu Vaccine (1) 09/01/2020     Health Maintenance reviewed - She has had her flu vaccine done at [address] [place] [address] in September.  updated. Will request eye report.

## 2020-10-16 NOTE — PROGRESS NOTES
Khadijah Jones is a 78 y.o. female who was seen by synchronous (real-time) audio-video technology on 10/16/2020 for Diabetes and Chronic Kidney Disease        Assessment & Plan:   Diagnoses and all orders for this visit:    1. Stage 3 chronic kidney disease, unspecified whether stage 3a or 3b CKD  -     REFERRAL TO NUTRITION    2. Diabetic nephropathy associated with type 2 diabetes mellitus ([facility] [address]    3. Type 2 diabetes mellitus with diabetic nephropathy, with long-term current use of insulin ([facility])  -     REFERRAL TO NUTRITION  -     TSH W/ REFLX FREE T4 IF ABNORMAL; Future  -     HEMOGLOBIN A1C WITH EAG; Future  -     METABOLIC PANEL, COMPREHENSIVE; Future  -     MICROALBUMIN, UR, RAND W/ MICROALB/CREAT RATIO; Future    4. Acquired hypothyroidism  -     TSH W/ REFLX FREE T4 IF ABNORMAL; Future    5. Pyelonephritis      Loss of DM control due to diet. Referring. Med choices limited. Will stick with insulin. Avoid high protein load due to CKD. Lots of thyroid dose variability reflecting variablity of home regimen (Diet Coke vs water). Will start taking only with water. If remains variable, try taking at HS. Complete antibiotics. FU if symptoms do not resolve completely. Follow-up and Dispositions    · Return in about 2 months (around 12/16/2020) for chronic medical conditions, non fasting labs 1 week prior. 712  Subjective:   FU pyelonephritis: \"almost\" a week of antibiotics. Back pain resolved. States prone to UTI \"I've had them all my life. \" Unremarkable evaluation. FU yeast vaginitis: also improving with tx. Follow-up DM2 with CKD. Frozen meals 1-2 almost every day  Mar/sausage and eggs for breakfast    \"cheating\" some as well    GLP1 agonist discontinued due to declining renal function  Key Antihyperglycemic Medications             insulin glargine (LANTUS SOLOSTAR U-100 INSULIN) 100 unit/mL (3 mL) inpn (Taking) 30 Units by SubCUTAneous route daily.     insulin aspart U-100 (NOVOLOG) 100 unit/mL (3 mL) inpn (Taking) 5 Units daily (with breakfast) AND 8 Units daily (with dinner). Home glucoses:  Fasting: usually 120-180, single 64  PP lunch: around 200  PP dinner: 170-190, single >400 after can of kristel        Lab Results   Component Value Date/Time    Hemoglobin A1c 8.4 (H) 10/09/2020 08:36 AM    Hemoglobin A1c 7.1 (H) 11/12/2019 10:50 AM    Hemoglobin A1c 7.0 (H) 07/02/2019 08:55 AM    Hemoglobin A1c, External 11.1 03/21/2016    Glucose 132 (H) 10/09/2020 08:36 AM    Glucose,  (H) 01/09/2019 10:30 AM    Microalbumin/Creat ratio (mg/g creat) 93 (H) 11/12/2019 10:50 AM    Microalb/Creat ratio (ug/mg creat.) 184.9 (H) 10/09/2020 08:36 AM    Microalbumin,urine random 11.00 (H) 11/12/2019 10:50 AM    LDL, calculated 107 (H) 10/09/2020 08:36 AM    Creatinine, POC 1.6 (H) 01/09/2019 10:30 AM    Creatinine 1.6 (H) 10/09/2020 08:36 AM         FU thyroid: takes in the morning with other meds and Diet Coke. May wait 30 minutes then for breakfast.       Magnesium   Date Value Ref Range Status   10/09/2020 2.0 1.6 - 2.5 mg/dL Final   07/30/2018 2.0 1.6 - 2.6 mg/dL Final     Lab Results   Component Value Date/Time    Vitamin B12 317 10/09/2020 08:36 AM    Folate 13.40 10/09/2020 08:36 AM     Lab Results   Component Value Date/Time    Sodium 142 10/09/2020 08:36 AM    Potassium 5.0 10/09/2020 08:36 AM    Chloride 104 10/09/2020 08:36 AM    CO2 26 10/09/2020 08:36 AM    Anion gap 12.0 10/09/2020 08:36 AM    Glucose 132 (H) 10/09/2020 08:36 AM    BUN 23 (H) 10/09/2020 08:36 AM    Creatinine 1.6 (H) 10/09/2020 08:36 AM    BUN/Creatinine ratio 17 01/30/2020 10:23 AM    GFR est AA 42 (L) 01/30/2020 10:23 AM    GFR est non-AA 35 (L) 01/30/2020 10:23 AM    Calcium 9.5 10/09/2020 08:36 AM    Bilirubin, total 0.3 10/09/2020 08:36 AM    Alk.  phosphatase 106 10/09/2020 08:36 AM    Protein, total 6.8 10/09/2020 08:36 AM    Albumin 4.4 10/09/2020 08:36 AM    Globulin 2.4 10/09/2020 08:36 AM A-G Ratio 1.8 10/09/2020 08:36 AM    ALT (SGPT) 14 10/09/2020 08:36 AM    AST (SGOT) 17 10/09/2020 08:36 AM             Prior to Admission medications    Medication Sig Start Date End Date Taking? Authorizing Provider   ciprofloxacin HCl (CIPRO) 500 mg tablet Take  by mouth two (2) times a day. Yes Provider, Historical   docusate sodium (COLACE) 50 mg capsule Take 1 Cap by mouth two (2) times a day for 90 days. 7/24/20 10/22/20 Yes Wil Caceres MD   simvastatin (ZOCOR) 40 mg tablet Take 1 Tab by mouth nightly. 5/8/20  Yes Wil Caceres MD   NIFEdipine ER (ADALAT CC) 30 mg ER tablet Take 1 Tab by mouth daily. 2/3/20  Yes Wil Caceres MD   Insulin Needles, Disposable, (BD ULTRA-FINE MINI PEN NEEDLE) 31 gauge x 3/16\" ndle Use as directed 2/3/20  Yes Wil Caceres MD   insulin glargine (LANTUS SOLOSTAR U-100 INSULIN) 100 unit/mL (3 mL) inpn 30 Units by SubCUTAneous route daily. 2/3/20  Yes Wil Caceres MD   insulin aspart U-100 (NOVOLOG) 100 unit/mL (3 mL) inpn 5 Units daily (with breakfast) AND 8 Units daily (with dinner). 2/3/20  Yes Wil Caceres MD   olmesartan (BENICAR) 40 mg tablet Take 1 Tab by mouth daily. 12/3/19  Yes Wil Caceres MD   levothyroxine (SYNTHROID) 25 mcg tablet Take 1 Tab by mouth Daily (before breakfast). 12/3/19  Yes Wil Caecres MD   glucose blood VI test strips (ACCU-CHEK CODY PLUS TEST STRP) strip Patient is to check blood sugar 3-4 times a day 1/28/19  Yes Wil Caceres MD   acetaminophen (TYLENOL EXTRA STRENGTH) 500 mg tablet Take  by mouth every six (6) hours as needed for Pain. Yes Provider, Historical   esomeprazole (NEXIUM) 20 mg capsule Take  by mouth. Yes Provider, Historical   Blood-Glucose Meter (ACCU-CHEK CODY PLUS METER) misc 1 Device by Does Not Apply route four (4) times daily.  6/8/18  Yes Wil Caceres MD   BLADDER CONTROL PADS pads  5/16/17  Yes Provider, Historical   ALCOHOL PREP PADS padm  5/16/17  Yes Provider, Historical   clotrimazole (GYNE-LOTRIMIN) 2 % vaginal cream Insert 1 Applicatorful into vagina nightly. 10/6/20   Maico Berg MD   ferrous sulfate 325 mg (65 mg iron) tablet Take 1 Tab by mouth three (3) times daily (with meals). 7/24/20   Maico Berg MD   calcium carbonate (CALCIUM 500 PO) Take  by mouth. Provider, Historical   psyllium seed, with dextrose, (FIBER PO) Take  by mouth. Provider, Historical   NOÉ OIL, BULK, by Does Not Apply route. Using topical for Vertigo    Provider, Historical   cholecalciferol, VITAMIN D3, (VITAMIN D3) 5,000 unit tab tablet Take  by mouth daily. Provider, Historical         Review of Systems   Constitutional: Negative for fever. Gastrointestinal: Positive for nausea (with kidney infection, improving). Negative for vomiting. Genitourinary: Negative for hematuria. Objective:     Patient-Reported Vitals 7/24/2020   Patient-Reported Weight 186   Patient-Reported Height 5' 3\"   Patient-Reported Pulse 97   Patient-Reported Temperature 97   Patient-Reported Systolic  883   Patient-Reported Diastolic 76      General: alert, cooperative, no distress   Mental  status: normal mood, behavior, speech, dress, motor activity, and thought processes, able to follow commands   HENT: NCAT   Neck: no visualized mass   Resp: no respiratory distress   Neuro: no gross deficits   Skin: no discoloration or lesions of concern on visible areas   Psychiatric: normal affect, consistent with stated mood, no evidence of hallucinations     Additional exam findings: We discussed the expected course, resolution and complications of the diagnosis(es) in detail. Medication risks, benefits, costs, interactions, and alternatives were discussed as indicated. I advised her to contact the office if her condition worsens, changes or fails to improve as anticipated. She expressed understanding with the diagnosis(es) and plan.        Bart Bethea, who was evaluated through a patient-initiated, synchronous (real-time) audio-video encounter, and/or her healthcare decision maker, is aware that it is a billable service, with coverage as determined by her insurance carrier. She provided verbal consent to proceed: n/a- consent obtained within past 12 months, and patient identification was verified. It was conducted pursuant to the emergency declaration under the [address] [place] [address] authority and the [employer] General Act. A caregiver was present when appropriate. Ability to conduct physical exam was limited. I was at home. The patient was at home.       Tammy Larios MD

## 2020-10-18 DIAGNOSIS — E11.29 TYPE 2 DIABETES MELLITUS WITH MICROALBUMINURIA, WITH LONG-TERM CURRENT USE OF INSULIN (HCC): ICD-10-CM

## 2020-10-18 DIAGNOSIS — R80.9 TYPE 2 DIABETES MELLITUS WITH MICROALBUMINURIA, WITH LONG-TERM CURRENT USE OF INSULIN (HCC): ICD-10-CM

## 2020-10-18 DIAGNOSIS — Z79.4 TYPE 2 DIABETES MELLITUS WITH MICROALBUMINURIA, WITH LONG-TERM CURRENT USE OF INSULIN (HCC): ICD-10-CM

## 2020-10-20 RX ORDER — INSULIN GLARGINE 100 [IU]/ML
INJECTION, SOLUTION SUBCUTANEOUS
Qty: 15 ML | Refills: 2 | Status: SHIPPED | OUTPATIENT
Start: 2020-10-20 | End: 2021-02-02 | Stop reason: SDUPTHER

## 2020-12-08 ENCOUNTER — TELEPHONE (OUTPATIENT)
Dept: FAMILY MEDICINE CLINIC | Age: 70
End: 2020-12-08

## 2020-12-08 NOTE — TELEPHONE ENCOUNTER
Ms. Agueda Andino called stating that she believed she had covid. Her roommate did test positive for it. She never got tested but had a terrible cough, no fever. This morning she woke up with a rash all over her chest. She is concerned as to what she should do. I asked her was it itching, she said yes & it's really red. Call back number is: [phone].

## 2020-12-08 NOTE — TELEPHONE ENCOUNTER
Patient called the office back she says her room mate was exposed at work but thought it was just a sinus infection so patient was not concerned at that time. She did go to her daughters home for Thanksgiving that week and says she began having symptoms that same evening 11/26/2020. Patient c/o cough, weakness, chills, muscle pain, joint pain, fatigue and says she woke up with a rash this morning all over her chest little red spots that are itchy. Her room mate as well as her daughter and granddaughter have all tested positive for COVID. Told I will send this to Dr. Cole Edwards and call her back please advise.

## 2020-12-08 NOTE — TELEPHONE ENCOUNTER
Called patient back she has been told this is definitely COVID given her symptoms and exposure. She has been advised to isolate herself which she says she has been doing. Told patient to keep an eye on her symptoms for any new or worsening, if she start to have breathing problems to go to the ER. She is asking if she is able to use Hydrocortisone on her rash to keep it from itching she has been told I will ask Dr. Opal Garcia and call her back.

## 2020-12-08 NOTE — TELEPHONE ENCOUNTER
Yes. This is COVID. Too late therapeutic intervention. We now need to watch for indicators for hospitalization. Needs to isolate. Can add to my schedule if desired.  TIM

## 2020-12-11 ENCOUNTER — VIRTUAL VISIT (OUTPATIENT)
Dept: FAMILY MEDICINE CLINIC | Age: 70
End: 2020-12-11
Payer: MEDICARE

## 2020-12-11 ENCOUNTER — TELEPHONE (OUTPATIENT)
Dept: FAMILY MEDICINE CLINIC | Age: 70
End: 2020-12-11

## 2020-12-11 ENCOUNTER — HOSPITAL ENCOUNTER (OUTPATIENT)
Dept: LAB | Age: 70
Discharge: HOME | End: 2020-12-11

## 2020-12-11 DIAGNOSIS — R34 DECREASED URINE OUTPUT: Primary | ICD-10-CM

## 2020-12-11 DIAGNOSIS — Z86.16 HISTORY OF 2019 NOVEL CORONAVIRUS DISEASE (COVID-19): ICD-10-CM

## 2020-12-11 DIAGNOSIS — N18.30 STAGE 3 CHRONIC KIDNEY DISEASE, UNSPECIFIED WHETHER STAGE 3A OR 3B CKD (HCC): ICD-10-CM

## 2020-12-11 DIAGNOSIS — Z79.4 TYPE 2 DIABETES MELLITUS WITH DIABETIC NEPHROPATHY, WITH LONG-TERM CURRENT USE OF INSULIN (HCC): ICD-10-CM

## 2020-12-11 DIAGNOSIS — E11.21 TYPE 2 DIABETES MELLITUS WITH DIABETIC NEPHROPATHY, WITH LONG-TERM CURRENT USE OF INSULIN (HCC): ICD-10-CM

## 2020-12-11 DIAGNOSIS — R39.89 ABNORMAL URINE COLOR: ICD-10-CM

## 2020-12-11 DIAGNOSIS — R21 RASH: ICD-10-CM

## 2020-12-11 DIAGNOSIS — R34 DECREASED URINE OUTPUT: ICD-10-CM

## 2020-12-11 LAB
ALBUMIN SERPL-MCNC: 3 G/DL (ref 3.4–5)
ALBUMIN/GLOB SERPL: 0.8 {RATIO} (ref 0.8–1.7)
ALP SERPL-CCNC: 90 U/L (ref 45–117)
ALT SERPL-CCNC: 20 U/L (ref 13–56)
ANION GAP SERPL CALC-SCNC: 3 MMOL/L (ref 3–18)
APPEARANCE UR: CLEAR
AST SERPL-CCNC: 21 U/L (ref 10–38)
BACTERIA URNS QL MICRO: NEGATIVE /HPF
BASOPHILS # BLD: 0 K/UL (ref 0–0.06)
BASOPHILS NFR BLD: 0 % (ref 0–3)
BILIRUB SERPL-MCNC: 0.4 MG/DL (ref 0.2–1)
BILIRUB UR QL: NEGATIVE
BUN SERPL-MCNC: 29 MG/DL (ref 7–18)
BUN/CREAT SERPL: 16 (ref 12–20)
CALCIUM SERPL-MCNC: 8.7 MG/DL (ref 8.5–10.1)
CHLORIDE SERPL-SCNC: 109 MMOL/L (ref 100–111)
CO2 SERPL-SCNC: 28 MMOL/L (ref 21–32)
COLOR UR: ABNORMAL
CREAT SERPL-MCNC: 1.79 MG/DL (ref 0.6–1.3)
DIFFERENTIAL METHOD BLD: NORMAL
EOSINOPHIL # BLD: 0 K/UL (ref 0–0.4)
EOSINOPHIL NFR BLD: 0 % (ref 0–5)
EPITH CASTS URNS QL MICRO: NORMAL /LPF (ref 0–5)
ERYTHROCYTE [DISTWIDTH] IN BLOOD BY AUTOMATED COUNT: 12.1 % (ref 11.6–14.5)
GLOBULIN SER CALC-MCNC: 4 G/DL (ref 2–4)
GLUCOSE SERPL-MCNC: 153 MG/DL (ref 74–99)
GLUCOSE UR STRIP.AUTO-MCNC: NEGATIVE MG/DL
HCT VFR BLD AUTO: 40 % (ref 35–45)
HGB BLD-MCNC: 13 G/DL (ref 12–16)
HGB UR QL STRIP: NEGATIVE
KETONES UR QL STRIP.AUTO: ABNORMAL MG/DL
LEUKOCYTE ESTERASE UR QL STRIP.AUTO: ABNORMAL
LYMPHOCYTES # BLD: 1.7 K/UL (ref 0.8–3.5)
LYMPHOCYTES NFR BLD: 28 % (ref 20–51)
MCH RBC QN AUTO: 29.1 PG (ref 24–34)
MCHC RBC AUTO-ENTMCNC: 32.5 G/DL (ref 31–37)
MCV RBC AUTO: 89.5 FL (ref 74–97)
MONOCYTES # BLD: 0.5 K/UL (ref 0–1)
MONOCYTES NFR BLD: 9 % (ref 2–9)
NEUTS SEG # BLD: 3.7 K/UL (ref 1.8–8)
NEUTS SEG NFR BLD: 63 % (ref 42–75)
NITRITE UR QL STRIP.AUTO: NEGATIVE
PH UR STRIP: 5.5 [PH] (ref 5–8)
PLATELET # BLD AUTO: 286 K/UL (ref 135–420)
PLATELET COMMENTS,PCOM: NORMAL
PMV BLD AUTO: 11.8 FL (ref 9.2–11.8)
POTASSIUM SERPL-SCNC: 4.8 MMOL/L (ref 3.5–5.5)
PROT SERPL-MCNC: 7 G/DL (ref 6.4–8.2)
PROT UR STRIP-MCNC: 100 MG/DL
RBC # BLD AUTO: 4.47 M/UL (ref 4.2–5.3)
RBC #/AREA URNS HPF: NEGATIVE /HPF (ref 0–5)
RBC MORPH BLD: NORMAL
SENTARA SPECIMEN COL,SENBCF: NORMAL
SODIUM SERPL-SCNC: 140 MMOL/L (ref 136–145)
SP GR UR REFRACTOMETRY: 1.03 (ref 1–1.03)
UROBILINOGEN UR QL STRIP.AUTO: 1 EU/DL (ref 0.2–1)
WBC # BLD AUTO: 5.9 K/UL (ref 4.6–13.2)
WBC URNS QL MICRO: NORMAL /HPF (ref 0–4)

## 2020-12-11 PROCEDURE — G9717 DOC PT DX DEP/BP F/U NT REQ: HCPCS | Performed by: FAMILY MEDICINE

## 2020-12-11 PROCEDURE — 3017F COLORECTAL CA SCREEN DOC REV: CPT | Performed by: FAMILY MEDICINE

## 2020-12-11 PROCEDURE — G8399 PT W/DXA RESULTS DOCUMENT: HCPCS | Performed by: FAMILY MEDICINE

## 2020-12-11 PROCEDURE — G8427 DOCREV CUR MEDS BY ELIG CLIN: HCPCS | Performed by: FAMILY MEDICINE

## 2020-12-11 PROCEDURE — G8756 NO BP MEASURE DOC: HCPCS | Performed by: FAMILY MEDICINE

## 2020-12-11 PROCEDURE — 82550 ASSAY OF CK (CPK): CPT

## 2020-12-11 PROCEDURE — 1100F PTFALLS ASSESS-DOCD GE2>/YR: CPT | Performed by: FAMILY MEDICINE

## 2020-12-11 PROCEDURE — 99001 SPECIMEN HANDLING PT-LAB: CPT

## 2020-12-11 PROCEDURE — 85025 COMPLETE CBC W/AUTO DIFF WBC: CPT

## 2020-12-11 PROCEDURE — 3288F FALL RISK ASSESSMENT DOCD: CPT | Performed by: FAMILY MEDICINE

## 2020-12-11 PROCEDURE — 1090F PRES/ABSN URINE INCON ASSESS: CPT | Performed by: FAMILY MEDICINE

## 2020-12-11 PROCEDURE — 3052F HG A1C>EQUAL 8.0%<EQUAL 9.0%: CPT | Performed by: FAMILY MEDICINE

## 2020-12-11 PROCEDURE — 2022F DILAT RTA XM EVC RTNOPTHY: CPT | Performed by: FAMILY MEDICINE

## 2020-12-11 PROCEDURE — G9899 SCRN MAM PERF RSLTS DOC: HCPCS | Performed by: FAMILY MEDICINE

## 2020-12-11 PROCEDURE — 99214 OFFICE O/P EST MOD 30 MIN: CPT | Performed by: FAMILY MEDICINE

## 2020-12-11 PROCEDURE — 81001 URINALYSIS AUTO W/SCOPE: CPT

## 2020-12-11 PROCEDURE — 80053 COMPREHEN METABOLIC PANEL: CPT

## 2020-12-11 NOTE — TELEPHONE ENCOUNTER
----- Message from Olya Fu MD sent at 12/11/2020  2:53 PM EST -----  Regarding: I don't even see labs pending.  Which lab has she gone to?

## 2020-12-11 NOTE — TELEPHONE ENCOUNTER
Called patient she says she just got finished with her labs and urine she had to find a ride to [facility] then says she did have a little bit of a wait. She says to let Dr. Melva Guido know when she gave her urine sample this was \"blood red\". Asked had she taken any OTC medication for urinary symptoms she says she has not.

## 2020-12-11 NOTE — TELEPHONE ENCOUNTER
Spoke to MsElisa Zackery. I let her know that Dr. Jeremy Stafford advises strongly that she go to the ER as her urine is red, but there is no blood in it. She said 'I don't know if I can go tonight, maybe tomorrow. My daughter just left and they just took my roommate's mother to the hospital'. I told her while I understand, it is very important that she goes and gets checked out. Patient said she will see what she can do tonight and try to get there. I once again stressed the urgency of getting seen. Pt expressed clear understanding and had no further questions.

## 2020-12-11 NOTE — TELEPHONE ENCOUNTER
\"Blood red\" urine but UA shows no blood. Worrisome for myoglobinuria. Please instruct her to go to the ER for further evaluation.  TIM

## 2020-12-11 NOTE — PROGRESS NOTES
Jihan Zheng is a 69 y.o. female who was seen by synchronous (real-time) audio-video technology on 12/11/2020 for Rash and Urgency        Assessment & Plan:   Diagnoses and all orders for this visit:    1. Decreased urine output  -     CBC WITH AUTOMATED DIFF; Future  -     METABOLIC PANEL, COMPREHENSIVE; Future  -     URINALYSIS W/ RFLX MICROSCOPIC; Future  -     CULTURE, URINE; Future    2. Stage 3 chronic kidney disease, unspecified whether stage 3a or 3b CKD  -     CBC WITH AUTOMATED DIFF; Future  -     METABOLIC PANEL, COMPREHENSIVE; Future  -     URINALYSIS W/ RFLX MICROSCOPIC; Future  -     CULTURE, URINE; Future    3. History of 2019 novel coronavirus disease (COVID-19)    4. Rash    5. Type 2 diabetes mellitus with diabetic nephropathy, with long-term current use of insulin (HCC)        Meets criteria to end isolation    Elevated sugars argue against \"normalization\" of her urine output as explanation for her experience. Sending for stat studies with plan to follow. Continue current skin care with addition of emollients. 712  Subjective:   Recent presumptive COVID - close contact with + roommate    Symptom onset 11/26 (15 days ago) - cough \"spasm after spasm after spasm\", weakness, body aches. Now also with rash on chest x 5 days - dry, itchy, stable since onset. Washing, cleaning. OTC HC cream helps with the itching  No loss of taste or smell  No fever    complains of rash  Decreased urine output x few days. Used to void 2-3x/night. First morning urine is orange-red, full bladder. The rest of the day, it clears, but small volume. associated with pressure in abd. Endorses loss of appetite with illness  Fasting sugar today 101 but that's an anomoly  Mostly 200-220. PPs 200    Prior to Admission medications    Medication Sig Start Date End Date Taking?  Authorizing Provider   Lantus Solostar U-100 Insulin 100 unit/mL (3 mL) inpn inject subcutaneously once daily (30 UNITS) 10/20/20 Yes Paolo Rodriguez MD   ferrous sulfate 325 mg (65 mg iron) tablet Take 1 Tab by mouth three (3) times daily (with meals). 7/24/20  Yes Paolo Rodriguez MD   simvastatin (ZOCOR) 40 mg tablet Take 1 Tab by mouth nightly. 5/8/20  Yes Paolo Rodriguez MD   NIFEdipine ER (ADALAT CC) 30 mg ER tablet Take 1 Tab by mouth daily. 2/3/20  Yes Paolo Rodriguez MD   Insulin Needles, Disposable, (BD ULTRA-FINE MINI PEN NEEDLE) 31 gauge x 3/16\" ndle Use as directed 2/3/20  Yes Paolo Rodriguez MD   insulin aspart U-100 (NOVOLOG) 100 unit/mL (3 mL) inpn 5 Units daily (with breakfast) AND 8 Units daily (with dinner). 2/3/20  Yes Paolo Rodriguez MD   olmesartan (BENICAR) 40 mg tablet Take 1 Tab by mouth daily. 12/3/19  Yes Paolo Rodriguez MD   levothyroxine (SYNTHROID) 25 mcg tablet Take 1 Tab by mouth Daily (before breakfast). 12/3/19  Yes MD Jose Hickey, by Does Not Apply route. Using topical for Vertigo   Yes Provider, Historical   glucose blood VI test strips (ACCU-CHEK CODY PLUS TEST STRP) strip Patient is to check blood sugar 3-4 times a day 1/28/19  Yes Paolo Rodriguez MD   cholecalciferol, VITAMIN D3, (VITAMIN D3) 5,000 unit tab tablet Take  by mouth daily. Yes Provider, Historical   acetaminophen (TYLENOL EXTRA STRENGTH) 500 mg tablet Take  by mouth every six (6) hours as needed for Pain. Yes Provider, Historical   esomeprazole (NEXIUM) 20 mg capsule Take  by mouth. Yes Provider, Historical   Blood-Glucose Meter (ACCU-CHEK CODY PLUS METER) misc 1 Device by Does Not Apply route four (4) times daily. 6/8/18  Yes Paolo Rodriguez MD   BLADDER CONTROL PADS pads  5/16/17  Yes Provider, Historical   ALCOHOL PREP PADS padm  5/16/17  Yes Provider, Historical   ciprofloxacin HCl (CIPRO) 500 mg tablet Take  by mouth two (2) times a day. 12/11/20  Provider, Historical   clotrimazole (GYNE-LOTRIMIN) 2 % vaginal cream Insert 1 Applicatorful into vagina nightly.  10/6/20   Jim Hillary Batista MD   calcium carbonate (CALCIUM 500 PO) Take  by mouth. Provider, Historical   psyllium seed, with dextrose, (FIBER PO) Take  by mouth. Provider, Historical         ROS per hpi    Objective:     Patient-Reported Vitals 7/24/2020   Patient-Reported Weight 186   Patient-Reported Height 5' 3\"   Patient-Reported Pulse 97   Patient-Reported Temperature 97   Patient-Reported Systolic  936   Patient-Reported Diastolic 76      General: alert, cooperative, no distress   Mental  status: normal mood, behavior, speech, dress, motor activity, and thought processes, able to follow commands   HENT: NCAT   Neck: no visualized mass   Resp: no respiratory distress   Neuro: no gross deficits   Skin:    Psychiatric: normal affect, consistent with stated mood, no evidence of hallucinations     Additional exam findings: ant chest: approx 10-20 slightly raised discrete lesions. Pink to red macules with adherent collarettes of scale vs excoriation with flat tops. No discharge      We discussed the expected course, resolution and complications of the diagnosis(es) in detail. Medication risks, benefits, costs, interactions, and alternatives were discussed as indicated. I advised her to contact the office if her condition worsens, changes or fails to improve as anticipated. She expressed understanding with the diagnosis(es) and plan. Brandee Ty, who was evaluated through a patient-initiated, synchronous (real-time) audio-video encounter, and/or her healthcare decision maker, is aware that it is a billable service, with coverage as determined by her insurance carrier. She provided verbal consent to proceed: n/a- consent obtained within past 12 months, and patient identification was verified. It was conducted pursuant to the emergency declaration under the [address] [place] [address], [address] [place] [address] authority and the [employer] Act.  A caregiver was present when appropriate. Ability to conduct physical exam was limited. I was at home. The patient was at home.       Sloan Ag MD

## 2020-12-11 NOTE — TELEPHONE ENCOUNTER
Before I sent the message pt called me back. And states that she will be going tonight. She found a ride.

## 2020-12-11 NOTE — PROGRESS NOTES
Patient being seen today for a follow up she says she had COVID test done on 12/9 and this came back negative. She says she still has issues with weakness, rash on her chest and loss of appetite. She says she also noticed her urine output has been low, she says she has been drinking plenty of fluids but has not had to get up at night to urinate which is unusual for her. She also says when she does go it's only a \"trickle\" and she has urges and pressure. 1. Have you been to the ER, urgent care clinic since your last visit? Hospitalized since your last visit? Yes When: 12/9/20 Where: [facility] Reason for visit: COVID test (Negative)  2. Have you seen or consulted any other health care providers outside of the [address] [place] [address] since your last visit? Include any pap smears or colon screening. No    Medication reconciliation has been completed with patient. Care team discussed/updated as well as pharmacy.     Health Maintenance Due   Topic Date Due    Colorectal Cancer Screening Combo  02/11/2019    Eye Exam Retinal or Dilated  09/07/2019    GLAUCOMA SCREENING Q2Y  06/05/2020    Foot Exam Q1  07/09/2020

## 2020-12-11 NOTE — PROGRESS NOTES
Commented to Jacqueline that her urine at the lab was \"blood red\"    Component      Latest Ref Rng & Units 12/11/2020 12/11/2020           3:00 PM  3:00 PM   Color        DARK YELLOW   Appearance        CLEAR   Specific gravity      1.005 - 1.030    1.026   pH (UA)      5.0 - 8.0    5.5   Protein      NEG mg/dL  100 (A)   Glucose      NEG mg/dL  Negative   Ketone      NEG mg/dL  TRACE (A)   Bilirubin      NEG    Negative   Blood      NEG    Negative   Urobilinogen      0.2 - 1.0 EU/dL  1.0   Nitrites      NEG    Negative   Leukocyte Esterase      NEG    TRACE (A)   WBC      0 - 4 /hpf 0 to 3    RBC      0 - 5 /hpf Negative    Epithelial cells      0 - 5 /lpf FEW    Bacteria      NEG /hpf Negative        Lab Results   Component Value Date/Time    WBC 5.9 12/11/2020 03:00 PM    Hemoglobin, POC 13.3 01/09/2019 10:30 AM    HGB 13.0 12/11/2020 03:00 PM    Hematocrit, POC 39 01/09/2019 10:30 AM    HCT 40.0 12/11/2020 03:00 PM    PLATELET 602 42/82/1840 03:00 PM    MCV 89.5 12/11/2020 03:00 PM     Lab Results   Component Value Date/Time    Sodium 140 12/11/2020 03:00 PM    Potassium 4.8 12/11/2020 03:00 PM    Chloride 109 12/11/2020 03:00 PM    CO2 28 12/11/2020 03:00 PM    Anion gap 3 12/11/2020 03:00 PM    Glucose 153 (H) 12/11/2020 03:00 PM    BUN 29 (H) 12/11/2020 03:00 PM    Creatinine 1.79 (H) 12/11/2020 03:00 PM    BUN/Creatinine ratio 16 12/11/2020 03:00 PM    GFR est AA 34 (L) 12/11/2020 03:00 PM    GFR est non-AA 28 (L) 12/11/2020 03:00 PM    Calcium 8.7 12/11/2020 03:00 PM    Bilirubin, total 0.4 12/11/2020 03:00 PM    Alk. phosphatase 90 12/11/2020 03:00 PM    Protein, total 7.0 12/11/2020 03:00 PM    Albumin 3.0 (L) 12/11/2020 03:00 PM    Globulin 4.0 12/11/2020 03:00 PM    A-G Ratio 0.8 12/11/2020 03:00 PM    ALT (SGPT) 20 12/11/2020 03:00 PM    AST (SGOT) 21 12/11/2020 03:00 PM     Blood red urine without blood suggests myoglobin. Decreased output worrisome. High risk.   Directed to go to ED    KJS

## 2020-12-13 LAB — RESULT: NORMAL

## 2020-12-14 DIAGNOSIS — R34 DECREASED URINE OUTPUT: ICD-10-CM

## 2020-12-14 DIAGNOSIS — R39.89 ABNORMAL URINE COLOR: ICD-10-CM

## 2020-12-14 LAB — CK SERPL-CCNC: 53 U/L (ref 26–192)

## 2020-12-14 RX ORDER — CEPHALEXIN 500 MG/1
500 CAPSULE ORAL
COMMUNITY
Start: 2020-12-11 | End: 2020-12-18

## 2020-12-14 NOTE — TELEPHONE ENCOUNTER
Called [facility] outpatient lab they have confirmed they are able to add STAT CK on to specimen from 12/11/20. Order has been faxed to [phone], called patient to update her on Dr. Anibal Mathew concerns no answer left message asking that she call the office back.

## 2020-12-14 NOTE — TELEPHONE ENCOUNTER
Called patient to follow up with her. CANDACE verified she was seen at [facility] and was put on Cirpo 500 mg tablet BID for 7 days. Patient says her urine output seems to be back to normal. She also says the color of her urine is almost back to normal. She says she had taken some Nyquil earlier that week and did not realize it would affect her kidneys the way it did. She is feeling better and told to call the office with any new or worsening symptoms or if her symptoms do not completely resolve after her treatment. Patient has expressed understanding.

## 2020-12-14 NOTE — TELEPHONE ENCOUNTER
Kaykay Pope, her urine culture did not grow any bacteria. I do NOT think this was a urinary tract infection. I believe her improvement is coincidental with the passage of time. Yet I remain concerned this is a complication of her COVID with muscle damage that would turn her urine red like blood but without blood. I'm placing lab orders to be added to the specimen at the lab. Care complicated bc she went to [facility] facility, not ours.  TIM

## 2020-12-14 NOTE — TELEPHONE ENCOUNTER
Patient called the office back she has been made aware that additional labs have been ordered and have been added on to her specimen from the 11th. Told I will call her this afternoon with those results.

## 2020-12-15 NOTE — TELEPHONE ENCOUNTER
Called patient told the labs were normal that were added on for her. She says she is still seeing improvement and actually has more energy today.  She has been told to call the office right away for any new or worsening symptoms patient has expressed understanding

## 2021-01-16 DIAGNOSIS — Z79.4 TYPE 2 DIABETES MELLITUS WITH DIABETIC NEPHROPATHY, WITH LONG-TERM CURRENT USE OF INSULIN (HCC): ICD-10-CM

## 2021-01-16 DIAGNOSIS — E11.21 TYPE 2 DIABETES MELLITUS WITH DIABETIC NEPHROPATHY, WITH LONG-TERM CURRENT USE OF INSULIN (HCC): ICD-10-CM

## 2021-01-19 DIAGNOSIS — D50.0 IRON DEFICIENCY ANEMIA DUE TO CHRONIC BLOOD LOSS: ICD-10-CM

## 2021-01-19 DIAGNOSIS — E11.21 TYPE 2 DIABETES MELLITUS WITH DIABETIC NEPHROPATHY, WITH LONG-TERM CURRENT USE OF INSULIN (HCC): ICD-10-CM

## 2021-01-19 DIAGNOSIS — E03.9 ACQUIRED HYPOTHYROIDISM: ICD-10-CM

## 2021-01-19 DIAGNOSIS — D50.0 IRON DEFICIENCY ANEMIA DUE TO CHRONIC BLOOD LOSS: Primary | ICD-10-CM

## 2021-01-19 DIAGNOSIS — N17.9 AKI (ACUTE KIDNEY INJURY) (HCC): ICD-10-CM

## 2021-01-19 DIAGNOSIS — Z79.4 TYPE 2 DIABETES MELLITUS WITH DIABETIC NEPHROPATHY, WITH LONG-TERM CURRENT USE OF INSULIN (HCC): ICD-10-CM

## 2021-01-19 DIAGNOSIS — I10 ESSENTIAL HYPERTENSION: ICD-10-CM

## 2021-01-26 ENCOUNTER — LAB ONLY (OUTPATIENT)
Dept: FAMILY MEDICINE CLINIC | Age: 71
End: 2021-01-26
Payer: MEDICARE

## 2021-01-26 DIAGNOSIS — D50.0 IRON DEFICIENCY ANEMIA DUE TO CHRONIC BLOOD LOSS: ICD-10-CM

## 2021-01-26 DIAGNOSIS — Z79.4 TYPE 2 DIABETES MELLITUS WITH DIABETIC NEPHROPATHY, WITH LONG-TERM CURRENT USE OF INSULIN (HCC): Primary | ICD-10-CM

## 2021-01-26 DIAGNOSIS — E03.9 ACQUIRED HYPOTHYROIDISM: ICD-10-CM

## 2021-01-26 DIAGNOSIS — E11.21 TYPE 2 DIABETES MELLITUS WITH DIABETIC NEPHROPATHY, WITH LONG-TERM CURRENT USE OF INSULIN (HCC): Primary | ICD-10-CM

## 2021-01-26 PROCEDURE — 36415 COLL VENOUS BLD VENIPUNCTURE: CPT | Performed by: FAMILY MEDICINE

## 2021-01-26 NOTE — PROGRESS NOTES
Patient here for lab draw and blood pressure check, name and  verified blood pressure checked manually left arm was patient was seated 124/60. Venipuncture performed on patients right arm was successful patient tolerated well.

## 2021-01-27 LAB
A-G RATIO,AGRAT: 1.9 RATIO (ref 1.1–2.6)
ABSOLUTE LYMPHOCYTE COUNT, 10803: 1.9 K/UL (ref 1–4.8)
ALBUMIN SERPL-MCNC: 4.4 G/DL (ref 3.5–5)
ALP SERPL-CCNC: 106 U/L (ref 40–120)
ALT SERPL-CCNC: 16 U/L (ref 5–40)
ANION GAP SERPL CALC-SCNC: 13 MMOL/L (ref 3–15)
AST SERPL W P-5'-P-CCNC: 16 U/L (ref 10–37)
AVG GLU, 10930: 206 MG/DL (ref 91–123)
BASOPHILS # BLD: 0.1 K/UL (ref 0–0.2)
BASOPHILS NFR BLD: 1 % (ref 0–2)
BILIRUB SERPL-MCNC: 0.4 MG/DL (ref 0.2–1.2)
BUN SERPL-MCNC: 27 MG/DL (ref 6–22)
CALCIUM SERPL-MCNC: 8.9 MG/DL (ref 8.4–10.5)
CHLORIDE SERPL-SCNC: 102 MMOL/L (ref 98–110)
CHOLEST SERPL-MCNC: 202 MG/DL (ref 110–200)
CO2 SERPL-SCNC: 25 MMOL/L (ref 20–32)
CREAT SERPL-MCNC: 1.6 MG/DL (ref 0.8–1.4)
CREATININE, URINE: 136 MG/DL
EOSINOPHIL # BLD: 0.1 K/UL (ref 0–0.5)
EOSINOPHIL NFR BLD: 2 % (ref 0–6)
ERYTHROCYTE [DISTWIDTH] IN BLOOD BY AUTOMATED COUNT: 12.7 % (ref 10–15.5)
FE % SATURATION,PSAT: 25 % (ref 20–50)
FERRITIN SERPL-MCNC: 110 NG/ML (ref 10–291)
GFRAA, 66117: 37.7
GFRNA, 66118: 31.1
GLOBULIN,GLOB: 2.3 G/DL (ref 2–4)
GLUCOSE SERPL-MCNC: 286 MG/DL (ref 70–99)
GRANULOCYTES,GRANS: 61 % (ref 40–75)
HBA1C MFR BLD HPLC: 8.8 % (ref 4.8–5.6)
HCT VFR BLD AUTO: 43.1 % (ref 35.1–48.3)
HDLC SERPL-MCNC: 4.9 MG/DL (ref 0–5)
HDLC SERPL-MCNC: 41 MG/DL
HGB BLD-MCNC: 13 G/DL (ref 11.7–16.1)
IRON,IRN: 71 MCG/DL (ref 30–160)
LDL/HDL RATIO,LDHD: 3.1
LDLC SERPL CALC-MCNC: 129 MG/DL (ref 50–99)
LYMPHOCYTES, LYMLT: 28 % (ref 20–45)
MCH RBC QN AUTO: 29 PG (ref 26–34)
MCHC RBC AUTO-ENTMCNC: 30 G/DL (ref 31–36)
MCV RBC AUTO: 97 FL (ref 81–99)
MICROALB/CREAT RATIO, 140286: 191.9 (ref 0–30)
MICROALBUMIN,URINE RANDOM 140054: 261 MG/L (ref 0.1–17)
MONOCYTES # BLD: 0.5 K/UL (ref 0.1–1)
MONOCYTES NFR BLD: 7 % (ref 3–12)
NEUTROPHILS # BLD AUTO: 4.2 K/UL (ref 1.8–7.7)
NON-HDL CHOLESTEROL, 011976: 161 MG/DL
PLATELET # BLD AUTO: 345 K/UL (ref 140–440)
PMV BLD AUTO: 12.2 FL (ref 9–13)
POTASSIUM SERPL-SCNC: 4.4 MMOL/L (ref 3.5–5.5)
PROT SERPL-MCNC: 6.7 G/DL (ref 6.2–8.1)
RBC # BLD AUTO: 4.46 M/UL (ref 3.8–5.2)
SODIUM SERPL-SCNC: 140 MMOL/L (ref 133–145)
T4 FREE SERPL-MCNC: 1.1 NG/DL (ref 0.9–1.8)
TIBC,TIBC: 288 MCG/DL (ref 228–428)
TRIGL SERPL-MCNC: 160 MG/DL (ref 40–149)
TSH SERPL-ACNC: 7.81 MCU/ML (ref 0.27–4.2)
UIBC SERPL-MCNC: 217 MCG/DL (ref 110–370)
VLDLC SERPL CALC-MCNC: 32 MG/DL (ref 8–30)
WBC # BLD AUTO: 6.8 K/UL (ref 4–11)

## 2021-02-02 ENCOUNTER — VIRTUAL VISIT (OUTPATIENT)
Dept: FAMILY MEDICINE CLINIC | Age: 71
End: 2021-02-02
Payer: MEDICARE

## 2021-02-02 DIAGNOSIS — Z86.010 HISTORY OF ADENOMATOUS POLYP OF COLON: ICD-10-CM

## 2021-02-02 DIAGNOSIS — Z79.4 TYPE 2 DIABETES MELLITUS WITH DIABETIC NEPHROPATHY, WITH LONG-TERM CURRENT USE OF INSULIN (HCC): Primary | ICD-10-CM

## 2021-02-02 DIAGNOSIS — D50.0 IRON DEFICIENCY ANEMIA DUE TO CHRONIC BLOOD LOSS: ICD-10-CM

## 2021-02-02 DIAGNOSIS — E11.21 DIABETIC NEPHROPATHY ASSOCIATED WITH TYPE 2 DIABETES MELLITUS (HCC): ICD-10-CM

## 2021-02-02 DIAGNOSIS — Z12.11 SCREEN FOR COLON CANCER: ICD-10-CM

## 2021-02-02 DIAGNOSIS — E11.21 TYPE 2 DIABETES MELLITUS WITH DIABETIC NEPHROPATHY, WITH LONG-TERM CURRENT USE OF INSULIN (HCC): Primary | ICD-10-CM

## 2021-02-02 DIAGNOSIS — R19.8 ALTERED BOWEL FUNCTION: ICD-10-CM

## 2021-02-02 DIAGNOSIS — E03.9 ACQUIRED HYPOTHYROIDISM: ICD-10-CM

## 2021-02-02 DIAGNOSIS — N18.32 STAGE 3B CHRONIC KIDNEY DISEASE (HCC): ICD-10-CM

## 2021-02-02 DIAGNOSIS — I10 ESSENTIAL HYPERTENSION: ICD-10-CM

## 2021-02-02 PROBLEM — R19.7 DIARRHEA: Status: RESOLVED | Noted: 2019-02-28 | Resolved: 2021-02-02

## 2021-02-02 PROBLEM — N17.9 AKI (ACUTE KIDNEY INJURY) (HCC): Status: RESOLVED | Noted: 2020-07-24 | Resolved: 2021-02-02

## 2021-02-02 PROCEDURE — 3052F HG A1C>EQUAL 8.0%<EQUAL 9.0%: CPT | Performed by: FAMILY MEDICINE

## 2021-02-02 PROCEDURE — 99215 OFFICE O/P EST HI 40 MIN: CPT | Performed by: FAMILY MEDICINE

## 2021-02-02 RX ORDER — LEVOTHYROXINE SODIUM 50 UG/1
50 TABLET ORAL
Qty: 90 TAB | Refills: 4 | Status: SHIPPED | OUTPATIENT
Start: 2021-02-02 | End: 2022-02-07 | Stop reason: SDUPTHER

## 2021-02-02 RX ORDER — INSULIN GLARGINE 100 [IU]/ML
30 INJECTION, SOLUTION SUBCUTANEOUS DAILY
Qty: 15 ML | Refills: 4 | Status: SHIPPED | OUTPATIENT
Start: 2021-02-02 | End: 2021-03-23

## 2021-02-02 RX ORDER — INSULIN ASPART 100 [IU]/ML
5 INJECTION, SOLUTION INTRAVENOUS; SUBCUTANEOUS AS NEEDED
Qty: 1 PEN | Refills: 2 | Status: SHIPPED | OUTPATIENT
Start: 2021-02-02 | End: 2022-01-04 | Stop reason: SDUPTHER

## 2021-02-02 NOTE — ASSESSMENT & PLAN NOTE
Stable. Nephropathy is multifactorial without clear, reproducible changes attributable to GLP1 agonist in the past. Short interval follow up. Discuss referral to nephrology next visit.

## 2021-02-02 NOTE — ASSESSMENT & PLAN NOTE
Modest decline despite dietary changes. Praised. Insulin only regimen not ideal. Prior excellent results with GLP1 agonist. Renal function did not improve with cessation. Resume with short term follow up -  6-8 weeks IN OFFICE for labs/foot exam, prior to a virtual visit. Stop scheduled mealtime insulin. Continue basal insulin unchanged.   Lab Results   Component Value Date/Time    Hemoglobin A1c 8.8 (H) 01/26/2021 09:04 AM    Hemoglobin A1c 8.4 (H) 10/09/2020 08:36 AM    Hemoglobin A1c 7.1 (H) 11/12/2019 10:50 AM    Hemoglobin A1c, External 11.1 03/21/2016    Glucose 286 (H) 01/26/2021 09:04 AM    Glucose,  (H) 01/09/2019 10:30 AM    Microalbumin/Creat ratio (mg/g creat) 93 (H) 11/12/2019 10:50 AM    Microalb/Creat ratio (ug/mg creat.) 191.9 (H) 01/26/2021 09:04 AM    Microalbumin,urine random 11.00 (H) 11/12/2019 10:50 AM    LDL, calculated 129 (H) 01/26/2021 09:04 AM    Creatinine, POC 1.6 (H) 01/09/2019 10:30 AM    Creatinine 1.6 (H) 01/26/2021 09:04 AM

## 2021-02-02 NOTE — Clinical Note
follow up 6-8 weeks IN OFFICE for labs/foot exam (15) prior to a virtual visit to follow up DM2, CKD and thyroid

## 2021-02-02 NOTE — PROGRESS NOTES
Patient being seen today for chronic condition and alb follow up. She says she smell a metallic odor since being on antibiotic last week. She says the smell is stronger when she uses the restroom. 1. Have you been to the ER, urgent care clinic since your last visit? Hospitalized since your last visit? Yes When: 12/11/20 Where: [address] [facility] Reason for visit: UTI  2. Have you seen or consulted any other health care providers outside of the [address] [place] [address] since your last visit? Include any pap smears or colon screening. No     Medication reconciliation has been completed with patient. Care team discussed/updated as well as pharmacy. Health Maintenance Due   Topic Date Due    COVID-19 Vaccine (1 of 2) 11/17/1966    Colorectal Cancer Screening Combo  02/11/2019    Eye Exam Retinal or Dilated  09/07/2019    GLAUCOMA SCREENING Q2Y  06/05/2020    Foot Exam Q1  07/09/2020     Health Maintenance reviewed - Had her DM eye done in September. DM eye report has been requested.

## 2021-02-02 NOTE — PROGRESS NOTES
Natasha Oconnell (: 1950) is a 69 y.o. female, established patient, here for evaluation of the following chief complaint(s):   Follow Up Chronic Condition       ASSESSMENT/PLAN:  1. Type 2 diabetes mellitus with diabetic nephropathy, with long-term current use of insulin (HCC)  Assessment & Plan:  Modest decline despite dietary changes. Praised. Insulin only regimen not ideal. Prior excellent results with GLP1 agonist. Renal function did not improve with cessation. Resume with short term follow up -  6-8 weeks IN OFFICE for labs/foot exam, prior to a virtual visit. Stop scheduled mealtime insulin. Continue basal insulin unchanged. Lab Results   Component Value Date/Time    Hemoglobin A1c 8.8 (H) 2021 09:04 AM    Hemoglobin A1c 8.4 (H) 10/09/2020 08:36 AM    Hemoglobin A1c 7.1 (H) 2019 10:50 AM    Hemoglobin A1c, External 11.1 2016    Glucose 286 (H) 2021 09:04 AM    Glucose,  (H) 2019 10:30 AM    Microalbumin/Creat ratio (mg/g creat) 93 (H) 2019 10:50 AM    Microalb/Creat ratio (ug/mg creat.) 191.9 (H) 2021 09:04 AM    Microalbumin,urine random 11.00 (H) 2019 10:50 AM    LDL, calculated 129 (H) 2021 09:04 AM    Creatinine, POC 1.6 (H) 2019 10:30 AM    Creatinine 1.6 (H) 2021 09:04 AM       Orders:  -     dulaglutide (TRULICITY) 7.18 AV/7.3 mL sub-q pen; 0.5 mL by SubCUTAneous route every seven (7) days. , Normal, Disp-12 Pen, R-4  -     insulin aspart U-100 (NOVOLOG) 100 unit/mL (3 mL) inpn; 5 Units by SubCUTAneous route as needed (blood sugar >250, reassess in 30 minutes and repeat if needed). , Normal, Disp-1 Pen, R-2  -     insulin glargine (Lantus Solostar U-100 Insulin) 100 unit/mL (3 mL) inpn; 30 Units by SubCUTAneous route daily. , Normal, Disp-15 mL, R-4  -     METABOLIC PANEL, BASIC; Future  -     HEMOGLOBIN A1C WITH EAG; Future    2.  Diabetic nephropathy associated with type [facility]  Assessment & Plan:  Stable. Nephropathy is multifactorial without clear, reproducible changes attributable to GLP1 agonist in the past. Short interval follow up. Discuss referral to nephrology next visit. 3. Stage 3b chronic kidney disease  Assessment & Plan:  Stable. Nephropathy is multifactorial without clear, reproducible changes attributable to GLP1 agonist in the past. Short interval follow up. Discuss referral to nephrology next visit. 4. Acquired hypothyroidism  Assessment & Plan:  Uncontrolled. Doubling replacement to 50 mcg  Lab Results   Component Value Date/Time    TSH 7.81 (H) 01/26/2021 09:04 AM    TSH 0.57 11/12/2019 10:51 AM       Orders:  -     levothyroxine (SYNTHROID) 50 mcg tablet; Take 1 Tab by mouth Daily (before breakfast). , Normal, Disp-90 Tab, R-4  -     TSH W/ REFLX FREE T4 IF ABNORMAL; Future    5. Iron deficiency anemia due to chronic blood loss  Assessment & Plan:  new iron deficiency anemia as of 7/2020. Stores now replaced but need to ID source. Prior history of tubular adenoma of colon. Referring back to Dr. Christal Garcia. Component      Latest Ref Rng & Units 1/26/2021 1/26/2021 10/9/2020 10/9/2020           9:04 AM  9:04 AM  8:36 AM  8:36 AM   Iron      30 - 160 mcg/dL 71   57   UIBC      110 - 370 mcg/dL 217   242   TIBC      228 - 428 mcg/dL 288   299   Iron % saturation      20 - 50 % 25   19 (L)   Ferritin      10 - 291 ng/mL  110 88      Component      Latest Ref Rng & Units 7/20/2020 7/20/2020           1:21 PM  1:21 PM   Iron      30 - 160 mcg/dL 54    UIBC      110 - 370 mcg/dL 298    TIBC      228 - 428 mcg/dL 352    Iron % saturation      20 - 50 % 15 (L)    Ferritin      10 - 291 ng/mL  37       Orders:  -     REFERRAL TO GASTROENTEROLOGY    6. Screen for colon cancer  -     REFERRAL TO GASTROENTEROLOGY    7. History of adenomatous polyp of colon    8. Altered bowel function  Assessment & Plan:  New. Uncontrolled.  Onset early Jan 2021 post antibiotics with gradual improvement since instituting probiotics, now 1-2 unformed stools/day. Observe for resolution. Pursue if it doesn't.      9. Essential hypertension  Assessment & Plan:  Status? Continue present management pending in office assessment. Return in about 6 weeks (around 3/16/2021) for labs/foot exam (15) IN OFFICE  prior to a virtual visit to follow up DM2, CKD and thyroid. SUBJECTIVE/OBJECTIVE:  HPI  States as part of her discharge planning from ED 12/11, was told to return to ED for follow up testing late December. States she was still having some low abd pain and mild dysuria. Was given cephalexin. Has had unformed stools ever since then. 1-2/day. No fevers. symptoms resolved. Started [address] Daily Probiotic early January with gradual improvement. DM2: Since last visit - more water  Decreased frozen food, more fruits and vegetables    Home glucoses:  Fasting: usually 120-160  PP lunch: around 180-200  PP dinner: 180-200, once as high as 240  No hypoglycemia      Review of Systems   Constitutional: Negative for fatigue and fever. Respiratory: Negative for shortness of breath. Cardiovascular: Negative for chest pain. Physical Exam  Objective:     BP Readings from Last 3 Encounters:   02/17/20 128/62   02/03/20 148/78   12/03/19 126/68      General: alert, cooperative, no distress   Mental  status: normal mood, behavior, speech, dress, motor activity, and thought processes, able to follow commands   HENT: NCAT   Neck: no visualized mass   Resp: no respiratory distress   Neuro: no gross deficits   Skin: no discoloration or lesions of concern on visible areas   Psychiatric: normal affect, consistent with stated mood, no evidence of hallucinations     Additional exam findings:              On this date 02/04/21 I have spent 45 minutes reviewing previous notes, test results and face to face (virtual) with the patient discussing the diagnosis and importance of compliance with the treatment plan as well as documenting on the day of the visit. Aaron Worrell is being evaluated by a Virtual Visit (video visit) encounter to address concerns as mentioned above. A caregiver was present when appropriate. Due to this being a TeleHealth encounter ([facility] public [facility] emergency), evaluation of the following organ systems was limited: Vitals/Constitutional/EENT/Resp/CV/GI//MS/Neuro/Skin/Heme-Lymph-Imm. Pursuant to the emergency declaration under the [address] [place] [address] and the [employer] Act, this Virtual Visit was conducted with patient's (and/or legal guardian's) consent, to reduce the patient's risk of exposure to COVID-19 and provide necessary medical care. The patient (and/or legal guardian) has also been advised to contact this office for worsening conditions or problems, and seek emergency medical treatment and/or call 911 if deemed necessary. Patient identification was verified at the start of the visit: YES    Services were provided through a video synchronous discussion virtually to substitute for in-person clinic visit. Patient was located at home and provider was located in office or at home. An electronic signature was used to authenticate this note.   -- Gali Davenport MD

## 2021-02-02 NOTE — ASSESSMENT & PLAN NOTE
new iron deficiency anemia as of 7/2020. Stores now replaced but need to ID source. Prior history of tubular adenoma of colon. Referring back to Dr. Jihan Lay.   Component      Latest Ref Rng & Units 1/26/2021 1/26/2021 10/9/2020 10/9/2020           9:04 AM  9:04 AM  8:36 AM  8:36 AM   Iron      30 - 160 mcg/dL 71   57   UIBC      110 - 370 mcg/dL 217   242   TIBC      228 - 428 mcg/dL 288   299   Iron % saturation      20 - 50 % 25   19 (L)   Ferritin      10 - 291 ng/mL  110 88      Component      Latest Ref Rng & Units 7/20/2020 7/20/2020           1:21 PM  1:21 PM   Iron      30 - 160 mcg/dL 54    UIBC      110 - 370 mcg/dL 298    TIBC      228 - 428 mcg/dL 352    Iron % saturation      20 - 50 % 15 (L)    Ferritin      10 - 291 ng/mL  37

## 2021-02-02 NOTE — ASSESSMENT & PLAN NOTE
Uncontrolled.  Doubling replacement to 50 mcg  Lab Results   Component Value Date/Time    TSH 7.81 (H) 01/26/2021 09:04 AM    TSH 0.57 11/12/2019 10:51 AM

## 2021-02-04 DIAGNOSIS — I10 ESSENTIAL HYPERTENSION: ICD-10-CM

## 2021-02-04 PROBLEM — R19.8 ALTERED BOWEL FUNCTION: Status: ACTIVE | Noted: 2021-02-04

## 2021-02-04 NOTE — ASSESSMENT & PLAN NOTE
New. Uncontrolled. Onset early Jan 2021 post antibiotics with gradual improvement since instituting probiotics, now 1-2 unformed stools/day. Observe for resolution. Pursue if it doesn't.

## 2021-02-07 RX ORDER — OLMESARTAN MEDOXOMIL 40 MG/1
TABLET ORAL
Qty: 90 TAB | Refills: 0 | Status: SHIPPED | OUTPATIENT
Start: 2021-02-07 | End: 2021-03-23 | Stop reason: SDUPTHER

## 2021-02-11 DIAGNOSIS — R80.9 TYPE 2 DIABETES MELLITUS WITH MICROALBUMINURIA, WITH LONG-TERM CURRENT USE OF INSULIN (HCC): ICD-10-CM

## 2021-02-11 DIAGNOSIS — E11.29 TYPE 2 DIABETES MELLITUS WITH MICROALBUMINURIA, WITH LONG-TERM CURRENT USE OF INSULIN (HCC): ICD-10-CM

## 2021-02-11 DIAGNOSIS — Z79.4 TYPE 2 DIABETES MELLITUS WITH MICROALBUMINURIA, WITH LONG-TERM CURRENT USE OF INSULIN (HCC): ICD-10-CM

## 2021-02-12 RX ORDER — PEN NEEDLE, DIABETIC 31 GX3/16"
NEEDLE, DISPOSABLE MISCELLANEOUS
Qty: 100 PEN NEEDLE | Refills: 11 | Status: SHIPPED | OUTPATIENT
Start: 2021-02-12 | End: 2022-01-04 | Stop reason: SDUPTHER

## 2021-03-16 ENCOUNTER — OFFICE VISIT (OUTPATIENT)
Dept: FAMILY MEDICINE CLINIC | Age: 71
End: 2021-03-16
Payer: MEDICARE

## 2021-03-16 ENCOUNTER — HOSPITAL ENCOUNTER (OUTPATIENT)
Dept: LAB | Age: 71
Discharge: HOME | End: 2021-03-16

## 2021-03-16 VITALS — TEMPERATURE: 97.6 F | DIASTOLIC BLOOD PRESSURE: 68 MMHG | SYSTOLIC BLOOD PRESSURE: 116 MMHG

## 2021-03-16 DIAGNOSIS — Z79.4 TYPE 2 DIABETES MELLITUS WITH DIABETIC NEPHROPATHY, WITH LONG-TERM CURRENT USE OF INSULIN (HCC): Primary | ICD-10-CM

## 2021-03-16 DIAGNOSIS — E11.21 TYPE 2 DIABETES MELLITUS WITH DIABETIC NEPHROPATHY, WITH LONG-TERM CURRENT USE OF INSULIN (HCC): Primary | ICD-10-CM

## 2021-03-16 DIAGNOSIS — E03.9 ACQUIRED HYPOTHYROIDISM: ICD-10-CM

## 2021-03-16 LAB — SENTARA SPECIMEN COL,SENBCF: NORMAL

## 2021-03-16 PROCEDURE — 36415 COLL VENOUS BLD VENIPUNCTURE: CPT | Performed by: FAMILY MEDICINE

## 2021-03-16 PROCEDURE — 99001 SPECIMEN HANDLING PT-LAB: CPT

## 2021-03-16 NOTE — PROGRESS NOTES
Patient here today to have labs drawn and to have her DM foot exam done. Venipuncture performed on patients left arm was successful patient tolerated well. 1. Have you been to the ER, urgent care clinic since your last visit? Hospitalized since your last visit? No  2. Have you seen or consulted any other health care providers outside of the [address] [place] [address] since your last visit? Include any pap smears or colon screening. No    Medication reconciliation has been completed with patient. Care team discussed/updated as well as pharmacy.     Health Maintenance Due   Topic Date Due    COVID-19 Vaccine (1) Never done    Colorectal Cancer Screening Combo  02/11/2019    Foot Exam Q1  07/09/2020

## 2021-03-16 NOTE — PROGRESS NOTES
Diabetic foot exam:     Left Foot:   Visual Exam: normal    Pulse DP: 2+ (normal)   Filament test: normal sensation          Right Foot:   Visual Exam: normal    Pulse DP: 2+ (normal)   Filament test: normal sensation      follow up via VV for chronic conditions  Damian Tucker MD

## 2021-03-18 LAB
ANION GAP SERPL CALC-SCNC: 15 MMOL/L (ref 3–15)
AVG GLU, 10930: 169 MG/DL (ref 91–123)
BUN SERPL-MCNC: 23 MG/DL (ref 6–22)
CALCIUM SERPL-MCNC: 9.3 MG/DL (ref 8.4–10.5)
CHLORIDE SERPL-SCNC: 107 MMOL/L (ref 98–110)
CO2 SERPL-SCNC: 22 MMOL/L (ref 20–32)
CREAT SERPL-MCNC: 1.7 MG/DL (ref 0.8–1.4)
GFRAA, 66117: 35.4
GFRNA, 66118: 29.2
GLUCOSE SERPL-MCNC: 86 MG/DL (ref 70–99)
HBA1C MFR BLD HPLC: 7.5 % (ref 4.8–5.6)
POTASSIUM SERPL-SCNC: 5.1 MMOL/L (ref 3.5–5.5)
SODIUM SERPL-SCNC: 144 MMOL/L (ref 133–145)
T4 FREE SERPL-MCNC: 1.5 NG/DL (ref 0.9–1.8)
TSH SERPL DL<=0.005 MIU/L-ACNC: 3.13 MCU/ML (ref 0.27–4.2)

## 2021-03-23 ENCOUNTER — VIRTUAL VISIT (OUTPATIENT)
Dept: FAMILY MEDICINE CLINIC | Age: 71
End: 2021-03-23
Payer: MEDICARE

## 2021-03-23 DIAGNOSIS — E11.21 DIABETIC NEPHROPATHY ASSOCIATED WITH TYPE 2 DIABETES MELLITUS (HCC): ICD-10-CM

## 2021-03-23 DIAGNOSIS — E03.9 ACQUIRED HYPOTHYROIDISM: ICD-10-CM

## 2021-03-23 DIAGNOSIS — Z79.4 TYPE 2 DIABETES MELLITUS WITH DIABETIC NEPHROPATHY, WITH LONG-TERM CURRENT USE OF INSULIN (HCC): Primary | ICD-10-CM

## 2021-03-23 DIAGNOSIS — F32.1 MODERATE MAJOR DEPRESSION (HCC): ICD-10-CM

## 2021-03-23 DIAGNOSIS — I10 ESSENTIAL HYPERTENSION: ICD-10-CM

## 2021-03-23 DIAGNOSIS — E66.09 CLASS 1 OBESITY DUE TO EXCESS CALORIES WITH SERIOUS COMORBIDITY AND BODY MASS INDEX (BMI) OF 31.0 TO 31.9 IN ADULT: ICD-10-CM

## 2021-03-23 DIAGNOSIS — E78.5 HYPERLIPIDEMIA, UNSPECIFIED HYPERLIPIDEMIA TYPE: ICD-10-CM

## 2021-03-23 DIAGNOSIS — E11.21 TYPE 2 DIABETES MELLITUS WITH DIABETIC NEPHROPATHY, WITH LONG-TERM CURRENT USE OF INSULIN (HCC): Primary | ICD-10-CM

## 2021-03-23 PROCEDURE — G9717 DOC PT DX DEP/BP F/U NT REQ: HCPCS | Performed by: FAMILY MEDICINE

## 2021-03-23 PROCEDURE — G8427 DOCREV CUR MEDS BY ELIG CLIN: HCPCS | Performed by: FAMILY MEDICINE

## 2021-03-23 PROCEDURE — 99214 OFFICE O/P EST MOD 30 MIN: CPT | Performed by: FAMILY MEDICINE

## 2021-03-23 PROCEDURE — 1100F PTFALLS ASSESS-DOCD GE2>/YR: CPT | Performed by: FAMILY MEDICINE

## 2021-03-23 PROCEDURE — G8399 PT W/DXA RESULTS DOCUMENT: HCPCS | Performed by: FAMILY MEDICINE

## 2021-03-23 PROCEDURE — 2022F DILAT RTA XM EVC RTNOPTHY: CPT | Performed by: FAMILY MEDICINE

## 2021-03-23 PROCEDURE — 3017F COLORECTAL CA SCREEN DOC REV: CPT | Performed by: FAMILY MEDICINE

## 2021-03-23 PROCEDURE — 3051F HG A1C>EQUAL 7.0%<8.0%: CPT | Performed by: FAMILY MEDICINE

## 2021-03-23 PROCEDURE — 3288F FALL RISK ASSESSMENT DOCD: CPT | Performed by: FAMILY MEDICINE

## 2021-03-23 PROCEDURE — G8756 NO BP MEASURE DOC: HCPCS | Performed by: FAMILY MEDICINE

## 2021-03-23 PROCEDURE — 1090F PRES/ABSN URINE INCON ASSESS: CPT | Performed by: FAMILY MEDICINE

## 2021-03-23 RX ORDER — INSULIN GLARGINE 100 [IU]/ML
20 INJECTION, SOLUTION SUBCUTANEOUS DAILY
Qty: 15 ML | Refills: 4 | Status: SHIPPED | OUTPATIENT
Start: 2021-03-23 | End: 2022-01-04 | Stop reason: SDUPTHER

## 2021-03-23 RX ORDER — SIMVASTATIN 40 MG/1
40 TABLET, FILM COATED ORAL
Qty: 90 TAB | Refills: 4 | Status: SHIPPED | OUTPATIENT
Start: 2021-03-23 | End: 2022-03-25 | Stop reason: ALTCHOICE

## 2021-03-23 RX ORDER — OLMESARTAN MEDOXOMIL 40 MG/1
40 TABLET ORAL DAILY
Qty: 90 TAB | Refills: 4 | Status: SHIPPED | OUTPATIENT
Start: 2021-03-23 | End: 2022-02-07 | Stop reason: SDUPTHER

## 2021-03-23 RX ORDER — NIFEDIPINE 30 MG/1
30 TABLET, FILM COATED, EXTENDED RELEASE ORAL DAILY
Qty: 90 TAB | Refills: 4 | Status: SHIPPED | OUTPATIENT
Start: 2021-03-23 | End: 2022-01-04

## 2021-03-23 NOTE — ASSESSMENT & PLAN NOTE
Well controlled, continue present management.  Be watchful for orthostatic symptoms meriting decreased medication management as her obesity improves

## 2021-03-23 NOTE — PROGRESS NOTES
Patient being seen today for chronic condition follow up and lab review. No other concerns. 1. Have you been to the ER, urgent care clinic since your last visit? Hospitalized since your last visit? No  2. Have you seen or consulted any other health care providers outside of the [address] [place] [address] since your last visit? Include any pap smears or colon screening. No    Medication reconciliation has been completed with patient. Care team discussed/updated as well as pharmacy. Health Maintenance Due   Topic Date Due    COVID-19 Vaccine (1) Never done    Colorectal Cancer Screening Combo  02/11/2019     Health Maintenance reviewed - Has consult with GI for colonoscopy and endoscopy 4/1/21.

## 2021-03-23 NOTE — ASSESSMENT & PLAN NOTE
Indices not significantly different today vs January. Continuing GLP1 in regimen. Referring nephrology to consider other potential reversible factors or diagnostic considerations to plan around.

## 2021-03-23 NOTE — PROGRESS NOTES
Juliann Payton is a 69 y.o. female, established patient, who was seen by synchronous (real-time) audio-video technology on 3/23/2021 for Follow Up Chronic Condition        Assessment & Plan:   1. Type 2 diabetes mellitus with diabetic nephropathy, with long-term current use of insulin ([facility]  Assessment & Plan:  Much improved. Not well controlled. Now snacking largely to avoid hypoglycemia. Decrease insulin by 10 units to 20 units daily to facilitate her excellent dietary changes. Orders:  -     insulin glargine (Lantus Solostar U-100 Insulin) 100 unit/mL (3 mL) inpn; 20 Units by SubCUTAneous route daily. , Normal, Disp-15 mL, R-4  -     olmesartan (BENICAR) 40 mg tablet; Take 1 Tab by mouth daily. , Normal, Disp-90 Tab, R-4  -     simvastatin (ZOCOR) 40 mg tablet; Take 1 Tab by mouth nightly., Normal, Disp-90 Tab, R-4  2. Diabetic nephropathy associated with type 2 diabetes mellitus ([facility]  Assessment & Plan:  Indices not significantly different today vs January. Continuing GLP1 in regimen. Referring nephrology to consider other potential reversible factors or diagnostic considerations to plan around. Orders:  -     REFERRAL TO NEPHROLOGY  3. Class 1 obesity due to excess calories with serious comorbidity and body mass index (BMI) of 31.0 to 31.9 in adult  4. Essential hypertension  Assessment & Plan:  Well controlled, continue present management. Be watchful for orthostatic symptoms meriting decreased medication management as her obesity improves  Orders:  -     olmesartan (BENICAR) 40 mg tablet; Take 1 Tab by mouth daily. , Normal, Disp-90 Tab, R-4  -     NIFEdipine ER (ADALAT CC) 30 mg ER tablet; Take 1 Tab by mouth daily. , Normal, Disp-90 Tab, R-4  5. Hyperlipidemia, unspecified hyperlipidemia type  -     simvastatin (ZOCOR) 40 mg tablet; Take 1 Tab by mouth nightly., Normal, Disp-90 Tab, R-4  6. Acquired hypothyroidism  Assessment & Plan:  Well controlled, continue present management   7.  Moderate major depression [facility])  Assessment & Plan:  Currently in remission without medication. Continue to monitor. Follow-up and Dispositions    · Return in about 6 weeks (around 5/4/2021) for diabetes follow up, no labs prior. Routing History          712  Subjective:   Trulicity: \"I LOVE it!\" really helps with appetite as well sugars   Rare blood sugars <100. No hypoglycemia    Snacking in the evening, sometimes because she enjoys it, but mostly because she worries about mid-night hypoglycemia    More fresh food. Trying to avoid frozen food    Lab Results   Component Value Date/Time    Hemoglobin A1c 7.5 (H) 03/16/2021 09:32 AM    Hemoglobin A1c 8.8 (H) 01/26/2021 09:04 AM    Hemoglobin A1c 8.4 (H) 10/09/2020 08:36 AM    Hemoglobin A1c, External 11.1 03/21/2016    Glucose 86 03/16/2021 09:32 AM    Glucose,  (H) 01/09/2019 10:30 AM    Microalbumin/Creat ratio (mg/g creat) 93 (H) 11/12/2019 10:50 AM    Microalb/Creat ratio (ug/mg creat.) 191.9 (H) 01/26/2021 09:04 AM    Microalbumin,urine random 11.00 (H) 11/12/2019 10:50 AM    LDL, calculated 129 (H) 01/26/2021 09:04 AM    Creatinine, POC 1.6 (H) 01/09/2019 10:30 AM    Creatinine 1.7 (H) 03/16/2021 09:32 AM     Lab Results   Component Value Date/Time    TSH 3.13 03/16/2021 09:32 AM     Lab Results   Component Value Date/Time    Sodium 144 03/16/2021 09:32 AM    Potassium 5.1 03/16/2021 09:32 AM    Chloride 107 03/16/2021 09:32 AM    CO2 22 03/16/2021 09:32 AM    Anion gap 15.0 03/16/2021 09:32 AM    Glucose 86 03/16/2021 09:32 AM    BUN 23 (H) 03/16/2021 09:32 AM    Creatinine 1.7 (H) 03/16/2021 09:32 AM    BUN/Creatinine ratio 16 12/11/2020 03:00 PM    GFR est AA 34 (L) 12/11/2020 03:00 PM    GFR est non-AA 28 (L) 12/11/2020 03:00 PM    Calcium 9.3 03/16/2021 09:32 AM    Bilirubin, total 0.4 01/26/2021 09:04 AM    Alk.  phosphatase 106 01/26/2021 09:04 AM    Protein, total 6.7 01/26/2021 09:04 AM    Albumin 4.4 01/26/2021 09:04 AM    Globulin 2.3 01/26/2021 09:04 AM    A-G Ratio 1.9 01/26/2021 09:04 AM    ALT (SGPT) 16 01/26/2021 09:04 AM    AST (SGOT) 16 01/26/2021 09:04 AM     Lab Results   Component Value Date/Time    Cholesterol, total 202 (H) 01/26/2021 09:04 AM    HDL Cholesterol 41 01/26/2021 09:04 AM    LDL, calculated 129 (H) 01/26/2021 09:04 AM    VLDL, calculated 32 (H) 01/26/2021 09:04 AM    Triglyceride 160 (H) 01/26/2021 09:04 AM    CHOL/HDL Ratio 3.9 01/30/2020 10:23 AM         Prior to Admission medications    Medication Sig Start Date End Date Taking? Authorizing Provider   Insulin Needles, Disposable, (BD Ultra-Fine Mini Pen Needle) 31 gauge x 3/16\" ndle USE AS DIRECTED 2/12/21  Yes Den Pike MD   [facility]) 40 mg tablet take 1 tablet by mouth once daily 2/7/21  Yes Den Pike MD   Lactobac no.41/Bifidobact no.7 (PROBIOTIC-10 PO) Take  by mouth. Yes Provider, Historical   dulaglutide (TRULICITY) 0.43 QV/6.0 mL sub-q pen 0.5 mL by SubCUTAneous route every seven (7) days. 2/2/21  Yes Den Pike MD   insulin aspart U-100 (NOVOLOG) 100 unit/mL (3 mL) inpn 5 Units by SubCUTAneous route as needed (blood sugar >250, reassess in 30 minutes and repeat if needed). 2/2/21  Yes Den Pike MD   insulin glargine (Lantus Solostar U-100 Insulin) 100 unit/mL (3 mL) inpn 30 Units by SubCUTAneous route daily. 2/2/21  Yes Den Pike MD   levothyroxine (SYNTHROID) 50 mcg tablet Take 1 Tab by mouth Daily (before breakfast). 2/2/21  Yes Den Pike MD   ferrous sulfate 325 mg (65 mg iron) tablet Take 1 Tab by mouth three (3) times daily (with meals). 7/24/20  Yes Den Pike MD   calcium carbonate (CALCIUM 500 PO) Take  by mouth. Yes Provider, Historical   simvastatin (ZOCOR) 40 mg tablet Take 1 Tab by mouth nightly. 5/8/20  Yes Den Pike MD   NIFEdipine ER (ADALAT CC) 30 mg ER tablet Take 1 Tab by mouth daily.  2/3/20  Yes Den Pike MD   psyllium seed, with dextrose, (FIBER PO) Take  by mouth.   Yes Provider, Historical   GINGER OIL, BULK, by Does Not Apply route. Using topical for Vertigo   Yes Provider, Historical   glucose blood VI test strips (ACCU-CHEK CODY PLUS TEST STRP) strip Patient is to check blood sugar 3-4 times a day 1/28/19  Yes Olya Byrne MD   cholecalciferol, VITAMIN D3, (VITAMIN D3) 5,000 unit tab tablet Take  by mouth daily. Yes Provider, Historical   esomeprazole (NEXIUM) 20 mg capsule Take  by mouth. Yes Provider, Historical   Blood-Glucose Meter (ACCU-CHEK CODY PLUS METER) misc 1 Device by Does Not Apply route four (4) times daily. 6/8/18  Yes Olya Byrne MD   BLADDER CONTROL PADS pads  5/16/17  Yes Provider, Historical   ALCOHOL PREP PADS padm  5/16/17  Yes Provider, Historical   acetaminophen (TYLENOL EXTRA STRENGTH) 500 mg tablet Take  by mouth every six (6) hours as needed for Pain. Provider, Historical         Objective:     BP Readings from Last 3 Encounters:   03/16/21 116/68   02/17/20 128/62   02/03/20 148/78         General: alert, cooperative, no distress   Mental  status: normal mood, behavior, speech, dress, motor activity, and thought processes, able to follow commands   HENT: NCAT   Neck: no visualized mass   Resp: no respiratory distress   Neuro: no gross deficits   Skin: no discoloration or lesions of concern on visible areas   Psychiatric: normal affect, consistent with stated mood, no evidence of hallucinations     Additional exam findings: We discussed the expected course, resolution and complications of the diagnosis(es) in detail. Medication risks, benefits, costs, interactions, and alternatives were discussed as indicated. I advised her to contact the office if her condition worsens, changes or fails to improve as anticipated. She expressed understanding with the diagnosis(es) and plan. Gian Aaron was evaluated through a patient-initiated, synchronous (real-time) audio-video encounter.  The family is aware that it is a billable service. Verbal consent to proceed has been obtained within the past 12 months. It was conducted pursuant to the emergency declaration under the [address] [place] [address] and the [employer] Act. Patient identification was verified, and a caregiver was present when appropriate. I was at home or in the office. The patient was at home.       Florian Javed MD

## 2021-03-23 NOTE — ASSESSMENT & PLAN NOTE
Much improved. Not well controlled. Now snacking largely to avoid hypoglycemia. Decrease insulin by 10 units to 20 units daily to facilitate her excellent dietary changes.

## 2021-04-14 NOTE — TELEPHONE ENCOUNTER
Called patient no answer left message asking her to call the office back as soon as possible. [id number] Exp Problem Focused - Mod. Complex

## 2021-05-05 DIAGNOSIS — E11.21 TYPE 2 DIABETES MELLITUS WITH DIABETIC NEPHROPATHY, WITH LONG-TERM CURRENT USE OF INSULIN (HCC): ICD-10-CM

## 2021-05-05 DIAGNOSIS — Z79.4 TYPE 2 DIABETES MELLITUS WITH DIABETIC NEPHROPATHY, WITH LONG-TERM CURRENT USE OF INSULIN (HCC): ICD-10-CM

## 2021-12-28 ENCOUNTER — HOSPITAL ENCOUNTER (OUTPATIENT)
Dept: LAB | Age: 71
Discharge: HOME | End: 2021-12-28

## 2021-12-28 LAB
CREATININE, EXTERNAL: 170
MICROALBUMIN UR TEST STR-MCNC: 240 MG/DL
MICROALBUMIN/CREAT RATIO, EXTERNAL: NORMAL
SENTARA SPECIMEN COL,SENBCF: NORMAL

## 2021-12-28 PROCEDURE — 99001 SPECIMEN HANDLING PT-LAB: CPT

## 2022-01-04 ENCOUNTER — OFFICE VISIT (OUTPATIENT)
Dept: FAMILY MEDICINE CLINIC | Age: 72
End: 2022-01-04
Payer: MEDICARE

## 2022-01-04 VITALS
HEART RATE: 86 BPM | TEMPERATURE: 98.4 F | RESPIRATION RATE: 14 BRPM | WEIGHT: 191.8 LBS | OXYGEN SATURATION: 98 % | SYSTOLIC BLOOD PRESSURE: 144 MMHG | BODY MASS INDEX: 32.74 KG/M2 | DIASTOLIC BLOOD PRESSURE: 60 MMHG | HEIGHT: 64 IN

## 2022-01-04 DIAGNOSIS — I10 ESSENTIAL HYPERTENSION: ICD-10-CM

## 2022-01-04 DIAGNOSIS — E11.21 TYPE 2 DIABETES MELLITUS WITH DIABETIC NEPHROPATHY, WITH LONG-TERM CURRENT USE OF INSULIN (HCC): Primary | ICD-10-CM

## 2022-01-04 DIAGNOSIS — E11.21 TYPE 2 DIABETES MELLITUS WITH DIABETIC NEPHROPATHY, WITH LONG-TERM CURRENT USE OF INSULIN (HCC): ICD-10-CM

## 2022-01-04 DIAGNOSIS — E03.9 ACQUIRED HYPOTHYROIDISM: ICD-10-CM

## 2022-01-04 DIAGNOSIS — Z86.59 HISTORY OF MAJOR DEPRESSION: ICD-10-CM

## 2022-01-04 DIAGNOSIS — E11.21 DIABETIC NEPHROPATHY ASSOCIATED WITH TYPE 2 DIABETES MELLITUS (HCC): ICD-10-CM

## 2022-01-04 DIAGNOSIS — N18.4 CKD (CHRONIC KIDNEY DISEASE) STAGE 4, GFR 15-29 ML/MIN (HCC): ICD-10-CM

## 2022-01-04 DIAGNOSIS — Z79.4 TYPE 2 DIABETES MELLITUS WITH DIABETIC NEPHROPATHY, WITH LONG-TERM CURRENT USE OF INSULIN (HCC): Primary | ICD-10-CM

## 2022-01-04 DIAGNOSIS — Z79.4 TYPE 2 DIABETES MELLITUS WITH DIABETIC NEPHROPATHY, WITH LONG-TERM CURRENT USE OF INSULIN (HCC): ICD-10-CM

## 2022-01-04 PROBLEM — F32.1 MODERATE MAJOR DEPRESSION (HCC): Status: RESOLVED | Noted: 2018-07-09 | Resolved: 2022-01-04

## 2022-01-04 PROCEDURE — G8536 NO DOC ELDER MAL SCRN: HCPCS | Performed by: FAMILY MEDICINE

## 2022-01-04 PROCEDURE — G9717 DOC PT DX DEP/BP F/U NT REQ: HCPCS | Performed by: FAMILY MEDICINE

## 2022-01-04 PROCEDURE — 3046F HEMOGLOBIN A1C LEVEL >9.0%: CPT | Performed by: FAMILY MEDICINE

## 2022-01-04 PROCEDURE — G8417 CALC BMI ABV UP PARAM F/U: HCPCS | Performed by: FAMILY MEDICINE

## 2022-01-04 PROCEDURE — G8427 DOCREV CUR MEDS BY ELIG CLIN: HCPCS | Performed by: FAMILY MEDICINE

## 2022-01-04 PROCEDURE — G8754 DIAS BP LESS 90: HCPCS | Performed by: FAMILY MEDICINE

## 2022-01-04 PROCEDURE — 2022F DILAT RTA XM EVC RTNOPTHY: CPT | Performed by: FAMILY MEDICINE

## 2022-01-04 PROCEDURE — G8399 PT W/DXA RESULTS DOCUMENT: HCPCS | Performed by: FAMILY MEDICINE

## 2022-01-04 PROCEDURE — G8753 SYS BP > OR = 140: HCPCS | Performed by: FAMILY MEDICINE

## 2022-01-04 PROCEDURE — 1090F PRES/ABSN URINE INCON ASSESS: CPT | Performed by: FAMILY MEDICINE

## 2022-01-04 PROCEDURE — 99214 OFFICE O/P EST MOD 30 MIN: CPT | Performed by: FAMILY MEDICINE

## 2022-01-04 PROCEDURE — 1101F PT FALLS ASSESS-DOCD LE1/YR: CPT | Performed by: FAMILY MEDICINE

## 2022-01-04 PROCEDURE — 3017F COLORECTAL CA SCREEN DOC REV: CPT | Performed by: FAMILY MEDICINE

## 2022-01-04 RX ORDER — NIFEDIPINE 60 MG/1
60 TABLET, EXTENDED RELEASE ORAL DAILY
Qty: 90 TABLET | Refills: 2 | Status: SHIPPED | OUTPATIENT
Start: 2022-01-04 | End: 2022-03-25 | Stop reason: ALTCHOICE

## 2022-01-04 RX ORDER — INSULIN ASPART 100 [IU]/ML
5 INJECTION, SOLUTION INTRAVENOUS; SUBCUTANEOUS AS NEEDED
Qty: 1 PEN | Refills: 2 | Status: SHIPPED | OUTPATIENT
Start: 2022-01-04

## 2022-01-04 RX ORDER — INSULIN GLARGINE 100 [IU]/ML
20 INJECTION, SOLUTION SUBCUTANEOUS DAILY
Qty: 15 ML | Refills: 4 | Status: SHIPPED | OUTPATIENT
Start: 2022-01-04 | End: 2022-03-25 | Stop reason: SDUPTHER

## 2022-01-04 RX ORDER — PEN NEEDLE, DIABETIC 31 GX3/16"
NEEDLE, DISPOSABLE MISCELLANEOUS
Qty: 100 PEN NEEDLE | Refills: 11 | Status: SHIPPED | OUTPATIENT
Start: 2022-01-04

## 2022-01-04 NOTE — PROGRESS NOTES
Patient here for her 6 week f/u on her DM. She says she has been put on a new medication by her GI but she does not know the name. She will call the office back once she gets home with the name. She also c/o elevated BP especially at night. 1. \"Have you been to the ER, urgent care clinic since your last visit? Hospitalized since your last visit? \" No    2. \"Have you seen or consulted any other health care providers outside of the [address] [place] [address] since your last visit? \" Has seen her GI. and Nephrology     3. For patients aged 39-70: Has the patient had a colonoscopy / FIT/ Cologuard? Yes, HM satisfied with blue hyperlink     If the patient is female:    4. For patients aged 41-77: Has the patient had a mammogram within the past 2 years? No    5. For patients aged 21-65: Has the patient had a pap smear?  NA based on age or sex

## 2022-01-04 NOTE — ASSESSMENT & PLAN NOTE
Uncontrolled. Continuing olmesartan 40 mg. Double nifedipine to 60 mg daily, trial splitting them - one am one pm. Encouraged to focus on healthy diet and exercise.  Just gained access to [employer] program. follow up 1 month

## 2022-01-04 NOTE — ASSESSMENT & PLAN NOTE
Home sugars and BMP suggest well controlled. Checking A1c today. Continue dulaglutide 0.75, basal insulin 20 and as needed short acting pending results.

## 2022-01-04 NOTE — ASSESSMENT & PLAN NOTE
Two most recent GFRs visible through [facility] are 34. Already established with Dr. Spring Cantu and prepping for visit with him.  No need for my intervention other than controlling RFs

## 2022-01-04 NOTE — PROGRESS NOTES
Jovani Coker (: 1950) is a 66 y.o. female, established patient, here for:    ASSESSMENT/PLAN:  1. Type 2 diabetes mellitus with diabetic nephropathy, with long-term current use of insulin ([facility])  Assessment & Plan:  Home sugars and BMP suggest well controlled. Checking A1c today. Continue dulaglutide 0.75, basal insulin 20 and as needed short acting pending results. Orders:  -     HEMOGLOBIN A1C WITH EAG; Future  -     dulaglutide (TRULICITY) 4.92 VY/0.4 mL sub-q pen; 0.5 mL by SubCUTAneous route every seven (7) days. , Normal, Disp-12 Each, R-2  -     insulin aspart U-100 (NOVOLOG) 100 unit/mL (3 mL) inpn; 5 Units by SubCUTAneous route as needed (blood sugar >250, reassess in 30 minutes and repeat if needed). , Normal, Disp-1 Pen, R-2  -     Insulin Needles, Disposable, (BD Ultra-Fine Mini Pen Needle) 31 gauge x 3/16\" ndle; USE AS DIRECTED, Normal, Disp-100 Pen Needle, R-11  -     insulin glargine (Lantus Solostar U-100 Insulin) 100 unit/mL (3 mL) inpn; 20 Units by SubCUTAneous route daily. , Normal, Disp-15 mL, R-4  2. Diabetic nephropathy associated with type 2 diabetes mellitus ([facility]  3. Essential hypertension  Assessment & Plan:  Uncontrolled. Continuing olmesartan 40 mg. Double nifedipine to 60 mg daily, trial splitting them - one am one pm. Encouraged to focus on healthy diet and exercise. Just gained access to [employer] program. follow up 1 month  Orders:  -     NIFEdipine ER (ADALAT CC) 60 mg ER tablet; Take 1 Tablet by mouth daily. , Normal, Disp-90 Tablet, R-2  4. CKD (chronic kidney disease) stage 4, GFR 15-29 ml/min ([facility])  Assessment & Plan:  Two most recent GFRs visible through [facility] are 34. Already established with Dr. Corey Morales and prepping for visit with him. No need for my intervention other than controlling RFs   5. Acquired hypothyroidism  -     TSH W/ REFLX FREE T4 IF ABNORMAL; Future  6.  History of major depression      Return in about 1 month (around 2022) for blood pressure, thyroid and diabetes follow up, [address] same day (45). SUBJECTIVE/OBJECTIVE:  HPI   Very excited about birth of great grandson    follow up DM with nephropathy - on dulaglutide, basal insulin 20 units daily and short acting insulin as needed for BS >200 - which has been twice in the past year  fastings 120s, rare 140s  Evenings 160s    follow up hypertension - spiking at night up to SBP 170s, hasn't been <140 \"in a while\"    CKD - recently established with Dr. King Saini. Labs last week in prep for follow up    Care Everywhere [facility]  12/28/2021 -   eGFR 29.1  Cr 1.7  Microalb/cr 141.2  CBC - normal  PTH - 35, normal     follow up hypothyroidism on replacement    Notes that her throat was stretched at the time had recent endoscop with Dr. Sakina Mccord. Throat has been bothersome. He is aware and managing with follow up planned in April. Physical Exam  Constitutional:       General: She is not in acute distress. Appearance: She is well-developed. She is obese. HENT:      Head: Normocephalic and atraumatic. Pulmonary:      Effort: Pulmonary effort is normal.   Neurological:      Mental Status: She is alert and oriented to person, place, and time. Psychiatric:         Behavior: Behavior normal.         Thought Content:  Thought content normal.         Judgment: Judgment normal.               -- Amy Medrano MD

## 2022-01-06 LAB
AVG GLU, 10930: 175 MG/DL (ref 91–123)
HBA1C MFR BLD HPLC: 7.7 % (ref 4.8–5.6)
T4 FREE SERPL-MCNC: 1.3 NG/DL (ref 0.9–1.8)
TSH SERPL DL<=0.005 MIU/L-ACNC: 2.85 MCU/ML (ref 0.27–4.2)

## 2022-01-11 PROBLEM — N18.32 STAGE 3B CHRONIC KIDNEY DISEASE (HCC): Status: ACTIVE | Noted: 2019-07-09

## 2022-02-07 ENCOUNTER — OFFICE VISIT (OUTPATIENT)
Dept: FAMILY MEDICINE CLINIC | Age: 72
End: 2022-02-07

## 2022-02-07 ENCOUNTER — OFFICE VISIT (OUTPATIENT)
Dept: FAMILY MEDICINE CLINIC | Age: 72
End: 2022-02-07
Payer: MEDICARE

## 2022-02-07 VITALS
DIASTOLIC BLOOD PRESSURE: 70 MMHG | DIASTOLIC BLOOD PRESSURE: 70 MMHG | TEMPERATURE: 97.2 F | WEIGHT: 190.4 LBS | BODY MASS INDEX: 32.5 KG/M2 | HEART RATE: 90 BPM | OXYGEN SATURATION: 98 % | SYSTOLIC BLOOD PRESSURE: 148 MMHG | HEIGHT: 64 IN | RESPIRATION RATE: 12 BRPM | TEMPERATURE: 97.2 F | WEIGHT: 190.4 LBS | HEART RATE: 90 BPM | OXYGEN SATURATION: 98 % | RESPIRATION RATE: 12 BRPM | HEIGHT: 64 IN | SYSTOLIC BLOOD PRESSURE: 148 MMHG | BODY MASS INDEX: 32.5 KG/M2

## 2022-02-07 DIAGNOSIS — I10 ESSENTIAL HYPERTENSION: Primary | ICD-10-CM

## 2022-02-07 DIAGNOSIS — Z00.00 MEDICARE ANNUAL WELLNESS VISIT, SUBSEQUENT: Primary | ICD-10-CM

## 2022-02-07 DIAGNOSIS — N18.32 STAGE 3B CHRONIC KIDNEY DISEASE (HCC): ICD-10-CM

## 2022-02-07 DIAGNOSIS — E03.9 ACQUIRED HYPOTHYROIDISM: ICD-10-CM

## 2022-02-07 DIAGNOSIS — N39.0 RECURRENT UTI: ICD-10-CM

## 2022-02-07 DIAGNOSIS — E11.21 TYPE 2 DIABETES MELLITUS WITH DIABETIC NEPHROPATHY, WITH LONG-TERM CURRENT USE OF INSULIN (HCC): ICD-10-CM

## 2022-02-07 DIAGNOSIS — Z79.4 TYPE 2 DIABETES MELLITUS WITH DIABETIC NEPHROPATHY, WITH LONG-TERM CURRENT USE OF INSULIN (HCC): ICD-10-CM

## 2022-02-07 DIAGNOSIS — Z12.31 ENCOUNTER FOR SCREENING MAMMOGRAM FOR MALIGNANT NEOPLASM OF BREAST: ICD-10-CM

## 2022-02-07 PROCEDURE — G8754 DIAS BP LESS 90: HCPCS | Performed by: FAMILY MEDICINE

## 2022-02-07 PROCEDURE — 1090F PRES/ABSN URINE INCON ASSESS: CPT | Performed by: FAMILY MEDICINE

## 2022-02-07 PROCEDURE — G8399 PT W/DXA RESULTS DOCUMENT: HCPCS | Performed by: FAMILY MEDICINE

## 2022-02-07 PROCEDURE — 3017F COLORECTAL CA SCREEN DOC REV: CPT | Performed by: FAMILY MEDICINE

## 2022-02-07 PROCEDURE — 99214 OFFICE O/P EST MOD 30 MIN: CPT | Performed by: FAMILY MEDICINE

## 2022-02-07 PROCEDURE — G8753 SYS BP > OR = 140: HCPCS | Performed by: FAMILY MEDICINE

## 2022-02-07 PROCEDURE — G8510 SCR DEP NEG, NO PLAN REQD: HCPCS | Performed by: FAMILY MEDICINE

## 2022-02-07 PROCEDURE — G9899 SCRN MAM PERF RSLTS DOC: HCPCS | Performed by: FAMILY MEDICINE

## 2022-02-07 PROCEDURE — G8536 NO DOC ELDER MAL SCRN: HCPCS | Performed by: FAMILY MEDICINE

## 2022-02-07 PROCEDURE — G8427 DOCREV CUR MEDS BY ELIG CLIN: HCPCS | Performed by: FAMILY MEDICINE

## 2022-02-07 PROCEDURE — G8417 CALC BMI ABV UP PARAM F/U: HCPCS | Performed by: FAMILY MEDICINE

## 2022-02-07 PROCEDURE — 3051F HG A1C>EQUAL 7.0%<8.0%: CPT | Performed by: FAMILY MEDICINE

## 2022-02-07 PROCEDURE — 1101F PT FALLS ASSESS-DOCD LE1/YR: CPT | Performed by: FAMILY MEDICINE

## 2022-02-07 PROCEDURE — G0439 PPPS, SUBSEQ VISIT: HCPCS | Performed by: FAMILY MEDICINE

## 2022-02-07 PROCEDURE — 2022F DILAT RTA XM EVC RTNOPTHY: CPT | Performed by: FAMILY MEDICINE

## 2022-02-07 RX ORDER — OLMESARTAN MEDOXOMIL 40 MG/1
40 TABLET ORAL DAILY
Qty: 90 TABLET | Refills: 4 | Status: SHIPPED | OUTPATIENT
Start: 2022-02-07 | End: 2022-10-17

## 2022-02-07 RX ORDER — LEVOTHYROXINE SODIUM 50 UG/1
50 TABLET ORAL
Qty: 90 TABLET | Refills: 4 | Status: SHIPPED | OUTPATIENT
Start: 2022-02-07

## 2022-02-07 NOTE — PATIENT INSTRUCTIONS
Look for variables that may be affecting your blood pressure/blood pressure readings:  Consistency with medications - use a pill organizer  Activity level  Dietary sodium - track convenience foods/microwave meals  Method of taking your pressure - ensure you have been sitting for 5 minutes and that your arm is at the level of your heart

## 2022-02-07 NOTE — ACP (ADVANCE CARE PLANNING)
Advance Care Planning     General Advance Care Planning (ACP) Conversation      Date of Conversation: 2/7/2022  Conducted with: Patient with Decision Making Capacity    Healthcare Decision Maker:     Primary Decision Maker: Rajesh Wilson - Daughter - [phone]    Secondary Decision Maker: Hernan Vizcarra - Child - [phone]  Click here to complete [address] including selection of the Healthcare Decision Maker Relationship (ie \"Primary\")          Content/Action Overview:   No documents. Philosophy is care focused on alleviation of suffering at the end of life without life prolonging procedures. Her daughter is aware of her wishes and reportedly is willing to support her choices.            Length of Voluntary ACP Conversation in minutes:  <16 minutes (Non-Billable)    Saravanan Sandoval MD

## 2022-02-07 NOTE — ASSESSMENT & PLAN NOTE
50/50 controlled vs uncontrolled suggests another variable.  Continue meds unchanged  Patient Instructions   Look for variables that may be affecting your blood pressure/blood pressure readings:  Consistency with medications - use a pill organizer  Activity level  Dietary sodium - track convenience foods/microwave meals  Method of taking your pressure - ensure you have been sitting for 5 minutes and that your arm is at the level of your heart

## 2022-02-07 NOTE — PROGRESS NOTES
This is the Subsequent Medicare Annual Wellness Exam, performed 12 months or more after the Initial AWV or the last Subsequent AWV    I have reviewed the patient's medical history in detail and updated the computerized patient record. Assessment/Plan   Education and counseling provided:  Patient declined ACP referral    1. Medicare annual wellness visit, subsequent  2. Encounter for screening mammogram for malignant neoplasm of breast  -     [facility] MAMMO BI SCREENING INCL CAD; Future       Depression Risk Factor Screening     3 most recent PHQ Screens 2/7/2022   PHQ Not Done -   Little interest or pleasure in doing things Not at all   Feeling down, depressed, irritable, or hopeless Not at all   Total Score PHQ 2 0   Trouble falling or staying asleep, or sleeping too much Not at all   Feeling tired or having little energy Not at all   Poor appetite, weight loss, or overeating Not at all   Feeling bad about yourself - or that you are a failure or have let yourself or your family down Not at all   Trouble concentrating on things such as school, work, reading, or watching TV Not at all   Moving or speaking so slowly that other people could have noticed; or the opposite being so fidgety that others notice Not at all   Thoughts of being better off dead, or hurting yourself in some way Not at all   PHQ 9 Score 0   How difficult have these problems made it for you to do your work, take care of your home and get along with others Not difficult at all       Alcohol & Drug Abuse Risk Screen    Do you average more than 1 drink per night or more than 7 drinks a week:  No    On any one occasion in the past three months have you have had more than 3 drinks containing alcohol:  No          Functional Ability and Level of Safety    Hearing: Hearing is good. Activities of Daily Living: The home contains: no safety equipment.   Patient does total self care      Ambulation: with no difficulty     Fall Risk:  Fall Risk Assessment, last 12 mths 2/7/2022   Able to walk? Yes   Fall in past 12 months? 0   Do you feel unsteady? 0   Are you worried about falling 0   Number of falls in past 12 months -   Fall with injury?  -      Abuse Screen:  Patient is not abused       Cognitive Screening    Has your family/caregiver stated any concerns about your memory: no     Cognitive Screening: Normal - obs    Health Maintenance Due     Health Maintenance Due   Topic Date Due    Breast Cancer Screen Mammogram  05/23/2021    Medicare Yearly Exam  07/25/2021    Flu Vaccine (1) 09/01/2021    MICROALBUMIN Q1  01/26/2022    Lipid Screen  01/26/2022       Patient Care Team   Patient Care Team:  Carolina Maki MD as PCP - General (Family Medicine)  Carolina Maki MD as PCP - [facility]  Blas Munoz (Ophthalmology)  Juan Perales MD (Gastroenterology)  Lisy Sarabia MD (Nephrology)    History     Patient Active Problem List   Diagnosis Code    Essential hypertension I10    Acquired hypothyroidism E03.9    Hiatal hernia K44.9    Hyperlipidemia E78.5    Migraines G43.909    Lump or mass in breast N63.0    Family history of malignant neoplasm of breast Z80.3    Type 2 diabetes mellitus with diabetic nephropathy, with long-term current use of insulin ([address] E11.21, Z79.4    Diabetic nephropathy associated with type 2 diabetes mellitus ([address] E11.21    Osteopenia of multiple sites M85.89    Vitamin D deficiency E55.9    Class 1 obesity due to excess calories with serious comorbidity and body mass index (BMI) of 31.0 to 31.9 in adult E66.09, Z68.31    Stage 3b chronic kidney disease ([address] N18.32    History of adenomatous polyp of colon Z86.010    Iron deficiency anemia due to chronic blood loss D50.0    Altered bowel function R19.8     Past Medical History:   Diagnosis Date    EMPERATRIZ (acute kidney injury) ([address] 7/24/2020    Depression     Diabetes ([address]     GERD (gastroesophageal reflux disease)     Hiatal hernia  Hypercholesterolemia     Hypertension     Migraines     Moderate major depression ([facility] 7/9/2018    Recurrent UTI     VCUG reportedly nl 90s, +/- pyelo, no hospitalizations, no surgeries, no scopes    Thyroid disease       Past Surgical History:   Procedure Laterality Date    HX BREAST BIOPSY  1993    right breast, benign    HX HYSTERECTOMY      HX MOHS PROCEDURES Right 09/2001    HX OTHER SURGICAL  2001    HEAD LACERATION DUE TO A FALL    HX PARTIAL HYSTERECTOMY  11/17/1972    HX TUBAL LIGATION  11/1971     Current Outpatient Medications   Medication Sig Dispense Refill    elderberry fruit (ELDERBERRY PO) Take  by mouth.  NIFEdipine ER (ADALAT CC) 60 mg ER tablet Take 1 Tablet by mouth daily. 90 Tablet 2    dulaglutide (TRULICITY) 2.91 QG/4.5 mL sub-q pen 0.5 mL by SubCUTAneous route every seven (7) days. 12 Each 2    insulin aspart U-100 (NOVOLOG) 100 unit/mL (3 mL) inpn 5 Units by SubCUTAneous route as needed (blood sugar >250, reassess in 30 minutes and repeat if needed). 1 Pen 2    Insulin Needles, Disposable, (BD Ultra-Fine Mini Pen Needle) 31 gauge x 3/16\" ndle USE AS DIRECTED 100 Pen Needle 11    insulin glargine (Lantus Solostar U-100 Insulin) 100 unit/mL (3 mL) inpn 20 Units by SubCUTAneous route daily. 15 mL 4    olmesartan (BENICAR) 40 mg tablet Take 1 Tab by mouth daily. 90 Tab 4    simvastatin (ZOCOR) 40 mg tablet Take 1 Tab by mouth nightly. 90 Tab 4    levothyroxine (SYNTHROID) 50 mcg tablet Take 1 Tab by mouth Daily (before breakfast). 90 Tab 4    glucose blood VI test strips (ACCU-CHEK CODY PLUS TEST STRP) strip Patient is to check blood sugar 3-4 times a day 300 Strip 11    cholecalciferol, VITAMIN D3, (VITAMIN D3) 5,000 unit tab tablet Take  by mouth daily.  acetaminophen (TYLENOL EXTRA STRENGTH) 500 mg tablet Take  by mouth every six (6) hours as needed for Pain.  esomeprazole (NEXIUM) 20 mg capsule Take  by mouth.       Blood-Glucose Meter (ACCU-CHEK CODY PLUS METER) misc 1 Device by Does Not Apply route four (4) times daily. 1 Each 0    BLADDER CONTROL PADS pads       ALCOHOL PREP PADS padm       psyllium seed, with dextrose, (FIBER PO) Take  by mouth. (Patient not taking: Reported on 1/4/2022)      NOÉ OIL, BULK, by Does Not Apply route.  Using topical for Vertigo (Patient not taking: Reported on 1/4/2022)       Allergies   Allergen Reactions    Darvocet A500 [Propoxyphene N-Acetaminophen] Nausea and Vomiting    Gloves, Latex With Aloe Vera Contact Dermatitis     Rash from powder inside latex gloves      Metformin Diarrhea       Family History   Problem Relation Age of Onset    Diabetes Mother     Hypertension Mother     Cancer Mother     Kidney Disease Mother     Diabetes Father     Heart Disease Father     Hypertension Father     Diabetes Maternal Grandmother     Breast Cancer Maternal Grandmother     Cancer Maternal Grandfather     Breast Cancer Maternal Aunt      Social History     Tobacco Use    Smoking status: Never Smoker    Smokeless tobacco: Never Used   Substance Use Topics    Alcohol use: No     Alcohol/week: 0.0 standard drinks

## 2022-02-07 NOTE — ASSESSMENT & PLAN NOTE
Uncontrolled, with target A1c <6.5 per Dr. Lani Correia. He is also advocating for addition of SLGT2i as next step for her. We are navigating risks/benefits given a prior hx of recurrent UTIs requiring antibiotic prophylaxis. Adding empagliflozin 10 mg daily. Watch for change in UTI or vaginitis frequency. Continue dulaglutide 0.75 and insulins unchanged.  follow up 6 weeks

## 2022-02-07 NOTE — PROGRESS NOTES
Patient being seen today in office for thyroid and blood pressure follow up. She has bought in her home blood pressure readings for review. 1. \"Have you been to the ER, urgent care clinic since your last visit? Hospitalized since your last visit? \" No    2. \"Have you seen or consulted any other health care providers outside of the [address] [place] [address] since your last visit? \" No     3. For patients aged 39-70: Has the patient had a colonoscopy / FIT/ Cologuard? Yes - Care Gap present. OVERDUE! Patient say she will schedule this in April. If the patient is female:    4. For patients aged 41-77: Has the patient had a mammogram within the past 2 years? Yes - no Care Gap present Patient will schedule. 5. For patients aged 21-65: Has the patient had a pap smear?  NA - based on age or sex

## 2022-02-07 NOTE — PROGRESS NOTES
Preeti Cortez (: 1950) is a 66 y.o. female, established patient, here for:    ASSESSMENT/PLAN:  1. Essential hypertension  Assessment & Plan:  50/50 controlled vs uncontrolled suggests another variable. Continue meds unchanged  Patient Instructions   Look for variables that may be affecting your blood pressure/blood pressure readings:  Consistency with medications - use a pill organizer  Activity level  Dietary sodium - track convenience foods/microwave meals  Method of taking your pressure - ensure you have been sitting for 5 minutes and that your arm is at the level of your heart        Orders:  -     olmesartan (BENICAR) 40 mg tablet; Take 1 Tablet by mouth daily. , Normal, Disp-90 Tablet, R-4  2. Stage 3b chronic kidney disease (HCC)  -     METABOLIC PANEL, COMPREHENSIVE; Future  -     REFERRAL TO UROLOGY  3. Type 2 diabetes mellitus with diabetic nephropathy, with long-term current use of insulin (HCC)  Assessment & Plan:  Uncontrolled, with target A1c <6.5 per Dr. Zaria Forrest. He is also advocating for addition of SLGT2i as next step for her. We are navigating risks/benefits given a prior hx of recurrent UTIs requiring antibiotic prophylaxis. Adding empagliflozin 10 mg daily. Watch for change in UTI or vaginitis frequency. Continue dulaglutide 0.75 and insulins unchanged. follow up 6 weeks   Orders:  -     olmesartan (BENICAR) 40 mg tablet; Take 1 Tablet by mouth daily. , Normal, Disp-90 Tablet, R-4  -     METABOLIC PANEL, COMPREHENSIVE; Future  -     LIPID PANEL W/ REFLX DIRECT LDL; Future  -     HEMOGLOBIN A1C WITH EAG; Future  -     REFERRAL TO UROLOGY  -     dulaglutide (TRULICITY) 5.07 BQ/3.8 mL sub-q pen; 0.5 mL by SubCUTAneous route every seven (7) days. , Normal, Disp-12 Each, R-3  -     empagliflozin (JARDIANCE) 10 mg tablet; Take 1 Tablet by mouth daily. , Normal, Disp-90 Tablet, R-0  4.  Acquired hypothyroidism  Assessment & Plan:  Well controlled, continue present management with levo 50 Orders:  -     levothyroxine (SYNTHROID) 50 mcg tablet; Take 1 Tablet by mouth Daily (before breakfast). , Normal, Disp-90 Tablet, R-4  5. Recurrent UTI  -     REFERRAL TO UROLOGY      Return in about 6 weeks (around 3/21/2022) for chronic medical conditions, labs 1 week prior, (30). SUBJECTIVE/OBJECTIVE:  HPI    follow up DM with nephropathy - on dulaglutide, basal insulin 20 units daily and short acting insulin as needed      follow up uncontrolled hypertension - continued olmesartan 40 mg and doubled nifedipine to 60 mg last visit  Home values 50/50 normal vs uncontrolled     CKD -  Dr. Comer Session recommends target A1c <6.5, recommends SGLT2i addition and urology referral for her history of recurrent and severe UTIs     follow up hypothyroidism on replacement  Results for orders placed or performed in visit on 01/04/22   T4, FREE   Result Value Ref Range    T4, Free 1.3 0.9 - 1.8 ng/dL   TSH 3RD GENERATION   Result Value Ref Range    TSH 2.85 0.27 - 4.20 mcU/mL   HEMOGLOBIN A1C W/O EAG   Result Value Ref Range    Hemoglobin A1c 7.7 (H) 4.8 - 5.6 %    AVG  (H) 91 - 123 mg/dL         Physical Exam  Constitutional:       General: She is not in acute distress. Appearance: She is well-developed. HENT:      Head: Normocephalic and atraumatic. Neck:      Thyroid: No thyroid mass, thyromegaly or thyroid tenderness. Pulmonary:      Effort: Pulmonary effort is normal.   Neurological:      Mental Status: She is alert and oriented to person, place, and time. Psychiatric:         Behavior: Behavior normal.         Thought Content:  Thought content normal.         Judgment: Judgment normal.               -- Meenakshi Bunch MD

## 2022-02-07 NOTE — PATIENT INSTRUCTIONS
Medicare Wellness Visit, Female     The best way to live healthy is to have a lifestyle where you eat a well-balanced diet, exercise regularly, limit alcohol use, and quit all forms of tobacco/nicotine, if applicable. Regular preventive services are another way to keep healthy. Preventive services (vaccines, screening tests, monitoring & exams) can help personalize your care plan, which helps you manage your own care. Screening tests can find health problems at the earliest stages, when they are easiest to treat. [facility] follows the current, evidence-based guidelines published by the [facility] ([facility]) when recommending preventive services for our patients. Because we follow these guidelines, sometimes recommendations change over time as research supports it. (For example, mammograms used to be recommended annually. Even though Medicare will still pay for an annual mammogram, the newer guidelines recommend a mammogram every two years for women of average risk). Of course, you and your doctor may decide to screen more often for some diseases, based on your risk and your co-morbidities (chronic disease you are already diagnosed with). Preventive services for you include:  - Medicare offers their members a free annual wellness visit, which is time for you and your primary care provider to discuss and plan for your preventive service needs. Take advantage of this benefit every year!  -All adults over the age of 72 should receive the recommended pneumonia vaccines. Current USPSTF guidelines recommend a series of two vaccines for the best pneumonia protection.   -All adults should have a flu vaccine yearly and a tetanus vaccine every 10 years.   -All adults age 48 and older should receive the shingles vaccines (series of two vaccines).       -All adults age 38-68 who are overweight should have a diabetes screening test once every three years.   -All adults born between [birth year] and 1966 should be screened once for Hepatitis C.  -Other screening tests and preventive services for persons with diabetes include: an eye exam to screen for diabetic retinopathy, a kidney function test, a foot exam, and stricter control over your cholesterol.   -Cardiovascular screening for adults with routine risk involves an electrocardiogram (ECG) at intervals determined by your doctor.   -Colorectal cancer screenings should be done for adults age 54-65 with no increased risk factors for colorectal cancer. There are a number of acceptable methods of screening for this type of cancer. Each test has its own benefits and drawbacks. Discuss with your doctor what is most appropriate for you during your annual wellness visit. The different tests include: colonoscopy (considered the best screening method), a fecal occult blood test, a fecal DNA test, and sigmoidoscopy.    -A bone mass density test is recommended when a woman turns 65 to screen for osteoporosis. This test is only recommended one time, as a screening. Some providers will use this same test as a disease monitoring tool if you already have osteoporosis. -Breast cancer screenings are recommended every other year for women of normal risk, age 54-69.  -Cervical cancer screenings for women over age 72 are only recommended with certain risk factors.      Here is a list of your current Health Maintenance items (your personalized list of preventive services) with a due date:  Health Maintenance Due   Topic Date Due    Mammogram  05/23/2021    Annual Well Visit  07/25/2021    Yearly Flu Vaccine (1) 09/01/2021    Albumin Urine Test  01/26/2022    Cholesterol Test   01/26/2022

## 2022-02-11 ENCOUNTER — TELEPHONE (OUTPATIENT)
Dept: FAMILY MEDICINE CLINIC | Age: 72
End: 2022-02-11

## 2022-02-11 DIAGNOSIS — N63.10 BREAST MASS, RIGHT: Primary | ICD-10-CM

## 2022-02-11 DIAGNOSIS — N64.89 OTHER SPECIFIED DISORDERS OF BREAST: ICD-10-CM

## 2022-02-11 NOTE — TELEPHONE ENCOUNTER
Pt states she has a lump on her lower right breast and [facility] is requesting an ultrasound. Pt can be reached at ([phone]). Please review and advise.           SCHEDULING NOTES: 02/11/22 DO NOT [address] ROUTINE MAMMO DUE TO PT HAS ANSWERED YES TO ISSUE WITH BREASTS.  PT WILL CALL THE DR OFFICE AND REQUEST FOR A DX MAMMO AND US BREASTS TO BE FAXED/IL

## 2022-02-14 NOTE — TELEPHONE ENCOUNTER
Patient requesting new orders to be placed for her mammogram, she has a mass in her right breast and need orders placed for dx mammogram and right breast u/s,   Orders have been pended please review and sign if appropriate.

## 2022-02-14 NOTE — TELEPHONE ENCOUNTER
Called patient  verified she has been made aware new orders have been placed for her mammogram, she will wait for central scheduling to call her to schedule and has been told if she does not hear from anyone by the end of the week to call the office back.

## 2022-03-01 ENCOUNTER — HOSPITAL ENCOUNTER (OUTPATIENT)
Dept: ULTRASOUND IMAGING | Age: 72
Discharge: HOME | End: 2022-03-01
Attending: FAMILY MEDICINE
Payer: MEDICARE

## 2022-03-01 ENCOUNTER — HOSPITAL ENCOUNTER (OUTPATIENT)
Dept: MAMMOGRAPHY | Age: 72
Discharge: HOME | End: 2022-03-01
Attending: FAMILY MEDICINE
Payer: MEDICARE

## 2022-03-01 DIAGNOSIS — N64.89 OTHER SPECIFIED DISORDERS OF BREAST: ICD-10-CM

## 2022-03-01 DIAGNOSIS — N63.10 BREAST MASS, RIGHT: ICD-10-CM

## 2022-03-01 PROCEDURE — 77062 BREAST TOMOSYNTHESIS BI: CPT

## 2022-03-01 PROCEDURE — 76642 ULTRASOUND BREAST LIMITED: CPT

## 2022-03-15 ENCOUNTER — HOSPITAL ENCOUNTER (OUTPATIENT)
Dept: LAB | Age: 72
Discharge: HOME | End: 2022-03-15

## 2022-03-15 LAB — SENTARA SPECIMEN COL,SENBCF: NORMAL

## 2022-03-15 PROCEDURE — 99001 SPECIMEN HANDLING PT-LAB: CPT

## 2022-03-16 LAB
A-G RATIO,AGRAT: 1.7 RATIO (ref 1.1–2.6)
ALBUMIN SERPL-MCNC: 4.4 G/DL (ref 3.5–5)
ALP SERPL-CCNC: 119 U/L (ref 40–120)
ALT SERPL-CCNC: 14 U/L (ref 5–40)
ANION GAP SERPL CALC-SCNC: 14 MMOL/L (ref 3–15)
AST SERPL W P-5'-P-CCNC: 17 U/L (ref 10–37)
AVG GLU, 10930: 161 MG/DL (ref 91–123)
BILIRUB SERPL-MCNC: 0.3 MG/DL (ref 0.2–1.2)
BUN SERPL-MCNC: 27 MG/DL (ref 6–22)
CALCIUM SERPL-MCNC: 9.7 MG/DL (ref 8.4–10.5)
CHLORIDE SERPL-SCNC: 106 MMOL/L (ref 98–110)
CHOLEST SERPL-MCNC: 179 MG/DL (ref 110–200)
CO2 SERPL-SCNC: 24 MMOL/L (ref 20–32)
CREAT SERPL-MCNC: 1.7 MG/DL (ref 0.8–1.4)
GFRAA, 66117: 35.3
GFRNA, 66118: 29.1
GLOBULIN,GLOB: 2.6 G/DL (ref 2–4)
GLUCOSE SERPL-MCNC: 125 MG/DL (ref 70–99)
HBA1C MFR BLD HPLC: 7.2 % (ref 4.8–5.6)
HDLC SERPL-MCNC: 38 MG/DL
HDLC SERPL-MCNC: 4.7 MG/DL (ref 0–5)
LDL/HDL RATIO,LDHD: 2.9
LDLC SERPL CALC-MCNC: 109 MG/DL (ref 50–99)
NON-HDL CHOLESTEROL, 011976: 141 MG/DL
POTASSIUM SERPL-SCNC: 5.2 MMOL/L (ref 3.5–5.5)
PROT SERPL-MCNC: 7 G/DL (ref 6.2–8.1)
SODIUM SERPL-SCNC: 144 MMOL/L (ref 133–145)
TRIGL SERPL-MCNC: 160 MG/DL (ref 40–149)
VLDLC SERPL CALC-MCNC: 32 MG/DL (ref 8–30)

## 2022-03-18 PROBLEM — Z86.0101 HISTORY OF ADENOMATOUS POLYP OF COLON: Status: ACTIVE | Noted: 2020-07-24

## 2022-03-18 PROBLEM — Z86.010 HISTORY OF ADENOMATOUS POLYP OF COLON: Status: ACTIVE | Noted: 2020-07-24

## 2022-03-18 PROBLEM — R19.8 ALTERED BOWEL FUNCTION: Status: ACTIVE | Noted: 2021-02-04

## 2022-03-18 PROBLEM — M85.89 OSTEOPENIA OF MULTIPLE SITES: Status: ACTIVE | Noted: 2018-07-17

## 2022-03-19 PROBLEM — E66.09 CLASS 1 OBESITY DUE TO EXCESS CALORIES WITH SERIOUS COMORBIDITY AND BODY MASS INDEX (BMI) OF 31.0 TO 31.9 IN ADULT: Status: ACTIVE | Noted: 2018-09-24

## 2022-03-19 PROBLEM — Z79.4 TYPE 2 DIABETES MELLITUS WITH DIABETIC NEPHROPATHY, WITH LONG-TERM CURRENT USE OF INSULIN (HCC): Status: ACTIVE | Noted: 2017-06-08

## 2022-03-19 PROBLEM — E66.811 CLASS 1 OBESITY DUE TO EXCESS CALORIES WITH SERIOUS COMORBIDITY AND BODY MASS INDEX (BMI) OF 31.0 TO 31.9 IN ADULT: Status: ACTIVE | Noted: 2018-09-24

## 2022-03-19 PROBLEM — D50.0 IRON DEFICIENCY ANEMIA DUE TO CHRONIC BLOOD LOSS: Status: ACTIVE | Noted: 2020-07-24

## 2022-03-19 PROBLEM — N18.32 STAGE 3B CHRONIC KIDNEY DISEASE (HCC): Status: ACTIVE | Noted: 2019-07-09

## 2022-03-19 PROBLEM — E11.21 TYPE 2 DIABETES MELLITUS WITH DIABETIC NEPHROPATHY, WITH LONG-TERM CURRENT USE OF INSULIN (HCC): Status: ACTIVE | Noted: 2017-06-08

## 2022-03-20 PROBLEM — E55.9 VITAMIN D DEFICIENCY: Status: ACTIVE | Noted: 2018-08-28

## 2022-03-25 ENCOUNTER — OFFICE VISIT (OUTPATIENT)
Dept: FAMILY MEDICINE CLINIC | Age: 72
End: 2022-03-25
Payer: MEDICARE

## 2022-03-25 VITALS
DIASTOLIC BLOOD PRESSURE: 80 MMHG | TEMPERATURE: 97.7 F | HEIGHT: 64 IN | WEIGHT: 189 LBS | BODY MASS INDEX: 32.27 KG/M2 | HEART RATE: 78 BPM | SYSTOLIC BLOOD PRESSURE: 136 MMHG | RESPIRATION RATE: 10 BRPM

## 2022-03-25 DIAGNOSIS — E11.21 TYPE 2 DIABETES MELLITUS WITH DIABETIC NEPHROPATHY, WITH LONG-TERM CURRENT USE OF INSULIN (HCC): Primary | ICD-10-CM

## 2022-03-25 DIAGNOSIS — Z79.4 TYPE 2 DIABETES MELLITUS WITH DIABETIC NEPHROPATHY, WITH LONG-TERM CURRENT USE OF INSULIN (HCC): Primary | ICD-10-CM

## 2022-03-25 DIAGNOSIS — N18.32 STAGE 3B CHRONIC KIDNEY DISEASE (HCC): ICD-10-CM

## 2022-03-25 DIAGNOSIS — E78.5 HYPERLIPIDEMIA, UNSPECIFIED HYPERLIPIDEMIA TYPE: ICD-10-CM

## 2022-03-25 DIAGNOSIS — I10 ESSENTIAL HYPERTENSION: ICD-10-CM

## 2022-03-25 PROCEDURE — G8427 DOCREV CUR MEDS BY ELIG CLIN: HCPCS | Performed by: FAMILY MEDICINE

## 2022-03-25 PROCEDURE — G8754 DIAS BP LESS 90: HCPCS | Performed by: FAMILY MEDICINE

## 2022-03-25 PROCEDURE — G8417 CALC BMI ABV UP PARAM F/U: HCPCS | Performed by: FAMILY MEDICINE

## 2022-03-25 PROCEDURE — G8399 PT W/DXA RESULTS DOCUMENT: HCPCS | Performed by: FAMILY MEDICINE

## 2022-03-25 PROCEDURE — 3017F COLORECTAL CA SCREEN DOC REV: CPT | Performed by: FAMILY MEDICINE

## 2022-03-25 PROCEDURE — 2022F DILAT RTA XM EVC RTNOPTHY: CPT | Performed by: FAMILY MEDICINE

## 2022-03-25 PROCEDURE — G8510 SCR DEP NEG, NO PLAN REQD: HCPCS | Performed by: FAMILY MEDICINE

## 2022-03-25 PROCEDURE — 1101F PT FALLS ASSESS-DOCD LE1/YR: CPT | Performed by: FAMILY MEDICINE

## 2022-03-25 PROCEDURE — 1090F PRES/ABSN URINE INCON ASSESS: CPT | Performed by: FAMILY MEDICINE

## 2022-03-25 PROCEDURE — G8536 NO DOC ELDER MAL SCRN: HCPCS | Performed by: FAMILY MEDICINE

## 2022-03-25 PROCEDURE — 3051F HG A1C>EQUAL 7.0%<8.0%: CPT | Performed by: FAMILY MEDICINE

## 2022-03-25 PROCEDURE — G9899 SCRN MAM PERF RSLTS DOC: HCPCS | Performed by: FAMILY MEDICINE

## 2022-03-25 PROCEDURE — G8752 SYS BP LESS 140: HCPCS | Performed by: FAMILY MEDICINE

## 2022-03-25 PROCEDURE — 99214 OFFICE O/P EST MOD 30 MIN: CPT | Performed by: FAMILY MEDICINE

## 2022-03-25 RX ORDER — ATORVASTATIN CALCIUM 40 MG/1
40 TABLET, FILM COATED ORAL DAILY
Qty: 90 TABLET | Refills: 3 | Status: CANCELLED | OUTPATIENT
Start: 2022-03-25

## 2022-03-25 RX ORDER — ROSUVASTATIN CALCIUM 20 MG/1
20 TABLET, COATED ORAL
Qty: 90 TABLET | Refills: 3 | Status: SHIPPED | OUTPATIENT
Start: 2022-03-25

## 2022-03-25 RX ORDER — DILTIAZEM HYDROCHLORIDE 180 MG/1
180 CAPSULE, COATED, EXTENDED RELEASE ORAL DAILY
Qty: 90 CAPSULE | Refills: 3 | Status: SHIPPED | OUTPATIENT
Start: 2022-03-25 | End: 2022-04-25

## 2022-03-25 RX ORDER — INSULIN GLARGINE 100 [IU]/ML
22 INJECTION, SOLUTION SUBCUTANEOUS DAILY
Qty: 5 PEN | Refills: 3 | Status: SHIPPED | OUTPATIENT
Start: 2022-03-25 | End: 2022-04-25 | Stop reason: SDUPTHER

## 2022-03-25 NOTE — PROGRESS NOTES
Patient here for chronic condition follow up she has not started her Adin Dire says she had not received this from her pharmacy. 1. \"Have you been to the ER, urgent care clinic since your last visit? Hospitalized since your last visit? \" No    2. \"Have you seen or consulted any other health care providers outside of the [address] [place] [address] since your last visit? \" No     3. For patients aged 39-70: Has the patient had a colonoscopy / FIT/ Cologuard? Yes - no Care Gap present      If the patient is female:    4. For patients aged 41-77: Has the patient had a mammogram within the past 2 years? Yes - no Care Gap present      5. For patients aged 21-65: Has the patient had a pap smear?  NA - based on age or sex

## 2022-03-25 NOTE — ASSESSMENT & PLAN NOTE
Uncontrolled, with target A1c <6.5 per Dr. Salinas Acuña. Encouraged agency - actively seek filling new order for empagliflozin 10 mg daily. We are navigating risks/benefits given a prior hx of recurrent UTIs requiring antibiotic prophylaxis. Watch for change in UTI or vaginitis frequency. Urology intake scheduled. Continue dulaglutide 0.75 and insulins unchanged.  follow up 1 month

## 2022-03-25 NOTE — ASSESSMENT & PLAN NOTE
Not at goal for comorbidities. Continue olmesartan. Switch nifedipine to diltiazem.  Reassess 1 month

## 2022-03-25 NOTE — PROGRESS NOTES
Trace Torres (: 1950) is a 66 y.o. female, established patient, here for:    ASSESSMENT/PLAN:  1. Type 2 diabetes mellitus with diabetic nephropathy, with long-term current use of insulin ([facility])  Assessment & Plan:  Uncontrolled, with target A1c <6.5 per Dr. Salinas Acuña. Encouraged agency - actively seek filling new order for empagliflozin 10 mg daily. We are navigating risks/benefits given a prior hx of recurrent UTIs requiring antibiotic prophylaxis. Watch for change in UTI or vaginitis frequency. Urology intake scheduled. Continue dulaglutide 0.75 and insulins unchanged. follow up 1 month  Orders:  -     insulin glargine (Lantus Solostar U-100 Insulin) 100 unit/mL (3 mL) inpn; 22 Units by SubCUTAneous route daily. , Normal, Disp-5 Pen, R-3  -     rosuvastatin (CRESTOR) 20 mg tablet; Take 1 Tablet by mouth nightly., Normal, Disp-90 Tablet, R-3  -     empagliflozin (JARDIANCE) 10 mg tablet; Take 1 Tablet by mouth daily. , Normal, Disp-90 Tablet, R-0  2. Essential hypertension  Assessment & Plan:  Not at goal for comorbidities. Continue olmesartan. Switch nifedipine to diltiazem. Reassess 1 month   Orders:  -     dilTIAZem ER (CARDIZEM CD) 180 mg capsule; Take 1 Capsule by mouth daily. , Normal, Disp-90 Capsule, R-3  3. Hyperlipidemia, unspecified hyperlipidemia type  Assessment & Plan:  Not at target LDL <100. Reports prior leg cramps with atorva. Switching to rosuvastatin   Orders:  -     rosuvastatin (CRESTOR) 20 mg tablet; Take 1 Tablet by mouth nightly., Normal, Disp-90 Tablet, R-3  4. Stage 3b chronic kidney disease ([address]      Return in about 1 month (around 2022) for diabetes and blood pressure follow up, no labs prior, (30). SUBJECTIVE/OBJECTIVE:  HPI    follow up DM2 with nephropathy - on dulaglutide, basal insulin 20 units daily and short acting insulin as needed     was supposed to assess impact of addtion of empagliflozin, but hasn't received it.  No explanation from pharmacy    Drinking 32 oz water daily. No sugars >250    follow up hypertension with variable control - has since been stabilizing with better attention to med management  Most SBPs 130s, higher in the evenings  Spikes with stress      follow up recurrent UTI in CKD 3b - urology intake scheduled          Physical Exam  Constitutional:       General: She is not in acute distress. Appearance: She is well-developed. HENT:      Head: Normocephalic and atraumatic. Pulmonary:      Effort: Pulmonary effort is normal.   Neurological:      Mental Status: She is alert and oriented to person, place, and time. Psychiatric:         Behavior: Behavior normal.         Thought Content:  Thought content normal.         Judgment: Judgment normal.     Diabetic foot exam:     Left Foot:   Visual Exam: normal    Pulse DP: 2+ (normal)   Filament test: normal sensation          Right Foot:   Visual Exam: normal    Pulse DP: 2+ (normal)   Filament test: normal sensation                -- Karuna Bright MD

## 2022-04-25 ENCOUNTER — OFFICE VISIT (OUTPATIENT)
Dept: FAMILY MEDICINE CLINIC | Age: 72
End: 2022-04-25
Payer: MEDICARE

## 2022-04-25 VITALS
RESPIRATION RATE: 10 BRPM | DIASTOLIC BLOOD PRESSURE: 60 MMHG | HEART RATE: 80 BPM | SYSTOLIC BLOOD PRESSURE: 140 MMHG | HEIGHT: 64 IN | TEMPERATURE: 97.5 F | BODY MASS INDEX: 32.27 KG/M2 | WEIGHT: 189 LBS | OXYGEN SATURATION: 96 %

## 2022-04-25 DIAGNOSIS — E11.21 TYPE 2 DIABETES MELLITUS WITH DIABETIC NEPHROPATHY, WITH LONG-TERM CURRENT USE OF INSULIN (HCC): ICD-10-CM

## 2022-04-25 DIAGNOSIS — I10 ESSENTIAL HYPERTENSION: ICD-10-CM

## 2022-04-25 DIAGNOSIS — M72.0 DUPUYTREN'S CONTRACTURE OF LEFT HAND: Primary | ICD-10-CM

## 2022-04-25 DIAGNOSIS — Z79.4 TYPE 2 DIABETES MELLITUS WITH DIABETIC NEPHROPATHY, WITH LONG-TERM CURRENT USE OF INSULIN (HCC): ICD-10-CM

## 2022-04-25 PROCEDURE — 3051F HG A1C>EQUAL 7.0%<8.0%: CPT | Performed by: FAMILY MEDICINE

## 2022-04-25 PROCEDURE — G8510 SCR DEP NEG, NO PLAN REQD: HCPCS | Performed by: FAMILY MEDICINE

## 2022-04-25 PROCEDURE — G8399 PT W/DXA RESULTS DOCUMENT: HCPCS | Performed by: FAMILY MEDICINE

## 2022-04-25 PROCEDURE — G8753 SYS BP > OR = 140: HCPCS | Performed by: FAMILY MEDICINE

## 2022-04-25 PROCEDURE — 99214 OFFICE O/P EST MOD 30 MIN: CPT | Performed by: FAMILY MEDICINE

## 2022-04-25 PROCEDURE — 1090F PRES/ABSN URINE INCON ASSESS: CPT | Performed by: FAMILY MEDICINE

## 2022-04-25 PROCEDURE — 3017F COLORECTAL CA SCREEN DOC REV: CPT | Performed by: FAMILY MEDICINE

## 2022-04-25 PROCEDURE — 1101F PT FALLS ASSESS-DOCD LE1/YR: CPT | Performed by: FAMILY MEDICINE

## 2022-04-25 PROCEDURE — G8754 DIAS BP LESS 90: HCPCS | Performed by: FAMILY MEDICINE

## 2022-04-25 PROCEDURE — G8417 CALC BMI ABV UP PARAM F/U: HCPCS | Performed by: FAMILY MEDICINE

## 2022-04-25 PROCEDURE — 2022F DILAT RTA XM EVC RTNOPTHY: CPT | Performed by: FAMILY MEDICINE

## 2022-04-25 PROCEDURE — G8536 NO DOC ELDER MAL SCRN: HCPCS | Performed by: FAMILY MEDICINE

## 2022-04-25 PROCEDURE — G8427 DOCREV CUR MEDS BY ELIG CLIN: HCPCS | Performed by: FAMILY MEDICINE

## 2022-04-25 PROCEDURE — G9899 SCRN MAM PERF RSLTS DOC: HCPCS | Performed by: FAMILY MEDICINE

## 2022-04-25 RX ORDER — DILTIAZEM HYDROCHLORIDE 240 MG/1
240 CAPSULE, COATED, EXTENDED RELEASE ORAL DAILY
Qty: 90 CAPSULE | Refills: 3 | Status: SHIPPED | OUTPATIENT
Start: 2022-04-25 | End: 2022-05-10

## 2022-04-25 RX ORDER — INSULIN GLARGINE 100 [IU]/ML
22 INJECTION, SOLUTION SUBCUTANEOUS DAILY
Qty: 5 PEN | Refills: 3 | Status: SHIPPED | OUTPATIENT
Start: 2022-04-25

## 2022-04-25 NOTE — PROGRESS NOTES
Fransisca Doctor (: 1950) is a 66 y.o. female, established patient, here for:    ASSESSMENT/PLAN:  1. Dupuytren's contracture of left hand  Assessment & Plan:  New to me. Chronic, progressive with pain, numbness and decreased ROM 4th digit. Referring Dr. Diana Almendarez. Orders:  -     REFERRAL TO HAND SURGERY  2. Essential hypertension  Assessment & Plan:  Uncontrolled. Continue olmesartan. Increase diltiazem to 240 mg. Orders:  -     dilTIAZem ER (CARDIZEM CD) 240 mg capsule; Take 1 Capsule by mouth daily. , Normal, Disp-90 Capsule, R-3  3. Type 2 diabetes mellitus with diabetic nephropathy, with long-term current use of insulin ([facility])  -     insulin glargine (Lantus Solostar U-100 Insulin) 100 unit/mL (3 mL) inpn; 22 Units by SubCUTAneous route daily. , Normal, Disp-5 Pen, R-3    Reviewed signs of stroke (not present) for which should call 911. Scheduled May 6th for DM/Htn follow up      SUBJECTIVE/OBJECTIVE:  HPI    complains of pain/numbness left hand +/- radn to neck. Triggered by gripping. Inability to completely straighten 4th finger. Longstanding palpable cord prox to 4th finger    Clarifies \"slurred speech\" is altered pronunciation of \"s\"  Missing 1 tooth per quadrant. follow up hypertension with variable control - post switch from amlodipine to dilt    follow up DM2- tolerating empagliflozin so far          Physical Exam  Constitutional:       General: She is not in acute distress. Appearance: She is well-developed. HENT:      Head: Normocephalic and atraumatic. Pulmonary:      Effort: Pulmonary effort is normal.   Musculoskeletal:      Comments: Dupuytren's contracture prox to 4th digit, which cannot extend fully. Attempts reproduce symptoms of concern   Neurological:      Mental Status: She is alert and oriented to person, place, and time. Cranial Nerves: No facial asymmetry. Psychiatric:         Behavior: Behavior normal.         Thought Content:  Thought content normal. Judgment: Judgment normal.                    -- Soraya Vigil MD

## 2022-04-25 NOTE — ASSESSMENT & PLAN NOTE
New to me. Chronic, progressive with pain, numbness and decreased ROM 4th digit. Referring Dr. Remy Espino.

## 2022-04-25 NOTE — PATIENT INSTRUCTIONS
Dupuytren's Contracture: Care Instructions  Your Care Instructions     In Dupuytren's contracture, the fingers become stiff and curl toward the palm. It is caused by thick tissue that grows under the skin in the palm of the hand. Sometimes the condition affects the palm but not the fingers. If the tissue gets thicker and affects one or more fingers, it may limit movement of your fingers and hand. Sometimes the condition can occur in the soles of the feet. The cause of Dupuytren's disease is not known. Dupuytren's disease may get worse slowly. If you have mild Dupuytren's disease, you may be able to keep your fingers moving with regular stretching. Surgery usually helps in severe cases. However, Dupuytren's disease can come back. Follow-up care is a key part of your treatment and safety. Be sure to make and go to all appointments, and call your doctor if you are having problems. It's also a good idea to know your test results and keep a list of the medicines you take. How can you care for yourself at home? · Follow your doctor's advice for physical or occupational therapy and exercises to put your fingers and hand through a range of motion. · Two times a day, massage your hand and gently stretch the fingers back. This can get rid of tightness and help keep your fingers flexible. · Try to avoid curling your hand tightly. For example, use utensils and tools that have larger hand . When should you call for help? Call your doctor now or seek immediate medical care if:    · You have numbness in your fingers.     · You have a wound or sore on your finger or palm.     · Your hand or fingers get worse. Watch closely for changes in your health, and be sure to contact your doctor if you have any problems. Where can you learn more? Go to http://www.gray.com/  Enter T888 in the search box to learn more about \"Dupuytren's Contracture: Care Instructions. \"  Current as of: July 1, 2021               Content Version: 13.2  © 8385-5154 [employer]. Care instructions adapted under license by [employer] (which disclaims liability or warranty for this information). If you have questions about a medical condition or this instruction, always ask your healthcare professional. [address] any warranty or liability for your use of this information.

## 2022-04-25 NOTE — PROGRESS NOTES
Patient here to be seen for numbness in pain in her hand that radiates from her hand to her neck. She has pain in her fingers with trying to bend them. She also says her daughter told her she sounds like she is slurring her \"s\" words. \    1. \"Have you been to the ER, urgent care clinic since your last visit? Hospitalized since your last visit? \" No    2. \"Have you seen or consulted any other health care providers outside of the [address] [place] [address] since your last visit? \" No     3. For patients aged 39-70: Has the patient had a colonoscopy / FIT/ Cologuard? Yes - no Care Gap present      If the patient is female:    4. For patients aged 41-77: Has the patient had a mammogram within the past 2 years? Yes - no Care Gap present      5. For patients aged 21-65: Has the patient had a pap smear?  NA - based on age or sex

## 2022-05-10 ENCOUNTER — OFFICE VISIT (OUTPATIENT)
Dept: FAMILY MEDICINE CLINIC | Age: 72
End: 2022-05-10
Payer: MEDICARE

## 2022-05-10 VITALS
DIASTOLIC BLOOD PRESSURE: 60 MMHG | HEART RATE: 78 BPM | WEIGHT: 188 LBS | RESPIRATION RATE: 10 BRPM | BODY MASS INDEX: 32.1 KG/M2 | HEIGHT: 64 IN | OXYGEN SATURATION: 99 % | SYSTOLIC BLOOD PRESSURE: 148 MMHG | TEMPERATURE: 97.6 F

## 2022-05-10 DIAGNOSIS — N18.32 STAGE 3B CHRONIC KIDNEY DISEASE (HCC): ICD-10-CM

## 2022-05-10 DIAGNOSIS — I10 ESSENTIAL HYPERTENSION: ICD-10-CM

## 2022-05-10 DIAGNOSIS — Z79.4 TYPE 2 DIABETES MELLITUS WITH DIABETIC NEPHROPATHY, WITH LONG-TERM CURRENT USE OF INSULIN (HCC): Primary | ICD-10-CM

## 2022-05-10 DIAGNOSIS — E78.5 HYPERLIPIDEMIA, UNSPECIFIED HYPERLIPIDEMIA TYPE: ICD-10-CM

## 2022-05-10 DIAGNOSIS — E11.21 TYPE 2 DIABETES MELLITUS WITH DIABETIC NEPHROPATHY, WITH LONG-TERM CURRENT USE OF INSULIN (HCC): Primary | ICD-10-CM

## 2022-05-10 PROCEDURE — G8432 DEP SCR NOT DOC, RNG: HCPCS | Performed by: FAMILY MEDICINE

## 2022-05-10 PROCEDURE — G9899 SCRN MAM PERF RSLTS DOC: HCPCS | Performed by: FAMILY MEDICINE

## 2022-05-10 PROCEDURE — 3017F COLORECTAL CA SCREEN DOC REV: CPT | Performed by: FAMILY MEDICINE

## 2022-05-10 PROCEDURE — 2022F DILAT RTA XM EVC RTNOPTHY: CPT | Performed by: FAMILY MEDICINE

## 2022-05-10 PROCEDURE — 3051F HG A1C>EQUAL 7.0%<8.0%: CPT | Performed by: FAMILY MEDICINE

## 2022-05-10 PROCEDURE — G8536 NO DOC ELDER MAL SCRN: HCPCS | Performed by: FAMILY MEDICINE

## 2022-05-10 PROCEDURE — G8417 CALC BMI ABV UP PARAM F/U: HCPCS | Performed by: FAMILY MEDICINE

## 2022-05-10 PROCEDURE — 1090F PRES/ABSN URINE INCON ASSESS: CPT | Performed by: FAMILY MEDICINE

## 2022-05-10 PROCEDURE — 99214 OFFICE O/P EST MOD 30 MIN: CPT | Performed by: FAMILY MEDICINE

## 2022-05-10 PROCEDURE — G8753 SYS BP > OR = 140: HCPCS | Performed by: FAMILY MEDICINE

## 2022-05-10 PROCEDURE — G8427 DOCREV CUR MEDS BY ELIG CLIN: HCPCS | Performed by: FAMILY MEDICINE

## 2022-05-10 PROCEDURE — G8754 DIAS BP LESS 90: HCPCS | Performed by: FAMILY MEDICINE

## 2022-05-10 PROCEDURE — 1101F PT FALLS ASSESS-DOCD LE1/YR: CPT | Performed by: FAMILY MEDICINE

## 2022-05-10 PROCEDURE — G8399 PT W/DXA RESULTS DOCUMENT: HCPCS | Performed by: FAMILY MEDICINE

## 2022-05-10 RX ORDER — FUROSEMIDE 20 MG/1
TABLET ORAL
Status: CANCELLED | OUTPATIENT
Start: 2022-05-10

## 2022-05-10 RX ORDER — DILTIAZEM HYDROCHLORIDE 360 MG/1
360 CAPSULE, EXTENDED RELEASE ORAL DAILY
Qty: 90 CAPSULE | Refills: 3 | Status: SHIPPED | OUTPATIENT
Start: 2022-05-10

## 2022-05-10 NOTE — ASSESSMENT & PLAN NOTE
Uncontrolled, with target A1c <6.5 per Dr. Jovany Joshi. Improved from 7.7 to 7.2 with approx 6 weeks empagliflozin 10 mg. No decline renal function. Urology intake scheduled. Continue dulaglutide 0.75 and insulins unchanged. follow up 3 months (will plan after BP follow up).  Recommended The Obesity Code

## 2022-05-10 NOTE — ASSESSMENT & PLAN NOTE
Tolerating rosuvastatin 20 mg. Not yet at target LDL <100 but improved vs prior to initiation.  Continue unchanged for now

## 2022-05-10 NOTE — PROGRESS NOTES
Cole Thurston (: 1950) is a 66 y.o. female, established patient, here for:    ASSESSMENT/PLAN:  1. Type 2 diabetes mellitus with diabetic nephropathy, with long-term current use of insulin ([facility])  Assessment & Plan:  Uncontrolled, with target A1c <6.5 per Dr. Kari Ricks. Improved from 7.7 to 7.2 with approx 6 weeks empagliflozin 10 mg. No decline renal function. Urology intake scheduled. Continue dulaglutide 0.75 and insulins unchanged. follow up 3 months (will plan after BP follow up). Recommended The Obesity Code  Orders:  -     empagliflozin (JARDIANCE) 10 mg tablet; Take 1 Tablet by mouth daily. , Normal, Disp-90 Tablet, R-3  2. Stage 3b chronic kidney disease ([address]  3. Essential hypertension  Assessment & Plan:  Uncontrolled. Continue olmesartan. Increase diltiazem to 360 mg. Follow up 1 month  Orders:  -     dilTIAZem ER (CARDIZEM CD) 360 mg capsule; Take 1 Capsule by mouth daily. , Normal, Disp-90 Capsule, R-3  4. Hyperlipidemia, unspecified hyperlipidemia type  Assessment & Plan: Tolerating rosuvastatin 20 mg. Not yet at target LDL <100 but improved vs prior to initiation. Continue unchanged for now       Return in about 1 month (around 6/10/2022) for blood pressure follow up, no labs prior. A1c, lipids, cmp, before 3 month visit    SUBJECTIVE/OBJECTIVE:  HPI  follow up DM2 - post addition empagliflozin.  Has only used novolog once since last visit  Walking with a new babysitting job  follow up hypertension - morning SBPs 130s, afternoon highest has been 150    Dr. Kari Ricks in July  Urologist - next week  Dr. Tayler Cunningham - later in May    Lab Results   Component Value Date/Time    Hemoglobin A1c 7.2 (H) 03/15/2022 09:43 AM    Hemoglobin A1c 7.7 (H) 2022 03:19 PM    Hemoglobin A1c 7.5 (H) 2021 09:32 AM    Hemoglobin A1c, External 11.1 2016 12:00 AM    Glucose 125 (H) 03/15/2022 09:43 AM    Glucose,  (H) 2019 10:30 AM    Microalbumin/creat ratio, External  2021 12:00 AM    Microalbumin/Creat ratio (mg/g creat) 93 (H) 11/12/2019 10:50 AM    Microalb/Creat ratio (ug/mg creat.) 191.9 (H) 01/26/2021 09:04 AM    Microalbumin,urine random 11.00 (H) 11/12/2019 10:50 AM    LDL, calculated 109 (H) 03/15/2022 09:43 AM    Creatinine, POC 1.6 (H) 01/09/2019 10:30 AM    Creatinine 1.7 (H) 03/15/2022 09:43 AM       Results for orders placed or performed in visit on 03/15/22   LIPID PANEL   Result Value Ref Range    Triglyceride 160 (H) 40 - 149 mg/dL    HDL Cholesterol 38 (L) >=40 mg/dL    Cholesterol, total 179 110 - 200 mg/dL    CHOLESTEROL/HDL 4.7 0.0 - 5.0    Non-HDL Cholesterol 141 (H) <130 mg/dL    LDL, calculated 109 (H) 50 - 99 mg/dL    VLDL, calculated 32 (H) 8 - 30 mg/dL    LDL/HDL Ratio 2.9    METABOLIC PANEL, COMPREHENSIVE   Result Value Ref Range    Glucose 125 (H) 70 - 99 mg/dL    BUN 27 (H) 6 - 22 mg/dL    Creatinine 1.7 (H) 0.8 - 1.4 mg/dL    Sodium 144 133 - 145 mmol/L    Potassium 5.2 3.5 - 5.5 mmol/L    Chloride 106 98 - 110 mmol/L    CO2 24 20 - 32 mmol/L    AST (SGOT) 17 10 - 37 U/L    ALT (SGPT) 14 5 - 40 U/L    Alk. phosphatase 119 40 - 120 U/L    Bilirubin, total 0.3 0.2 - 1.2 mg/dL    Calcium 9.7 8.4 - 10.5 mg/dL    Protein, total 7.0 6.2 - 8.1 g/dL    Albumin 4.4 3.5 - 5.0 g/dL    A-G Ratio 1.7 1.1 - 2.6 ratio    Globulin 2.6 2.0 - 4.0 g/dL    Anion gap 14.0 3.0 - 15.0 mmol/L    GFRAA 35.3 (L) >60.0    GFRNA 29.1 (L) >60.0   HEMOGLOBIN A1C W/O EAG   Result Value Ref Range    Hemoglobin A1c 7.2 (H) 4.8 - 5.6 %    AVG  (H) 91 - 123 mg/dL     Prior to Admission medications    Medication Sig Start Date End Date Taking? Authorizing Provider   insulin glargine (Lantus Solostar U-100 Insulin) 100 unit/mL (3 mL) inpn 22 Units by SubCUTAneous route daily. 4/25/22  Yes Janett Marin MD   dilTIAZem ER (CARDIZEM CD) 240 mg capsule Take 1 Capsule by mouth daily. 4/25/22  Yes Janett Marin MD   ascorbic acid (VITAMIN C PO) Take  by mouth.    Yes Provider, Historical   rosuvastatin (CRESTOR) 20 mg tablet Take 1 Tablet by mouth nightly. 3/25/22  Yes Darrick Favre, MD   empagliflozin (JARDIANCE) 10 mg tablet Take 1 Tablet by mouth daily. 3/25/22  Yes Darrick Favre, MD   olmesartan (BENICAR) 40 mg tablet Take 1 Tablet by mouth daily. 2/7/22  Yes Darrick Favre, MD   levothyroxine (SYNTHROID) 50 mcg tablet Take 1 Tablet by mouth Daily (before breakfast). 2/7/22  Yes Darrick Favre, MD   dulaglutide (TRULICITY) 0.86 YE/9.2 mL sub-q pen 0.5 mL by SubCUTAneous route every seven (7) days. 2/7/22  Yes Darrick Favre, MD   elderberry fruit (ELDERBERRY PO) Take  by mouth. Yes Provider, Historical   Insulin Needles, Disposable, (BD Ultra-Fine Mini Pen Needle) 31 gauge x 3/16\" ndle USE AS DIRECTED 1/4/22  Yes Darrick Favre, MD   glucose blood VI test strips (ACCU-CHEK CODY PLUS TEST STRP) strip Patient is to check blood sugar 3-4 times a day 1/28/19  Yes Darrick Favre, MD   cholecalciferol, VITAMIN D3, (VITAMIN D3) 5,000 unit tab tablet Take  by mouth daily. Yes Provider, Historical   acetaminophen (TYLENOL EXTRA STRENGTH) 500 mg tablet Take  by mouth every six (6) hours as needed for Pain. Yes Provider, Historical   esomeprazole (NEXIUM) 20 mg capsule Take  by mouth. Yes Provider, Historical   Blood-Glucose Meter (ACCU-CHEK CODY PLUS METER) misc 1 Device by Does Not Apply route four (4) times daily. 6/8/18  Yes Darrick Favre, MD   BLADDER CONTROL PADS pads  5/16/17  Yes Provider, Historical   ALCOHOL PREP PADS padm  5/16/17  Yes Provider, Historical   insulin aspart U-100 (NOVOLOG) 100 unit/mL (3 mL) inpn 5 Units by SubCUTAneous route as needed (blood sugar >250, reassess in 30 minutes and repeat if needed). Patient not taking: Reported on 3/25/2022 1/4/22   Darrick Favre, MD       Physical Exam  Constitutional:       General: She is not in acute distress. Appearance: She is well-developed. She is obese.    HENT:      Head: Normocephalic and atraumatic. Pulmonary:      Effort: Pulmonary effort is normal.   Neurological:      Mental Status: She is alert and oriented to person, place, and time. Psychiatric:         Behavior: Behavior normal.         Thought Content:  Thought content normal.         Judgment: Judgment normal.               -- Alex García MD

## 2022-05-10 NOTE — PROGRESS NOTES
Patient being seen today for blood pressure today no concerns. 1. \"Have you been to the ER, urgent care clinic since your last visit? Hospitalized since your last visit? \" No    2. \"Have you seen or consulted any other health care providers outside of the [address] [place] [address] since your last visit? \" No     3. For patients aged 39-70: Has the patient had a colonoscopy / FIT/ Cologuard? Yes - no Care Gap present      If the patient is female:    4. For patients aged 41-77: Has the patient had a mammogram within the past 2 years? Yes - no Care Gap present      5. For patients aged 21-65: Has the patient had a pap smear?  NA - based on age or sex

## 2022-05-23 ENCOUNTER — OFFICE VISIT (OUTPATIENT)
Dept: ORTHOPEDIC SURGERY | Age: 72
End: 2022-05-23
Payer: MEDICARE

## 2022-05-23 VITALS — HEIGHT: 64 IN | BODY MASS INDEX: 31.84 KG/M2 | TEMPERATURE: 95.7 F | WEIGHT: 186.5 LBS

## 2022-05-23 DIAGNOSIS — M72.0 DUPUYTREN'S DISEASE OF PALM OF BOTH HANDS: Primary | ICD-10-CM

## 2022-05-23 DIAGNOSIS — M72.0 PALMAR FASCIITIS: ICD-10-CM

## 2022-05-23 PROCEDURE — G8417 CALC BMI ABV UP PARAM F/U: HCPCS | Performed by: ORTHOPAEDIC SURGERY

## 2022-05-23 PROCEDURE — 20550 NJX 1 TENDON SHEATH/LIGAMENT: CPT | Performed by: ORTHOPAEDIC SURGERY

## 2022-05-23 PROCEDURE — G8399 PT W/DXA RESULTS DOCUMENT: HCPCS | Performed by: ORTHOPAEDIC SURGERY

## 2022-05-23 PROCEDURE — 99203 OFFICE O/P NEW LOW 30 MIN: CPT | Performed by: ORTHOPAEDIC SURGERY

## 2022-05-23 PROCEDURE — 1090F PRES/ABSN URINE INCON ASSESS: CPT | Performed by: ORTHOPAEDIC SURGERY

## 2022-05-23 PROCEDURE — G8432 DEP SCR NOT DOC, RNG: HCPCS | Performed by: ORTHOPAEDIC SURGERY

## 2022-05-23 PROCEDURE — G8756 NO BP MEASURE DOC: HCPCS | Performed by: ORTHOPAEDIC SURGERY

## 2022-05-23 PROCEDURE — G8536 NO DOC ELDER MAL SCRN: HCPCS | Performed by: ORTHOPAEDIC SURGERY

## 2022-05-23 PROCEDURE — 1101F PT FALLS ASSESS-DOCD LE1/YR: CPT | Performed by: ORTHOPAEDIC SURGERY

## 2022-05-23 PROCEDURE — G9899 SCRN MAM PERF RSLTS DOC: HCPCS | Performed by: ORTHOPAEDIC SURGERY

## 2022-05-23 PROCEDURE — 3017F COLORECTAL CA SCREEN DOC REV: CPT | Performed by: ORTHOPAEDIC SURGERY

## 2022-05-23 PROCEDURE — G8427 DOCREV CUR MEDS BY ELIG CLIN: HCPCS | Performed by: ORTHOPAEDIC SURGERY

## 2022-05-23 NOTE — PROGRESS NOTES
Antwon Mcbride is a 66 y.o. female right handed retiree. Worker's Compensation and legal considerations: none filed. Vitals:    05/23/22 1016   Temp: (!) 95.7 °F (35.4 °C)   TempSrc: Temporal   Weight: 186 lb 8 oz (84.6 kg)   Height: 5' 4\" (1.626 m)   PainSc:   2   PainLoc: Hand   LMP: 11/17/1972           Chief Complaint   Patient presents with    Hand Pain     bilateral          HPI: Patient presents today with complaints of knots in the palms of her hands bilaterally. She reports tenderness in the area.     Date of onset: Indeterminate    Injury: No    Prior Treatment:  No    Numbness/ Tingling: No      ROS: Review of Systems - General ROS: negative  Psychological ROS: negative  ENT ROS: negative  Allergy and Immunology ROS: negative  Hematological and Lymphatic ROS: negative  Respiratory ROS: no cough, shortness of breath, or wheezing  Cardiovascular ROS: no chest pain or dyspnea on exertion  Gastrointestinal ROS: no abdominal pain, change in bowel habits, or black or bloody stools  Musculoskeletal ROS: negative  Neurological ROS: negative  Dermatological ROS: negative    Past Medical History:   Diagnosis Date    Acquired hypothyroidism     EMPERATRIZ (acute kidney injury) ([address] 7/24/2020    Depression     Diabetes (HCC)     GERD (gastroesophageal reflux disease)     Hiatal hernia     Hypercholesterolemia     Hypertension     Migraines     Moderate major depression ([address] 7/9/2018    Recurrent UTI     VCUG reportedly nl 90s, +/- pyelo, no hospitalizations, no surgeries, no scopes    Stage 3 chronic kidney disease ([address]     Thyroid disease        Past Surgical History:   Procedure Laterality Date    HX APPENDECTOMY      HX BREAST BIOPSY  1993    right breast, benign    HX HYSTERECTOMY      HX MOHS PROCEDURES Right 09/2001    HX OTHER SURGICAL  2001    HEAD LACERATION DUE TO A FALL    HX PARTIAL HYSTERECTOMY  11/17/1972    HX TUBAL LIGATION  11/1971       Current Outpatient Medications   Medication Sig Dispense Refill    cefdinir (OMNICEF) 300 mg capsule Take 1 Capsule by mouth two (2) times a day for 7 days. 14 Capsule 0    estradioL (Estrace) 0.01 % (0.1 mg/gram) vaginal cream Apply pea sized amount 3x weekly around urethra. 42.5 g 3    dilTIAZem ER (CARDIZEM CD) 360 mg capsule Take 1 Capsule by mouth daily. 90 Capsule 3    empagliflozin (JARDIANCE) 10 mg tablet Take 1 Tablet by mouth daily. 90 Tablet 3    insulin glargine (Lantus Solostar U-100 Insulin) 100 unit/mL (3 mL) inpn 22 Units by SubCUTAneous route daily. 5 Pen 3    ascorbic acid (VITAMIN C PO) Take  by mouth.  rosuvastatin (CRESTOR) 20 mg tablet Take 1 Tablet by mouth nightly. 90 Tablet 3    olmesartan (BENICAR) 40 mg tablet Take 1 Tablet by mouth daily. 90 Tablet 4    levothyroxine (SYNTHROID) 50 mcg tablet Take 1 Tablet by mouth Daily (before breakfast). 90 Tablet 4    dulaglutide (TRULICITY) 3.13 NQ/8.6 mL sub-q pen 0.5 mL by SubCUTAneous route every seven (7) days. 12 Each 3    insulin aspart U-100 (NOVOLOG) 100 unit/mL (3 mL) inpn 5 Units by SubCUTAneous route as needed (blood sugar >250, reassess in 30 minutes and repeat if needed). 1 Pen 2    Insulin Needles, Disposable, (BD Ultra-Fine Mini Pen Needle) 31 gauge x 3/16\" ndle USE AS DIRECTED 100 Pen Needle 11    glucose blood VI test strips (ACCU-CHEK CODY PLUS TEST STRP) strip Patient is to check blood sugar 3-4 times a day 300 Strip 11    cholecalciferol, VITAMIN D3, (VITAMIN D3) 5,000 unit tab tablet Take  by mouth daily.  acetaminophen (TYLENOL EXTRA STRENGTH) 500 mg tablet Take  by mouth every six (6) hours as needed for Pain.  esomeprazole (NEXIUM) 20 mg capsule Take  by mouth.  Blood-Glucose Meter (ACCU-CHEK CODY PLUS METER) misc 1 Device by Does Not Apply route four (4) times daily. 1 Each 0    BLADDER CONTROL PADS pads       elderberry fruit (ELDERBERRY PO) Take  by mouth.  (Patient not taking: Reported on 5/17/2022)      ALCOHOL PREP PADS padm Allergies   Allergen Reactions    Darvocet A500 [Propoxyphene N-Acetaminophen] Nausea and Vomiting    Gloves, Latex With Aloe Vera Contact Dermatitis     Rash from powder inside latex gloves      Metformin Diarrhea           PE:     Physical Exam  Vitals and nursing note reviewed. Constitutional:       General: She is not in acute distress. Appearance: Normal appearance. She is not ill-appearing. Cardiovascular:      Pulses: Normal pulses. Pulmonary:      Effort: Pulmonary effort is normal. No respiratory distress. Musculoskeletal:         General: Tenderness present. No swelling, deformity or signs of injury. Normal range of motion. Cervical back: Normal range of motion and neck supple. Right lower leg: No edema. Left lower leg: No edema. Skin:     General: Skin is warm and dry. Capillary Refill: Capillary refill takes less than 2 seconds. Findings: No bruising or erythema. Neurological:      General: No focal deficit present. Mental Status: She is alert and oriented to person, place, and time. Psychiatric:         Mood and Affect: Mood normal.         Behavior: Behavior normal.            Hand: Tenderness to palpation in the palmar fascia specifically around the A1 pulley of the ring finger bilaterally. Examination L Digit(s) R Digit(s)   1st CMC Tenderness -  -    1st CMC Grind -  -    Bella Nodes -  -    [place] Nodes -  -    A1 Pulley Tenderness -  -    Triggering -  -    UCL Instability -  -    RCL Instability -  -    Lateral Stress Pain -  -    Palmar Cords + RF + RF   Tabletop test -  -    Garrod's Pads -  -     Strength       Pinch Strength         ROM: Full        Imaging:     Not indicated        ICD-10-CM ICD-9-CM    1. Dupuytren's disease of palm of both hands  M72.0 728.6    2.  Palmar fasciitis  M72.0 728.6 INJECT TENDON SHEATH/LIGAMENT      triamcinolone acetonide (KENALOG) 10 mg/mL injection 10 mg         Plan:     Palmar fascia injection of the maximal point of tenderness bilaterally. This may represent a palmar fasciitis related to Dupuytren's disease. There is no indication to do an injection to lyse the cord with Xiaflex. I instructed the patient on tabletop test to evaluate for contractures. Follow-up and Dispositions    · Return if symptoms worsen or fail to improve. Plan was reviewed with patient, who verbalized agreement and understanding of the plan    [address] [facility] NOTE        Chart reviewed for the following:   Yoni GARZA DO, have reviewed the History, Physical and updated the Allergic reactions for Yulisa Crocker     TIME OUT performed immediately prior to start of procedure:   Yoni GARZA DO, have performed the following reviews on Yulisa Crocker prior to the start of the procedure:            * Patient was identified by name and date of birth   * Agreement on procedure being performed was verified  * Risks and Benefits explained to the patient  * Procedure site verified and marked as necessary  * Patient was positioned for comfort  * Consent was signed and verified     Time: 10:30 AM      Date of procedure: 5/23/2022    Procedure performed by: Tamera Black DO    Provider assisted by: Festus Carlisle MA    Patient assisted by: self    How tolerated by patient: tolerated the procedure well with no complications    Post Procedural Pain Scale: 0 - No Hurt    Comments: none    Procedure:  After consent was obtained, using sterile technique the bilateral palmar fascia was prepped. Local anesthetic used: 1% lidocaine. Kenalog 5 mg X2 and was then injected and the needle withdrawn. The procedure was well tolerated. The patient is asked to continue to rest the area for a few more days before resuming regular activities. It may be more painful for the first 1-2 days. Watch for fever, or increased swelling or persistent pain in the joint.  Call or return to clinic prn if such symptoms occur or there is failure to improve as anticipated.

## 2022-06-14 ENCOUNTER — OFFICE VISIT (OUTPATIENT)
Dept: FAMILY MEDICINE CLINIC | Age: 72
End: 2022-06-14
Payer: MEDICARE

## 2022-06-14 VITALS
OXYGEN SATURATION: 97 % | WEIGHT: 180 LBS | DIASTOLIC BLOOD PRESSURE: 64 MMHG | HEART RATE: 73 BPM | BODY MASS INDEX: 30.9 KG/M2 | TEMPERATURE: 97.5 F | SYSTOLIC BLOOD PRESSURE: 138 MMHG

## 2022-06-14 DIAGNOSIS — I10 ESSENTIAL HYPERTENSION: Primary | ICD-10-CM

## 2022-06-14 DIAGNOSIS — E66.09 CLASS 1 OBESITY DUE TO EXCESS CALORIES WITH SERIOUS COMORBIDITY AND BODY MASS INDEX (BMI) OF 31.0 TO 31.9 IN ADULT: ICD-10-CM

## 2022-06-14 DIAGNOSIS — E11.21 TYPE 2 DIABETES MELLITUS WITH DIABETIC NEPHROPATHY, WITH LONG-TERM CURRENT USE OF INSULIN (HCC): ICD-10-CM

## 2022-06-14 DIAGNOSIS — Z79.4 TYPE 2 DIABETES MELLITUS WITH DIABETIC NEPHROPATHY, WITH LONG-TERM CURRENT USE OF INSULIN (HCC): ICD-10-CM

## 2022-06-14 DIAGNOSIS — B37.2 INTERTRIGINOUS CANDIDIASIS: ICD-10-CM

## 2022-06-14 PROCEDURE — G8510 SCR DEP NEG, NO PLAN REQD: HCPCS | Performed by: FAMILY MEDICINE

## 2022-06-14 PROCEDURE — 3051F HG A1C>EQUAL 7.0%<8.0%: CPT | Performed by: FAMILY MEDICINE

## 2022-06-14 PROCEDURE — G9899 SCRN MAM PERF RSLTS DOC: HCPCS | Performed by: FAMILY MEDICINE

## 2022-06-14 PROCEDURE — G8536 NO DOC ELDER MAL SCRN: HCPCS | Performed by: FAMILY MEDICINE

## 2022-06-14 PROCEDURE — 99214 OFFICE O/P EST MOD 30 MIN: CPT | Performed by: FAMILY MEDICINE

## 2022-06-14 PROCEDURE — 1090F PRES/ABSN URINE INCON ASSESS: CPT | Performed by: FAMILY MEDICINE

## 2022-06-14 PROCEDURE — G8427 DOCREV CUR MEDS BY ELIG CLIN: HCPCS | Performed by: FAMILY MEDICINE

## 2022-06-14 PROCEDURE — 1123F ACP DISCUSS/DSCN MKR DOCD: CPT | Performed by: FAMILY MEDICINE

## 2022-06-14 PROCEDURE — G8754 DIAS BP LESS 90: HCPCS | Performed by: FAMILY MEDICINE

## 2022-06-14 PROCEDURE — G8399 PT W/DXA RESULTS DOCUMENT: HCPCS | Performed by: FAMILY MEDICINE

## 2022-06-14 PROCEDURE — G8752 SYS BP LESS 140: HCPCS | Performed by: FAMILY MEDICINE

## 2022-06-14 PROCEDURE — 1101F PT FALLS ASSESS-DOCD LE1/YR: CPT | Performed by: FAMILY MEDICINE

## 2022-06-14 PROCEDURE — 2022F DILAT RTA XM EVC RTNOPTHY: CPT | Performed by: FAMILY MEDICINE

## 2022-06-14 PROCEDURE — G8417 CALC BMI ABV UP PARAM F/U: HCPCS | Performed by: FAMILY MEDICINE

## 2022-06-14 PROCEDURE — 3017F COLORECTAL CA SCREEN DOC REV: CPT | Performed by: FAMILY MEDICINE

## 2022-06-14 RX ORDER — CHLORPHENIRAMINE MALEATE 4 MG
TABLET ORAL 2 TIMES DAILY
Qty: 15 G | Refills: 3 | Status: SHIPPED | OUTPATIENT
Start: 2022-06-14 | End: 2022-07-29

## 2022-06-14 NOTE — PROGRESS NOTES
Pt here for BP f/u. Pt reports a rash at pelvic area and under breast which she noticed about 1 week ago. 1. \"Have you been to the ER, urgent care clinic since your last visit? Hospitalized since your last visit? \" No    2. \"Have you seen or consulted any other health care providers outside of the [address] [place] [address] since your last visit? \" No     3. For patients aged 39-70: Has the patient had a colonoscopy / FIT/ Cologuard? Yes - no Care Gap present      If the patient is female:    4. For patients aged 41-77: Has the patient had a mammogram within the past 2 years? Yes - no Care Gap present      5. For patients aged 21-65: Has the patient had a pap smear?  NA - based on age or sex

## 2022-06-14 NOTE — PROGRESS NOTES
Baylee Mccartney (: 1950) is a 66 y.o. female, established patient, here for:    ASSESSMENT/PLAN:  1. Essential hypertension  Assessment & Plan:  Home values well controlled despite borderline value in office. Continue olmesartan 40 and diltiazem 360 mg unchanged. Consider Holter for palpitations. Observing for now. 2. Intertriginous candidiasis  Comments:  clotrimazole. Dry well daily with cool hairdryer  Orders:  -     clotrimazole (LOTRIMIN) 1 % topical cream; Apply  to affected area two (2) times a day., Normal, Disp-15 g, R-3  3. Class 1 obesity due to excess calories with serious comorbidity and body mass index (BMI) of 31.0 to 31.9 in adult  Assessment & Plan:  Praised for great job losing weight! Keep it up! 4. Type 2 diabetes mellitus with diabetic nephropathy, with long-term current use of insulin (HCC)  -     HEMOGLOBIN A1C WITH EAG; Future  -     METABOLIC PANEL, COMPREHENSIVE; Future  -     LIPID PANEL W/ REFLX DIRECT LDL; Future      Return in about 2 months (around 2022) for chronic medical conditions, labs 1 week prior, (30). SUBJECTIVE/OBJECTIVE:  HPI    follow up hypertension - diltiazem 360 and olmesartan 40. Noting episodic palpitations since dilt dose increase. Home pressures well controlled    Exercising more - playing outside with grandson. Losing weight    Noticed tender rash in skin folds approx 1 week ago       Physical Exam  Constitutional:       General: She is not in acute distress. Appearance: She is well-developed. She is not diaphoretic. HENT:      Head: Normocephalic and atraumatic. Cardiovascular:      Rate and Rhythm: Normal rate and regular rhythm. Heart sounds: Normal heart sounds. No murmur heard. No friction rub. No gallop. Pulmonary:      Effort: Pulmonary effort is normal. No respiratory distress. Breath sounds: Normal breath sounds. No wheezing, rhonchi or rales. Skin:     General: Skin is warm and dry. Findings: Rash present. Nails: There is no clubbing. Comments: Intensely red crescent without skin breakdown within fold of abdominal pannus. No odor or discharge. Satellite lesions  Faint pink pinpoint lesions TNTC under breasts aggregated into crescents without coaliescing. No odor or discharge. Neurological:      Mental Status: She is alert and oriented to person, place, and time. Psychiatric:         Behavior: Behavior normal.         Thought Content:  Thought content normal.         Judgment: Judgment normal.               -- Laurence Zhao MD

## 2022-06-14 NOTE — ASSESSMENT & PLAN NOTE
Home values well controlled despite borderline value in office. Continue olmesartan 40 and diltiazem 360 mg unchanged. Consider Holter for palpitations. Observing for now.

## 2022-07-29 ENCOUNTER — OFFICE VISIT (OUTPATIENT)
Dept: FAMILY MEDICINE CLINIC | Age: 72
End: 2022-07-29
Payer: MEDICARE

## 2022-07-29 VITALS
HEART RATE: 75 BPM | BODY MASS INDEX: 30.22 KG/M2 | WEIGHT: 177 LBS | TEMPERATURE: 97.1 F | OXYGEN SATURATION: 97 % | DIASTOLIC BLOOD PRESSURE: 60 MMHG | RESPIRATION RATE: 14 BRPM | HEIGHT: 64 IN | SYSTOLIC BLOOD PRESSURE: 136 MMHG

## 2022-07-29 DIAGNOSIS — R10.31 ABDOMINAL PAIN, RLQ: ICD-10-CM

## 2022-07-29 DIAGNOSIS — Z09 HOSPITAL DISCHARGE FOLLOW-UP: Primary | ICD-10-CM

## 2022-07-29 DIAGNOSIS — N18.32 STAGE 3B CHRONIC KIDNEY DISEASE (HCC): ICD-10-CM

## 2022-07-29 DIAGNOSIS — R19.8 ALTERED BOWEL FUNCTION: ICD-10-CM

## 2022-07-29 PROCEDURE — G8399 PT W/DXA RESULTS DOCUMENT: HCPCS | Performed by: FAMILY MEDICINE

## 2022-07-29 PROCEDURE — G8754 DIAS BP LESS 90: HCPCS | Performed by: FAMILY MEDICINE

## 2022-07-29 PROCEDURE — 1090F PRES/ABSN URINE INCON ASSESS: CPT | Performed by: FAMILY MEDICINE

## 2022-07-29 PROCEDURE — 3017F COLORECTAL CA SCREEN DOC REV: CPT | Performed by: FAMILY MEDICINE

## 2022-07-29 PROCEDURE — G8536 NO DOC ELDER MAL SCRN: HCPCS | Performed by: FAMILY MEDICINE

## 2022-07-29 PROCEDURE — G9899 SCRN MAM PERF RSLTS DOC: HCPCS | Performed by: FAMILY MEDICINE

## 2022-07-29 PROCEDURE — G8432 DEP SCR NOT DOC, RNG: HCPCS | Performed by: FAMILY MEDICINE

## 2022-07-29 PROCEDURE — G8417 CALC BMI ABV UP PARAM F/U: HCPCS | Performed by: FAMILY MEDICINE

## 2022-07-29 PROCEDURE — 1101F PT FALLS ASSESS-DOCD LE1/YR: CPT | Performed by: FAMILY MEDICINE

## 2022-07-29 PROCEDURE — G8752 SYS BP LESS 140: HCPCS | Performed by: FAMILY MEDICINE

## 2022-07-29 PROCEDURE — G8427 DOCREV CUR MEDS BY ELIG CLIN: HCPCS | Performed by: FAMILY MEDICINE

## 2022-07-29 PROCEDURE — 1111F DSCHRG MED/CURRENT MED MERGE: CPT | Performed by: FAMILY MEDICINE

## 2022-07-29 PROCEDURE — 99215 OFFICE O/P EST HI 40 MIN: CPT | Performed by: FAMILY MEDICINE

## 2022-07-29 PROCEDURE — 1123F ACP DISCUSS/DSCN MKR DOCD: CPT | Performed by: FAMILY MEDICINE

## 2022-07-29 NOTE — Clinical Note
Alva Partida, /[facility] isn't linking to an actual colo report with [facility] [address]. I think she had a colo in 2018 but we can't see it. Please obtain.  Thanks, TIM

## 2022-07-29 NOTE — ASSESSMENT & PLAN NOTE
RLQ due to constipation. Reassuring abd/pelvic CT reviewed. Incidental appy with hysterectomy. Take only medications prescribed DO NOT TAKE OTC AGENTS/ PRODUCTS FROM FRIENDS WITHOUT CLEARING WITH DR. Eloisa Galaviz. follow up if symptoms do not resolve with resolution of constipation.

## 2022-07-29 NOTE — PROGRESS NOTES
Patient here today for hospital follow up for CKD. She says she has been having abdominal pain that began while she was in patient. 1. \"Have you been to the ER, urgent care clinic since your last visit? Hospitalized since your last visit? \"  Hernández Conception 7/19-7/23, CKD    2. \"Have you seen or consulted any other health care providers outside of the [address] [place] [address] since your last visit? \" No     3. For patients aged 39-70: Has the patient had a colonoscopy / FIT/ Cologuard? Yes - no Care Gap present      If the patient is female:    4. For patients aged 41-77: Has the patient had a mammogram within the past 2 years? Yes - no Care Gap present      5. For patients aged 21-65: Has the patient had a pap smear?  NA - based on age or sex

## 2022-07-29 NOTE — PATIENT INSTRUCTIONS
Do NOT take anything from friends or over the counter without expressly clearing it with Dr. Jermaine Dodd!

## 2022-07-29 NOTE — ASSESSMENT & PLAN NOTE
hosp discharge states CKD 4. Awaiting definitive answer from Dr. Melba Teran. AKIand hyperkalemia due to consumption of electrolyte beverages \"Body Armor\". Strongly advised against consuming any prepackaged drinks/foods or or OTC agents or passalongs from others without express approval from Dr. Melba Teran. Adhere to hospital diet. Seek dietary guidance from nephro team on protein consumption and K+ limit. Chicken and fish are also protein! Staying off ARB pending visit with him. BP reasonable at the moment. Not at target. Staying off Jardiance and PPI.   Scheduled to see me for follow up of DM2 in a few weeks

## 2022-07-29 NOTE — PROGRESS NOTES
Hospital Discharge Follow up Note    Patient: Luis F Vyas  : 1950  PCP: Nikko Holloway MD    Date of office visit: 2022   Date of admission:   Date of discharge:   Hospital: [facility]     *No documented post hospital discharge outreach found in the last 14 days        Assessment/Plan:   Diagnoses and all orders for this visit:    1. Hospital discharge follow-up  -     OR DISCHARGE MEDS RECONCILED W/ CURRENT OUTPATIENT MED LIST    2. Stage 3b chronic kidney disease ([address]  Assessment & Plan:  hosp discharge states CKD 4. Awaiting definitive answer from Dr. Mireille Leung. AKIand hyperkalemia due to consumption of electrolyte beverages \"Body Armor\". Strongly advised against consuming any prepackaged drinks/foods or or OTC agents or passalongs from others without express approval from Dr. Mireille Leung. Adhere to hospital diet. Seek dietary guidance from nephro team on protein consumption and K+ limit. Chicken and fish are also protein! Staying off ARB pending visit with him. BP reasonable at the moment. Not at target. Staying off Jardiance and PPI. Scheduled to see me for follow up of DM2 in a few weeks      3. Altered bowel function  Assessment & Plan:  RLQ due to constipation. Reassuring abd/pelvic CT reviewed. Incidental appy with hysterectomy. Take only medications prescribed DO NOT TAKE OTC AGENTS/ PRODUCTS FROM FRIENDS WITHOUT CLEARING WITH DR. Silke Gonzales. follow up if symptoms do not resolve with resolution of constipation. 4. Abdominal pain, RLQ       Schedule follow up with Dr. Mireille Leung    Subjective: Luis F Vyas is a 66 y.o. female presenting today for follow-up after hospital discharge. This encounter and supporting documentation was reviewed if available. Medication reconciliation was performed today. The main problem requiring admission was hyperkalemia, EMPERATRIZ on CKD IV, uncontrolled DM2.        Noted on labs done for Dr. Mireille Leung    Reports had consumed a couple of Body Armor \"elecrolyte drinks\"  Post discharge looked at label \"700 mg potassium\"    Medically managed  Advised to hold olmesartan pending nephro appt    Complications during admission: none    Dc labs  K+4.0  Cr 2.5    Abd pain in patient. CT. Assessed due to constipation    Discharged with bisacodyl EC 5 mg 2 daily prn  Sennosides 8.6 mg 1 bid prn    discontinued Jardiance, PPI and olmesartan    Took Smooth Move with partial relief of constipation and improvement in pain  Did not try agents prescribed          Objective:   /60 (BP 1 Location: Left upper arm, BP Patient Position: Sitting)   Pulse 75   Temp 97.1 °F (36.2 °C) (Tympanic)   Resp 14   Ht 5' 4\" (1.626 m)   Wt 177 lb (80.3 kg)   LMP 11/17/1972 Comment: partial  SpO2 97%   BMI 30.38 kg/m²      Physical Exam  Constitutional:       General: She is not in acute distress. Appearance: She is well-developed. She is not diaphoretic. HENT:      Head: Normocephalic and atraumatic. Cardiovascular:      Rate and Rhythm: Normal rate and regular rhythm. Heart sounds: Normal heart sounds. No murmur heard. No friction rub. No gallop. Pulmonary:      Effort: Pulmonary effort is normal. No respiratory distress. Breath sounds: Normal breath sounds. No wheezing, rhonchi or rales. Abdominal:      Palpations: Abdomen is soft. There is no mass. Tenderness: There is abdominal tenderness (RLQ). There is no guarding or rebound. Musculoskeletal:      Right lower leg: No edema. Left lower leg: No edema. Skin:     General: Skin is warm and dry. Nails: There is no clubbing. Neurological:      Mental Status: She is alert and oriented to person, place, and time. Psychiatric:         Behavior: Behavior normal.         Thought Content: Thought content normal.         Judgment: Judgment normal.          We discussed the expected course, resolution and complications of the diagnosis(es) in detail.   Medication risks, benefits, costs, interactions, and alternatives were discussed as indicated. I advised her to contact the office if her condition worsens, changes or fails to improve as anticipated. She expressed understanding with the diagnosis(es) and plan. I spent at least 40 minutes on this encounter.       Donavon Lund MD

## 2022-08-10 ENCOUNTER — HOSPITAL ENCOUNTER (OUTPATIENT)
Dept: LAB | Age: 72
Discharge: HOME | End: 2022-08-10

## 2022-08-10 ENCOUNTER — CLINICAL SUPPORT (OUTPATIENT)
Dept: FAMILY MEDICINE CLINIC | Age: 72
End: 2022-08-10

## 2022-08-10 DIAGNOSIS — Z79.4 TYPE 2 DIABETES MELLITUS WITH DIABETIC NEPHROPATHY, WITH LONG-TERM CURRENT USE OF INSULIN (HCC): ICD-10-CM

## 2022-08-10 DIAGNOSIS — E11.21 DIABETIC NEPHROPATHY ASSOCIATED WITH TYPE 2 DIABETES MELLITUS (HCC): Primary | ICD-10-CM

## 2022-08-10 DIAGNOSIS — E11.21 TYPE 2 DIABETES MELLITUS WITH DIABETIC NEPHROPATHY, WITH LONG-TERM CURRENT USE OF INSULIN (HCC): ICD-10-CM

## 2022-08-10 LAB — SENTARA SPECIMEN COL,SENBCF: NORMAL

## 2022-08-10 PROCEDURE — 99001 SPECIMEN HANDLING PT-LAB: CPT

## 2022-08-11 LAB
A-G RATIO,AGRAT: 2.1 RATIO (ref 1.1–2.6)
ALBUMIN SERPL-MCNC: 4.5 G/DL (ref 3.5–5)
ALP SERPL-CCNC: 97 U/L (ref 40–120)
ALT SERPL-CCNC: 13 U/L (ref 5–40)
ANION GAP SERPL CALC-SCNC: 14 MMOL/L (ref 3–15)
AST SERPL W P-5'-P-CCNC: 16 U/L (ref 10–37)
AVG GLU, 10930: 160 MG/DL (ref 91–123)
BILIRUB SERPL-MCNC: 0.4 MG/DL (ref 0.2–1.2)
BUN SERPL-MCNC: 24 MG/DL (ref 6–22)
CALCIUM SERPL-MCNC: 9.3 MG/DL (ref 8.4–10.5)
CHLORIDE SERPL-SCNC: 103 MMOL/L (ref 98–110)
CHOLEST SERPL-MCNC: 130 MG/DL (ref 110–200)
CO2 SERPL-SCNC: 24 MMOL/L (ref 20–32)
CREAT SERPL-MCNC: 1.8 MG/DL (ref 0.8–1.4)
GLOBULIN,GLOB: 2.1 G/DL (ref 2–4)
GLOMERULAR FILTRATION RATE: 29.2 ML/MIN/1.73 SQ.M.
GLUCOSE SERPL-MCNC: 156 MG/DL (ref 70–99)
HBA1C MFR BLD HPLC: 7.2 % (ref 4.8–5.6)
HDLC SERPL-MCNC: 2.7 MG/DL (ref 0–5)
HDLC SERPL-MCNC: 48 MG/DL
LDL, DIRECT,DLDL: 60 MG/DL (ref 50–99)
LDL/HDL RATIO,LDHD: 1.3
LDLC SERPL CALC-MCNC: 59 MG/DL (ref 50–99)
NON-HDL CHOLESTEROL, 011976: 82 MG/DL
POTASSIUM SERPL-SCNC: 5 MMOL/L (ref 3.5–5.5)
PROT SERPL-MCNC: 6.6 G/DL (ref 6.2–8.1)
SODIUM SERPL-SCNC: 141 MMOL/L (ref 133–145)
TRIGL SERPL-MCNC: 114 MG/DL (ref 40–149)
VLDLC SERPL CALC-MCNC: 23 MG/DL (ref 8–30)

## 2022-08-16 ENCOUNTER — OFFICE VISIT (OUTPATIENT)
Dept: FAMILY MEDICINE CLINIC | Age: 72
End: 2022-08-16
Payer: MEDICARE

## 2022-08-16 VITALS
SYSTOLIC BLOOD PRESSURE: 132 MMHG | DIASTOLIC BLOOD PRESSURE: 70 MMHG | WEIGHT: 181 LBS | OXYGEN SATURATION: 97 % | RESPIRATION RATE: 18 BRPM | HEART RATE: 71 BPM | BODY MASS INDEX: 31.07 KG/M2 | TEMPERATURE: 97.3 F

## 2022-08-16 DIAGNOSIS — D50.0 IRON DEFICIENCY ANEMIA DUE TO CHRONIC BLOOD LOSS: ICD-10-CM

## 2022-08-16 DIAGNOSIS — E03.9 ACQUIRED HYPOTHYROIDISM: ICD-10-CM

## 2022-08-16 DIAGNOSIS — E55.9 VITAMIN D DEFICIENCY: ICD-10-CM

## 2022-08-16 DIAGNOSIS — N18.4 CKD (CHRONIC KIDNEY DISEASE) STAGE 4, GFR 15-29 ML/MIN (HCC): ICD-10-CM

## 2022-08-16 DIAGNOSIS — Z79.4 TYPE 2 DIABETES MELLITUS WITH DIABETIC NEPHROPATHY, WITH LONG-TERM CURRENT USE OF INSULIN (HCC): Primary | ICD-10-CM

## 2022-08-16 DIAGNOSIS — E11.21 TYPE 2 DIABETES MELLITUS WITH DIABETIC NEPHROPATHY, WITH LONG-TERM CURRENT USE OF INSULIN (HCC): Primary | ICD-10-CM

## 2022-08-16 DIAGNOSIS — M19.049 HAND ARTHRITIS: ICD-10-CM

## 2022-08-16 DIAGNOSIS — I10 ESSENTIAL HYPERTENSION: ICD-10-CM

## 2022-08-16 PROCEDURE — 3017F COLORECTAL CA SCREEN DOC REV: CPT | Performed by: FAMILY MEDICINE

## 2022-08-16 PROCEDURE — G8752 SYS BP LESS 140: HCPCS | Performed by: FAMILY MEDICINE

## 2022-08-16 PROCEDURE — 1101F PT FALLS ASSESS-DOCD LE1/YR: CPT | Performed by: FAMILY MEDICINE

## 2022-08-16 PROCEDURE — G8432 DEP SCR NOT DOC, RNG: HCPCS | Performed by: FAMILY MEDICINE

## 2022-08-16 PROCEDURE — G8427 DOCREV CUR MEDS BY ELIG CLIN: HCPCS | Performed by: FAMILY MEDICINE

## 2022-08-16 PROCEDURE — 3051F HG A1C>EQUAL 7.0%<8.0%: CPT | Performed by: FAMILY MEDICINE

## 2022-08-16 PROCEDURE — G8399 PT W/DXA RESULTS DOCUMENT: HCPCS | Performed by: FAMILY MEDICINE

## 2022-08-16 PROCEDURE — 1090F PRES/ABSN URINE INCON ASSESS: CPT | Performed by: FAMILY MEDICINE

## 2022-08-16 PROCEDURE — 2022F DILAT RTA XM EVC RTNOPTHY: CPT | Performed by: FAMILY MEDICINE

## 2022-08-16 PROCEDURE — 99214 OFFICE O/P EST MOD 30 MIN: CPT | Performed by: FAMILY MEDICINE

## 2022-08-16 PROCEDURE — G8754 DIAS BP LESS 90: HCPCS | Performed by: FAMILY MEDICINE

## 2022-08-16 PROCEDURE — 1123F ACP DISCUSS/DSCN MKR DOCD: CPT | Performed by: FAMILY MEDICINE

## 2022-08-16 PROCEDURE — G8536 NO DOC ELDER MAL SCRN: HCPCS | Performed by: FAMILY MEDICINE

## 2022-08-16 PROCEDURE — G9899 SCRN MAM PERF RSLTS DOC: HCPCS | Performed by: FAMILY MEDICINE

## 2022-08-16 PROCEDURE — G8417 CALC BMI ABV UP PARAM F/U: HCPCS | Performed by: FAMILY MEDICINE

## 2022-08-16 NOTE — PROGRESS NOTES
1. \"Have you been to the ER, urgent care clinic since your last visit? Hospitalized since your last visit? \" No    2. \"Have you seen or consulted any other health care providers outside of the [address] [place] [address] since your last visit? \" No     3. For patients aged 39-70: Has the patient had a colonoscopy / FIT/ Cologuard? Yes - no Care Gap present      If the patient is female:    4. For patients aged 41-77: Has the patient had a mammogram within the past 2 years? Yes - no Care Gap present      5. For patients aged 21-65: Has the patient had a pap smear? NA - based on age or sex    Chief Complaint   Patient presents with    Follow Up Chronic Condition     Diabetes      Patient states her right hand is swollen. Patient would like to see if Dr. Ila Santiago would be able to freeze mole on right side up in [facility] area.
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
## 2022-08-16 NOTE — ASSESSMENT & PLAN NOTE
Lytes normalized. Cr 1.8. Benicar still suspended. BP reasonable therefore will defer decision making to Dr. Caffie Osgood. Hasn't received return call from his office to schedule hospital discharge follow up. She will call them again.  Referring to nutrition

## 2022-09-16 ENCOUNTER — HOSPITAL ENCOUNTER (OUTPATIENT)
Dept: NUTRITION | Age: 72
Discharge: HOME | End: 2022-09-16
Payer: MEDICARE

## 2022-09-16 PROCEDURE — 97802 MEDICAL NUTRITION INDIV IN: CPT

## 2022-09-16 NOTE — PROGRESS NOTES
[address] [place] [address], [address] [place] [address] Road  Phone: [phone] Fax: [phone]   Nutrition Assessment - Medical Nutrition Therapy   Outpatient Initial Evaluation         Patient Name: Angelito Loredo : 1950   Treatment Diagnosis: Type 2 diabetes mellitus with diabetic nephropathy   Referral Source: Kamar Gonzalez MD [facility] of [facility] in [place]): 2022     Gender: female Age: 66 y.o. Ht:  64 in Wt: 180  lb  kg   BMI:  BMR   Male  BMR Female      Past Medical History:  Diabetes, CKD Stage 4, Arthritis, High Cholesterol, High Blood Pressure, Constipation, Hypothyroidism      Pertinent Medications:   Includes: Trulicity, Lantus, Vitamin D, Levothyroxine, Rosuvastatin, Diltiazem, Metoclopramide      Biochemical Data:   Lab Results   Component Value Date/Time    Hemoglobin A1c 7.2 (H) 08/10/2022 09:14 AM    Hemoglobin A1c, External 11.1 2016 12:00 AM     Lab Results   Component Value Date/Time    Sodium 141 08/10/2022 09:14 AM    Potassium 5.0 08/10/2022 09:14 AM    Chloride 103 08/10/2022 09:14 AM    CO2 24 08/10/2022 09:14 AM    Anion gap 14.0 08/10/2022 09:14 AM    Glucose 156 (H) 08/10/2022 09:14 AM    BUN 24 (H) 08/10/2022 09:14 AM    Creatinine 1.8 (H) 08/10/2022 09:14 AM    BUN/Creatinine ratio 16 2020 03:00 PM    GFR est AA 34 (L) 2020 03:00 PM    GFR est non-AA 28 (L) 2020 03:00 PM    Calcium 9.3 08/10/2022 09:14 AM    Bilirubin, total 0.4 08/10/2022 09:14 AM    Alk.  phosphatase 97 08/10/2022 09:14 AM    Protein, total 6.6 08/10/2022 09:14 AM    Albumin 4.5 08/10/2022 09:14 AM    Globulin 2.1 08/10/2022 09:14 AM    A-G Ratio 2.1 08/10/2022 09:14 AM    ALT (SGPT) 13 08/10/2022 09:14 AM    AST (SGOT) 16 08/10/2022 09:14 AM     Lab Results   Component Value Date/Time    Cholesterol, total 130 08/10/2022 09:14 AM    HDL Cholesterol 48 08/10/2022 09:14 AM    LDL,Direct 60 08/10/2022 09:14 AM    LDL, calculated 59 08/10/2022 09:14 AM    VLDL, calculated 23 08/10/2022 09:14 AM    Triglyceride 114 08/10/2022 09:14 AM    CHOL/HDL Ratio 3.9 01/30/2020 10:23 AM     Lab Results   Component Value Date/Time    ALT (SGPT) 13 08/10/2022 09:14 AM    AST (SGOT) 16 08/10/2022 09:14 AM    Alk. phosphatase 97 08/10/2022 09:14 AM    Bilirubin, total 0.4 08/10/2022 09:14 AM     Lab Results   Component Value Date/Time    Creatinine, POC 1.6 (H) 01/09/2019 10:30 AM    Creatinine 1.8 (H) 08/10/2022 09:14 AM     Lab Results   Component Value Date/Time    BUN 24 (H) 08/10/2022 09:14 AM    BUN, POC 27 (H) 01/09/2019 10:30 AM     Lab Results   Component Value Date/Time    Microalbumin/Creat ratio (mg/g creat) 93 (H) 11/12/2019 10:50 AM    Microalb/Creat ratio (ug/mg creat.) 191.9 (H) 01/26/2021 09:04 AM    Microalbumin,urine random 11.00 (H) 11/12/2019 10:50 AM        Assessment:   Patient present to learn about nutrition related to diabetic control and CKD. Patient reports that she has had diabetes and CKD for many years. Patient reports that her DM has been well controlled for the past year when she was started on Trulicity. Patient reports that her CKD is progressing much faster than her doctor would like. Patient was recently hospitalized with high potassium levels. Patient was drinking large amounts of [employer] [place] sports drinks. Patient reports that she motors her intake of sodium, potassium, and protein. However, patient was not able to provide information regarding amounts or serving size. Patient is able to provided very little information regarding what foods are high or low in sodium or potassium. Food & Nutrition: 24 hour recall:  Breakfast: granola blueberry bar  Lunch: salad, tomato soup  Dinner: frozen meal- chicken, pasta, broccoli  Drinks: water, diet coke, flavored water  Snacks: canned peaches, popcorn, candy, chips    Patient was difficult to obtain information from regarding diet. Patient would state that she ate certain foods. Then, as patient learned that some foods she consumes are very high in potassium or sodium she would state that she never eats those foods. For example patient first reported that broccoli is her favorite vegetable and she \"eats it all the time\". After finding out patient needs to monitor broccoli intake, she reported that she rarely eats broccoli and has only small portions. Estimate Needs   Calories:   Protein:  Carbs: Fat:    Kcal/day  g/day  g/day  g/day        percent: 25  39  30               Nutrition Diagnosis Undesirable food choices related to food and nutrition related knowledge deficit as evidenced by patient unable to read nutrition label to identify amount of potassium, sodium, phosphorus, and protein in foods and drinks. Nutrition Intervention &  Education: Educated patient on the need for a consistent carbohydrate intake related to diabetic control and weight loss. Educated patient on nutrition label reading, emphasizing carbohydrates and serving size. Educated patient on protein sources, encouraged patient to incorporate mostly lean protein source with all carbohydrates. Encouraged patient to exercise after meals when possible.       Handouts Provided: [x]  Carbohydrates  [x]  Protein  []  Non-starchy Vegatbles  [x]  Food Label  [x]  Meal and Snack Ideas  []  Food Journals []  Diabetes  []  Cholesterol  []  Sodium  []  Gen Nutr Guidelines  []  SBGM Guidelines  []  Others:   Information Reviewed with: Patient    Readiness to Change Stage: [x]  Pre-contemplative    []  Contemplative  []  Preparation               []  Action                  []  Maintenance   Potential Barriers to Learning: []  Decline in memory    []  Language barrier   []  Other:  []  Emotional                  []  Limited mobility  [x]  Lack of motivation     [] Vision, hearing or cognitive impairment   Expected Compliance: Poor     Nutritional Goal - To promote lifestyle changes to result in:    [x]  Weight loss  [x]  Improved diabetic control  []  Decreased cholesterol levels  []  Decreased blood pressure  []  Weight maintenance []  Preventing any interactions associated with food allergies  []  Adequate weight gain toward goal weight  []  Other:        Patient Goals:   1. Patient will consume three meals per day 4-5 hours apart. 2. Patient will monitor intake of protein.         Dietitian Signature: Isha Roche RD Date: 9/16/2022   Follow-up:  Time: 12:25 PM

## 2022-10-17 ENCOUNTER — OFFICE VISIT (OUTPATIENT)
Dept: ORTHOPEDIC SURGERY | Age: 72
End: 2022-10-17
Payer: MEDICARE

## 2022-10-17 VITALS
OXYGEN SATURATION: 99 % | TEMPERATURE: 96.8 F | HEART RATE: 81 BPM | BODY MASS INDEX: 31.24 KG/M2 | WEIGHT: 183 LBS | HEIGHT: 64 IN

## 2022-10-17 DIAGNOSIS — G56.01 RIGHT CARPAL TUNNEL SYNDROME: Primary | ICD-10-CM

## 2022-10-17 DIAGNOSIS — M25.531 RIGHT WRIST PAIN: ICD-10-CM

## 2022-10-17 PROCEDURE — 1090F PRES/ABSN URINE INCON ASSESS: CPT | Performed by: ORTHOPAEDIC SURGERY

## 2022-10-17 PROCEDURE — G8756 NO BP MEASURE DOC: HCPCS | Performed by: ORTHOPAEDIC SURGERY

## 2022-10-17 PROCEDURE — G8399 PT W/DXA RESULTS DOCUMENT: HCPCS | Performed by: ORTHOPAEDIC SURGERY

## 2022-10-17 PROCEDURE — G8427 DOCREV CUR MEDS BY ELIG CLIN: HCPCS | Performed by: ORTHOPAEDIC SURGERY

## 2022-10-17 PROCEDURE — 73110 X-RAY EXAM OF WRIST: CPT | Performed by: ORTHOPAEDIC SURGERY

## 2022-10-17 PROCEDURE — 1123F ACP DISCUSS/DSCN MKR DOCD: CPT | Performed by: ORTHOPAEDIC SURGERY

## 2022-10-17 PROCEDURE — G8417 CALC BMI ABV UP PARAM F/U: HCPCS | Performed by: ORTHOPAEDIC SURGERY

## 2022-10-17 PROCEDURE — 1101F PT FALLS ASSESS-DOCD LE1/YR: CPT | Performed by: ORTHOPAEDIC SURGERY

## 2022-10-17 PROCEDURE — G8432 DEP SCR NOT DOC, RNG: HCPCS | Performed by: ORTHOPAEDIC SURGERY

## 2022-10-17 PROCEDURE — G9899 SCRN MAM PERF RSLTS DOC: HCPCS | Performed by: ORTHOPAEDIC SURGERY

## 2022-10-17 PROCEDURE — 99214 OFFICE O/P EST MOD 30 MIN: CPT | Performed by: ORTHOPAEDIC SURGERY

## 2022-10-17 PROCEDURE — 20526 THER INJECTION CARP TUNNEL: CPT | Performed by: ORTHOPAEDIC SURGERY

## 2022-10-17 PROCEDURE — G8536 NO DOC ELDER MAL SCRN: HCPCS | Performed by: ORTHOPAEDIC SURGERY

## 2022-10-17 PROCEDURE — 3017F COLORECTAL CA SCREEN DOC REV: CPT | Performed by: ORTHOPAEDIC SURGERY

## 2022-10-17 RX ORDER — METOCLOPRAMIDE 5 MG/1
5 TABLET ORAL
COMMUNITY

## 2022-10-17 RX ORDER — SENNOSIDES 8.6 MG/1
8.6 TABLET ORAL
COMMUNITY
Start: 2022-07-23

## 2022-10-17 RX ORDER — ESOMEPRAZOLE MAGNESIUM 40 MG/1
40 CAPSULE, DELAYED RELEASE ORAL DAILY
COMMUNITY
Start: 2022-10-03

## 2022-10-17 NOTE — PROGRESS NOTES
Rand Pizarro is a 66 y.o. female right handed retiree. Worker's Compensation and legal considerations: none filed. Vitals:    10/17/22 0849   Pulse: 81   Temp: 96.8 °F (36 °C)   TempSrc: Temporal   SpO2: 99%   Weight: 183 lb (83 kg)   Height: 5' 4\" (1.626 m)   LMP: 11/17/1972             Chief Complaint   Patient presents with    Hand Pain     Right         HPI: Patient presents today with a new problem of right hand numbness and tingling and pain in the wrist.    Initial HPI: Patient presents today with complaints of knots in the palms of her hands bilaterally. She reports tenderness in the area.     Date of onset: Indeterminate    Injury: No    Prior Treatment:  Yes: Comment: Bilateral palmar fascia injections    Numbness/ Tingling: No      ROS: Review of Systems - General ROS: negative  Psychological ROS: negative  ENT ROS: negative  Allergy and Immunology ROS: negative  Hematological and Lymphatic ROS: negative  Respiratory ROS: no cough, shortness of breath, or wheezing  Cardiovascular ROS: no chest pain or dyspnea on exertion  Gastrointestinal ROS: no abdominal pain, change in bowel habits, or black or bloody stools  Musculoskeletal ROS: negative  Neurological ROS: negative  Dermatological ROS: negative    Past Medical History:   Diagnosis Date    Acquired hypothyroidism     EMPERATRIZ (acute kidney injury) ([facility] 7/24/2020    Depression     Diabetes ([facility]     GERD (gastroesophageal reflux disease)     Hiatal hernia     Hypercholesterolemia     Hypertension     Migraines     Moderate major depression ([facility] 7/9/2018    Recurrent UTI     VCUG reportedly nl 90s, +/- pyelo, no hospitalizations, no surgeries, no scopes    Stage 3 chronic kidney disease (HCC)     Thyroid disease        Past Surgical History:   Procedure Laterality Date    HX APPENDECTOMY      HX BREAST BIOPSY  1993    right breast, benign    HX HYSTERECTOMY      HX MOHS PROCEDURES Right 09/2001    HX OTHER SURGICAL  2001    HEAD LACERATION DUE TO A FALL HX PARTIAL HYSTERECTOMY  11/17/1972    HX TUBAL LIGATION  11/1971       Current Outpatient Medications   Medication Sig Dispense Refill    metoclopramide HCl (REGLAN) 5 mg tablet Take 5 mg by mouth.      esomeprazole (NEXIUM) 40 mg capsule Take 40 mg by mouth daily. senna (SENOKOT) 8.6 mg tablet Take 8.6 mg by mouth two (2) times daily as needed. estradioL (ESTRACE) 0.01 % (0.1 mg/gram) vaginal cream APPLY A PEA SIZED AMOUNT 3 TIMES A WEEK AROUND THE URETHRA 42.5 g 3    dilTIAZem ER (CARDIZEM CD) 360 mg capsule Take 1 Capsule by mouth daily. 90 Capsule 3    insulin glargine (Lantus Solostar U-100 Insulin) 100 unit/mL (3 mL) inpn 22 Units by SubCUTAneous route daily. 5 Pen 3    rosuvastatin (CRESTOR) 20 mg tablet Take 1 Tablet by mouth nightly. 90 Tablet 3    levothyroxine (SYNTHROID) 50 mcg tablet Take 1 Tablet by mouth Daily (before breakfast). 90 Tablet 4    dulaglutide (TRULICITY) 0.63 LO/5.3 mL sub-q pen 0.5 mL by SubCUTAneous route every seven (7) days. 12 Each 3    insulin aspart U-100 (NOVOLOG) 100 unit/mL (3 mL) inpn 5 Units by SubCUTAneous route as needed (blood sugar >250, reassess in 30 minutes and repeat if needed). 1 Pen 2    Insulin Needles, Disposable, (BD Ultra-Fine Mini Pen Needle) 31 gauge x 3/16\" ndle USE AS DIRECTED 100 Pen Needle 11    glucose blood VI test strips (ACCU-CHEK CODY PLUS TEST STRP) strip Patient is to check blood sugar 3-4 times a day 300 Strip 11    cholecalciferol, VITAMIN D3, (VITAMIN D3) 5,000 unit tab tablet Take  by mouth daily. acetaminophen (TYLENOL) 500 mg tablet Take  by mouth every six (6) hours as needed for Pain. Blood-Glucose Meter (ACCU-CHEK CODY PLUS METER) misc 1 Device by Does Not Apply route four (4) times daily. 1 Each 0    BLADDER CONTROL PADS pads       ALCOHOL PREP PADS padm       ascorbic acid (VITAMIN C PO) Take  by mouth.  (Patient not taking: Reported on 8/16/2022)      olmesartan (BENICAR) 40 mg tablet Take 1 Tablet by mouth daily. (Patient not taking: Reported on 8/16/2022) 90 Tablet 4    elderberry fruit (ELDERBERRY PO) Take  by mouth. (Patient not taking: Reported on 8/16/2022)         Allergies   Allergen Reactions    Darvocet A500 [Propoxyphene N-Acetaminophen] Nausea and Vomiting    Gloves, Latex With Aloe Vera Contact Dermatitis     Rash from powder inside latex gloves      Jardiance [Empagliflozin] Other (comments)     Hyperkalemia, EMPERATRIZ    Metformin Diarrhea           PE:     Physical Exam  Vitals and nursing note reviewed. Constitutional:       General: She is not in acute distress. Appearance: Normal appearance. She is not ill-appearing. Cardiovascular:      Pulses: Normal pulses. Pulmonary:      Effort: Pulmonary effort is normal. No respiratory distress. Musculoskeletal:         General: Tenderness present. No swelling, deformity or signs of injury. Normal range of motion. Cervical back: Normal range of motion and neck supple. Right lower leg: No edema. Left lower leg: No edema. Skin:     General: Skin is warm and dry. Capillary Refill: Capillary refill takes less than 2 seconds. Findings: No bruising or erythema. Neurological:      General: No focal deficit present. Mental Status: She is alert and oriented to person, place, and time. Psychiatric:         Mood and Affect: Mood normal.         Behavior: Behavior normal.          NEUROVASCULAR    Examination L R Examination L R   Carpal Comp. - + Pronator Comp. - -   Carpal Tinel - + Pronator Tinel - -   Phalen's - - Pronator Stress - -   Cubital Comp. - - Guyon Comp. - -   Cubital Tinel - - Guyon Tinel - -   Elbow Hyperflexion - - Adson's - -   Spurling's - - SC Comp. - -   PCB Median abn - - SC Tinel - -   Radial Tinel - - IC Comp.  - -   Digital Tinel - - IC Tinel - -   Radial 2-Pt WNL WNL Ulnar 2-Pt WNL WNL     Radial Pulse: 2+  Capillary Refill: < 2 sec  Gurvinder: Not Performed  Digital Gurvinder: Not Performed          Imaging: 10/17/2022 3 views of right wrist is negative for any acute fracture, dislocation, or any other significant osseous abnormalities of an incidental finding of Eaton stage II degenerative changes at right thumb CMC joint. ICD-10-CM ICD-9-CM    1. Right carpal tunnel syndrome  G56.01 354.0 EMG ONE EXTREMITY UPPER RT      NCV/LAT MOTOR PER NERVE UP/RT      AMB SUPPLY ORDER      INJECT CARPAL TUNNEL      triamcinolone acetonide (KENALOG) 10 mg/mL injection 5 mg      2. Right wrist pain  M25.531 719.43 AMB POC XRAY, WRIST; COMPLETE, 3+ VIE            Plan:     Right carpal tunnel injection, brace, and upper extremity EMG. Follow-up and Dispositions    Return for EMG Review. Plan was reviewed with patient, who verbalized agreement and understanding of the plan    2042 [facility] NOTE        Chart reviewed for the following:   Yoni GARZA DO, have reviewed the History, Physical and updated the Allergic reactions for Kaleigh Mano     TIME OUT performed immediately prior to start of procedure:   Yoni GARZA DO, have performed the following reviews on Kaleigh Mano prior to the start of the procedure:            * Patient was identified by name and date of birth   * Agreement on procedure being performed was verified  * Risks and Benefits explained to the patient  * Procedure site verified and marked as necessary  * Patient was positioned for comfort  * Consent was signed and verified     Time: 08:48 AM      Date of procedure: 10/17/2022    Procedure performed by: Kathi Barba DO    Provider assisted by: Osman Hussein MA    Patient assisted by: self    How tolerated by patient: tolerated the procedure well with no complications    Post Procedural Pain Scale: 0 - No Hurt    Comments: none    Procedure:  After consent was obtained, using sterile technique the right carpal tunnel was prepped. Local anesthetic used: 1% lidocaine. Kenalog 5 mg and was then injected and the needle withdrawn. The procedure was well tolerated. The patient is asked to continue to rest the area for a few more days before resuming regular activities. It may be more painful for the first 1-2 days. Watch for fever, or increased swelling or persistent pain in the joint. Call or return to clinic prn if such symptoms occur or there is failure to improve as anticipated.

## 2022-10-17 NOTE — LETTER
10/17/2022    Patient: Danyell Scruggs   YOB: 1950   Date of Visit: 10/17/2022     Candido Maher MD  [address] [facility]  [address] [place] [address]  Via In Basket    Dear Candido Maher MD,      Thank you for referring Ms. Danyell Scruggs to [address] [place] [address] for evaluation. My notes for this consultation are attached. If you have questions, please do not hesitate to call me. I look forward to following your patient along with you.       Sincerely,    Ula Boeck, DO

## 2022-11-02 ENCOUNTER — OFFICE VISIT (OUTPATIENT)
Dept: ORTHOPEDIC SURGERY | Age: 72
End: 2022-11-02
Payer: MEDICARE

## 2022-11-02 VITALS
OXYGEN SATURATION: 95 % | HEIGHT: 64 IN | WEIGHT: 183 LBS | HEART RATE: 77 BPM | BODY MASS INDEX: 31.24 KG/M2 | RESPIRATION RATE: 16 BRPM | DIASTOLIC BLOOD PRESSURE: 77 MMHG | TEMPERATURE: 97 F | SYSTOLIC BLOOD PRESSURE: 146 MMHG

## 2022-11-02 DIAGNOSIS — R20.0 NUMBNESS AND TINGLING OF RIGHT HAND: Primary | ICD-10-CM

## 2022-11-02 DIAGNOSIS — R20.2 NUMBNESS AND TINGLING OF RIGHT HAND: ICD-10-CM

## 2022-11-02 DIAGNOSIS — R20.0 NUMBNESS AND TINGLING OF RIGHT HAND: ICD-10-CM

## 2022-11-02 DIAGNOSIS — R20.2 NUMBNESS AND TINGLING OF RIGHT HAND: Primary | ICD-10-CM

## 2022-11-02 PROCEDURE — 95886 MUSC TEST DONE W/N TEST COMP: CPT | Performed by: PHYSICAL MEDICINE & REHABILITATION

## 2022-11-02 PROCEDURE — 95909 NRV CNDJ TST 5-6 STUDIES: CPT | Performed by: PHYSICAL MEDICINE & REHABILITATION

## 2022-11-02 NOTE — PROGRESS NOTES
[facility] [address]  [address]  [place] [address]  Phone: [phone]  Fax: [phone]        Monika Sanchez  : 1950  PCP: Isha Mccallum MD  2022    ELECTROMYOGRAPHY AND NERVE CONDUCTION STUDIES    Rand Pizarro was referred by Dr. Gibson Mobley for electrodiagnostic evaluation of numbness and tingling of right upper extremity. NCV & EMG Findings:  Evaluation of the right median (APB) motor nerve showed decreased conduction velocity (50 m/s). The right ulnar (ADM) motor nerve showed decreased conduction velocity (Bel Elbow-Wrist, 51 m/s). All remaining nerves (as indicated in the following tables) were within normal limits. INTERPRETATION  This was a normal nerve conduction and EMG study showing there to be no signs of neuropathy, myopathy, or radiculopathy in the nerves and muscles tested. CLINICAL INTERPRETATION  Her electrodiagnostic findings do not appear to explain her right arm findings. HISTORY OF PRESENT ILLNESS  Rand Pizarro is a 66 y.o. female. Pt presents today with RUE EMG evaluation for numbness and tingling of right hand. Notes of swelling of right hand.     PAST MEDICAL HISTORY   Past Medical History:   Diagnosis Date    Acquired hypothyroidism     EMPERATRIZ (acute kidney injury) ([facility] 2020    Depression     Diabetes ([address]     GERD (gastroesophageal reflux disease)     Hiatal hernia     Hypercholesterolemia     Hypertension     Migraines     Moderate major depression ([address] 2018    Recurrent UTI     VCUG reportedly nl 90s, +/- pyelo, no hospitalizations, no surgeries, no scopes    Stage 3 chronic kidney disease ([address]     Thyroid disease        Past Surgical History:   Procedure Laterality Date    HX APPENDECTOMY      HX BREAST BIOPSY      right breast, benign    HX HYSTERECTOMY      HX MOHS PROCEDURES Right 2001    HX OTHER SURGICAL      HEAD LACERATION DUE TO A FALL    HX PARTIAL HYSTERECTOMY 11/17/1972    HX TUBAL LIGATION  11/1971   . MEDICATIONS      Current Outpatient Medications   Medication Sig Dispense Refill    metoclopramide HCl (REGLAN) 5 mg tablet Take 5 mg by mouth.      esomeprazole (NEXIUM) 40 mg capsule Take 40 mg by mouth daily. senna (SENOKOT) 8.6 mg tablet Take 8.6 mg by mouth two (2) times daily as needed. estradioL (ESTRACE) 0.01 % (0.1 mg/gram) vaginal cream APPLY A PEA SIZED AMOUNT 3 TIMES A WEEK AROUND THE URETHRA 42.5 g 3    dilTIAZem ER (CARDIZEM CD) 360 mg capsule Take 1 Capsule by mouth daily. 90 Capsule 3    insulin glargine (Lantus Solostar U-100 Insulin) 100 unit/mL (3 mL) inpn 22 Units by SubCUTAneous route daily. 5 Pen 3    rosuvastatin (CRESTOR) 20 mg tablet Take 1 Tablet by mouth nightly. 90 Tablet 3    levothyroxine (SYNTHROID) 50 mcg tablet Take 1 Tablet by mouth Daily (before breakfast). 90 Tablet 4    dulaglutide (TRULICITY) 9.07 FY/4.8 mL sub-q pen 0.5 mL by SubCUTAneous route every seven (7) days. 12 Each 3    insulin aspart U-100 (NOVOLOG) 100 unit/mL (3 mL) inpn 5 Units by SubCUTAneous route as needed (blood sugar >250, reassess in 30 minutes and repeat if needed). 1 Pen 2    Insulin Needles, Disposable, (BD Ultra-Fine Mini Pen Needle) 31 gauge x 3/16\" ndle USE AS DIRECTED 100 Pen Needle 11    glucose blood VI test strips (ACCU-CHEK CODY PLUS TEST STRP) strip Patient is to check blood sugar 3-4 times a day 300 Strip 11    cholecalciferol, VITAMIN D3, (VITAMIN D3) 5,000 unit tab tablet Take  by mouth daily. acetaminophen (TYLENOL) 500 mg tablet Take  by mouth every six (6) hours as needed for Pain. Blood-Glucose Meter (ACCU-CHEK CODY PLUS METER) misc 1 Device by Does Not Apply route four (4) times daily.  1 Each 0    BLADDER CONTROL PADS pads       ALCOHOL PREP PADS padm           ALLERGIES    Allergies   Allergen Reactions    Darvocet A500 [Propoxyphene N-Acetaminophen] Nausea and Vomiting    Gloves, Latex With Aloe Vera Contact Dermatitis     Rash from powder inside latex gloves      Jardiance [Empagliflozin] Other (comments)     Hyperkalemia, EMPERATRIZ    Metformin Diarrhea          SOCIAL HISTORY    Social History     Socioeconomic History    Marital status:    Occupational History    Occupation: retired     Comment: cook   Tobacco Use    Smoking status: Never    Smokeless tobacco: Never   Substance and Sexual Activity    Alcohol use: No     Alcohol/week: 0.0 standard drinks    Drug use: No    Sexual activity: Never     Birth control/protection: Surgical   Other Topics Concern    Caffeine Concern Yes     Comment: 4 cups a day    Exercise No    Seat Belt Yes     Social Determinants of Health     Physical Activity: Insufficiently Active    Days of Exercise per Week: 2 days    Minutes of Exercise per Session: 30 min       FAMILY HISTORY    Family History   Problem Relation Age of Onset    Diabetes Mother     Hypertension Mother     Kidney Disease Mother     Diabetes Father     Heart Disease Father     Hypertension Father     Diabetes Maternal Grandmother     Breast Cancer Maternal Grandmother     Colon Cancer Maternal Grandfather     Breast Cancer Maternal Aunt          PHYSICAL EXAMINATION  Visit Vitals  BP (!) 146/77   Pulse 77   Temp 97 °F (36.1 °C) (Tympanic)   Resp 16   Ht 5' 4\" (1.626 m)   Wt 183 lb (83 kg)   LMP 11/17/1972 Comment: partial   SpO2 95%   BMI 31.41 kg/m²       Pain Assessment  11/2/2022   Location of Pain -   Location Modifiers -   Severity of Pain 0   Quality of Pain -   Quality of Pain Comment -   Duration of Pain -   Frequency of Pain -   Date Pain First Started -   Aggravating Factors -   Aggravating Factors Comment -   Limiting Behavior -   Relieving Factors -   Relieving Factors Comment -   Result of Injury -   Work-Related Injury -   Type of Injury -   Type of Injury Comment -           Constitutional:  Well developed, well nourished, in no acute distress. Psychiatric: Affect and mood are appropriate. Integumentary: No rashes or abrasions noted on exposed areas.         SPINE/MUSCULOSKELETAL EXAM    On brief examination: None      NCV & EMG Findings:    NCS+  Motor Nerve Results      Latency Amplitude F-Lat Segment Distance CV Comment   Site (ms) Norm (mV) Norm (ms)  (cm) (m/s) Norm    Right Median (APB) Motor   Wrist 3.3  < 4.4 7.2  > 3.8  Wrist-APB 8      Elbow 7.8 - 6.1 -  Elbow-Wrist 22.5 50  > 51    Right Ulnar (ADM) Motor   Wrist 2.6  < 3.7 8.5  > 7.9  Wrist-ADM 8      Bel Elbow 6.0 - 8.5 -  Bel Elbow-Wrist 17.5 51  > 52    Abv Elbow 8.2 - 8.1 -  Abv Elbow-Bel Elbow 10 45  > 43      Sensory Sites       Latency (Onset) Latency (Peak)  Amplitude (O-P) Segment Distance CV (Onset) Comment   Site ms Norm (ms) Norm µV Norm  mm m/s Norm    Right Median Sensory   Wrist-Dig II 2.3  < 3.3 3.0  < 4.0 27  > 7 Wrist-Dig II 14 61 -    Right Radial Sensory   Forearm-Wrist 1.35  < 2.2 2.0  < 2.8 44  > 7 Forearm-Wrist 10 74 -    Right Ulnar Sensory   Wrist-Dig V 2.3  < 3.1 2.9  < 4.0 21  > 7 Wrist-Dig V 14 61 -      EMG+     Side Muscle Nerve Root Ins Act Fibs Psw Fascics Other Amp Dur Poly Recrt Activation Comment Misc   Right Biceps Musculocut C5-C6 Nml Nml Nml Nml 0 Nml Nml 0 Nml Nml     Right Triceps Radial C6-C8 Nml Nml Nml Nml 0 Nml Nml 0 Nml Nml     Right Pronator Teres Median C6-C7 Nml Nml Nml Nml 0 Nml Nml 0 Nml Nml     Right FDI Median,  Ulnar C8-T1 Nml Nml Nml Nml 0 Nml Nml 0 Nml Nml     Right APB Median C8-T1 Nml Nml Nml Nml 0 Nml Nml 0 Nml Nml             Waveforms:    Motor         Sensory                 [facility]  OFFICE PROCEDURE PROGRESS NOTE        Chart reviewed for the following:   Osmin GARZA Both, have reviewed the History, Physical and updated the Allergic reactions for Leighann Castaneda     TIME OUT performed immediately prior to start of procedure:   Osmni GARZA, have performed the following reviews on Leighann Castaneda prior to the start of the procedure: * Patient was identified by name and date of birth   * Agreement on procedure being performed was verified  * Risks and Benefits explained to the patient  * Procedure site verified and marked as necessary  * Patient was positioned for comfort  * Consent was signed and verified     Time: 11:22 AM     Date of procedure: 11/2/2022    Procedure performed by:  Myriam Hernandez MD    Provider accompanied by: Geovanna.     Patient accompanied by another individual: No    How tolerated by patient: tolerated the procedure well with no complications    Post Procedural Pain Scale: 0 - No Hurt    Comments: none    Written by Conception Fabry as dictated by Azra Bullock MD

## 2022-11-02 NOTE — PROGRESS NOTES
Chief Complaint   Patient presents with    Confirmation for Procedure(s)       Pt preferred language for health care discussion is english.     Is someone accompanying this pt? no    Is the patient using any DME equipment during [address]? no    Depression Screening:  3 most recent [facility] Screens 8/16/2022 6/14/2022 4/25/2022 3/25/2022 2/7/2022 1/4/2022 3/23/2021   PHQ Not Done - - - - - - -   Little interest or pleasure in doing things Not at all Not at all Not at all Not at all Not at all Not at all Not at all   Feeling down, depressed, irritable, or hopeless - Not at all Not at all Not at all Not at all Not at all Not at all   Total Score PHQ 2 - 0 0 0 0 0 0   Trouble falling or staying asleep, or sleeping too much - - - - Not at all - -   Feeling tired or having little energy - - - - Not at all - -   Poor appetite, weight loss, or overeating - - - - Not at all - -   Feeling bad about yourself - or that you are a failure or have let yourself or your family down - - - - Not at all - -   Trouble concentrating on things such as school, work, reading, or watching TV - - - - Not at all - -   Moving or speaking so slowly that other people could have noticed; or the opposite being so fidgety that others notice - - - - Not at all - -   Thoughts of being better off dead, or hurting yourself in some way - - - - Not at all - -   PHQ 9 Score - - - - 0 - -   How difficult have these problems made it for you to do your work, take care of your home and get along with others - - - - Not difficult at all - -       Learning Assessment:  Learning Assessment 4/16/2018 5/14/2015 2/14/2013   PRIMARY LEARNER Patient Patient Patient   PRIMARY LANGUAGE ENGLISH ENGLISH ENGLISH   LEARNER PREFERENCE PRIMARY DEMONSTRATION LISTENING LISTENING     READING READING DEMONSTRATION     LISTENING DEMONSTRATION READING   ANSWERED BY Jacque patient patient   RELATIONSHIP SELF SELF SELF       Abuse Screening:  Abuse Screening Questionnaire 2/7/2022 7/24/2020 7/9/2019 7/9/2018 10/6/2014   Do you ever feel afraid of your partner? N N N N N   Are you in a relationship with someone who physically or mentally threatens you? N N N N N   Is it safe for you to go home? Darryn CALVILLO       Fall Risk  Fall Risk Assessment, last 12 mths 10/17/2022   Able to walk? Yes   Fall in past 12 months? 0   Do you feel unsteady? 0   Are you worried about falling 0   Number of falls in past 12 months -   Fall with injury? -           Advance Directive:  1. Do you have an advance directive in place? Patient Reply:no    2. If not, would you like material regarding how to put one in place? Patient Reply: no    2. Per patient no changes to their ACP contact no. Coordination of Care:  1. Have you been to the ER, urgent care clinic since your last visit? Hospitalized since your last visit? no    2. Have you seen or consulted any other health care providers outside of the [address] [place] [address] since your last visit? Include any pap smears or colon screening.  no

## 2022-11-09 ENCOUNTER — HOSPITAL ENCOUNTER (OUTPATIENT)
Dept: LAB | Age: 72
Discharge: HOME | End: 2022-11-09

## 2022-11-09 ENCOUNTER — CLINICAL SUPPORT (OUTPATIENT)
Dept: FAMILY MEDICINE CLINIC | Age: 72
End: 2022-11-09

## 2022-11-09 DIAGNOSIS — E78.5 HYPERLIPIDEMIA, UNSPECIFIED HYPERLIPIDEMIA TYPE: ICD-10-CM

## 2022-11-09 DIAGNOSIS — E11.21 TYPE 2 DIABETES MELLITUS WITH DIABETIC NEPHROPATHY, WITH LONG-TERM CURRENT USE OF INSULIN (HCC): ICD-10-CM

## 2022-11-09 DIAGNOSIS — Z79.4 TYPE 2 DIABETES MELLITUS WITH DIABETIC NEPHROPATHY, WITH LONG-TERM CURRENT USE OF INSULIN (HCC): ICD-10-CM

## 2022-11-09 DIAGNOSIS — N18.4 CKD (CHRONIC KIDNEY DISEASE) STAGE 4, GFR 15-29 ML/MIN (HCC): ICD-10-CM

## 2022-11-09 DIAGNOSIS — E55.9 VITAMIN D DEFICIENCY: Primary | ICD-10-CM

## 2022-11-09 DIAGNOSIS — E03.9 ACQUIRED HYPOTHYROIDISM: ICD-10-CM

## 2022-11-09 DIAGNOSIS — I10 ESSENTIAL HYPERTENSION: ICD-10-CM

## 2022-11-09 DIAGNOSIS — D50.0 IRON DEFICIENCY ANEMIA DUE TO CHRONIC BLOOD LOSS: ICD-10-CM

## 2022-11-09 LAB — SENTARA SPECIMEN COL,SENBCF: NORMAL

## 2022-11-09 PROCEDURE — 99001 SPECIMEN HANDLING PT-LAB: CPT

## 2022-11-11 LAB
25(OH)D3 SERPL-MCNC: 83.1 NG/ML (ref 32–100)
A-G RATIO,AGRAT: 2.1 RATIO (ref 1.1–2.6)
ABSOLUTE LYMPHOCYTE COUNT, 10803: 1.9 K/UL (ref 1–4.8)
ALBUMIN SERPL-MCNC: 4.4 G/DL (ref 3.5–5)
ALP SERPL-CCNC: 105 U/L (ref 40–120)
ALT SERPL-CCNC: 14 U/L (ref 5–40)
ANION GAP SERPL CALC-SCNC: 10 MMOL/L (ref 3–15)
AST SERPL W P-5'-P-CCNC: 15 U/L (ref 10–37)
AVG GLU, 10930: 167 MG/DL (ref 91–123)
BASOPHILS # BLD: 0.1 K/UL (ref 0–0.2)
BASOPHILS NFR BLD: 1 % (ref 0–2)
BILIRUB SERPL-MCNC: 0.3 MG/DL (ref 0.2–1.2)
BUN SERPL-MCNC: 27 MG/DL (ref 6–22)
CALCIUM SERPL-MCNC: 9.4 MG/DL (ref 8.4–10.5)
CHLORIDE SERPL-SCNC: 104 MMOL/L (ref 98–110)
CO2 SERPL-SCNC: 27 MMOL/L (ref 20–32)
CREAT SERPL-MCNC: 1.5 MG/DL (ref 0.8–1.4)
CREATININE, URINE: 227 MG/DL
EOSINOPHIL # BLD: 0.1 K/UL (ref 0–0.5)
EOSINOPHIL NFR BLD: 1 % (ref 0–6)
ERYTHROCYTE [DISTWIDTH] IN BLOOD BY AUTOMATED COUNT: 12.8 % (ref 10–15.5)
FE % SATURATION,PSAT: 17 % (ref 20–50)
FERRITIN SERPL-MCNC: 33 NG/ML (ref 10–291)
GLOBULIN,GLOB: 2.1 G/DL (ref 2–4)
GLOMERULAR FILTRATION RATE: 35.9 ML/MIN/1.73 SQ.M.
GLUCOSE SERPL-MCNC: 186 MG/DL (ref 70–99)
GRANULOCYTES,GRANS: 73 % (ref 40–75)
HBA1C MFR BLD HPLC: 7.5 % (ref 4.8–5.6)
HCT VFR BLD AUTO: 40 % (ref 35.1–48.3)
HGB BLD-MCNC: 12.7 G/DL (ref 11.7–16.1)
IRON,IRN: 57 MCG/DL (ref 30–160)
LYMPHOCYTES, LYMLT: 20 % (ref 20–45)
MCH RBC QN AUTO: 29 PG (ref 26–34)
MCHC RBC AUTO-ENTMCNC: 32 G/DL (ref 31–36)
MCV RBC AUTO: 92 FL (ref 80–99)
MICROALB/CREAT RATIO, 140286: 297.5 (ref 0–30)
MICROALBUMIN,URINE RANDOM 140054: 675.4 MG/L (ref 0.1–17)
MONOCYTES # BLD: 0.5 K/UL (ref 0.1–1)
MONOCYTES NFR BLD: 6 % (ref 3–12)
NEUTROPHILS # BLD AUTO: 7 K/UL (ref 1.8–7.7)
PHOSPHATE SERPL-MCNC: 4.6 MG/DL (ref 2.5–4.5)
PLATELET # BLD AUTO: 339 K/UL (ref 140–440)
PMV BLD AUTO: 11.8 FL (ref 9–13)
POTASSIUM SERPL-SCNC: 4.9 MMOL/L (ref 3.5–5.5)
PROT SERPL-MCNC: 6.5 G/DL (ref 6.2–8.1)
PTH INTACT,IPTH: 34 PG/ML (ref 15–65)
RBC # BLD AUTO: 4.35 M/UL (ref 3.8–5.2)
SODIUM SERPL-SCNC: 141 MMOL/L (ref 133–145)
TIBC,TIBC: 341 MCG/DL (ref 228–428)
TSH SERPL DL<=0.005 MIU/L-ACNC: 3.03 MCU/ML (ref 0.27–4.2)
UIBC SERPL-MCNC: 284 MCG/DL (ref 110–370)
WBC # BLD AUTO: 9.7 K/UL (ref 4–11)

## 2022-11-15 PROBLEM — N18.32 STAGE 3B CHRONIC KIDNEY DISEASE (CKD) (HCC): Status: ACTIVE | Noted: 2019-07-09

## 2022-12-01 ENCOUNTER — OFFICE VISIT (OUTPATIENT)
Dept: ORTHOPEDIC SURGERY | Age: 72
End: 2022-12-01
Payer: MEDICARE

## 2022-12-01 VITALS — WEIGHT: 185 LBS | TEMPERATURE: 97.3 F | HEART RATE: 66 BPM | OXYGEN SATURATION: 97 % | BODY MASS INDEX: 31.76 KG/M2

## 2022-12-01 DIAGNOSIS — M19.031 PRIMARY OSTEOARTHRITIS OF RIGHT WRIST: Primary | ICD-10-CM

## 2022-12-01 NOTE — PROGRESS NOTES
Moise Franklin is a 67 y.o. female right handed retiree. Worker's Compensation and legal considerations: none filed. Vitals:    12/01/22 0923   Pulse: 66   Temp: 97.3 °F (36.3 °C)   SpO2: 97%   Weight: 185 lb (83.9 kg)   PainSc:   0 - No pain   PainLoc: Wrist   LMP: 11/17/1972             Chief Complaint   Patient presents with    Wrist Pain     right       HPI: Patient reports mild improvement in the pain in her hands. She denies any numbness. 10/17/2022 HPI: Patient presents today with a new problem of right hand numbness and tingling and pain in the wrist.    Initial HPI: Patient presents today with complaints of knots in the palms of her hands bilaterally. She reports tenderness in the area.     Date of onset: Indeterminate    Injury: No    Prior Treatment:  Yes: Comment: Bilateral palmar fascia injections followed by right carpal tunnel injection    Numbness/ Tingling: No      ROS: Review of Systems - General ROS: negative  Psychological ROS: negative  ENT ROS: negative  Allergy and Immunology ROS: negative  Hematological and Lymphatic ROS: negative  Respiratory ROS: no cough, shortness of breath, or wheezing  Cardiovascular ROS: no chest pain or dyspnea on exertion  Gastrointestinal ROS: no abdominal pain, change in bowel habits, or black or bloody stools  Musculoskeletal ROS: negative  Neurological ROS: negative  Dermatological ROS: negative    Past Medical History:   Diagnosis Date    Acquired hypothyroidism     EMPERATRIZ (acute kidney injury) ([facility] [address] 7/24/2020    Depression     Diabetes (HCC)     GERD (gastroesophageal reflux disease)     Hiatal hernia     Hypercholesterolemia     Hypertension     Migraines     Moderate major depression ([address] 7/9/2018    Recurrent UTI     VCUG reportedly nl 90s, +/- pyelo, no hospitalizations, no surgeries, no scopes    Stage 3 chronic kidney disease (HCC)     Thyroid disease        Past Surgical History:   Procedure Laterality Date    HX APPENDECTOMY      HX BREAST BIOPSY 1993    right breast, benign    HX HYSTERECTOMY      HX MOHS PROCEDURES Right 09/2001    HX OTHER SURGICAL  2001    HEAD LACERATION DUE TO A FALL    HX PARTIAL HYSTERECTOMY  11/17/1972    HX TUBAL LIGATION  11/1971       Current Outpatient Medications   Medication Sig Dispense Refill    metoclopramide HCl (REGLAN) 5 mg tablet Take 5 mg by mouth.      esomeprazole (NEXIUM) 40 mg capsule Take 40 mg by mouth daily. senna (SENOKOT) 8.6 mg tablet Take 8.6 mg by mouth two (2) times daily as needed. estradioL (ESTRACE) 0.01 % (0.1 mg/gram) vaginal cream APPLY A PEA SIZED AMOUNT 3 TIMES A WEEK AROUND THE URETHRA 42.5 g 3    dilTIAZem ER (CARDIZEM CD) 360 mg capsule Take 1 Capsule by mouth daily. 90 Capsule 3    insulin glargine (Lantus Solostar U-100 Insulin) 100 unit/mL (3 mL) inpn 22 Units by SubCUTAneous route daily. 5 Pen 3    rosuvastatin (CRESTOR) 20 mg tablet Take 1 Tablet by mouth nightly. 90 Tablet 3    levothyroxine (SYNTHROID) 50 mcg tablet Take 1 Tablet by mouth Daily (before breakfast). 90 Tablet 4    dulaglutide (TRULICITY) 9.23 GI/8.8 mL sub-q pen 0.5 mL by SubCUTAneous route every seven (7) days. 12 Each 3    insulin aspart U-100 (NOVOLOG) 100 unit/mL (3 mL) inpn 5 Units by SubCUTAneous route as needed (blood sugar >250, reassess in 30 minutes and repeat if needed). 1 Pen 2    Insulin Needles, Disposable, (BD Ultra-Fine Mini Pen Needle) 31 gauge x 3/16\" ndle USE AS DIRECTED 100 Pen Needle 11    glucose blood VI test strips (ACCU-CHEK CODY PLUS TEST STRP) strip Patient is to check blood sugar 3-4 times a day 300 Strip 11    cholecalciferol, VITAMIN D3, (VITAMIN D3) 5,000 unit tab tablet Take  by mouth daily. acetaminophen (TYLENOL) 500 mg tablet Take  by mouth every six (6) hours as needed for Pain. Blood-Glucose Meter (ACCU-CHEK CODY PLUS METER) misc 1 Device by Does Not Apply route four (4) times daily.  1 Each 0    BLADDER CONTROL PADS pads       ALCOHOL PREP PADS padm Allergies   Allergen Reactions    Darvocet A500 [Propoxyphene N-Acetaminophen] Nausea and Vomiting    Gloves, Latex With Aloe Vera Contact Dermatitis     Rash from powder inside latex gloves      Jardiance [Empagliflozin] Other (comments)     Hyperkalemia, EMPERATRIZ    Metformin Diarrhea           PE:     Physical Exam  Vitals and nursing note reviewed. Constitutional:       General: She is not in acute distress. Appearance: Normal appearance. She is not ill-appearing. Cardiovascular:      Pulses: Normal pulses. Pulmonary:      Effort: Pulmonary effort is normal. No respiratory distress. Musculoskeletal:         General: Tenderness present. No swelling, deformity or signs of injury. Normal range of motion. Cervical back: Normal range of motion and neck supple. Right lower leg: No edema. Left lower leg: No edema. Skin:     General: Skin is warm and dry. Capillary Refill: Capillary refill takes less than 2 seconds. Findings: No bruising or erythema. Neurological:      General: No focal deficit present. Mental Status: She is alert and oriented to person, place, and time. Psychiatric:         Mood and Affect: Mood normal.         Behavior: Behavior normal.          Right wrist: There is minimal tenderness to palpation on the dorsal ulnar aspect of the wrist.  Neurovascularly intact distally and range of motion full. NCV & EMG Findings:  Evaluation of the right median (APB) motor nerve showed decreased conduction velocity (50 m/s). The right ulnar (ADM) motor nerve showed decreased conduction velocity ([place]-Wrist, 51 m/s). All remaining nerves (as indicated in the following tables) were within normal limits. INTERPRETATION  This was a normal nerve conduction and EMG study showing there to be no signs of neuropathy, myopathy, or radiculopathy in the nerves and muscles tested.          CLINICAL INTERPRETATION  Her electrodiagnostic findings do not appear to explain her right arm findings. Imaging:     10/17/2022 3 views of right wrist is negative for any acute fracture, dislocation, or any other significant osseous abnormalities of an incidental finding of [place] stage II degenerative changes at right thumb CMC joint. ICD-10-CM ICD-9-CM    1. Primary osteoarthritis of right wrist  M19.031 715.13             Plan:     Discussed Voltaren gel topical as needed given the history of kidney injury. Also discussed wearing the wrist brace as needed. Follow-up and Dispositions    Return if symptoms worsen or fail to improve.           Plan was reviewed with patient, who verbalized agreement and understanding of the plan

## 2022-12-02 ENCOUNTER — VIRTUAL VISIT (OUTPATIENT)
Dept: FAMILY MEDICINE CLINIC | Age: 72
End: 2022-12-02
Payer: MEDICARE

## 2022-12-02 DIAGNOSIS — N18.32 STAGE 3B CHRONIC KIDNEY DISEASE (CKD) (HCC): ICD-10-CM

## 2022-12-02 DIAGNOSIS — E03.9 ACQUIRED HYPOTHYROIDISM: ICD-10-CM

## 2022-12-02 DIAGNOSIS — D50.0 IRON DEFICIENCY ANEMIA DUE TO CHRONIC BLOOD LOSS: ICD-10-CM

## 2022-12-02 DIAGNOSIS — E11.21 TYPE 2 DIABETES MELLITUS WITH DIABETIC NEPHROPATHY, WITH LONG-TERM CURRENT USE OF INSULIN (HCC): Primary | ICD-10-CM

## 2022-12-02 DIAGNOSIS — I10 ESSENTIAL HYPERTENSION: ICD-10-CM

## 2022-12-02 DIAGNOSIS — Z79.4 TYPE 2 DIABETES MELLITUS WITH DIABETIC NEPHROPATHY, WITH LONG-TERM CURRENT USE OF INSULIN (HCC): Primary | ICD-10-CM

## 2022-12-02 DIAGNOSIS — D12.6 TUBULAR ADENOMA OF COLON: ICD-10-CM

## 2022-12-02 PROCEDURE — G8510 SCR DEP NEG, NO PLAN REQD: HCPCS | Performed by: FAMILY MEDICINE

## 2022-12-02 PROCEDURE — 3051F HG A1C>EQUAL 7.0%<8.0%: CPT | Performed by: FAMILY MEDICINE

## 2022-12-02 PROCEDURE — 99214 OFFICE O/P EST MOD 30 MIN: CPT | Performed by: FAMILY MEDICINE

## 2022-12-02 PROCEDURE — 1090F PRES/ABSN URINE INCON ASSESS: CPT | Performed by: FAMILY MEDICINE

## 2022-12-02 PROCEDURE — 1101F PT FALLS ASSESS-DOCD LE1/YR: CPT | Performed by: FAMILY MEDICINE

## 2022-12-02 PROCEDURE — 3017F COLORECTAL CA SCREEN DOC REV: CPT | Performed by: FAMILY MEDICINE

## 2022-12-02 PROCEDURE — 2022F DILAT RTA XM EVC RTNOPTHY: CPT | Performed by: FAMILY MEDICINE

## 2022-12-02 PROCEDURE — G8427 DOCREV CUR MEDS BY ELIG CLIN: HCPCS | Performed by: FAMILY MEDICINE

## 2022-12-02 PROCEDURE — 1123F ACP DISCUSS/DSCN MKR DOCD: CPT | Performed by: FAMILY MEDICINE

## 2022-12-02 PROCEDURE — G8399 PT W/DXA RESULTS DOCUMENT: HCPCS | Performed by: FAMILY MEDICINE

## 2022-12-02 PROCEDURE — G8756 NO BP MEASURE DOC: HCPCS | Performed by: FAMILY MEDICINE

## 2022-12-02 PROCEDURE — G9899 SCRN MAM PERF RSLTS DOC: HCPCS | Performed by: FAMILY MEDICINE

## 2022-12-02 RX ORDER — LANOLIN ALCOHOL/MO/W.PET/CERES
325 CREAM (GRAM) TOPICAL
Qty: 270 TABLET | Refills: 3 | Status: SHIPPED | OUTPATIENT
Start: 2022-12-02

## 2022-12-02 RX ORDER — FUROSEMIDE 20 MG/1
20 TABLET ORAL DAILY
Qty: 30 TABLET | Refills: 1 | Status: SHIPPED | OUTPATIENT
Start: 2022-12-02

## 2022-12-02 NOTE — PROGRESS NOTES
Chief Complaint   Patient presents with    Diabetes    Chronic Kidney Disease    Thyroid Problem    Vitamin D Deficiency    Hypertension    Anemia     Patient being seen today for her chronic condition follow up and lab review, no concerns. 1. \"Have you been to the ER, urgent care clinic since your last visit? Hospitalized since your last visit? \" No    2. \"Have you seen or consulted any other health care providers outside of the [address] [place] [address] since your last visit? \"  Has seen Dr. Shahrzad Mcduffie, Dr. Krystal David      3. For patients aged 39-70: Has the patient had a colonoscopy / FIT/ Cologuard? Yes - no Care Gap present       If the patient is female:    4. For patients aged 41-77: Has the patient had a mammogram within the past 2 years? Yes - no Care Gap present      5. For patients aged 21-65: Has the patient had a pap smear?  NA - based on age or sex

## 2022-12-02 NOTE — ASSESSMENT & PLAN NOTE
Uncontrolled. Benicar contraindicated by K+>4.5. Contineu diltiazem 360 mg unchanged. Adding furosemide 20 mg. Short interval follow up. May be able to resume Benicar.

## 2022-12-02 NOTE — PROGRESS NOTES
Rand Pizarro is a 67 y.o. female, established patient, who was seen by synchronous (real-time) audio-video technology on 12/2/2022 for     Assessment & Plan:   1. Type 2 diabetes mellitus with diabetic nephropathy, with long-term current use of insulin ([facility])  Assessment & Plan:  A1c 7.5 acceptable for age. Continue dulaglutide and insulins unchanged. 2. Essential hypertension  Assessment & Plan:  Uncontrolled. Benicar contraindicated by K+>4.5. Contineu diltiazem 360 mg unchanged. Adding furosemide 20 mg. Short interval follow up. May be able to resume Benicar. Orders:  -     furosemide (LASIX) 20 mg tablet; Take 1 Tablet by mouth daily. , Normal, Disp-30 Tablet, R-1  -     METABOLIC PANEL, BASIC; Future  3. Stage 3b chronic kidney disease (CKD) ([facility])  Assessment & Plan:  Resumption of Benicar desired, miguel angel as BP uncontrolled as well, but K+ 4.9, above Dr. Jackie Mathew threshold recommendation of 4.5, with which I concur. Orders:  -     furosemide (LASIX) 20 mg tablet; Take 1 Tablet by mouth daily. , Normal, Disp-30 Tablet, R-1  -     METABOLIC PANEL, BASIC; Future  4. Iron deficiency anemia due to chronic blood loss  Assessment & Plan:  Uncontrolled. [place] planned with Dr. Nimco Ureña 2023. Also with CKD. Resuming iron   Orders:  -     ferrous sulfate 325 mg (65 mg iron) tablet; Take 1 Tablet by mouth three (3) times daily (with meals). , Normal, Disp-270 Tablet, R-3  5. Tubular adenoma of colon  -     ferrous sulfate 325 mg (65 mg iron) tablet; Take 1 Tablet by mouth three (3) times daily (with meals). , Normal, Disp-270 Tablet, R-3  6. Acquired hypothyroidism  Assessment & Plan:  Well controlled, continue present management with levo 50    Follow-up and Dispositions    Return in about 2 weeks (around 12/16/2022) for blood pressure and potassium follow up, labs 1 week prior, (15). 712  Subjective:   No acute concerns    Interim - tubular adenoma identified.  Planning colo with Dr. Brenda szymanski January    follow up DM2  follow up CKD   follow up hypothyroidism  follow up hypertension   follow up iron deficiency - hasn't been on replacement    Results for orders placed or performed in visit on 75/92/76   METABOLIC PANEL, COMPREHENSIVE   Result Value Ref Range    Glucose 186 (H) 70 - 99 mg/dL    BUN 27 (H) 6 - 22 mg/dL    Creatinine 1.5 (H) 0.8 - 1.4 mg/dL    Sodium 141 133 - 145 mmol/L    Potassium 4.9 3.5 - 5.5 mmol/L    Chloride 104 98 - 110 mmol/L    CO2 27 20 - 32 mmol/L    AST (SGOT) 15 10 - 37 U/L    ALT (SGPT) 14 5 - 40 U/L    Alk. phosphatase 105 40 - 120 U/L    Bilirubin, total 0.3 0.2 - 1.2 mg/dL    Calcium 9.4 8.4 - 10.5 mg/dL    Protein, total 6.5 6.2 - 8.1 g/dL    Albumin 4.4 3.5 - 5.0 g/dL    A-G Ratio 2.1 1.1 - 2.6 ratio    Globulin 2.1 2.0 - 4.0 g/dL    GLOMERULAR FILTRATION RATE 35.9 (L) >60.0 mL/min/1.73 sq.m. Anion gap 10.0 3.0 - 15.0 mmol/L   FERRITIN   Result Value Ref Range    Ferritin 33 10 - 291 ng/mL   IRON PROFILE   Result Value Ref Range    Iron 57 30 - 160 mcg/dL    UIBC 284 110 - 370 mcg/dL    TIBC 341 228 - 428 mcg/dL    Iron % saturation 17 (L) 20 - 50 %   VITAMIN D, 25 HYDROXY   Result Value Ref Range    VITAMIN D, 25-HYDROXY 83.1 32.0 - 100.0 ng/mL   PTH INTACT   Result Value Ref Range    PTH, Intact 34 15 - 65 pg/mL   TSH 3RD GENERATION   Result Value Ref Range    TSH 3.03 0.27 - 4.20 mcU/mL   CBC WITH AUTOMATED DIFF   Result Value Ref Range    WBC 9.7 4.0 - 11.0 K/uL    RBC 4.35 3.80 - 5.20 M/uL    HGB 12.7 11.7 - 16.1 g/dL    HCT 40.0 35.1 - 48.3 %    MCV 92 80 - 99 fL    MCH 29 26 - 34 pg    MCHC 32 31 - 36 g/dL    RDW 12.8 10.0 - 15.5 %    PLATELET 337 307 - 264 K/uL    MPV 11.8 9.0 - 13.0 fL    NEUTROPHILS 73 40 - 75 %    Lymphocytes 20 20 - 45 %    MONOCYTES 6 3 - 12 %    EOSINOPHILS 1 0 - 6 %    BASOPHILS 1 0 - 2 %    ABS. NEUTROPHILS 7.0 1.8 - 7.7 K/uL    ABSOLUTE LYMPHOCYTE COUNT 1.9 1.0 - 4.8 K/uL    ABS. MONOCYTES 0.5 0.1 - 1.0 K/uL    ABS.  EOSINOPHILS 0.1 0.0 - 0.5 K/uL    ABS. BASOPHILS 0.1 0.0 - 0.2 K/uL   HEMOGLOBIN A1C W/O EAG   Result Value Ref Range    Hemoglobin A1c 7.5 (H) 4.8 - 5.6 %    AVG  (H) 91 - 123 mg/dL   MICROALBUMIN, UR, RAND W/ MICROALB/CREAT RATIO   Result Value Ref Range    Creatinine, urine random 227 mg/dL    Microalbumin, urine 675.4 (H) 0.1 - 17.0 mg/L    Microalb/Creat ratio (ug/mg creat.) 297.5 (H) 0.0 - 30.0   PHOSPHORUS   Result Value Ref Range    Phosphorus 4.6 (H) 2.5 - 4.5 mg/dL      Lab Results   Component Value Date/Time    Hemoglobin A1c 7.5 (H) 11/09/2022 09:04 AM    Hemoglobin A1c 7.2 (H) 08/10/2022 09:14 AM    Hemoglobin A1c 7.2 (H) 03/15/2022 09:43 AM    Hemoglobin A1c, External 11.1 03/21/2016 12:00 AM    Glucose 186 (H) 11/09/2022 09:04 AM    Glucose,  (H) 01/09/2019 10:30 AM    Microalbumin/creat ratio, External  12/28/2021 12:00 AM    Microalbumin/Creat ratio (mg/g creat) 93 (H) 11/12/2019 10:50 AM    Microalb/Creat ratio (ug/mg creat.) 297.5 (H) 11/09/2022 09:04 AM    Microalbumin,urine random 11.00 (H) 11/12/2019 10:50 AM    LDL,Direct 60 08/10/2022 09:14 AM    LDL, calculated 59 08/10/2022 09:14 AM    Creatinine, POC 1.6 (H) 01/09/2019 10:30 AM    Creatinine 1.5 (H) 11/09/2022 09:04 AM           Objective:     General: alert, cooperative, no distress   Mental  status: normal mood, behavior, speech, dress, motor activity, and thought processes, able to follow commands   HENT: NCAT   Neck: no visualized mass   Resp: no respiratory distress   Neuro: no gross deficits   Skin: no discoloration or lesions of concern on visible areas   Psychiatric: normal affect, consistent with stated mood, no evidence of hallucinations     Additional exam findings: We discussed the expected course, resolution and complications of the diagnosis(es) in detail. Medication risks, benefits, costs, interactions, and alternatives were discussed as indicated.   I advised her to contact the office if her condition worsens, changes or fails to improve as anticipated. She expressed understanding with the diagnosis(es) and plan. Radha Miranda, was evaluated through a synchronous (real-time) audio-video encounter. The patient (or guardian if applicable) is aware that this is a billable service, which includes applicable co-pays. This Virtual Visit was conducted with patient's (and/or legal guardian's) consent. The visit was conducted pursuant to the emergency declaration under the [address] [place] [address] and the [employer] and "[employer] General Act. Patient identification was verified, and a caregiver was present when appropriate. The patient was located at: Home: [address] [id number]  The provider was located at:  Facility (Appt Department): [address]  [address] [facility] [address] [facility]      Xavier Brown MD

## 2022-12-02 NOTE — ASSESSMENT & PLAN NOTE
Resumption of Benicar desired, miguel angel as BP uncontrolled as well, but K+ 4.9, above Dr. Roth Shade threshold recommendation of 4.5, with which I concur.

## 2022-12-12 ENCOUNTER — HOSPITAL ENCOUNTER (OUTPATIENT)
Dept: LAB | Age: 72
Discharge: HOME | End: 2022-12-12

## 2022-12-12 ENCOUNTER — CLINICAL SUPPORT (OUTPATIENT)
Dept: FAMILY MEDICINE CLINIC | Age: 72
End: 2022-12-12
Payer: MEDICARE

## 2022-12-12 DIAGNOSIS — N18.32 STAGE 3B CHRONIC KIDNEY DISEASE (CKD) (HCC): Primary | ICD-10-CM

## 2022-12-12 DIAGNOSIS — I10 ESSENTIAL HYPERTENSION: ICD-10-CM

## 2022-12-12 LAB — SENTARA SPECIMEN COL,SENBCF: NORMAL

## 2022-12-12 PROCEDURE — 36415 COLL VENOUS BLD VENIPUNCTURE: CPT | Performed by: FAMILY MEDICINE

## 2022-12-12 PROCEDURE — 99001 SPECIMEN HANDLING PT-LAB: CPT

## 2022-12-12 NOTE — PROGRESS NOTES
Patient here today for NV lab draw. Name and  verified venipuncture performed on patients left arm was successful patient tolerated well.

## 2022-12-14 LAB
ANION GAP SERPL CALC-SCNC: 13 MMOL/L (ref 3–15)
BUN SERPL-MCNC: 33 MG/DL (ref 6–22)
CALCIUM SERPL-MCNC: 9.3 MG/DL (ref 8.4–10.5)
CHLORIDE SERPL-SCNC: 102 MMOL/L (ref 98–110)
CO2 SERPL-SCNC: 26 MMOL/L (ref 20–32)
CREAT SERPL-MCNC: 1.8 MG/DL (ref 0.8–1.4)
GLOMERULAR FILTRATION RATE: 29 ML/MIN/1.73 SQ.M.
GLUCOSE SERPL-MCNC: 255 MG/DL (ref 70–99)
POTASSIUM SERPL-SCNC: 5 MMOL/L (ref 3.5–5.5)
SODIUM SERPL-SCNC: 141 MMOL/L (ref 133–145)

## 2022-12-16 ENCOUNTER — OFFICE VISIT (OUTPATIENT)
Dept: FAMILY MEDICINE CLINIC | Age: 72
End: 2022-12-16
Payer: MEDICARE

## 2022-12-16 VITALS
TEMPERATURE: 98.1 F | RESPIRATION RATE: 10 BRPM | HEART RATE: 67 BPM | DIASTOLIC BLOOD PRESSURE: 70 MMHG | SYSTOLIC BLOOD PRESSURE: 124 MMHG

## 2022-12-16 DIAGNOSIS — D50.0 IRON DEFICIENCY ANEMIA DUE TO CHRONIC BLOOD LOSS: ICD-10-CM

## 2022-12-16 DIAGNOSIS — E11.21 TYPE 2 DIABETES MELLITUS WITH DIABETIC NEPHROPATHY, WITH LONG-TERM CURRENT USE OF INSULIN (HCC): ICD-10-CM

## 2022-12-16 DIAGNOSIS — Z79.4 TYPE 2 DIABETES MELLITUS WITH DIABETIC NEPHROPATHY, WITH LONG-TERM CURRENT USE OF INSULIN (HCC): ICD-10-CM

## 2022-12-16 DIAGNOSIS — I10 ESSENTIAL HYPERTENSION: Primary | ICD-10-CM

## 2022-12-16 DIAGNOSIS — E03.9 ACQUIRED HYPOTHYROIDISM: ICD-10-CM

## 2022-12-16 DIAGNOSIS — N18.32 STAGE 3B CHRONIC KIDNEY DISEASE (CKD) (HCC): ICD-10-CM

## 2022-12-16 DIAGNOSIS — E55.9 VITAMIN D DEFICIENCY: ICD-10-CM

## 2022-12-16 PROCEDURE — G9899 SCRN MAM PERF RSLTS DOC: HCPCS | Performed by: FAMILY MEDICINE

## 2022-12-16 PROCEDURE — 2022F DILAT RTA XM EVC RTNOPTHY: CPT | Performed by: FAMILY MEDICINE

## 2022-12-16 PROCEDURE — 3074F SYST BP LT 130 MM HG: CPT | Performed by: FAMILY MEDICINE

## 2022-12-16 PROCEDURE — G8432 DEP SCR NOT DOC, RNG: HCPCS | Performed by: FAMILY MEDICINE

## 2022-12-16 PROCEDURE — G8417 CALC BMI ABV UP PARAM F/U: HCPCS | Performed by: FAMILY MEDICINE

## 2022-12-16 PROCEDURE — 1123F ACP DISCUSS/DSCN MKR DOCD: CPT | Performed by: FAMILY MEDICINE

## 2022-12-16 PROCEDURE — 1090F PRES/ABSN URINE INCON ASSESS: CPT | Performed by: FAMILY MEDICINE

## 2022-12-16 PROCEDURE — G8427 DOCREV CUR MEDS BY ELIG CLIN: HCPCS | Performed by: FAMILY MEDICINE

## 2022-12-16 PROCEDURE — 3051F HG A1C>EQUAL 7.0%<8.0%: CPT | Performed by: FAMILY MEDICINE

## 2022-12-16 PROCEDURE — 3078F DIAST BP <80 MM HG: CPT | Performed by: FAMILY MEDICINE

## 2022-12-16 PROCEDURE — G8754 DIAS BP LESS 90: HCPCS | Performed by: FAMILY MEDICINE

## 2022-12-16 PROCEDURE — 3017F COLORECTAL CA SCREEN DOC REV: CPT | Performed by: FAMILY MEDICINE

## 2022-12-16 PROCEDURE — 99214 OFFICE O/P EST MOD 30 MIN: CPT | Performed by: FAMILY MEDICINE

## 2022-12-16 PROCEDURE — 1101F PT FALLS ASSESS-DOCD LE1/YR: CPT | Performed by: FAMILY MEDICINE

## 2022-12-16 PROCEDURE — G8752 SYS BP LESS 140: HCPCS | Performed by: FAMILY MEDICINE

## 2022-12-16 PROCEDURE — G8399 PT W/DXA RESULTS DOCUMENT: HCPCS | Performed by: FAMILY MEDICINE

## 2022-12-16 PROCEDURE — G8536 NO DOC ELDER MAL SCRN: HCPCS | Performed by: FAMILY MEDICINE

## 2022-12-16 RX ORDER — ACETAMINOPHEN 500 MG
2000 TABLET ORAL DAILY
COMMUNITY
Start: 2022-12-16

## 2022-12-16 RX ORDER — FUROSEMIDE 20 MG/1
20 TABLET ORAL DAILY
Qty: 90 TABLET | Refills: 2 | Status: SHIPPED | OUTPATIENT
Start: 2022-12-16

## 2022-12-16 RX ORDER — PEN NEEDLE, DIABETIC 31 GX3/16"
NEEDLE, DISPOSABLE MISCELLANEOUS
Qty: 100 PEN NEEDLE | Refills: 11 | Status: SHIPPED | OUTPATIENT
Start: 2022-12-16

## 2022-12-16 NOTE — PROGRESS NOTES
Chief Complaint   Patient presents with    Hypertension       Pt in office for follow up and lab results. 1. \"Have you been to the ER, urgent care clinic since your last visit? Hospitalized since your last visit? \" No    2. \"Have you seen or consulted any other health care providers outside of the [address] [place] [address] since your last visit? \" No     3. For patients aged 39-70: Has the patient had a colonoscopy / FIT/ Cologuard? Yes - no Care Gap present      If the patient is female:    4. For patients aged 41-77: Has the patient had a mammogram within the past 2 years? Yes - no Care Gap present      5. For patients aged 21-65: Has the patient had a pap smear?  No

## 2022-12-16 NOTE — ASSESSMENT & PLAN NOTE
Resumption of Benicar desired, but K+ above Dr. Gabriel Reading threshold recommendation of 4.5, with which I concur.   Scheduled to see him March 2023

## 2022-12-16 NOTE — PROGRESS NOTES
Jg Morfin (: 1950) is a 67 y.o. female, established patient, here for:    ASSESSMENT/PLAN:  1. Essential hypertension  Assessment & Plan:  Well controlled. Benicar contraindicated by K+>4.5. Continue diltiazem 360 mg and furosemide 20 mg. Orders:  -     furosemide (LASIX) 20 mg tablet; Take 1 Tablet by mouth daily. , Normal, Disp-90 Tablet, R-2  2. Stage 3b chronic kidney disease (CKD) ([facility])  Assessment & Plan:  Resumption of Benicar desired, but K+ above Dr. Gabriel Reading threshold recommendation of 4.5, with which I concur. Scheduled to see him 2023  Orders:  -     furosemide (LASIX) 20 mg tablet; Take 1 Tablet by mouth daily. , Normal, Disp-90 Tablet, R-2  -     METABOLIC PANEL, COMPREHENSIVE; Future  3. Vitamin D deficiency  Assessment & Plan: With Vit d 83, far more than necessary. Decrease D3 to 2000 daily. Orders:  -     VITAMIN D, 25 HYDROXY; Future  -     VITAMIN D, 25 HYDROXY; Future  4. Iron deficiency anemia due to chronic blood loss  -     CBC WITH AUTOMATED DIFF; Future  -     FERRITIN; Future  -     IRON PROFILE; Future  5. Type 2 diabetes mellitus with diabetic nephropathy, with long-term current use of insulin ([facility])  -     Insulin Needles, Disposable, (BD Ultra-Fine Mini Pen Needle) 31 gauge x 3/16\" ndle; USE AS DIRECTED, Normal, Disp-100 Pen Needle, R-11  -     HEMOGLOBIN A1C WITH EAG; Future  -     METABOLIC PANEL, COMPREHENSIVE; Future  6. Acquired hypothyroidism  -     TSH W/ REFLX FREE T4 IF ABNORMAL; Future    Return in about 3 months (around 3/16/2023) for chronic medical conditions, labs 1 week prior, (30), MWV same day. SUBJECTIVE/OBJECTIVE:  HPI    follow up hypertension post addition furosemide.  Context CKD 3b  Complicated by history of hyperkalemia  No side effects       Results for orders placed or performed in visit on    METABOLIC PANEL, BASIC   Result Value Ref Range    Glucose 255 (H) 70 - 99 mg/dL    BUN 33 (H) 6 - 22 mg/dL    Creatinine 1.8 (H) 0.8 - 1.4 mg/dL    Sodium 141 133 - 145 mmol/L    Potassium 5.0 3.5 - 5.5 mmol/L    Chloride 102 98 - 110 mmol/L    CO2 26 20 - 32 mmol/L    Calcium 9.3 8.4 - 10.5 mg/dL    GLOMERULAR FILTRATION RATE 29.0 (L) >60.0 mL/min/1.73 sq.m. Anion gap 13.0 3.0 - 15.0 mmol/L     Lab Results   Component Value Date/Time    Vitamin D 25-Hydroxy 31.2 11/12/2019 10:50 AM    Vitamin D 25-Hydroxy 57.5 07/30/2018 08:48 AM    Vitamin D 25-Hydroxy 19.3 (L) 06/07/2018 12:40 PM    VITAMIN D, 25-HYDROXY 83.1 11/09/2022 09:04 AM    VITAMIN D, 25-HYDROXY 61.1 10/09/2020 08:36 AM    VITAMIN D, 25-HYDROXY 65.6 07/20/2020 01:21 PM           Physical Exam  Constitutional:       General: She is not in acute distress. Appearance: She is well-developed. HENT:      Head: Normocephalic and atraumatic. Pulmonary:      Effort: Pulmonary effort is normal.   Neurological:      Mental Status: She is alert and oriented to person, place, and time. Psychiatric:         Behavior: Behavior normal.         Thought Content:  Thought content normal.         Judgment: Judgment normal.             -- Yariel Fisher MD

## 2022-12-23 ENCOUNTER — OFFICE VISIT (OUTPATIENT)
Dept: FAMILY MEDICINE CLINIC | Age: 72
End: 2022-12-23
Payer: MEDICARE

## 2022-12-23 VITALS
TEMPERATURE: 97.8 F | WEIGHT: 190 LBS | SYSTOLIC BLOOD PRESSURE: 152 MMHG | OXYGEN SATURATION: 97 % | BODY MASS INDEX: 32.61 KG/M2 | DIASTOLIC BLOOD PRESSURE: 80 MMHG | HEART RATE: 80 BPM | RESPIRATION RATE: 16 BRPM

## 2022-12-23 DIAGNOSIS — R42 EPISODIC LIGHTHEADEDNESS: Primary | ICD-10-CM

## 2022-12-23 DIAGNOSIS — E87.5 HYPERKALEMIA: ICD-10-CM

## 2022-12-23 PROCEDURE — 3078F DIAST BP <80 MM HG: CPT | Performed by: STUDENT IN AN ORGANIZED HEALTH CARE EDUCATION/TRAINING PROGRAM

## 2022-12-23 PROCEDURE — 1123F ACP DISCUSS/DSCN MKR DOCD: CPT | Performed by: STUDENT IN AN ORGANIZED HEALTH CARE EDUCATION/TRAINING PROGRAM

## 2022-12-23 PROCEDURE — 3074F SYST BP LT 130 MM HG: CPT | Performed by: STUDENT IN AN ORGANIZED HEALTH CARE EDUCATION/TRAINING PROGRAM

## 2022-12-23 PROCEDURE — 99213 OFFICE O/P EST LOW 20 MIN: CPT | Performed by: STUDENT IN AN ORGANIZED HEALTH CARE EDUCATION/TRAINING PROGRAM

## 2022-12-23 NOTE — PROGRESS NOTES
Elliott Lala (: 1950) is a 67 y.o. female, established patient, here for evaluation of the following chief complaint(s):  Palpitations, Dizziness, and Headache (Patient does not know if it is from stress or the Lasix she was put on.)       Assessment/Plan:  1. Episodic lightheadedness-unclear etiology in the setting of the patient's underlying vertigo. Patient states her current symptoms are separate unique than her underlying vertigo. Does not appear orthostatic in nature, especially given patient's elevated blood pressure today. As it appears the symptoms started shortly after starting Lasix, will hold that medication for 1-2 weeks. If not better would restart the medication and return to clinic. 2. Hyperkalemia-Lasix previously prescribed to help treat hypertension and hyperkalemia. Holding for the next 1-2 weeks. If lightheadedness does improve with stopping this medication, would need to reevaluate other blood pressure management options. Return in about 2 weeks (around 2023) for Lightheadedness and dizziness. Subjective/Objective:  HPI    Lightheadedness- Feeling unsteady on feet, boat sensation. Denies presyncope. No known triggers, seems random. Denies any today but has had headache today. Symptoms started recently after starting Lasix 20mg and now moving a lot over the last week. Yesterday drank about 3-4 16oz bottles in a day, so feels she is staying well-hydrated. Vertigo- More room spinning and nausea sensation and associated with emesis. This feels separate than the current lightheadedness the patient is feeling. Physical Exam  Blood pressure (!) 152/80, pulse 80, temperature 97.8 °F (36.6 °C), temperature source Tympanic, resp. rate 16, weight 190 lb (86.2 kg), last menstrual period 1972, SpO2 97 %. Body mass index is 32.61 kg/m². Physical Exam  Constitutional:       Appearance: Normal appearance. HENT:      Head: Normocephalic and atraumatic. Right Ear: External ear normal.      Left Ear: External ear normal.      Nose: Nose normal.      Mouth/Throat:      Mouth: Mucous membranes are moist.      Pharynx: Oropharynx is clear. Eyes:      Extraocular Movements: Extraocular movements intact. Conjunctiva/sclera: Conjunctivae normal.   Cardiovascular:      Rate and Rhythm: Normal rate and regular rhythm. Pulmonary:      Effort: Pulmonary effort is normal. No respiratory distress. Breath sounds: Normal breath sounds. Skin:     General: Skin is warm and dry. Neurological:      General: No focal deficit present. Mental Status: She is alert and oriented to person, place, and time. Medical History- Reviewed Social History- Reviewed Surgical History- Reviewed   Shaina Chen has a past medical history of Acquired hypothyroidism, EMPERATRIZ (acute kidney injury) ([address] (7/24/2020), Depression, Diabetes ([address] GERD (gastroesophageal reflux disease), Hiatal hernia, Hypercholesterolemia, Hypertension, Migraines, Moderate major depression ([address] (7/9/2018), Recurrent UTI, Stage 3 chronic kidney disease ([address] and Thyroid disease. Shaina Chen reports that she has never smoked. She has never used smokeless tobacco. She reports that she does not drink alcohol and does not use drugs. Shaina Chen has a past surgical history that includes hx partial hysterectomy (11/17/1972); hx breast biopsy (1993); hx mohs procedure (Right, 09/2001); hx other surgical (2001); hx tubal ligation (11/1971); hx hysterectomy; and hx appendectomy.          Problem List- Reviewed   Shaina Chen has Essential hypertension, Acquired hypothyroidism, Gastroesophageal reflux disease without esophagitis, Hyperlipidemia, Migraines, Family history of malignant neoplasm of breast, Type 2 diabetes mellitus with diabetic nephropathy, with long-term current use of insulin ([address] Diabetic nephropathy associated with type 2 diabetes mellitus ([address] Osteopenia of multiple sites, Vitamin D deficiency, Class 1 obesity due to excess calories with serious comorbidity and body mass index (BMI) of 31.0 to 31.9 in adult, Stage 3b chronic kidney disease (CKD) ([facility] History of adenomatous polyp of colon, Iron deficiency anemia due to chronic blood loss, Altered bowel function, and Dupuytren's contracture of left hand on their problem list.       Current Outpatient Medications   Medication Instructions    acetaminophen (TYLENOL) 500 mg tablet Oral, EVERY 6 HOURS AS NEEDED    ALCOHOL PREP PADS padm No dose, route, or frequency recorded. BLADDER CONTROL PADS pads No dose, route, or frequency recorded. Blood-Glucose Meter (ACCU-CHEK CODY PLUS METER) misc 1 Device, Does Not Apply, 4 TIMES DAILY    cholecalciferol (VITAMIN D3) 2,000 Units, Oral, DAILY    dilTIAZem ER (CARDIZEM CD) 360 mg, Oral, DAILY    dulaglutide (TRULICITY) 6.38 mg, SubCUTAneous, EVERY 7 DAYS    esomeprazole (NEXIUM) 40 mg, DAILY    estradioL (ESTRACE) 0.01 % (0.1 mg/gram) vaginal cream APPLY A PEA SIZED AMOUNT 3 TIMES A WEEK AROUND THE URETHRA    ferrous sulfate 325 mg, Oral, 3 TIMES DAILY WITH MEALS    furosemide (LASIX) 20 mg, Oral, DAILY    glucose blood VI test strips (ACCU-CHEK CODY PLUS TEST STRP) strip Patient is to check blood sugar 3-4 times a day    insulin aspart U-100 (NOVOLOG) 5 Units, SubCUTAneous, AS NEEDED    Insulin Needles, Disposable, (BD Ultra-Fine Mini Pen Needle) 31 gauge x 3/16\" ndle USE AS DIRECTED    Lantus Solostar U-100 Insulin 22 Units, SubCUTAneous, DAILY    levothyroxine (SYNTHROID) 50 mcg, Oral, DAILY BEFORE BREAKFAST    metoclopramide HCl (REGLAN) 5 mg, Oral    rosuvastatin (CRESTOR) 20 mg, Oral, EVERY BEDTIME    senna (SENOKOT) 8.6 mg, Oral, 2 TIMES DAILY AS NEEDED           An electronic signature was used to authenticate this note.   -- Oswaldo Luna MD

## 2022-12-23 NOTE — PROGRESS NOTES
1. \"Have you been to the ER, urgent care clinic since your last visit? Hospitalized since your last visit? \" No    2. \"Have you seen or consulted any other health care providers outside of the [address] [place] [address] since your last visit? \" No     3. For patients aged 39-70: Has the patient had a colonoscopy / FIT/ Cologuard? Yes - no Care Gap present      If the patient is female:    4. For patients aged 41-77: Has the patient had a mammogram within the past 2 years? Yes - no Care Gap present      5. For patients aged 21-65: Has the patient had a pap smear? NA - based on age or sex      Chief Complaint   Patient presents with    Palpitations    Dizziness    Headache     Patient does not know if it is from stress or the Lasix she was put on.

## 2023-02-04 DIAGNOSIS — Z79.4 TYPE 2 DIABETES MELLITUS WITH DIABETIC NEPHROPATHY, WITH LONG-TERM CURRENT USE OF INSULIN (HCC): Primary | ICD-10-CM

## 2023-02-04 DIAGNOSIS — E55.9 VITAMIN D DEFICIENCY: Primary | ICD-10-CM

## 2023-02-04 DIAGNOSIS — E11.21 TYPE 2 DIABETES MELLITUS WITH DIABETIC NEPHROPATHY, WITH LONG-TERM CURRENT USE OF INSULIN (HCC): Primary | ICD-10-CM

## 2023-02-05 DIAGNOSIS — E11.21 TYPE 2 DIABETES MELLITUS WITH DIABETIC NEPHROPATHY, WITH LONG-TERM CURRENT USE OF INSULIN (HCC): ICD-10-CM

## 2023-02-05 DIAGNOSIS — N18.32 STAGE 3B CHRONIC KIDNEY DISEASE (CKD) (HCC): Primary | ICD-10-CM

## 2023-02-05 DIAGNOSIS — Z79.4 TYPE 2 DIABETES MELLITUS WITH DIABETIC NEPHROPATHY, WITH LONG-TERM CURRENT USE OF INSULIN (HCC): ICD-10-CM

## 2023-02-07 DIAGNOSIS — E03.9 ACQUIRED HYPOTHYROIDISM: Primary | ICD-10-CM

## 2023-02-07 DIAGNOSIS — D50.0 IRON DEFICIENCY ANEMIA DUE TO CHRONIC BLOOD LOSS: Primary | ICD-10-CM

## 2023-02-10 DIAGNOSIS — Z79.4 TYPE 2 DIABETES MELLITUS WITH DIABETIC NEPHROPATHY, WITH LONG-TERM CURRENT USE OF INSULIN (HCC): ICD-10-CM

## 2023-02-10 DIAGNOSIS — E11.21 TYPE 2 DIABETES MELLITUS WITH DIABETIC NEPHROPATHY, WITH LONG-TERM CURRENT USE OF INSULIN (HCC): ICD-10-CM

## 2023-02-27 NOTE — TELEPHONE ENCOUNTER
This pharmacy faxed over request for the following prescriptions to be filled:    Medication requested :   Requested Prescriptions     Pending Prescriptions Disp Refills    LANTUS SOLOSTAR 100 UNIT/ML injection pen [Pharmacy Med Name: Ronnell Hall 100 UNIT/ML] 15 mL      Sig: inject 20 units subcutaneously daily      PCP: Dr. Lucia Ahn or Print: [facility]  Mail order or Local pharmacy: [phone]    Scheduled appointment if not seen by current providers in office: LOV 12/23/22, next appt for chronic conditions is  3/17/2023

## 2023-02-28 RX ORDER — INSULIN GLARGINE 100 [IU]/ML
INJECTION, SOLUTION SUBCUTANEOUS
Qty: 15 ML | Refills: 3 | Status: SHIPPED | OUTPATIENT
Start: 2023-02-28

## 2023-03-07 NOTE — TELEPHONE ENCOUNTER
This pharmacy faxed over request for the following prescriptions to be filled:    Medication requested :   Requested Prescriptions     Pending Prescriptions Disp Refills    TRULICITY 3.45 AI/7.6NM SOPN [Pharmacy Med Name: TRULICITY 2.33 CN/3.0 ML PEN] 12 mL      Sig: inject 0.5 milliliters ( 0.75 milligrams ) subcutaneously every 7 days IN THE ABDOMEN THIGHS OR OUTER AREA OF UPPER ARM ROTATE INJECTION SITES      PCP: Dr. Ramonita Aguirre or Print: [facility]  Mail order or Local pharmacy: [phone]    Scheduled appointment if not seen by current providers in office: LOV 12/23/22 (Dr. Mo Roy), [address] [place] [address] 12/16/22 with Dr. Cameron Maya, next appt 3/17/23   I spoke with pt and she states she took her last shot yesterday and will need refill prior to appt.

## 2023-03-08 ENCOUNTER — HOSPITAL ENCOUNTER (OUTPATIENT)
Facility: HOSPITAL | Age: 73
Setting detail: SPECIMEN
Discharge: HOME | End: 2023-03-11

## 2023-03-08 DIAGNOSIS — N18.32 STAGE 3B CHRONIC KIDNEY DISEASE (CKD) (HCC): ICD-10-CM

## 2023-03-08 DIAGNOSIS — Z79.4 TYPE 2 DIABETES MELLITUS WITH DIABETIC NEPHROPATHY, WITH LONG-TERM CURRENT USE OF INSULIN (HCC): ICD-10-CM

## 2023-03-08 DIAGNOSIS — D50.0 IRON DEFICIENCY ANEMIA DUE TO CHRONIC BLOOD LOSS: ICD-10-CM

## 2023-03-08 DIAGNOSIS — E11.21 TYPE 2 DIABETES MELLITUS WITH DIABETIC NEPHROPATHY, WITH LONG-TERM CURRENT USE OF INSULIN (HCC): ICD-10-CM

## 2023-03-08 DIAGNOSIS — E55.9 VITAMIN D DEFICIENCY: ICD-10-CM

## 2023-03-08 LAB — SENTARA SPECIMEN COLLECTION: NORMAL

## 2023-03-08 PROCEDURE — 99001 SPECIMEN HANDLING PT-LAB: CPT

## 2023-03-09 LAB
A/G RATIO: 2 RATIO (ref 1.1–2.6)
ALBUMIN SERPL-MCNC: 4.1 G/DL (ref 3.5–5)
ALP BLD-CCNC: 109 U/L (ref 40–120)
ALT SERPL-CCNC: 12 U/L (ref 5–40)
ANION GAP SERPL CALCULATED.3IONS-SCNC: 11 MMOL/L (ref 3–15)
AST SERPL-CCNC: 16 U/L (ref 10–37)
AVERAGE GLUCOSE: 198 MG/DL (ref 91–123)
BILIRUB SERPL-MCNC: 0.2 MG/DL (ref 0.2–1.2)
BUN BLDV-MCNC: 21 MG/DL (ref 6–22)
CALCIUM SERPL-MCNC: 8.9 MG/DL (ref 8.4–10.5)
CHLORIDE BLD-SCNC: 106 MMOL/L (ref 98–110)
CO2: 26 MMOL/L (ref 20–32)
CREAT SERPL-MCNC: 1.7 MG/DL (ref 0.8–1.4)
FERRITIN: 81 NG/ML (ref 10–291)
GLOBULIN: 2.1 G/DL (ref 2–4)
GLOMERULAR FILTRATION RATE: 31 ML/MIN/1.73 SQ.M.
GLUCOSE: 251 MG/DL (ref 70–99)
HBA1C MFR BLD: 8.5 % (ref 4.8–5.6)
POTASSIUM SERPL-SCNC: 5.1 MMOL/L (ref 3.5–5.5)
SODIUM BLD-SCNC: 143 MMOL/L (ref 133–145)
TOTAL PROTEIN: 6.2 G/DL (ref 6.2–8.1)
TSH SERPL DL<=0.05 MIU/L-ACNC: 1.79 MCU/ML (ref 0.27–4.2)

## 2023-03-14 RX ORDER — DULAGLUTIDE 0.75 MG/.5ML
INJECTION, SOLUTION SUBCUTANEOUS
Qty: 12 ML | Refills: 0 | Status: SHIPPED | OUTPATIENT
Start: 2023-03-14

## 2023-03-14 RX ORDER — DULAGLUTIDE 0.75 MG/.5ML
INJECTION, SOLUTION SUBCUTANEOUS
Qty: 12 ML | OUTPATIENT
Start: 2023-03-14

## 2023-03-14 RX ORDER — ROSUVASTATIN CALCIUM 20 MG/1
TABLET, COATED ORAL
Qty: 90 TABLET | Refills: 3 | Status: SHIPPED | OUTPATIENT
Start: 2023-03-14

## 2023-03-14 NOTE — TELEPHONE ENCOUNTER
Patient has an appt on 3/17, called patient who says she is out of medication and will need this sent in before her appt on Friday.

## 2023-03-17 ENCOUNTER — OFFICE VISIT (OUTPATIENT)
Facility: CLINIC | Age: 73
End: 2023-03-17
Payer: MEDICARE

## 2023-03-17 VITALS
OXYGEN SATURATION: 99 % | RESPIRATION RATE: 14 BRPM | SYSTOLIC BLOOD PRESSURE: 154 MMHG | WEIGHT: 191 LBS | TEMPERATURE: 97.5 F | DIASTOLIC BLOOD PRESSURE: 76 MMHG | HEIGHT: 64 IN | BODY MASS INDEX: 32.61 KG/M2 | HEART RATE: 71 BPM

## 2023-03-17 VITALS
HEIGHT: 64 IN | HEART RATE: 70 BPM | SYSTOLIC BLOOD PRESSURE: 140 MMHG | WEIGHT: 191 LBS | RESPIRATION RATE: 14 BRPM | TEMPERATURE: 97.5 F | BODY MASS INDEX: 32.61 KG/M2 | OXYGEN SATURATION: 99 % | DIASTOLIC BLOOD PRESSURE: 66 MMHG

## 2023-03-17 DIAGNOSIS — H91.90 HEARING LOSS, UNSPECIFIED HEARING LOSS TYPE, UNSPECIFIED LATERALITY: ICD-10-CM

## 2023-03-17 DIAGNOSIS — E11.21 DIABETIC NEPHROPATHY ASSOCIATED WITH TYPE 2 DIABETES MELLITUS (HCC): ICD-10-CM

## 2023-03-17 DIAGNOSIS — E11.21 TYPE 2 DIABETES MELLITUS WITH DIABETIC NEPHROPATHY, WITH LONG-TERM CURRENT USE OF INSULIN (HCC): Primary | ICD-10-CM

## 2023-03-17 DIAGNOSIS — I10 ESSENTIAL (PRIMARY) HYPERTENSION: ICD-10-CM

## 2023-03-17 DIAGNOSIS — Z79.4 TYPE 2 DIABETES MELLITUS WITH DIABETIC NEPHROPATHY, WITH LONG-TERM CURRENT USE OF INSULIN (HCC): Primary | ICD-10-CM

## 2023-03-17 DIAGNOSIS — E11.65 TYPE 2 DIABETES MELLITUS WITH HYPERGLYCEMIA, WITH LONG-TERM CURRENT USE OF INSULIN (HCC): ICD-10-CM

## 2023-03-17 DIAGNOSIS — Z12.31 ENCOUNTER FOR SCREENING MAMMOGRAM FOR BREAST CANCER: ICD-10-CM

## 2023-03-17 DIAGNOSIS — Z00.00 MEDICARE ANNUAL WELLNESS VISIT, SUBSEQUENT: Primary | ICD-10-CM

## 2023-03-17 DIAGNOSIS — E55.9 VITAMIN D DEFICIENCY: ICD-10-CM

## 2023-03-17 DIAGNOSIS — Z79.4 TYPE 2 DIABETES MELLITUS WITH HYPERGLYCEMIA, WITH LONG-TERM CURRENT USE OF INSULIN (HCC): ICD-10-CM

## 2023-03-17 DIAGNOSIS — N18.32 CHRONIC KIDNEY DISEASE, STAGE 3B (HCC): ICD-10-CM

## 2023-03-17 DIAGNOSIS — E03.9 ACQUIRED HYPOTHYROIDISM: ICD-10-CM

## 2023-03-17 PROCEDURE — 1123F ACP DISCUSS/DSCN MKR DOCD: CPT | Performed by: FAMILY MEDICINE

## 2023-03-17 PROCEDURE — 3078F DIAST BP <80 MM HG: CPT | Performed by: FAMILY MEDICINE

## 2023-03-17 PROCEDURE — 3077F SYST BP >= 140 MM HG: CPT | Performed by: FAMILY MEDICINE

## 2023-03-17 PROCEDURE — G0439 PPPS, SUBSEQ VISIT: HCPCS | Performed by: FAMILY MEDICINE

## 2023-03-17 RX ORDER — FUROSEMIDE 40 MG/1
20 TABLET ORAL DAILY
Qty: 90 TABLET | Refills: 3 | Status: SHIPPED | OUTPATIENT
Start: 2023-03-17

## 2023-03-17 SDOH — ECONOMIC STABILITY: FOOD INSECURITY: WITHIN THE PAST 12 MONTHS, THE FOOD YOU BOUGHT JUST DIDN'T LAST AND YOU DIDN'T HAVE MONEY TO GET MORE.: NEVER TRUE

## 2023-03-17 SDOH — ECONOMIC STABILITY: HOUSING INSECURITY
IN THE LAST 12 MONTHS, WAS THERE A TIME WHEN YOU DID NOT HAVE A STEADY PLACE TO SLEEP OR SLEPT IN A SHELTER (INCLUDING NOW)?: NO

## 2023-03-17 SDOH — ECONOMIC STABILITY: INCOME INSECURITY: HOW HARD IS IT FOR YOU TO PAY FOR THE VERY BASICS LIKE FOOD, HOUSING, MEDICAL CARE, AND HEATING?: NOT HARD AT ALL

## 2023-03-17 SDOH — ECONOMIC STABILITY: FOOD INSECURITY: WITHIN THE PAST 12 MONTHS, YOU WORRIED THAT YOUR FOOD WOULD RUN OUT BEFORE YOU GOT MONEY TO BUY MORE.: NEVER TRUE

## 2023-03-17 ASSESSMENT — PATIENT HEALTH QUESTIONNAIRE - PHQ9
2. FEELING DOWN, DEPRESSED OR HOPELESS: 0
SUM OF ALL RESPONSES TO PHQ QUESTIONS 1-9: 0
1. LITTLE INTEREST OR PLEASURE IN DOING THINGS: 0
SUM OF ALL RESPONSES TO PHQ QUESTIONS 1-9: 0
SUM OF ALL RESPONSES TO PHQ9 QUESTIONS 1 & 2: 0

## 2023-03-17 ASSESSMENT — LIFESTYLE VARIABLES
HOW MANY STANDARD DRINKS CONTAINING ALCOHOL DO YOU HAVE ON A TYPICAL DAY: PATIENT DOES NOT DRINK
HOW OFTEN DO YOU HAVE A DRINK CONTAINING ALCOHOL: NEVER

## 2023-03-17 NOTE — PROGRESS NOTES
Medicare Annual Wellness Visit    Lindsey Staff is here for Medicare AWV    Assessment & Plan   Medicare annual wellness visit, subsequent  Encounter for screening mammogram for breast cancer  -     [facility] TOBIAS DIGITAL SCREEN BILATERAL [[id number]]; Future  Hearing loss, unspecified hearing loss type, unspecified laterality  -     External Referral To Audiology    Recommendations for Preventive Services Due: see orders and patient instructions/AVS.  Recommended screening schedule for the next 5-10 years is provided to the patient in written form: see Patient Instructions/AVS.     Return for Medicare Annual Wellness Visit in 1 year. Subjective       Patient's complete Health Risk Assessment and screening values have been reviewed and are found in Flowsheets. The following problems were reviewed today and where indicated follow up appointments were made and/or referrals ordered. Positive Risk Factor Screenings with Interventions:    Fall Risk:  Do you feel unsteady or are you worried about falling? : no  2 or more falls in past year?: (!) yes  Fall with injury in past year?: no     Interventions:    Patient comments: mechanical falls. No intervention needed              Weight and Activity:  Physical Activity: Inactive    Days of Exercise per Week: 0 days    Minutes of Exercise per Session: 0 min     On average, how many days per week do you engage in moderate to strenuous exercise (like a brisk walk)?: 0 days  Have you lost any weight without trying in the past 3 months?: No  Body mass index: (!) 32.78    Inactivity Interventions:   Activity recommended  Obesity Interventions:  Evaluated separately          Dentist Screen:  Have you seen the dentist within the past year?: (!) No    Intervention:  Advised to schedule with their dentist    Hearing Screen:  Do you or your family notice any trouble with your hearing that hasn't been managed with hearing aids?: (!) Yes    Interventions:  Referred to Audiology    Vision Screen:  Do you have difficulty driving, watching TV, or doing any of your daily activities because of your eyesight?: No  Have you had an eye exam within the past year?: (!) No  No results found. Interventions:   Patient encouraged to make appointment with their eye specialist    Safety:  Do you have any tripping hazards - loose or unsecured carpets or rugs?: (!) Yes (Nails on her [place])  Do you have either shower bars, grab bars, non-slip mats or non-slip surfaces in your shower or bathtub?: (!) No  Interventions:  Latter not indicated     Advanced Directives:  Do you have a Living Will?: (!) No    Intervention:                         Objective   Vitals:    03/17/23 0946   BP: (!) 154/76   Site: Left Upper Arm   Position: Sitting   Pulse: 71   Resp: 14   Temp: 97.5 °F (36.4 °C)   TempSrc: Tympanic   SpO2: 99%   Weight: 191 lb (86.6 kg)   Height: 5' 4\" (1.626 m)      Body mass index is 32.79 kg/m². Allergies   Allergen Reactions    Empagliflozin Other (See Comments)     Hyperkalemia, MEGAN    Metformin Diarrhea     Prior to Visit Medications    Medication Sig Taking?  Authorizing Provider   [facility] QJ/1.0SV SOPN inject 0.5 milliliters ( 0.75 milligrams ) subcutaneously every 7 days IN THE ABDOMEN THIGHS OR OUTER AREA OF UPPER ARM ROTATE INJECTION SITES Yes Gerber Babin MD   rosuvastatin (CRESTOR) 20 MG tablet take 1 tablet by mouth nightly Yes Gerber Babin MD   LANTUS SOLOSTAR 100 UNIT/ML injection pen inject 20 units subcutaneously daily Yes Gerber Babin MD   acetaminophen (TYLENOL) 500 MG tablet Take by mouth every 6 hours as needed Yes Ar Automatic Reconciliation   Cholecalciferol 50 MCG (2000 UT) CAPS Take 2,000 Units by mouth daily Yes Ar Automatic Reconciliation   dilTIAZem (TIAZAC) 360 MG extended release capsule Take 360 mg by mouth daily Yes Ar Automatic Reconciliation   esomeprazole (NEXIUM) 40 MG delayed release capsule Take 40 mg by mouth daily Yes Ar Automatic Reconciliation   estradiol (ESTRACE) 0.1 MG/GM vaginal cream APPLY A PEA SIZED AMOUNT 3 TIMES A WEEK AROUND THE URETHRA Yes Ar Automatic Reconciliation   ferrous sulfate (IRON 325) 325 (65 Fe) MG tablet Take 325 mg by mouth 3 times daily (with meals) Yes Ar Automatic Reconciliation   insulin aspart (NOVOLOG) 100 UNIT/ML injection pen Inject 5 Units into the skin as needed Yes Ar Automatic Reconciliation   levothyroxine (SYNTHROID) 50 MCG tablet Take 50 mcg by mouth every morning (before breakfast) Yes Ar Automatic Reconciliation   metoclopramide (REGLAN) 5 MG tablet Take 5 mg by mouth Yes Ar Automatic Reconciliation   senna (SENOKOT) 8.6 MG tablet Take 8.6 mg by mouth 2 times daily as needed Yes Ar Automatic Reconciliation   furosemide (LASIX) 40 MG tablet Take 0.5 tablets by mouth daily  Gamal Perez MD       CareTe (Including outside providers/suppliers regularly involved in providing care):   Patient Care Team:  Gamal Perez MD as PCP - Valarie Castro MD as PCP - Empaneled Provider     Reviewed and updated this visit:  Tobacco  Allergies  Meds  Problems  Med Hx  Surg Hx  Soc Hx  Fam Hx               Gamal Perez MD

## 2023-03-17 NOTE — ACP (ADVANCE CARE PLANNING)
Advance Care Planning     General Advance Care Planning (ACP) Conversation      Date of Conversation: 2/7/2022  Conducted with: Patient with Decision Making Capacity    Healthcare Decision Maker:     Primary Decision Maker: Karuna Rubio - Daughter - [phone]    Secondary Decision Maker: Sloan Hunt - Child - [phone]  Click here to complete [address] including selection of the Healthcare Decision Maker Relationship (ie \"Primary\")          Content/Action Overview:   No documents. Philosophy is care focused on alleviation of suffering at the end of life without life prolonging procedures. Her daughter is aware of her wishes and reportedly is willing to support her choices.            Length of Voluntary ACP Conversation in minutes:  <16 minutes (Non-Billable)    Valarie Ponce MD

## 2023-03-17 NOTE — PROGRESS NOTES
Chief Complaint   Patient presents with    Diabetes    Hypertension    Chronic Kidney Disease    Thyroid Problem    Anemia     Patient here today to be seen for her chronic conditions and lab review. 1. \"Have you been to the ER, urgent care clinic since your last visit? Hospitalized since your last visit? \" No    2. \"Have you seen or consulted any other health care providers outside of the [address] [place] [address] since your last visit? \"  Has seen Dr. Roshan Neville      3. For patients aged 39-70: Has the patient had a colonoscopy / FIT/ Cologuard? Yes - no Care Gap present      If the patient is female:    4. For patients aged 41-77: Has the patient had a mammogram within the past 2 years? Yes - no Care Gap present      5. For patients aged 21-65: Has the patient had a pap smear?  NA - based on age or sex

## 2023-03-17 NOTE — ASSESSMENT & PLAN NOTE
Uncontrolled. ACE I contraindicated by history of hyperkalemia, current K+ 5.1. Continue diltiazem.  Increase furosemide to 20 mg

## 2023-03-17 NOTE — PROGRESS NOTES
Shelia Nicole (: 1950) is a 67 y.o. female, established patient, here for:    ASSESSMENT/PLAN:  1. Type 2 diabetes mellitus with diabetic nephropathy, with long-term current use of insulin ([facility]  Assessment & Plan: With hyperglycemia due to lapse in dietary discretion. Discussed increased risk progression of kidney disease and need for dialysis that can be mitigated with good glycemic control. Consider placing sign on her snacks \"DIALYSIS? \" as a reminder of her power to choose. Continue dulaglutide and insulins unchanged. Schedule eye exam. follow up 1 month    2. Type 2 diabetes mellitus with hyperglycemia, with long-term current use of insulin ([facility])  -     Hemoglobin A1C; Future  3. Diabetic nephropathy associated with type 2 diabetes mellitus ([facility]  Assessment & Plan:     4. Chronic kidney disease, stage 3b ([facility])  -     furosemide (LASIX) 40 MG tablet; Take 0.5 tablets by mouth daily, Disp-90 tablet, R-3Normal  -     Basic Metabolic Panel; Future  5. Essential (primary) hypertension  Assessment & Plan:  Uncontrolled. ACE I contraindicated by history of hyperkalemia, current K+ 5.1. Continue diltiazem. Increase furosemide to 20 mg   Orders:  -     furosemide (LASIX) 40 MG tablet; Take 0.5 tablets by mouth daily, Disp-90 tablet, R-3Normal  -     Basic Metabolic Panel; Future  6. Vitamin D deficiency  Assessment & Plan:  Context osteopenia. Current value 83, more than sufficient with 2000 iu twice weekly. Decreasing to 1/week. Consider suspending altogether   7. Acquired hypothyroidism  Assessment & Plan:  Well controlled, continue present management        Follow-up and Dispositions    Return in about 1 month (around 2023) for blood pressure and DM, labs 1 week prior, (30) - hand swelling may be prior or with that follow up.             SUBJECTIVE/OBJECTIVE:  HPI    follow up DM2/obesity with nephropathy - history of hyperkalemia - lapse in diet over holidays, a couple of times 300-350, close to 200 or higher approx half the time. Focusing on less snacking now with morning sugar today 125  Saw dr. Olga Novoa earlier this week. States they discussed potentially increasing furosemide in the future. follow up hypertension   follow up hypothyroidism  follow up osteopenia with vit d def - taking 2000 units 2x/week  follow up iron def anemia    Lab Results   Component Value Date    LABA1C 8.5 (H) 03/08/2023    LABA1C 7.5 (H) 11/09/2022    LABA1C 7.2 (H) 08/10/2022     Lab Results   Component Value Date    LDLCALC 59 08/10/2022    CREATININE 1.7 (H) 03/08/2023     Lab Results   Component Value Date/Time    VITD25 83.1 11/09/2022 09:04 AM      Lab Results   Component Value Date    PTH 34 11/09/2022    CALCIUM 8.9 03/08/2023    PHOS 4.6 (H) 11/09/2022     Lab Results   Component Value Date    TSH 1.79 03/08/2023     Lab Results   Component Value Date    IRON 57 11/09/2022    TIBC 341 11/09/2022    FERRITIN 81 03/08/2023     Lab Results   Component Value Date     03/08/2023    K 5.1 03/08/2023     03/08/2023    CO2 26 03/08/2023    BUN 21 03/08/2023    CREATININE 1.7 (H) 03/08/2023    GLUCOSE 251 (H) 03/08/2023    CALCIUM 8.9 03/08/2023    PROT 6.2 03/08/2023    LABALBU 4.1 03/08/2023    BILITOT 0.2 03/08/2023    ALKPHOS 109 03/08/2023    AST 16 03/08/2023    ALT 12 03/08/2023    LABGLOM 29.1 (L) 03/15/2022    GFRAA 35.3 (L) 03/15/2022    AGRATIO 2.0 03/08/2023    GLOB 2.1 03/08/2023                 Physical Exam  Constitutional:       General: She is not in acute distress. Appearance: Normal appearance. HENT:      Head: Normocephalic and atraumatic. Pulmonary:      Effort: Pulmonary effort is normal.   Neurological:      Mental Status: She is alert and oriented to person, place, and time.          Diabetic foot exam:   Left Foot:   Visual Exam: normal   Pulse DP: 2+ (normal)   Filament test: normal sensation     Right Foot:   Visual Exam: normal   Pulse DP: 2+ (normal)   Filament test: normal sensation           -- Diana Holden MD

## 2023-03-17 NOTE — ASSESSMENT & PLAN NOTE
Context osteopenia. Current value 83, more than sufficient with 2000 iu twice weekly. Decreasing to 1/week.  Consider suspending altogether

## 2023-03-17 NOTE — PATIENT INSTRUCTIONS
Preventing Falls: Care Instructions  Overview     Getting around your home safely can be a challenge if you have injuries or health problems that make it easy for you to fall. Loose rugs and furniture in walkways are among the dangers for many older people who have problems walking or who have poor eyesight. People who have conditions such as arthritis, osteoporosis, or dementia also have to be careful not to fall. You can make your home safer with a few simple measures. Follow-up care is a key part of your treatment and safety. Be sure to make and go to all appointments, and call your doctor if you are having problems. It's also a good idea to know your test results and keep a list of the medicines you take. How can you care for yourself at home? Taking care of yourself  Exercise regularly to improve your strength, muscle tone, and balance. Walk if you can. Swimming may be a good choice if you cannot walk easily. Have your vision and hearing checked each year or any time you notice a change. If you have trouble seeing and hearing, you might not be able to avoid objects and could lose your balance. Know the side effects of the medicines you take. Ask your doctor or pharmacist whether the medicines you take can affect your balance. Sleeping pills or sedatives can affect your balance. Limit the amount of alcohol you drink. Alcohol can impair your balance and other senses. Ask your doctor whether calluses or corns on your feet need to be removed. If you wear loose-fitting shoes because of calluses or corns, you can lose your balance and fall. Talk to your doctor if you have numbness in your feet. You may get dizzy if you do not drink enough water. To prevent dehydration, drink plenty of fluids. Choose water and other clear liquids. If you have kidney, heart, or liver disease and have to limit fluids, talk with your doctor before you increase the amount of fluids you drink.   Preventing falls at home  Remove raised doorway thresholds, throw rugs, and clutter. Repair loose carpet or raised areas in the floor. Move furniture and electrical cords to keep them out of walking paths. Use nonskid floor wax, and wipe up spills right away, especially on ceramic tile floors. If you use a walker or cane, put rubber tips on it. If you use crutches, clean the bottoms of them regularly with an abrasive pad, such as steel wool. Keep your house well lit, especially stairways, porches, and outside walkways. Use night-lights in areas such as hallways and bathrooms. Add extra light switches or use remote switches (such as switches that go on or off when you clap your hands) to make it easier to turn lights on if you have to get up during the night. Install sturdy handrails on stairways. Move items in your cabinets so that the things you use a lot are on the lower shelves (about waist level). Keep a cordless phone and a flashlight with new batteries by your bed. If possible, put a phone in each of the main rooms of your house, or carry a cell phone in case you fall and cannot reach a phone. Or, you can wear a device around your neck or wrist. You push a button that sends a signal for help. Wear low-heeled shoes that fit well and give your feet good support. Use footwear with nonskid soles. Check the heels and soles of your shoes for wear. Repair or replace worn heels or soles. Do not wear socks without shoes on smooth floors, such as wood. Walk on the grass when the sidewalks are slippery. If you live in an area that gets snow and ice in the winter, sprinkle salt on slippery steps and sidewalks. Or ask a family member or friend to do this for you. Preventing falls in the bath  Install grab bars and nonskid mats inside and outside your shower or tub and near the toilet and sinks. Use shower chairs and bath benches. Use a hand-held shower head that will allow you to sit while showering.   Get into a tub or shower by putting the weaker leg in first. Get out of a tub or shower with your strong side first.  Repair loose toilet seats and consider installing a raised toilet seat to make getting on and off the toilet easier. Keep your bathroom door unlocked while you are in the shower. Where can you learn more? Go to http://www.parikh.com/ and enter G117 to learn more about \"Preventing Falls: Care Instructions. \"  Current as of: November 9, 2022               Content Version: 13.6  © 9030-9627 [employer]. Care instructions adapted under license by [facility] [address] If you have questions about a medical condition or this instruction, always ask your healthcare professional. [address] any warranty or liability for your use of this information. Learning About Being Active as an Older Adult  Why is being active important as you get older? Being active is one of the best things you can do for your health. And it's never too late to start. Being active--or getting active, if you aren't already--has definite benefits. It can:  Give you more energy,  Keep your mind sharp. Improve balance to reduce your risk of falls. Help you manage chronic illness with fewer medicines. No matter how old you are, how fit you are, or what health problems you have, there is a form of activity that will work for you. And the more physical activity you can do, the better your overall health will be. What kinds of activity can help you stay healthy? Being more active will make your daily activities easier. Physical activity includes planned exercise and things you do in daily life. There are four types of activity:  Aerobic. Doing aerobic activity makes your heart and lungs strong. Includes walking, dancing, and gardening. Aim for at least 2½ hours spread throughout the week. It improves your energy and can help you sleep better. Muscle-strengthening.   This type of activity can help maintain muscle and strengthen bones. Includes climbing stairs, using resistance bands, and lifting or carrying heavy loads. Aim for at least twice a week. It can help protect the knees and other joints. Stretching. Stretching gives you better range of motion in joints and muscles. Includes upper arm stretches, calf stretches, and gentle yoga. Aim for at least twice a week, preferably after your muscles are warmed up from other activities. It can help you function better in daily life. Balancing. This helps you stay coordinated and have good posture. Includes heel-to-toe walking, elijah chi, and certain types of yoga. Aim for at least 3 days a week. It can reduce your risk of falling. Even if you have a hard time meeting the recommendations, it's better to be more active than less active. All activity done in each category counts toward your weekly total. You'd be surprised how daily things like carrying groceries, keeping up with grandchildren, and taking the stairs can add up. What keeps you from being active? If you've had a hard time being more active, you're not alone. Maybe you remember being able to do more. Or maybe you've never thought of yourself as being active. It's frustrating when you can't do the things you want. Being more active can help. What's holding you back? Getting started. Have a goal, but break it into easy tasks. Small steps build into big accomplishments. Staying motivated. If you feel like skipping your activity, remember your goal. Maybe you want to move better and stay independent. Every activity gets you one step closer. Not feeling your best.  Start with 5 minutes of an activity you enjoy. Prove to yourself you can do it. As you get comfortable, increase your time. You may not be where you want to be. But you're in the process of getting there. Everyone starts somewhere. How can you find safe ways to stay active?   Talk with your doctor about any physical challenges you're facing. Make a plan with your doctor if you have a health problem or aren't sure how to get started with activity. If you're already active, ask your doctor if there is anything you should change to stay safe as your body and health change. If you tend to feel dizzy after you take medicine, avoid activity at that time. Try being active before you take your medicine. This will reduce your risk of falls. If you plan to be active at home, make sure to clear your space before you get started. Remove things like TV cords, coffee tables, and throw rugs. It's safest to have plenty of space to move freely. The key to getting more active is to take it slow and steady. Try to improve only a little bit at a time. Pick just one area to improve on at first. And if an activity hurts, stop and talk to your doctor. Where can you learn more? Go to http://www.parikh.com/ and enter P600 to learn more about \"Learning About Being Active as an Older Adult. \"  Current as of: October 10, 2022               Content Version: 13.6  © 9590-8372 [employer]. Care instructions adapted under license by [facility] ([facility]). If you have questions about a medical condition or this instruction, always ask your healthcare professional. [place] [address] any warranty or liability for your use of this information. Learning About Dental Care for Older Adults  Dental care for older adults: Overview  Dental care for older people is much the same as for younger adults. But older adults do have concerns that younger adults do not. Older adults may have problems with gum disease and decay on the roots of their teeth. They may need missing teeth replaced or broken fillings fixed. Or they may have dentures that need to be cared for. Some older adults may have trouble holding a toothbrush. You can help remind the person you are caring for to brush and floss their teeth or to clean their dentures.  In some cases, you may need to do the brushing and other dental care tasks. People who have trouble using their hands or who have dementia may need this extra help. How can you help with dental care? Normal dental care  To keep the teeth and gums healthy:  Brush the teeth with fluoride toothpaste twice a day--in the morning and at night--and floss at least once a day. Plaque can quickly build up on the teeth of older adults. Watch for the signs of gum disease. These signs include gums that bleed after brushing or after eating hard foods, such as apples. See a dentist regularly. Many experts recommend checkups every 6 months. Keep the dentist up to date on any new medications the person is taking. Encourage a balanced diet that includes whole grains, vegetables, and fruits, and that is low in saturated fat and sodium. Encourage the person you're caring for not to use tobacco products. They can affect dental and general health. Many older adults have a fixed income and feel that they can't afford dental care. But most [facility] have programs in which dentists help older adults by lowering fees. Contact your area's public health offices or  for information about dental care in your area. Using a toothbrush  Older adults with arthritis sometimes have trouble brushing their teeth because they can't easily hold the toothbrush. Their hands and fingers may be stiff, painful, or weak. If this is the case, you can: Offer an electric toothbrush. Enlarge the handle of a non-electric toothbrush by wrapping a sponge, an elastic bandage, or adhesive tape around it. Push the toothbrush handle through a ball made of rubber or soft foam.  Make the handle longer and thicker by taping Popsicle sticks or tongue depressors to it. You may also be able to buy special toothbrushes, toothpaste dispensers, and floss holders.   Your doctor may recommend a soft-bristle toothbrush if the person you care for bleeds easily. Bleeding can happen because of a health problem or from certain medicines. A toothpaste for sensitive teeth may help if the person you care for has sensitive teeth. How do you brush and floss someone's teeth? If the person you are caring for has a hard time cleaning their teeth on their own, you may need to brush and floss their teeth for them. It may be easiest to have the person sit and face away from you, and to sit or stand behind them. That way you can steady their head against your arm as you reach around to floss and brush their teeth. Choose a place that has good lighting and is comfortable for both of you. Before you begin, gather your supplies. You will need gloves, floss, a toothbrush, and a container to hold water if you are not near a sink. Wash and dry your hands well and put on gloves. Start by flossing:  Gently work a piece of floss between each of the teeth toward the gums. A plastic flossing tool may make this easier, and they are available at most [facility]. Curve the floss around each tooth into a U-shape and gently slide it under the gum line. Move the floss firmly up and down several times to scrape off the plaque. After you've finished flossing, throw away the used floss and begin brushing:  Wet the brush and apply toothpaste. Place the brush at a 45-degree angle where the teeth meet the gums. Press firmly, and move the brush in small circles over the surface of the teeth. Be careful not to brush too hard. Vigorous brushing can make the gums pull away from the teeth and can scratch the tooth enamel. Brush all surfaces of the teeth, on the tongue side and on the cheek side. Pay special attention to the front teeth and all surfaces of the back teeth. Brush chewing surfaces with short back-and-forth strokes. After you've finished, help the person rinse the remaining toothpaste from their mouth. Where can you learn more?   Go to http://www.DecaWave.com/ and enter F944 to learn more about \"Learning About Dental Care for Older Adults. \"  Current as of: November 14, 2022               Content Version: 13.6  © 2006-2023 [employer], [employer]. Care instructions adapted under license by [facility] ([facility]). If you have questions about a medical condition or this instruction, always ask your healthcare professional. [facility] [address] any warranty or liability for your use of this information. Hearing Loss: Care Instructions  Overview     Hearing loss is a sudden or slow decrease in how well you hear. It can range from mild to severe. Permanent hearing loss can occur with aging. It also can happen when you are exposed long-term to loud noise. Examples include listening to loud music, riding motorcycles, or being around other loud machines. Hearing loss can affect your work and home life. It can make you feel lonely or depressed. You may feel that you have lost your independence. But hearing aids and other devices can help you hear better and feel connected to others. Follow-up care is a key part of your treatment and safety. Be sure to make and go to all appointments, and call your doctor if you are having problems. It's also a good idea to know your test results and keep a list of the medicines you take. How can you care for yourself at home? Avoid loud noises whenever possible. This helps keep your hearing from getting worse. Always wear hearing protection around loud noises. Wear a hearing aid as directed. See a professional who can help you pick a hearing aid that fits you. Have hearing tests as your doctor suggests. They can show whether your hearing has changed. Your hearing aid may need to be adjusted. Use other devices as needed. These may include:  Telephone amplifiers and hearing aids that can connect to a television, stereo, radio, or microphone. Devices that use lights or vibrations.  These alert you to the doorbell, a ringing telephone, or a baby monitor.  Television closed-captioning. This shows the words at the bottom of the screen. Most new TVs can do this.  TTY (text telephone). This lets you type messages back and forth on the telephone instead of talking or listening. These devices are also called TDD. When messages are typed on the keyboard, they are sent over the phone line to a receiving TTY. The message is shown on a monitor.  Use text messaging, social media, and email if it is hard for you to communicate by telephone.  Try to learn a listening technique called speechreading. It is not lipreading. You pay attention to people's gestures, expressions, posture, and tone of voice. These clues can help you understand what a person is saying. Face the person you are talking to, and have them face you. Make sure the lighting is good. You need to see the other person's face clearly.  Think about counseling if you need help to adjust to your hearing loss.  When should you call for help?  Watch closely for changes in your health, and be sure to contact your doctor if:    You think your hearing is getting worse.     You have new symptoms, such as dizziness or nausea.   Where can you learn more?  Go to https://www.[employer]net/patientEd and enter R798 to learn more about \"Hearing Loss: Care Instructions.\"  Current as of: May 4, 2022               Content Version: 13.6  © 7303-4689 [employer].   Care instructions adapted under license by [employer]. If you have questions about a medical condition or this instruction, always ask your healthcare professional. [employer] disclaims any warranty or liability for your use of this information.           Learning About Vision Tests  What are vision tests?     The four most common vision tests are visual acuity tests, refraction, visual field tests, and color vision tests.  Visual acuity (sharpness) tests  These tests are used:  To see if you need glasses or contact lenses.  To  monitor an eye problem. To check an eye injury. Visual acuity tests are done as part of routine exams. You may also have this test when you get your 's license or apply for some types of jobs. Visual field tests  These tests are used: To check for vision loss in any area of your range of vision. To screen for certain eye diseases. To look for nerve damage after a stroke, head injury, or other problem that could reduce blood flow to the brain. Refraction and color tests  A refraction test is done to find the right prescription for glasses and contact lenses. A color vision test is done to check for color blindness. Color vision is often tested as part of a routine exam. You may also have this test when you apply for a job where recognizing different colors is important, such as , electronics, or the [employer]. How are vision tests done? Visual acuity test   You cover one eye at a time. You read aloud from a wall chart across the room. You read aloud from a small card that you hold in your hand. Refraction   You look into a special device. The device puts lenses of different strengths in front of each eye to see how strong your glasses or contact lenses need to be. Visual field tests   Your doctor may have you look through special machines. Or your doctor may simply have you stare straight ahead while they move a finger into and out of your field of vision. Color vision test   You look at pieces of printed test patterns in various colors. You say what number or symbol you see. Your doctor may have you trace the number or symbol using a pointer. How do these tests feel? There is very little chance of having a problem from this test. If dilating drops are used for a vision test, they may make the eyes sting and cause a medicine taste in the mouth. Follow-up care is a key part of your treatment and safety.  Be sure to make and go to all appointments, and call your doctor if you are having problems. It's also a good idea to know your test results and keep a list of the medicines you take. Where can you learn more? Go to http://www.parikh.com/ and enter G551 to learn more about \"Learning About Vision Tests. \"  Current as of: October 12, 2022               Content Version: 13.6  © 2006-2023 [employer]. Care instructions adapted under license by [facility] ([facility]). If you have questions about a medical condition or this instruction, always ask your healthcare professional. [place] [address] any warranty or liability for your use of this information. A Healthy Heart: Care Instructions  Your Care Instructions     Coronary artery disease, also called heart disease, occurs when a substance called plaque builds up in the vessels that supply oxygen-rich blood to your heart muscle. This can narrow the blood vessels and reduce blood flow. A heart attack happens when blood flow is completely blocked. A high-fat diet, smoking, and other factors increase the risk of heart disease. Your doctor has found that you have a chance of having heart disease. You can do lots of things to keep your heart healthy. It may not be easy, but you can change your diet, exercise more, and quit smoking. These steps really work to lower your chance of heart disease. Follow-up care is a key part of your treatment and safety. Be sure to make and go to all appointments, and call your doctor if you are having problems. It's also a good idea to know your test results and keep a list of the medicines you take. How can you care for yourself at home? Diet    Use less salt when you cook and eat. This helps lower your blood pressure. Taste food before salting. Add only a little salt when you think you need it.  With time, your taste buds will adjust to less salt.     Eat fewer snack items, fast foods, canned soups, and other high-salt, high-fat, processed foods.     Read food labels and try to avoid saturated and trans fats. They increase your risk of heart disease by raising cholesterol levels.     Limit the amount of solid fat-butter, margarine, and shortening-you eat. Use olive, peanut, or canola oil when you cook. Bake, broil, and steam foods instead of frying them.     Eat a variety of fruit and vegetables every day. Dark green, deep orange, red, or yellow fruits and vegetables are especially good for you. Examples include spinach, carrots, peaches, and berries.     Foods high in fiber can reduce your cholesterol and provide important vitamins and minerals. High-fiber foods include whole-grain cereals and breads, oatmeal, beans, brown rice, citrus fruits, and apples.     Eat lean proteins. Heart-healthy proteins include seafood, lean meats and poultry, eggs, beans, peas, nuts, seeds, and soy products.     Limit drinks and foods with added sugar. These include candy, desserts, and soda pop. Lifestyle changes    If your doctor recommends it, get more exercise. Walking is a good choice. Bit by bit, increase the amount you walk every day. Try for at least 30 minutes on most days of the week. You also may want to swim, bike, or do other activities.     Do not smoke. If you need help quitting, talk to your doctor about stop-smoking programs and medicines. These can increase your chances of quitting for good. Quitting smoking may be the most important step you can take to protect your heart. It is never too late to quit.     Limit alcohol to 2 drinks a day for men and 1 drink a day for women. Too much alcohol can cause health problems.     Manage other health problems such as diabetes, high blood pressure, and high cholesterol. If you think you may have a problem with alcohol or drug use, talk to your doctor. Medicines    Take your medicines exactly as prescribed.  Call your doctor if you think you are having a problem with your medicine.     If your doctor recommends aspirin, take the amount directed each day. Make sure you take aspirin and not another kind of pain reliever, such as acetaminophen (Tylenol). When should you call for help? Call 911 if you have symptoms of a heart attack. These may include:    Chest pain or pressure, or a strange feeling in the chest.     Sweating.     Shortness of breath.     Pain, pressure, or a strange feeling in the back, neck, jaw, or upper belly or in one or both shoulders or arms.     Lightheadedness or sudden weakness.     A fast or irregular heartbeat. After you call 911, the  may tell you to chew 1 adult-strength or 2 to 4 low-dose aspirin. Wait for an ambulance. Do not try to drive yourself. Watch closely for changes in your health, and be sure to contact your doctor if you have any problems. Where can you learn more? Go to http://www.parikh.com/ and enter F075 to learn more about \"A Healthy Heart: Care Instructions. \"  Current as of: September 7, 2022               Content Version: 13.6  © 6844-2713 [employer]. Care instructions adapted under license by [facility] ([facility]). If you have questions about a medical condition or this instruction, always ask your healthcare professional. [place] [address] any warranty or liability for your use of this information. Personalized Preventive Plan for Alexandrea Perez - 3/17/2023  Medicare offers a range of preventive health benefits. Some of the tests and screenings are paid in full while other may be subject to a deductible, co-insurance, and/or copay. Some of these benefits include a comprehensive review of your medical history including lifestyle, illnesses that may run in your family, and various assessments and screenings as appropriate. After reviewing your medical record and screening and assessments performed today your provider may have ordered immunizations, labs, imaging, and/or referrals for you.   A list of these orders (if applicable) as well as your Preventive Care list are included within your After Visit Summary for your review. Other Preventive Recommendations:    A preventive eye exam performed by an eye specialist is recommended every 1-2 years to screen for glaucoma; cataracts, macular degeneration, and other eye disorders. A preventive dental visit is recommended every 6 months. Try to get at least 150 minutes of exercise per week or 10,000 steps per day on a pedometer . Order or download the FREE \"Exercise & Physical Activity: Your Everyday Guide\" from [employer]. Call 6-373.343.7610 or search [employer] online. You need 6641-7842 mg of calcium and 7749-1596 IU of vitamin D per day. It is possible to meet your calcium requirement with diet alone, but a vitamin D supplement is usually necessary to meet this goal.  When exposed to the sun, use a sunscreen that protects against both UVA and UVB radiation with an SPF of 30 or greater. Reapply every 2 to 3 hours or after sweating, drying off with a towel, or swimming. Always wear a seat belt when traveling in a car. Always wear a helmet when riding a bicycle or motorcycle.

## 2023-03-17 NOTE — ASSESSMENT & PLAN NOTE
With hyperglycemia due to lapse in dietary discretion. Discussed increased risk progression of kidney disease and need for dialysis that can be mitigated with good glycemic control. Consider placing sign on her snacks \"DIALYSIS? \" as a reminder of her power to choose. Continue dulaglutide and insulins unchanged.  Schedule eye exam. follow up 1 month

## 2023-03-27 ENCOUNTER — NURSE ONLY (OUTPATIENT)
Facility: CLINIC | Age: 73
End: 2023-03-27
Payer: MEDICARE

## 2023-03-27 ENCOUNTER — HOSPITAL ENCOUNTER (OUTPATIENT)
Facility: HOSPITAL | Age: 73
Setting detail: SPECIMEN
Discharge: HOME | End: 2023-03-30

## 2023-03-27 DIAGNOSIS — N18.32 CHRONIC KIDNEY DISEASE, STAGE 3B (HCC): ICD-10-CM

## 2023-03-27 DIAGNOSIS — I10 ESSENTIAL (PRIMARY) HYPERTENSION: ICD-10-CM

## 2023-03-27 DIAGNOSIS — E11.65 TYPE 2 DIABETES MELLITUS WITH HYPERGLYCEMIA, WITH LONG-TERM CURRENT USE OF INSULIN (HCC): Primary | ICD-10-CM

## 2023-03-27 DIAGNOSIS — Z79.4 TYPE 2 DIABETES MELLITUS WITH HYPERGLYCEMIA, WITH LONG-TERM CURRENT USE OF INSULIN (HCC): Primary | ICD-10-CM

## 2023-03-27 LAB — SENTARA SPECIMEN COLLECTION: NORMAL

## 2023-03-27 PROCEDURE — 36415 COLL VENOUS BLD VENIPUNCTURE: CPT | Performed by: FAMILY MEDICINE

## 2023-03-27 PROCEDURE — 99001 SPECIMEN HANDLING PT-LAB: CPT

## 2023-03-29 LAB
ANION GAP SERPL CALCULATED.3IONS-SCNC: 11 MMOL/L (ref 3–15)
AVERAGE GLUCOSE: 201 MG/DL (ref 91–123)
BUN BLDV-MCNC: 19 MG/DL (ref 6–22)
CALCIUM SERPL-MCNC: 9.5 MG/DL (ref 8.4–10.5)
CHLORIDE BLD-SCNC: 106 MMOL/L (ref 98–110)
CO2: 27 MMOL/L (ref 20–32)
CREAT SERPL-MCNC: 1.6 MG/DL (ref 0.8–1.4)
GLOMERULAR FILTRATION RATE: 33.1 ML/MIN/1.73 SQ.M.
GLUCOSE: 121 MG/DL (ref 70–99)
HBA1C MFR BLD: 8.6 % (ref 4.8–5.6)
POTASSIUM SERPL-SCNC: 4.4 MMOL/L (ref 3.5–5.5)
SODIUM BLD-SCNC: 144 MMOL/L (ref 133–145)
TSH SERPL DL<=0.05 MIU/L-ACNC: 2.98 MCU/ML (ref 0.27–4.2)

## 2023-03-31 ENCOUNTER — TELEPHONE (OUTPATIENT)
Facility: CLINIC | Age: 73
End: 2023-03-31

## 2023-03-31 DIAGNOSIS — N63.10 MASS OF RIGHT BREAST, UNSPECIFIED QUADRANT: Primary | ICD-10-CM

## 2023-03-31 NOTE — TELEPHONE ENCOUNTER
[facility]  Subject: Referral Request     Reason for referral request? Pt is calling to see if she can have a new   order for a mammogram. It needs to say diagnostic and ultrasound. Provider patient wants to be referred to(if known):     Provider Phone Number(if known): Additional Information for Provider?   ---------------------------------------------------------------------------   --------------   [id number]     [phone]; OK to leave message on voicemail   ---------------------------------------------------------------------------    Called patient no answer left message letting her know I was calling her back about her request for new order and need to know which side, asked that she call the office back.

## 2023-04-05 NOTE — TELEPHONE ENCOUNTER
Called verified she needs the right breast u/s for the same reason as last year and was not having new concerns/issues. She says she is not having any new issues/concerns. Orders have been placed.

## 2023-05-02 ENCOUNTER — OFFICE VISIT (OUTPATIENT)
Facility: CLINIC | Age: 73
End: 2023-05-02
Payer: MEDICARE

## 2023-05-02 VITALS
DIASTOLIC BLOOD PRESSURE: 64 MMHG | SYSTOLIC BLOOD PRESSURE: 132 MMHG | WEIGHT: 191.38 LBS | OXYGEN SATURATION: 95 % | RESPIRATION RATE: 10 BRPM | BODY MASS INDEX: 32.67 KG/M2 | HEIGHT: 64 IN | HEART RATE: 82 BPM

## 2023-05-02 DIAGNOSIS — E66.9 CLASS 1 OBESITY WITH SERIOUS COMORBIDITY IN ADULT, UNSPECIFIED BMI, UNSPECIFIED OBESITY TYPE: ICD-10-CM

## 2023-05-02 DIAGNOSIS — Z79.4 TYPE 2 DIABETES MELLITUS WITH DIABETIC NEPHROPATHY, WITH LONG-TERM CURRENT USE OF INSULIN (HCC): Primary | ICD-10-CM

## 2023-05-02 DIAGNOSIS — N18.32 STAGE 3B CHRONIC KIDNEY DISEASE (CKD) (HCC): ICD-10-CM

## 2023-05-02 DIAGNOSIS — G47.8 NON-RESTORATIVE SLEEP: ICD-10-CM

## 2023-05-02 DIAGNOSIS — I10 ESSENTIAL (PRIMARY) HYPERTENSION: ICD-10-CM

## 2023-05-02 DIAGNOSIS — E11.21 TYPE 2 DIABETES MELLITUS WITH DIABETIC NEPHROPATHY, WITH LONG-TERM CURRENT USE OF INSULIN (HCC): Primary | ICD-10-CM

## 2023-05-02 PROCEDURE — 3075F SYST BP GE 130 - 139MM HG: CPT | Performed by: FAMILY MEDICINE

## 2023-05-02 PROCEDURE — 3052F HG A1C>EQUAL 8.0%<EQUAL 9.0%: CPT | Performed by: FAMILY MEDICINE

## 2023-05-02 PROCEDURE — 99214 OFFICE O/P EST MOD 30 MIN: CPT | Performed by: FAMILY MEDICINE

## 2023-05-02 PROCEDURE — 1123F ACP DISCUSS/DSCN MKR DOCD: CPT | Performed by: FAMILY MEDICINE

## 2023-05-02 PROCEDURE — 3078F DIAST BP <80 MM HG: CPT | Performed by: FAMILY MEDICINE

## 2023-05-02 RX ORDER — INSULIN GLARGINE 100 [IU]/ML
22 INJECTION, SOLUTION SUBCUTANEOUS DAILY
Qty: 15 ML | Refills: 3 | Status: SHIPPED | OUTPATIENT
Start: 2023-05-02

## 2023-05-02 RX ORDER — GLUCOSAMINE HCL/CHONDROITIN SU 500-400 MG
CAPSULE ORAL
Qty: 100 STRIP | Refills: 3 | Status: SHIPPED | OUTPATIENT
Start: 2023-05-02

## 2023-05-02 NOTE — PROGRESS NOTES
João Odom (: 1950) is a 67 y.o. female, established patient, here for:    ASSESSMENT/PLAN:  1. Type 2 diabetes mellitus with diabetic nephropathy, with long-term current use of insulin ([address]  Assessment & Plan:  Uncontrolled. Praised for dietary changes. Consider scheduling with dietician in Dr. Lisa Solorzano office (if available) for specific food options to pair with fruit for greater satiety that are low protein and potassium. Continue insulin unchanged. Increase dulaglutide to 1.5. Noting correlation in increased fasting sugar with poor sleep. Multiple symptoms suggestive of EDUARDO. Referring for eval. Schedule eye exam  Orders:  -     [facility], Sleep Medicine, [facility])  -     dulaglutide (TRULICITY) 1.5 GF/2.0VA SC injection; Inject 0.5 mLs into the skin once a week, Disp-12 Adjustable Dose Pre-filled Pen Syringe, R-3Normal  -     insulin glargine (LANTUS SOLOSTAR) 100 UNIT/ML injection pen; Inject 22 Units into the skin daily, Disp-15 mL, R-3Normal  -     blood glucose monitor strips; Accucheck Test 3 times a day & as needed for symptoms of irregular blood glucose. Dispense sufficient amount for indicated testing frequency plus additional to accommodate PRN testing needs. , Disp-100 strip, R-3, Normal  -     [facility] ([facility])  -     Basic Metabolic Panel; Future  -     Hemoglobin A1C; Future  2. Non-restorative sleep  Assessment & Plan:  disrupted, snoring, nocturia - context obesity, uncontrolled DM2, CKD. Highly suggestive EDUARDO. Referring   Orders:  -     [address], [address], , Sleep Medicine, [facility])  3. Stage 3b chronic kidney disease (CKD) ([address]  -     [facility] Governor Roll, DO, Sleep Medicine, [facility] ([facility]))  4. Essential (primary) hypertension  -     [facility], , Sleep Medicine, [place] [facility])  5.  Class 1 obesity with serious comorbidity in

## 2023-05-02 NOTE — ASSESSMENT & PLAN NOTE
disrupted, snoring, nocturia - context obesity, uncontrolled DM2, CKD. Highly suggestive EDUARDO.  Referring

## 2023-05-02 NOTE — ASSESSMENT & PLAN NOTE
Uncontrolled. Praised for dietary changes. Consider scheduling with dietician in Dr. Eligio Varela office (if available) for specific food options to pair with fruit for greater satiety that are low protein and potassium. Continue insulin unchanged. Increase dulaglutide to 1.5. Noting correlation in increased fasting sugar with poor sleep. Multiple symptoms suggestive of EDUARDO.  Referring for eval. Schedule eye exam

## 2023-05-03 ENCOUNTER — HOSPITAL ENCOUNTER (OUTPATIENT)
Facility: HOSPITAL | Age: 73
Discharge: HOME | End: 2023-05-06
Payer: MEDICARE

## 2023-05-03 DIAGNOSIS — N63.10 MASS OF RIGHT BREAST, UNSPECIFIED QUADRANT: ICD-10-CM

## 2023-05-03 PROCEDURE — G0279 TOMOSYNTHESIS, MAMMO: HCPCS

## 2023-05-03 PROCEDURE — 76642 ULTRASOUND BREAST LIMITED: CPT

## 2023-05-03 RX ORDER — DILTIAZEM HYDROCHLORIDE 360 MG/1
CAPSULE, EXTENDED RELEASE ORAL
Qty: 90 CAPSULE | Refills: 3 | Status: SHIPPED | OUTPATIENT
Start: 2023-05-03

## 2023-05-03 NOTE — TELEPHONE ENCOUNTER
This pharmacy faxed over request for the following prescriptions to be filled:    Medication requested :   Requested Prescriptions     Pending Prescriptions Disp Refills    dilTIAZem (CARDIZEM CD) 360 MG extended release capsule [Pharmacy Med Name: DILTIAZEM 24H ER(CD) 360 MG CP] 90 capsule      Sig: take 1 capsule by mouth once daily    (Last filled 5/10/22 #90 x 3)    PCP: Dr. Guera Rodriguez or Print: [facility]  Mail order or Local [phone]    Scheduled appointment if not seen by current providers in office: LOV 5/2/23, next appt  6/13/23

## 2023-05-04 ENCOUNTER — TELEPHONE (OUTPATIENT)
Facility: CLINIC | Age: 73
End: 2023-05-04

## 2023-05-11 RX ORDER — DULAGLUTIDE 0.75 MG/.5ML
INJECTION, SOLUTION SUBCUTANEOUS
Qty: 12 ML | OUTPATIENT
Start: 2023-05-11

## 2023-06-01 NOTE — TELEPHONE ENCOUNTER
This pharmacy faxed over request for the following prescriptions to be filled:    Medication requested :   Requested Prescriptions     Pending Prescriptions Disp Refills    levothyroxine (SYNTHROID) 50 MCG tablet [Pharmacy Med Name: LEVOTHYROXINE 50 MCG TABLET] 90 tablet      Sig: take 1 tablet by mouth EVERY MORNING BEFORE BREAKFAST      PCP: Dr. Aure Ruano or Print: [facility]   Mail order or Local pharmacy: [phone]    Scheduled appointment if not seen by current providers in office: LOV 5/2/23, next appt 6/13/23    Pt will be 3-4 pills short of her appt

## 2023-06-02 DIAGNOSIS — Z79.4 TYPE 2 DIABETES MELLITUS WITH DIABETIC NEPHROPATHY, WITH LONG-TERM CURRENT USE OF INSULIN (HCC): ICD-10-CM

## 2023-06-02 DIAGNOSIS — E11.21 TYPE 2 DIABETES MELLITUS WITH DIABETIC NEPHROPATHY, WITH LONG-TERM CURRENT USE OF INSULIN (HCC): ICD-10-CM

## 2023-06-02 RX ORDER — LEVOTHYROXINE SODIUM 0.05 MG/1
TABLET ORAL
Qty: 90 TABLET | Refills: 3 | Status: SHIPPED | OUTPATIENT
Start: 2023-06-02

## 2023-06-05 ENCOUNTER — NURSE ONLY (OUTPATIENT)
Facility: CLINIC | Age: 73
End: 2023-06-05
Payer: MEDICARE

## 2023-06-05 ENCOUNTER — HOSPITAL ENCOUNTER (OUTPATIENT)
Facility: HOSPITAL | Age: 73
Setting detail: SPECIMEN
Discharge: HOME | End: 2023-06-08

## 2023-06-05 DIAGNOSIS — Z79.4 TYPE 2 DIABETES MELLITUS WITH DIABETIC NEPHROPATHY, WITH LONG-TERM CURRENT USE OF INSULIN (HCC): Primary | ICD-10-CM

## 2023-06-05 DIAGNOSIS — E11.21 TYPE 2 DIABETES MELLITUS WITH DIABETIC NEPHROPATHY, WITH LONG-TERM CURRENT USE OF INSULIN (HCC): Primary | ICD-10-CM

## 2023-06-05 LAB — SENTARA SPECIMEN COLLECTION: NORMAL

## 2023-06-05 PROCEDURE — 36415 COLL VENOUS BLD VENIPUNCTURE: CPT | Performed by: FAMILY MEDICINE

## 2023-06-06 LAB
ANION GAP SERPL CALCULATED.3IONS-SCNC: 12 MMOL/L (ref 3–15)
AVERAGE GLUCOSE: 183 MG/DL (ref 91–123)
BUN BLDV-MCNC: 24 MG/DL (ref 6–22)
CALCIUM SERPL-MCNC: 9 MG/DL (ref 8.4–10.5)
CHLORIDE BLD-SCNC: 103 MMOL/L (ref 98–110)
CO2: 28 MMOL/L (ref 20–32)
CREAT SERPL-MCNC: 1.5 MG/DL (ref 0.8–1.4)
GLOMERULAR FILTRATION RATE: 35.7 ML/MIN/1.73 SQ.M.
GLUCOSE: 163 MG/DL (ref 70–99)
HBA1C MFR BLD: 8 % (ref 4.8–5.6)
POTASSIUM SERPL-SCNC: 4.7 MMOL/L (ref 3.5–5.5)
SODIUM BLD-SCNC: 143 MMOL/L (ref 133–145)

## 2023-06-07 ENCOUNTER — OFFICE VISIT (OUTPATIENT)
Age: 73
End: 2023-06-07
Payer: MEDICARE

## 2023-06-07 VITALS
HEART RATE: 83 BPM | HEIGHT: 64 IN | SYSTOLIC BLOOD PRESSURE: 146 MMHG | OXYGEN SATURATION: 97 % | TEMPERATURE: 98.1 F | WEIGHT: 188 LBS | BODY MASS INDEX: 32.1 KG/M2 | DIASTOLIC BLOOD PRESSURE: 66 MMHG | RESPIRATION RATE: 18 BRPM

## 2023-06-07 DIAGNOSIS — R06.83 LOUD SNORING: Primary | ICD-10-CM

## 2023-06-07 DIAGNOSIS — D50.0 IRON DEFICIENCY ANEMIA DUE TO CHRONIC BLOOD LOSS: ICD-10-CM

## 2023-06-07 DIAGNOSIS — R35.1 NOCTURIA: ICD-10-CM

## 2023-06-07 DIAGNOSIS — G47.8 NON-RESTORATIVE SLEEP: ICD-10-CM

## 2023-06-07 DIAGNOSIS — G47.00 INSOMNIA, UNSPECIFIED TYPE: ICD-10-CM

## 2023-06-07 DIAGNOSIS — N18.32 STAGE 3B CHRONIC KIDNEY DISEASE (CKD) (HCC): ICD-10-CM

## 2023-06-07 DIAGNOSIS — E11.21 DIABETIC NEPHROPATHY ASSOCIATED WITH TYPE 2 DIABETES MELLITUS (HCC): ICD-10-CM

## 2023-06-07 DIAGNOSIS — I10 ESSENTIAL (PRIMARY) HYPERTENSION: ICD-10-CM

## 2023-06-07 DIAGNOSIS — R29.818 SUSPECTED SLEEP APNEA: ICD-10-CM

## 2023-06-07 PROCEDURE — 1123F ACP DISCUSS/DSCN MKR DOCD: CPT | Performed by: OTOLARYNGOLOGY

## 2023-06-07 PROCEDURE — 3077F SYST BP >= 140 MM HG: CPT | Performed by: OTOLARYNGOLOGY

## 2023-06-07 PROCEDURE — 3052F HG A1C>EQUAL 8.0%<EQUAL 9.0%: CPT | Performed by: OTOLARYNGOLOGY

## 2023-06-07 PROCEDURE — 99204 OFFICE O/P NEW MOD 45 MIN: CPT | Performed by: OTOLARYNGOLOGY

## 2023-06-07 PROCEDURE — 3078F DIAST BP <80 MM HG: CPT | Performed by: OTOLARYNGOLOGY

## 2023-06-07 ASSESSMENT — PATIENT HEALTH QUESTIONNAIRE - PHQ9
SUM OF ALL RESPONSES TO PHQ QUESTIONS 1-9: 0
SUM OF ALL RESPONSES TO PHQ QUESTIONS 1-9: 0
SUM OF ALL RESPONSES TO PHQ9 QUESTIONS 1 & 2: 0
2. FEELING DOWN, DEPRESSED OR HOPELESS: 0
SUM OF ALL RESPONSES TO PHQ QUESTIONS 1-9: 0
1. LITTLE INTEREST OR PLEASURE IN DOING THINGS: 0
SUM OF ALL RESPONSES TO PHQ QUESTIONS 1-9: 0

## 2023-06-07 ASSESSMENT — SLEEP AND FATIGUE QUESTIONNAIRES
HOW LIKELY ARE YOU TO NOD OFF OR FALL ASLEEP IN A CAR, WHILE STOPPED FOR A FEW MINUTES IN TRAFFIC: 0
HOW LIKELY ARE YOU TO NOD OFF OR FALL ASLEEP WHEN YOU ARE A PASSENGER IN A CAR FOR AN HOUR WITHOUT A BREAK: 3
HOW LIKELY ARE YOU TO NOD OFF OR FALL ASLEEP WHILE WATCHING TV: 1
ESS TOTAL SCORE: 6
HOW LIKELY ARE YOU TO NOD OFF OR FALL ASLEEP WHILE SITTING AND READING: 1
HOW LIKELY ARE YOU TO NOD OFF OR FALL ASLEEP WHILE SITTING INACTIVE IN A PUBLIC PLACE: 0
HOW LIKELY ARE YOU TO NOD OFF OR FALL ASLEEP WHILE SITTING AND TALKING TO SOMEONE: 0
NECK CIRCUMFERENCE (INCHES): 14
HOW LIKELY ARE YOU TO NOD OFF OR FALL ASLEEP WHILE LYING DOWN TO REST IN THE AFTERNOON WHEN CIRCUMSTANCES PERMIT: 1
HOW LIKELY ARE YOU TO NOD OFF OR FALL ASLEEP WHILE SITTING QUIETLY AFTER LUNCH WITHOUT ALCOHOL: 0

## 2023-06-07 NOTE — PROGRESS NOTES
[facility]  Pulmonary, Critical Care, and Sleep Medicine    Office Progress Note - Initial Evaluation      Primary Care Physician: Na Baugh MD     Reason for Visit: Evaluation for obstructive sleep apnea/sleep disordered breathing    Assessment:  1. Loud snoring  2. Non-restorative sleep  3. Suspected sleep apnea  4. Essential (primary) hypertension - managed by PCM  5. Stage 3b chronic kidney disease (CKD) ([facility] - Nephrology following   6. Iron deficiency anemia due to chronic blood loss - Could contribute to RLS if present. 7. Diabetic nephropathy associated with type 2 diabetes mellitus ([facility]  8. Nocturia on some nights  9. Sleep maintenance insomnia    Ms. Edison Gasca is a 76-year-old female with a history of loud snoring, disrupted sleep and daytime somnolence requiring at least twice a week taking a long nap. She also developed a number of chronic medical conditions to include high blood pressure, diabetes and chronic kidney disease. Discussed the definition of obstructive sleep apnea and why this would be important to diagnose as it relates to other medical disorders. I will order a PSG and have her follow-up in the clinic afterwards to discuss the results. I advised her to cut down on the amount of time she is napping to possibly half an hour if she is having sleep onset or sleep maintenance insomnia is a complaint. We also discussed not driving sleepy, being active throughout the day and weight loss as excess weight contributes to obstructive sleep apnea. Plan: PSG and then follow up in office for follow up to discuss results. We did discuss CPAP today as the likely therapy that I'll be prescribing in follow up. Potential consequences of untreated sleep apnea, and/or excessive daytime sleepiness were discussed with the patient. Educational materials provided.   Treatment options including CPAP, dental appliance, weight reduction measures, positional
permit 1   Sitting and talking to someone 0   Sitting quietly after a lunch without alcohol 0   In a car, while stopped for a few minutes in traffic 0   [place] Sleepiness Score 6   Neck circumference (Inches) 14       Stop-Bang:  STOP-BANG QUESTIONNAIRE 6/7/2023   Are you a loud and/or regular snorer? 1   Do you often feel tired or groggy upon awakening or do you awaken with a headache? 1   Have you been observed to gasp or stop breathing during sleep? 0   Are you often tired or fatigued during wake time hours? 1   Do you fall asleep sitting, reading, watching TV or driving? 0   Do you often have problems with memory or concentration? 0   Do you have or are you being treated for high blood pressure? 1   Recent BMI (Calculated) 32.9   Is BMI greater than 35 kg/m2? 0=No   Age older than 48years old? 1=Yes   Is your neck circumference greater than 17 inches (Male) or 16 inches (Female)? 0   Gender - Male 0=No   STOP-Bang Total Score 4         Coordination of Care:  1. Have you been to the ER, urgent care clinic since your last visit? Hospitalized since your last visit? no    2. Have you seen or consulted any other health care providers outside of the [address] [place] [address] since your last visit? Include any pap smears or colon screening. no    Medication list has been updated according to patient.

## 2023-06-07 NOTE — PATIENT INSTRUCTIONS
Please make a follow up appointment to discuss the results of your sleep study. If this is impossible for some reason, please send me a \"My Chart\" message so that I may get back with you in a timely manner. The [facility] is located in the [place] [address], adjacent to [facility]. The lab is on the second floor. The direct number to call for sleep study related questions is: [phone]. Please call our clinic back at [phone] or send a message on [employer] if you have any questions or concerns or if you are experiencing any of the following: You have not received a follow up appointment within 30 days prior the recommended follow up time. If you are not tolerating treatment plan and/or not able to obtain equipment or prescribed medication(s). if you are experiencing any difficulties with the [employer]  (DME) Company you may be using or is assigned to you. Two weeks have passed and you have not received an appointment for a scheduled procedure. Two weeks have passed since you underwent a test and/or procedure and you have not received your results. If you are using a CPAP/BIPAP, or Home Ventilator Device- Please note the following. Currently, many DMEs are experiencing supply chain difficulties and orders for equipment may be back logged several weeks. Your  Durable Medical Equipment (DME ) company is supposed to provide you with replacement filters, tubing and masks. You can either call your DME when you need new supplies or you can arrange for an automatic shipment schedule. Your need to be seen by our office at lat minimum of every 12 months in order to renew the prescription for these supplies. Please make note of who your DME company is and their phone number. Please make sure that you clean your mask and hosing on a regular basis.   Your DME can provide you with additional information regarding proper care and cleaning of your

## 2023-06-19 ENCOUNTER — OFFICE VISIT (OUTPATIENT)
Facility: CLINIC | Age: 73
End: 2023-06-19
Payer: MEDICARE

## 2023-06-19 VITALS
SYSTOLIC BLOOD PRESSURE: 134 MMHG | TEMPERATURE: 97.9 F | HEIGHT: 64 IN | OXYGEN SATURATION: 98 % | DIASTOLIC BLOOD PRESSURE: 76 MMHG | HEART RATE: 68 BPM | BODY MASS INDEX: 31.92 KG/M2 | WEIGHT: 187 LBS

## 2023-06-19 DIAGNOSIS — Z86.010 HISTORY OF ADENOMATOUS POLYP OF COLON: ICD-10-CM

## 2023-06-19 DIAGNOSIS — Z79.4 TYPE 2 DIABETES MELLITUS WITH DIABETIC NEPHROPATHY, WITH LONG-TERM CURRENT USE OF INSULIN (HCC): Primary | ICD-10-CM

## 2023-06-19 DIAGNOSIS — E11.21 TYPE 2 DIABETES MELLITUS WITH DIABETIC NEPHROPATHY, WITH LONG-TERM CURRENT USE OF INSULIN (HCC): Primary | ICD-10-CM

## 2023-06-19 DIAGNOSIS — I10 ESSENTIAL (PRIMARY) HYPERTENSION: ICD-10-CM

## 2023-06-19 DIAGNOSIS — R19.5 CHANGE IN CONSISTENCY OF STOOL: ICD-10-CM

## 2023-06-19 DIAGNOSIS — K44.9 HIATAL HERNIA WITH GASTROESOPHAGEAL REFLUX: ICD-10-CM

## 2023-06-19 DIAGNOSIS — K21.9 HIATAL HERNIA WITH GASTROESOPHAGEAL REFLUX: ICD-10-CM

## 2023-06-19 DIAGNOSIS — E11.21 DIABETIC NEPHROPATHY ASSOCIATED WITH TYPE 2 DIABETES MELLITUS (HCC): ICD-10-CM

## 2023-06-19 PROCEDURE — 3075F SYST BP GE 130 - 139MM HG: CPT | Performed by: FAMILY MEDICINE

## 2023-06-19 PROCEDURE — 3078F DIAST BP <80 MM HG: CPT | Performed by: FAMILY MEDICINE

## 2023-06-19 PROCEDURE — 99214 OFFICE O/P EST MOD 30 MIN: CPT | Performed by: FAMILY MEDICINE

## 2023-06-19 PROCEDURE — 1123F ACP DISCUSS/DSCN MKR DOCD: CPT | Performed by: FAMILY MEDICINE

## 2023-06-19 PROCEDURE — 3052F HG A1C>EQUAL 8.0%<EQUAL 9.0%: CPT | Performed by: FAMILY MEDICINE

## 2023-06-19 RX ORDER — BLOOD SUGAR DIAGNOSTIC
1 STRIP MISCELLANEOUS DAILY
Qty: 100 EACH | Refills: 3 | Status: SHIPPED | OUTPATIENT
Start: 2023-06-19

## 2023-06-19 RX ORDER — BLOOD-GLUCOSE METER
EACH MISCELLANEOUS
Qty: 1 KIT | Refills: 0 | Status: SHIPPED | OUTPATIENT
Start: 2023-06-19

## 2023-06-19 RX ORDER — LANCETS 33 GAUGE
EACH MISCELLANEOUS
Qty: 100 EACH | Refills: 11 | Status: SHIPPED | OUTPATIENT
Start: 2023-06-19

## 2023-06-19 NOTE — ASSESSMENT & PLAN NOTE
Improved but not yet to target for DM/CKD. ACE I contraindicated by history of hyperkalemia, current K+ 4.7.  Continue diltiazem and furosemide 20 mg in light of impending sleep study

## 2023-06-19 NOTE — PROGRESS NOTES
Chief Complaint   Patient presents with    Hypertension    Diabetes    Chronic Kidney Disease     Patient today for her chronic conditions. She will need prescriptions for new DM supplies. 1. \"Have you been to the ER, urgent care clinic since your last visit? Hospitalized since your last visit? \" No    2. \"Have you seen or consulted any other health care providers outside of the [address] [place] [address] since your last visit? \"  Has seen Sleep Med      3. For patients aged 39-70: Has the patient had a colonoscopy / FIT/ Cologuard? Yes - no Care Gap present      If the patient is female:    4. For patients aged 41-77: Has the patient had a mammogram within the past 2 years? Yes - no Care Gap present      5. For patients aged 21-65: Has the patient had a pap smear?  NA - based on age or sex

## 2023-06-19 NOTE — PROGRESS NOTES
Mike Ervin (: 1950) is a 67 y.o. female, established patient, here for:    ASSESSMENT/PLAN:  1. Type 2 diabetes mellitus with diabetic nephropathy, with long-term current use of insulin ([facility])  Assessment & Plan:  Improved, A1c 8.0 at the top of acceptable A1c for age Praised for dietary changes. Consider scheduling with dietician in Dr. Sandy Downs office (if available) for specific food options to pair with fruit for greater satiety that are low protein and potassium. Continue insulin unchanged. dulaglutide 1.5 in light of impending sleep study. Schedule eye exam  Orders:  -     Blood Glucose Monitoring Suppl (Pedro Fernandez) w/Device KIT; Use to check blood sugars up to 3x/day, Disp-1 kit, R-0Normal  -     blood glucose test strips (ONETOUCH VERIO) strip; 1 each by In Vitro route daily As needed. , Disp-100 each, R-3Normal  -     OneTouch Delica Lancets 42R MISC; Use to check blood sugars up to 3x/day, Disp-100 each, R-11Normal  2. Diabetic nephropathy associated with type 2 diabetes mellitus ([facility]  3. Essential (primary) hypertension  Assessment & Plan:  Improved but not yet to target for DM/CKD. ACE I contraindicated by history of hyperkalemia, current K+ 4.7. Continue diltiazem and furosemide 20 mg in light of impending sleep study  4. Hiatal hernia with gastroesophageal reflux  -     Amb External Referral To Gastroenterology  5. History of adenomatous polyp of colon  -     Amb External Referral To Gastroenterology  6. Change in consistency of stool  -     Amb External Referral To Gastroenterology      Follow-up and Dispositions    Return for evaluation of abdominal concerns (30) at your convenience, chronic conditions 3 months, labs prior. SUBJECTIVE/OBJECTIVE:    follow up DM2/obesity with CKD 3b  Sugars fasting 180s  Working on snacking and late night eating    Suspected EDUARDO - Scheduled for sleep study in July    Requesting referral to new GI - seen for HH/GERD, hx adenomatous polyp.

## 2023-06-19 NOTE — ASSESSMENT & PLAN NOTE
Improved, A1c 8.0 at the top of acceptable A1c for age Praised for dietary changes. Consider scheduling with dietician in Dr. Sophia Navarro office (if available) for specific food options to pair with fruit for greater satiety that are low protein and potassium. Continue insulin unchanged. dulaglutide 1.5 in light of impending sleep study.  Schedule eye exam

## 2023-07-17 ENCOUNTER — PHARMACY VISIT (OUTPATIENT)
Age: 73
End: 2023-07-17

## 2023-07-17 DIAGNOSIS — E11.21 TYPE 2 DIABETES MELLITUS WITH DIABETIC NEPHROPATHY, WITH LONG-TERM CURRENT USE OF INSULIN (HCC): Primary | ICD-10-CM

## 2023-07-17 DIAGNOSIS — Z79.4 TYPE 2 DIABETES MELLITUS WITH DIABETIC NEPHROPATHY, WITH LONG-TERM CURRENT USE OF INSULIN (HCC): Primary | ICD-10-CM

## 2023-07-17 RX ORDER — BLOOD-GLUCOSE SENSOR
EACH MISCELLANEOUS
Qty: 3 EACH | Refills: 11 | Status: SHIPPED | OUTPATIENT
Start: 2023-07-17

## 2023-07-18 ENCOUNTER — OFFICE VISIT (OUTPATIENT)
Facility: CLINIC | Age: 73
End: 2023-07-18
Payer: MEDICARE

## 2023-07-18 VITALS
SYSTOLIC BLOOD PRESSURE: 150 MMHG | TEMPERATURE: 97.3 F | OXYGEN SATURATION: 99 % | HEIGHT: 64 IN | WEIGHT: 187 LBS | HEART RATE: 80 BPM | DIASTOLIC BLOOD PRESSURE: 70 MMHG | BODY MASS INDEX: 31.92 KG/M2

## 2023-07-18 DIAGNOSIS — I10 ESSENTIAL (PRIMARY) HYPERTENSION: ICD-10-CM

## 2023-07-18 DIAGNOSIS — K30 DELAYED GASTRIC EMPTYING: Primary | ICD-10-CM

## 2023-07-18 DIAGNOSIS — R10.11 PAIN, ABDOMINAL, RUQ: ICD-10-CM

## 2023-07-18 DIAGNOSIS — R19.8 ALTERED BOWEL FUNCTION: ICD-10-CM

## 2023-07-18 PROCEDURE — 3077F SYST BP >= 140 MM HG: CPT | Performed by: FAMILY MEDICINE

## 2023-07-18 PROCEDURE — 1123F ACP DISCUSS/DSCN MKR DOCD: CPT | Performed by: FAMILY MEDICINE

## 2023-07-18 PROCEDURE — 99214 OFFICE O/P EST MOD 30 MIN: CPT | Performed by: FAMILY MEDICINE

## 2023-07-18 PROCEDURE — 3079F DIAST BP 80-89 MM HG: CPT | Performed by: FAMILY MEDICINE

## 2023-07-18 RX ORDER — METOCLOPRAMIDE 5 MG/1
5 TABLET ORAL 4 TIMES DAILY
Qty: 120 TABLET | Refills: 0 | Status: SHIPPED | OUTPATIENT
Start: 2023-07-18

## 2023-07-18 NOTE — ASSESSMENT & PLAN NOTE
Verbal report of gastric emptying study 2022 Dr. Esme Martinez. Uncontrolled. Resume Reglan pending intake with new GI later this month.

## 2023-07-18 NOTE — ASSESSMENT & PLAN NOTE
Uncontrolled with few days bilateral LE edema suggestive of reversion of dietary habits. Continue present med management. Therapeutic lifestyle interventions. follow up 1 month.

## 2023-07-18 NOTE — PROGRESS NOTES
Ashley Smith (: 1950) is a 67 y.o. female, established patient, here for:    ASSESSMENT/PLAN:  1. Delayed gastric emptying  Assessment & Plan:  Verbal report of gastric emptying study  Dr. Tiera Daily. Uncontrolled. Resume Reglan pending intake with new GI later this month. Orders:  -     metoclopramide (REGLAN) 5 MG tablet; Take 1 tablet by mouth 4 times daily, Disp-120 tablet, R-0Normal  2. Altered bowel function  3. Pain, abdominal, RUQ  Assessment & Plan:  consistent with biliary colic. RUQ US. Refer if +   Orders:  -     [address]; Future  4. Essential (primary) hypertension  Assessment & Plan:  Uncontrolled with few days bilateral LE edema suggestive of reversion of dietary habits. Continue present med management. Therapeutic lifestyle interventions. follow up 1 month. Follow-up and Dispositions    Return in about 1 month (around 2023) for blood pressure follow up, no labs prior (30). SUBJECTIVE/OBJECTIVE:  HPI    RUQ abdominal pain x approx 1 year, radiates to her back and shoulder. Worse after ice cream, pizza, salad. Lasts up to 10 minutes. worse recently associated with bloating. Also alternating between being constipated and diarrhea. +/- nausea  Patient is concerned this may be her gallbladder. Had GI workup with \"4 hour test\" Dr. Tiera Daily indicated due to decreased emptying Started Reglan approx 2022. Had been taking it 4 times/day. Helpful. Ran out a few weeks ago correlating with decline in condition. Scheduled with new GI in a couple of weeks    Home BPs been increasing recently. bilateral LE swelling x few days           Physical Exam  Constitutional:       General: She is not in acute distress. Appearance: Normal appearance. HENT:      Head: Normocephalic and atraumatic. Eyes:      Conjunctiva/sclera: Conjunctivae normal.   Pulmonary:      Effort: Pulmonary effort is normal.   Abdominal:      Palpations: There is no mass.

## 2023-07-28 ENCOUNTER — HOSPITAL ENCOUNTER (OUTPATIENT)
Facility: HOSPITAL | Age: 73
Discharge: HOME | End: 2023-07-28
Attending: FAMILY MEDICINE
Payer: MEDICARE

## 2023-07-28 DIAGNOSIS — R10.11 PAIN, ABDOMINAL, RUQ: ICD-10-CM

## 2023-07-28 PROCEDURE — 76705 ECHO EXAM OF ABDOMEN: CPT

## 2023-08-11 ENCOUNTER — HOSPITAL ENCOUNTER (OUTPATIENT)
Dept: SLEEP MEDICINE | Facility: HOSPITAL | Age: 73
End: 2023-08-11
Attending: OTOLARYNGOLOGY
Payer: MEDICARE

## 2023-08-11 DIAGNOSIS — R06.83 LOUD SNORING: ICD-10-CM

## 2023-08-11 DIAGNOSIS — R29.818 SUSPECTED SLEEP APNEA: ICD-10-CM

## 2023-08-11 DIAGNOSIS — I10 ESSENTIAL (PRIMARY) HYPERTENSION: ICD-10-CM

## 2023-08-11 DIAGNOSIS — G47.8 NON-RESTORATIVE SLEEP: ICD-10-CM

## 2023-08-11 PROCEDURE — 95810 POLYSOM 6/> YRS 4/> PARAM: CPT

## 2023-08-11 ASSESSMENT — SLEEP AND FATIGUE QUESTIONNAIRES
ESS TOTAL SCORE: 8
HOW LIKELY ARE YOU TO NOD OFF OR FALL ASLEEP WHILE SITTING QUIETLY AFTER LUNCH WITHOUT ALCOHOL: 1
HOW LIKELY ARE YOU TO NOD OFF OR FALL ASLEEP WHILE SITTING AND READING: 1
HOW LIKELY ARE YOU TO NOD OFF OR FALL ASLEEP IN A CAR, WHILE STOPPED FOR A FEW MINUTES IN TRAFFIC: 0
HOW LIKELY ARE YOU TO NOD OFF OR FALL ASLEEP WHILE WATCHING TV: 1
HOW LIKELY ARE YOU TO NOD OFF OR FALL ASLEEP WHILE LYING DOWN TO REST IN THE AFTERNOON WHEN CIRCUMSTANCES PERMIT: 2
HOW LIKELY ARE YOU TO NOD OFF OR FALL ASLEEP WHEN YOU ARE A PASSENGER IN A CAR FOR AN HOUR WITHOUT A BREAK: 2
HOW LIKELY ARE YOU TO NOD OFF OR FALL ASLEEP WHILE SITTING INACTIVE IN A PUBLIC PLACE: 1
HOW LIKELY ARE YOU TO NOD OFF OR FALL ASLEEP WHILE SITTING AND TALKING TO SOMEONE: 0

## 2023-08-12 VITALS
BODY MASS INDEX: 32.1 KG/M2 | HEART RATE: 72 BPM | HEIGHT: 64 IN | DIASTOLIC BLOOD PRESSURE: 76 MMHG | WEIGHT: 188 LBS | SYSTOLIC BLOOD PRESSURE: 156 MMHG

## 2023-08-14 ENCOUNTER — OFFICE VISIT (OUTPATIENT)
Age: 73
End: 2023-08-14
Payer: MEDICARE

## 2023-08-14 ENCOUNTER — TELEPHONE (OUTPATIENT)
Age: 73
End: 2023-08-14

## 2023-08-14 VITALS
HEIGHT: 64 IN | WEIGHT: 187 LBS | DIASTOLIC BLOOD PRESSURE: 79 MMHG | HEART RATE: 74 BPM | OXYGEN SATURATION: 98 % | BODY MASS INDEX: 31.92 KG/M2 | SYSTOLIC BLOOD PRESSURE: 143 MMHG

## 2023-08-14 DIAGNOSIS — R19.8 ALTERNATING CONSTIPATION AND DIARRHEA: ICD-10-CM

## 2023-08-14 DIAGNOSIS — K30 DELAYED GASTRIC EMPTYING: ICD-10-CM

## 2023-08-14 DIAGNOSIS — K44.9 HIATAL HERNIA WITH GASTROESOPHAGEAL REFLUX: ICD-10-CM

## 2023-08-14 DIAGNOSIS — K76.0 STEATOSIS, LIVER: ICD-10-CM

## 2023-08-14 DIAGNOSIS — K81.1 CHRONIC CHOLECYSTITIS: Primary | ICD-10-CM

## 2023-08-14 DIAGNOSIS — K21.9 HIATAL HERNIA WITH GASTROESOPHAGEAL REFLUX: ICD-10-CM

## 2023-08-14 PROCEDURE — 99204 OFFICE O/P NEW MOD 45 MIN: CPT | Performed by: SURGERY

## 2023-08-14 PROCEDURE — 3077F SYST BP >= 140 MM HG: CPT | Performed by: SURGERY

## 2023-08-14 PROCEDURE — 3078F DIAST BP <80 MM HG: CPT | Performed by: SURGERY

## 2023-08-14 PROCEDURE — 1123F ACP DISCUSS/DSCN MKR DOCD: CPT | Performed by: SURGERY

## 2023-08-14 NOTE — TELEPHONE ENCOUNTER
Spoke to Ms. Hunt to schedule surgery with  on Wednesday, August 28, 2023 at 8:30am at [facility].

## 2023-08-14 NOTE — PROGRESS NOTES
Agustín Domingo is a 67 y.o. female (: 1950) presenting to address:    Chief Complaint   Patient presents with    New Patient     Cholelithiasis/referred by Dr. Lokesh Gore       Medication list and allergies have been reviewed with Agustín Domingo and updated as of today's date. I have gone over all Medical, Surgical and Social History with Agustín Domingo and updated/added the information accordingly.
06/05/2023 09:51 AM    GLOB 2.1 03/08/2023 08:18 AM       No results found for this or any previous visit (from the past 24 hour(s)). images and reports reviewed    Assessment:   Chelsea Gates is a 67 y.o. female who is presenting with a picture of chronic cholecystitis. I explained the indications for robotic cholecystectomy as well as the alternatives. I discussed the potential risks, including but not limited to bleeding, wound infection, trocar injuries, bile duct injury and leak, and also the possible need for conversion to open procedure. I also explained the firefly technology and how it allow us to visualize the biliary tree to avoid bile duct injury or leak. She indicates that she understands the risks, accepts and wishes to proceed. I also explained to the patient that her constipation and diarrhea are unlikely to be related to her cholelithiasis and most likely this is secondary to irritable bowel syndrome or other gastrointestinal disease. She has been followed by the GI team and in addition to the colonoscopy they ordered a liver scan. I told the patient I believe when we do the cholecystectomy we should do a liver biopsy giving the fact that they are trying to evaluate her liver disease especially that the ultrasound showed liver steatosis. Plan: Will schedule for robotic cholecystectomy and liver biopsy.     Please call me if you have any questions (cell phone: [phone])     Signed By: Gwendolyn Armendariz MD     August 14, 2023

## 2023-08-15 ENCOUNTER — OFFICE VISIT (OUTPATIENT)
Facility: CLINIC | Age: 73
End: 2023-08-15
Payer: COMMERCIAL

## 2023-08-15 VITALS
HEIGHT: 64 IN | TEMPERATURE: 97.8 F | WEIGHT: 187 LBS | BODY MASS INDEX: 31.92 KG/M2 | OXYGEN SATURATION: 97 % | HEART RATE: 83 BPM | DIASTOLIC BLOOD PRESSURE: 80 MMHG | SYSTOLIC BLOOD PRESSURE: 166 MMHG

## 2023-08-15 DIAGNOSIS — I10 ESSENTIAL (PRIMARY) HYPERTENSION: ICD-10-CM

## 2023-08-15 DIAGNOSIS — N18.32 CHRONIC KIDNEY DISEASE, STAGE 3B (HCC): ICD-10-CM

## 2023-08-15 DIAGNOSIS — N18.32 STAGE 3B CHRONIC KIDNEY DISEASE (CKD) (HCC): Primary | ICD-10-CM

## 2023-08-15 PROCEDURE — 99214 OFFICE O/P EST MOD 30 MIN: CPT | Performed by: FAMILY MEDICINE

## 2023-08-15 PROCEDURE — 3079F DIAST BP 80-89 MM HG: CPT | Performed by: FAMILY MEDICINE

## 2023-08-15 PROCEDURE — 1123F ACP DISCUSS/DSCN MKR DOCD: CPT | Performed by: FAMILY MEDICINE

## 2023-08-15 PROCEDURE — 3077F SYST BP >= 140 MM HG: CPT | Performed by: FAMILY MEDICINE

## 2023-08-15 RX ORDER — FUROSEMIDE 40 MG/1
40 TABLET ORAL DAILY
Qty: 90 TABLET | Refills: 3 | Status: SHIPPED | OUTPATIENT
Start: 2023-08-15

## 2023-08-15 ASSESSMENT — PATIENT HEALTH QUESTIONNAIRE - PHQ9
3. TROUBLE FALLING OR STAYING ASLEEP: 0
5. POOR APPETITE OR OVEREATING: 0
SUM OF ALL RESPONSES TO PHQ QUESTIONS 1-9: 0
6. FEELING BAD ABOUT YOURSELF - OR THAT YOU ARE A FAILURE OR HAVE LET YOURSELF OR YOUR FAMILY DOWN: 0
SUM OF ALL RESPONSES TO PHQ QUESTIONS 1-9: 0
SUM OF ALL RESPONSES TO PHQ QUESTIONS 1-9: 0
4. FEELING TIRED OR HAVING LITTLE ENERGY: 0
9. THOUGHTS THAT YOU WOULD BE BETTER OFF DEAD, OR OF HURTING YOURSELF: 0
8. MOVING OR SPEAKING SO SLOWLY THAT OTHER PEOPLE COULD HAVE NOTICED. OR THE OPPOSITE, BEING SO FIGETY OR RESTLESS THAT YOU HAVE BEEN MOVING AROUND A LOT MORE THAN USUAL: 0
SUM OF ALL RESPONSES TO PHQ QUESTIONS 1-9: 0
7. TROUBLE CONCENTRATING ON THINGS, SUCH AS READING THE NEWSPAPER OR WATCHING TELEVISION: 0
SUM OF ALL RESPONSES TO PHQ9 QUESTIONS 1 & 2: 0
2. FEELING DOWN, DEPRESSED OR HOPELESS: 0
1. LITTLE INTEREST OR PLEASURE IN DOING THINGS: 0

## 2023-08-15 NOTE — ASSESSMENT & PLAN NOTE
Uncontrolled. ACE I contraindicated by history of hyperkalemia, current K+ 4.7. I would continue meds unchanged pending results of sleep study, but scheduled for cholecystectomy next week. Continue diltiazem 360 mg. Double to furosemide 40 mg.  Follow up 1 week, labs day prior

## 2023-08-15 NOTE — PROGRESS NOTES
Ashley Smith (: 1950) is a 67 y.o. female, established patient, here for:    ASSESSMENT/PLAN:  1. Essential (primary) hypertension  Assessment & Plan:  Uncontrolled. ACE I contraindicated by history of hyperkalemia, current K+ 4.7. I would continue meds unchanged pending results of sleep study, but scheduled for cholecystectomy next week. Continue diltiazem 360 mg. Double to furosemide 40 mg. Follow up 1 week, labs day prior  Orders:  -     furosemide (LASIX) 40 MG tablet; Take 1 tablet by mouth daily, Disp-90 tablet, R-3Normal  -     Basic Metabolic Panel; Future  2. Chronic kidney disease, stage 3b (HCC)  -     furosemide (LASIX) 40 MG tablet; Take 1 tablet by mouth daily, Disp-90 tablet, R-3Normal  -     Basic Metabolic Panel; Future      Follow-up and Dispositions    Return in about 1 week (around 2023) for blood pressure follow up, labs day before, (15). SUBJECTIVE/OBJECTIVE:  HPI  Healthy great grandson born since last visit    follow up hypertension - diltiazem, furosemide. Uncontrolled at home SBPs 140-160    Sleep study last week with Dr. Gonzalez Howe. Results pending    Scheduled for cholestectomy with Dr. Bharathi Adamson next week. Plans to biopsy liver as well. CMP:    Lab Results   Component Value Date/Time     2023 09:51 AM    K 4.7 2023 09:51 AM     2023 09:51 AM    CO2 28 2023 09:51 AM    BUN 24 2023 09:51 AM    CREATININE 1.5 2023 09:51 AM    GFRAA 35.3 03/15/2022 09:43 AM    AGRATIO 2.0 2023 08:18 AM    LABGLOM 29.1 03/15/2022 09:43 AM    GLUCOSE 163 2023 09:51 AM    PROT 6.2 2023 08:18 AM    LABALBU 4.1 2023 08:18 AM    CALCIUM 9.0 2023 09:51 AM    BILITOT 0.2 2023 08:18 AM    ALKPHOS 109 2023 08:18 AM    AST 16 2023 08:18 AM    ALT 12 2023 08:18 AM       Physical Exam  Constitutional:       General: She is not in acute distress. Appearance: Normal appearance.    HENT:

## 2023-08-16 RX ORDER — CALCIUM CARBONATE 500 MG/1
1 TABLET, CHEWABLE ORAL DAILY PRN
COMMUNITY

## 2023-08-16 RX ORDER — ESOMEPRAZOLE MAGNESIUM 40 MG/1
40 FOR SUSPENSION ORAL
COMMUNITY

## 2023-08-16 NOTE — PERIOP NOTE
Instructions for your surgery at [address] [place] [address] Date:  8/16/2023      Patient's Name:  Evan Rollins           Surgery Date:  08/23/2023              Please enter the main entrance of the hospital and check-in at the  located in the lobby. Once checked in at the , you will take the elevators to the second floor, and report to the waiting room on the left. The room will say Procedure Registration. Do NOT eat or drink anything, including candy, gum, or ice chips after midnight prior to your surgery, unless you have specific instructions from your surgeon or anesthesia provider to do so. Brush your teeth before coming to the hospital. You may swish with water, but do not swallow. No smoking/Vaping/E-Cigarettes 24 hours prior to the day of surgery. No alcohol 24 hours prior to the day of surgery. No recreational drugs for one week prior to the day of surgery. Bring Photo ID, Insurance information, and Co-pay if required on day of surgery. Bring in pertinent legal documents, such as, Medical Power of FABIO MOE, DNR, Advance Directive, etc.  Leave all valuables, including money/purse, at home. Remove all jewelry, including ALL body piercings, nail polish, acrylic nails, and makeup (including mascara); no lotions, powders, deodorant, or perfume/cologne/after shave on the skin. Follow instruction for Hibiclens washes and CHG wipes from surgeon's office. Glasses and dentures may be worn to the hospital. They must be removed prior to surgery. Please bring case/container for glasses or dentures. Contact lenses should not be worn on day of surgery. Call your doctor's office if symptoms of a cold or illness develop within 24-48 hours prior to your surgery. 14. AN ADULT (relative or friend 25 years or older) [address].   15. Please make arrangements for a responsible adult (18 years or older) to be with you for 24 hours after your

## 2023-08-21 ENCOUNTER — HOSPITAL ENCOUNTER (OUTPATIENT)
Facility: HOSPITAL | Age: 73
Discharge: HOME | End: 2023-08-24

## 2023-08-21 LAB — SENTARA SPECIMEN COLLECTION: NORMAL

## 2023-08-22 ENCOUNTER — ANESTHESIA EVENT (OUTPATIENT)
Facility: HOSPITAL | Age: 73
End: 2023-08-22
Payer: MEDICARE

## 2023-08-22 ENCOUNTER — OFFICE VISIT (OUTPATIENT)
Facility: CLINIC | Age: 73
End: 2023-08-22
Payer: COMMERCIAL

## 2023-08-22 VITALS
DIASTOLIC BLOOD PRESSURE: 81 MMHG | HEART RATE: 71 BPM | SYSTOLIC BLOOD PRESSURE: 136 MMHG | BODY MASS INDEX: 32.96 KG/M2 | WEIGHT: 186 LBS | OXYGEN SATURATION: 97 % | HEIGHT: 63 IN | TEMPERATURE: 97.6 F

## 2023-08-22 DIAGNOSIS — N18.32 CHRONIC KIDNEY DISEASE, STAGE 3B (HCC): ICD-10-CM

## 2023-08-22 DIAGNOSIS — E11.21 TYPE 2 DIABETES MELLITUS WITH DIABETIC NEPHROPATHY, WITH LONG-TERM CURRENT USE OF INSULIN (HCC): ICD-10-CM

## 2023-08-22 DIAGNOSIS — I10 ESSENTIAL (PRIMARY) HYPERTENSION: Primary | ICD-10-CM

## 2023-08-22 DIAGNOSIS — Z79.4 TYPE 2 DIABETES MELLITUS WITH DIABETIC NEPHROPATHY, WITH LONG-TERM CURRENT USE OF INSULIN (HCC): ICD-10-CM

## 2023-08-22 DIAGNOSIS — I10 ESSENTIAL (PRIMARY) HYPERTENSION: ICD-10-CM

## 2023-08-22 LAB
ANION GAP SERPL CALCULATED.3IONS-SCNC: 12 MMOL/L (ref 3–15)
BUN BLDV-MCNC: 22 MG/DL (ref 6–22)
CALCIUM SERPL-MCNC: 9.2 MG/DL (ref 8.4–10.5)
CHLORIDE BLD-SCNC: 101 MMOL/L (ref 98–110)
CO2: 28 MMOL/L (ref 20–32)
CREAT SERPL-MCNC: 1.6 MG/DL (ref 0.8–1.4)
GLOMERULAR FILTRATION RATE: 33.2 ML/MIN/1.73 SQ.M.
GLUCOSE: 135 MG/DL (ref 70–99)
POTASSIUM SERPL-SCNC: 4.9 MMOL/L (ref 3.5–5.5)
SODIUM BLD-SCNC: 141 MMOL/L (ref 133–145)

## 2023-08-22 PROCEDURE — 3052F HG A1C>EQUAL 8.0%<EQUAL 9.0%: CPT | Performed by: FAMILY MEDICINE

## 2023-08-22 PROCEDURE — 3075F SYST BP GE 130 - 139MM HG: CPT | Performed by: FAMILY MEDICINE

## 2023-08-22 PROCEDURE — 3078F DIAST BP <80 MM HG: CPT | Performed by: FAMILY MEDICINE

## 2023-08-22 PROCEDURE — 99213 OFFICE O/P EST LOW 20 MIN: CPT | Performed by: FAMILY MEDICINE

## 2023-08-22 PROCEDURE — 1123F ACP DISCUSS/DSCN MKR DOCD: CPT | Performed by: FAMILY MEDICINE

## 2023-08-22 ASSESSMENT — PATIENT HEALTH QUESTIONNAIRE - PHQ9
4. FEELING TIRED OR HAVING LITTLE ENERGY: 0
8. MOVING OR SPEAKING SO SLOWLY THAT OTHER PEOPLE COULD HAVE NOTICED. OR THE OPPOSITE, BEING SO FIGETY OR RESTLESS THAT YOU HAVE BEEN MOVING AROUND A LOT MORE THAN USUAL: 0
SUM OF ALL RESPONSES TO PHQ QUESTIONS 1-9: 0
SUM OF ALL RESPONSES TO PHQ QUESTIONS 1-9: 0
SUM OF ALL RESPONSES TO PHQ9 QUESTIONS 1 & 2: 0
3. TROUBLE FALLING OR STAYING ASLEEP: 0
SUM OF ALL RESPONSES TO PHQ QUESTIONS 1-9: 0
6. FEELING BAD ABOUT YOURSELF - OR THAT YOU ARE A FAILURE OR HAVE LET YOURSELF OR YOUR FAMILY DOWN: 0
SUM OF ALL RESPONSES TO PHQ QUESTIONS 1-9: 0
9. THOUGHTS THAT YOU WOULD BE BETTER OFF DEAD, OR OF HURTING YOURSELF: 0
7. TROUBLE CONCENTRATING ON THINGS, SUCH AS READING THE NEWSPAPER OR WATCHING TELEVISION: 0
1. LITTLE INTEREST OR PLEASURE IN DOING THINGS: 0
2. FEELING DOWN, DEPRESSED OR HOPELESS: 0
5. POOR APPETITE OR OVEREATING: 0

## 2023-08-22 NOTE — ASSESSMENT & PLAN NOTE
Improved post doubling furosemide. Not yet to target SBP <130. ACE I contraindicated by history of hyperkalemia. Yesterday's labs not yet resulted. Continue diltiazem 360 mg,  furosemide 40 mg. Scheduled to see me next month for DM, labs prior.

## 2023-08-22 NOTE — PROGRESS NOTES
Melissa Posey (: 1950) is a 67 y.o. female, established patient, here for:    ASSESSMENT/PLAN:  1. Essential (primary) hypertension  Assessment & Plan:  Improved post doubling furosemide. Not yet to target SBP <130. ACE I contraindicated by history of hyperkalemia. Yesterday's labs not yet resulted. Continue diltiazem 360 mg,  furosemide 40 mg. Scheduled to see me next month for DM, labs prior. Orders:  -     Basic Metabolic Panel; Future  2. Type 2 diabetes mellitus with diabetic nephropathy, with long-term current use of insulin ([facility])  -     Basic Metabolic Panel; Future  -     Hemoglobin A1C; Future             SUBJECTIVE/OBJECTIVE:  HPI    follow up hypertension - no side effects doubled furosemide. Home SBPs 120-130s. DPB 70-80s. BMP from yesterday not yet resulted    Scheduled for cholecystectomy tomorrow              Physical Exam  Constitutional:       General: She is not in acute distress. Appearance: Normal appearance. HENT:      Head: Normocephalic and atraumatic. Pulmonary:      Effort: Pulmonary effort is normal.   Neurological:      Mental Status: She is alert and oriented to person, place, and time.              -- Sukhwinder Macario MD

## 2023-08-23 ENCOUNTER — ANESTHESIA (OUTPATIENT)
Facility: HOSPITAL | Age: 73
End: 2023-08-23
Payer: MEDICARE

## 2023-08-23 ENCOUNTER — HOSPITAL ENCOUNTER (OUTPATIENT)
Facility: HOSPITAL | Age: 73
Setting detail: OUTPATIENT SURGERY
Discharge: HOME | End: 2023-08-23
Attending: SURGERY | Admitting: SURGERY
Payer: MEDICARE

## 2023-08-23 VITALS
HEART RATE: 55 BPM | TEMPERATURE: 97.8 F | BODY MASS INDEX: 32.99 KG/M2 | WEIGHT: 186.2 LBS | OXYGEN SATURATION: 96 % | RESPIRATION RATE: 16 BRPM | DIASTOLIC BLOOD PRESSURE: 72 MMHG | HEIGHT: 63 IN | SYSTOLIC BLOOD PRESSURE: 138 MMHG

## 2023-08-23 DIAGNOSIS — Z90.49 S/P CHOLECYSTECTOMY: Primary | ICD-10-CM

## 2023-08-23 LAB
EKG ATRIAL RATE: 68 BPM
EKG DIAGNOSIS: NORMAL
EKG P AXIS: 50 DEGREES
EKG P-R INTERVAL: 186 MS
EKG Q-T INTERVAL: 408 MS
EKG QRS DURATION: 94 MS
EKG QTC CALCULATION (BAZETT): 433 MS
EKG R AXIS: 19 DEGREES
EKG T AXIS: 22 DEGREES
EKG VENTRICULAR RATE: 68 BPM
GLUCOSE BLD STRIP.AUTO-MCNC: 126 MG/DL (ref 70–110)
GLUCOSE BLD STRIP.AUTO-MCNC: 183 MG/DL (ref 70–110)

## 2023-08-23 PROCEDURE — 3700000000 HC ANESTHESIA ATTENDED CARE: Performed by: SURGERY

## 2023-08-23 PROCEDURE — 6360000002 HC RX W HCPCS: Performed by: NURSE ANESTHETIST, CERTIFIED REGISTERED

## 2023-08-23 PROCEDURE — 2500000003 HC RX 250 WO HCPCS: Performed by: NURSE ANESTHETIST, CERTIFIED REGISTERED

## 2023-08-23 PROCEDURE — C9399 UNCLASSIFIED DRUGS OR BIOLOG: HCPCS | Performed by: NURSE ANESTHETIST, CERTIFIED REGISTERED

## 2023-08-23 PROCEDURE — 7100000010 HC PHASE II RECOVERY - FIRST 15 MIN: Performed by: SURGERY

## 2023-08-23 PROCEDURE — 2580000003 HC RX 258: Performed by: SURGERY

## 2023-08-23 PROCEDURE — 88307 TISSUE EXAM BY PATHOLOGIST: CPT

## 2023-08-23 PROCEDURE — 2500000003 HC RX 250 WO HCPCS: Performed by: SURGERY

## 2023-08-23 PROCEDURE — 7100000000 HC PACU RECOVERY - FIRST 15 MIN: Performed by: SURGERY

## 2023-08-23 PROCEDURE — 6360000002 HC RX W HCPCS: Performed by: SURGERY

## 2023-08-23 PROCEDURE — 93010 ELECTROCARDIOGRAM REPORT: CPT | Performed by: INTERNAL MEDICINE

## 2023-08-23 PROCEDURE — 82962 GLUCOSE BLOOD TEST: CPT

## 2023-08-23 PROCEDURE — 47562 LAPAROSCOPIC CHOLECYSTECTOMY: CPT | Performed by: SURGERY

## 2023-08-23 PROCEDURE — 7100000011 HC PHASE II RECOVERY - ADDTL 15 MIN: Performed by: SURGERY

## 2023-08-23 PROCEDURE — 3600000009 HC SURGERY ROBOT BASE: Performed by: SURGERY

## 2023-08-23 PROCEDURE — 6370000000 HC RX 637 (ALT 250 FOR IP): Performed by: NURSE ANESTHETIST, CERTIFIED REGISTERED

## 2023-08-23 PROCEDURE — 88304 TISSUE EXAM BY PATHOLOGIST: CPT

## 2023-08-23 PROCEDURE — 6370000000 HC RX 637 (ALT 250 FOR IP): Performed by: SURGERY

## 2023-08-23 PROCEDURE — C1889 IMPLANT/INSERT DEVICE, NOC: HCPCS | Performed by: SURGERY

## 2023-08-23 PROCEDURE — 2709999900 HC NON-CHARGEABLE SUPPLY: Performed by: SURGERY

## 2023-08-23 PROCEDURE — S2900 ROBOTIC SURGICAL SYSTEM: HCPCS | Performed by: SURGERY

## 2023-08-23 PROCEDURE — 88313 SPECIAL STAINS GROUP 2: CPT

## 2023-08-23 PROCEDURE — 3700000001 HC ADD 15 MINUTES (ANESTHESIA): Performed by: SURGERY

## 2023-08-23 PROCEDURE — 3600000019 HC SURGERY ROBOT ADDTL 15MIN: Performed by: SURGERY

## 2023-08-23 PROCEDURE — 93005 ELECTROCARDIOGRAM TRACING: CPT | Performed by: NURSE ANESTHETIST, CERTIFIED REGISTERED

## 2023-08-23 PROCEDURE — 7100000001 HC PACU RECOVERY - ADDTL 15 MIN: Performed by: SURGERY

## 2023-08-23 PROCEDURE — 2580000003 HC RX 258: Performed by: NURSE ANESTHETIST, CERTIFIED REGISTERED

## 2023-08-23 PROCEDURE — 47000 NEEDLE BIOPSY OF LIVER PERQ: CPT | Performed by: SURGERY

## 2023-08-23 PROCEDURE — A4217 STERILE WATER/SALINE, 500 ML: HCPCS | Performed by: SURGERY

## 2023-08-23 DEVICE — CLIP INT M L POLYMER LOK LIG HEM O LOK: Type: IMPLANTABLE DEVICE | Status: FUNCTIONAL

## 2023-08-23 RX ORDER — LIDOCAINE HYDROCHLORIDE 20 MG/ML
INJECTION, SOLUTION EPIDURAL; INFILTRATION; INTRACAUDAL; PERINEURAL PRN
Status: DISCONTINUED | OUTPATIENT
Start: 2023-08-23 | End: 2023-08-23 | Stop reason: SDUPTHER

## 2023-08-23 RX ORDER — DEXTROSE MONOHYDRATE 100 MG/ML
INJECTION, SOLUTION INTRAVENOUS CONTINUOUS PRN
Status: DISCONTINUED | OUTPATIENT
Start: 2023-08-23 | End: 2023-08-23 | Stop reason: HOSPADM

## 2023-08-23 RX ORDER — ONDANSETRON 2 MG/ML
4 INJECTION INTRAMUSCULAR; INTRAVENOUS
Status: DISCONTINUED | OUTPATIENT
Start: 2023-08-23 | End: 2023-08-23 | Stop reason: HOSPADM

## 2023-08-23 RX ORDER — FENTANYL CITRATE 50 UG/ML
INJECTION, SOLUTION INTRAMUSCULAR; INTRAVENOUS PRN
Status: DISCONTINUED | OUTPATIENT
Start: 2023-08-23 | End: 2023-08-23 | Stop reason: SDUPTHER

## 2023-08-23 RX ORDER — PROCHLORPERAZINE EDISYLATE 5 MG/ML
10 INJECTION INTRAMUSCULAR; INTRAVENOUS
Status: DISCONTINUED | OUTPATIENT
Start: 2023-08-23 | End: 2023-08-23 | Stop reason: HOSPADM

## 2023-08-23 RX ORDER — MIDAZOLAM HYDROCHLORIDE 1 MG/ML
INJECTION INTRAMUSCULAR; INTRAVENOUS PRN
Status: DISCONTINUED | OUTPATIENT
Start: 2023-08-23 | End: 2023-08-23 | Stop reason: SDUPTHER

## 2023-08-23 RX ORDER — OXYCODONE HYDROCHLORIDE AND ACETAMINOPHEN 5; 325 MG/1; MG/1
1 TABLET ORAL EVERY 6 HOURS PRN
Qty: 12 TABLET | Refills: 0 | OUTPATIENT
Start: 2023-08-23 | End: 2023-08-26

## 2023-08-23 RX ORDER — FAMOTIDINE 20 MG/1
20 TABLET, FILM COATED ORAL ONCE
Status: COMPLETED | OUTPATIENT
Start: 2023-08-23 | End: 2023-08-23

## 2023-08-23 RX ORDER — ROCURONIUM BROMIDE 10 MG/ML
INJECTION, SOLUTION INTRAVENOUS PRN
Status: DISCONTINUED | OUTPATIENT
Start: 2023-08-23 | End: 2023-08-23 | Stop reason: SDUPTHER

## 2023-08-23 RX ORDER — FENTANYL CITRATE 50 UG/ML
50 INJECTION, SOLUTION INTRAMUSCULAR; INTRAVENOUS EVERY 5 MIN PRN
Status: DISCONTINUED | OUTPATIENT
Start: 2023-08-23 | End: 2023-08-23 | Stop reason: HOSPADM

## 2023-08-23 RX ORDER — INSULIN LISPRO 100 [IU]/ML
0-15 INJECTION, SOLUTION INTRAVENOUS; SUBCUTANEOUS ONCE
Status: DISCONTINUED | OUTPATIENT
Start: 2023-08-23 | End: 2023-08-23 | Stop reason: HOSPADM

## 2023-08-23 RX ORDER — ONDANSETRON 2 MG/ML
INJECTION INTRAMUSCULAR; INTRAVENOUS PRN
Status: DISCONTINUED | OUTPATIENT
Start: 2023-08-23 | End: 2023-08-23 | Stop reason: SDUPTHER

## 2023-08-23 RX ORDER — SUCCINYLCHOLINE/SOD CL,ISO/PF 100 MG/5ML
SYRINGE (ML) INTRAVENOUS PRN
Status: DISCONTINUED | OUTPATIENT
Start: 2023-08-23 | End: 2023-08-23 | Stop reason: SDUPTHER

## 2023-08-23 RX ORDER — LIDOCAINE HYDROCHLORIDE 10 MG/ML
1 INJECTION, SOLUTION EPIDURAL; INFILTRATION; INTRACAUDAL; PERINEURAL
Status: DISCONTINUED | OUTPATIENT
Start: 2023-08-23 | End: 2023-08-23 | Stop reason: HOSPADM

## 2023-08-23 RX ORDER — PROPOFOL 10 MG/ML
INJECTION, EMULSION INTRAVENOUS PRN
Status: DISCONTINUED | OUTPATIENT
Start: 2023-08-23 | End: 2023-08-23 | Stop reason: SDUPTHER

## 2023-08-23 RX ORDER — SODIUM CHLORIDE, SODIUM LACTATE, POTASSIUM CHLORIDE, CALCIUM CHLORIDE 600; 310; 30; 20 MG/100ML; MG/100ML; MG/100ML; MG/100ML
INJECTION, SOLUTION INTRAVENOUS CONTINUOUS
Status: DISCONTINUED | OUTPATIENT
Start: 2023-08-23 | End: 2023-08-23 | Stop reason: HOSPADM

## 2023-08-23 RX ORDER — DEXAMETHASONE SODIUM PHOSPHATE 4 MG/ML
INJECTION, SOLUTION INTRA-ARTICULAR; INTRALESIONAL; INTRAMUSCULAR; INTRAVENOUS; SOFT TISSUE PRN
Status: DISCONTINUED | OUTPATIENT
Start: 2023-08-23 | End: 2023-08-23 | Stop reason: SDUPTHER

## 2023-08-23 RX ORDER — INSULIN LISPRO 100 [IU]/ML
0-15 INJECTION, SOLUTION INTRAVENOUS; SUBCUTANEOUS ONCE
Status: COMPLETED | OUTPATIENT
Start: 2023-08-23 | End: 2023-08-23

## 2023-08-23 RX ORDER — INDOCYANINE GREEN AND WATER 25 MG
2.5 KIT INJECTION ONCE
Status: DISCONTINUED | OUTPATIENT
Start: 2023-08-23 | End: 2023-08-23 | Stop reason: HOSPADM

## 2023-08-23 RX ORDER — OXYCODONE HYDROCHLORIDE AND ACETAMINOPHEN 5; 325 MG/1; MG/1
1 TABLET ORAL ONCE
Status: COMPLETED | OUTPATIENT
Start: 2023-08-23 | End: 2023-08-23

## 2023-08-23 RX ADMIN — WATER 2000 MG: 1 INJECTION, SOLUTION INTRAMUSCULAR; INTRAVENOUS; SUBCUTANEOUS at 08:48

## 2023-08-23 RX ADMIN — LIDOCAINE HYDROCHLORIDE 80 MG: 20 INJECTION, SOLUTION EPIDURAL; INFILTRATION; INTRACAUDAL; PERINEURAL at 08:42

## 2023-08-23 RX ADMIN — SUGAMMADEX 200 MG: 100 INJECTION, SOLUTION INTRAVENOUS at 09:08

## 2023-08-23 RX ADMIN — Medication 100 MG: at 08:42

## 2023-08-23 RX ADMIN — ROCURONIUM BROMIDE 10 MG: 10 INJECTION, SOLUTION INTRAVENOUS at 08:54

## 2023-08-23 RX ADMIN — OXYCODONE AND ACETAMINOPHEN 1 TABLET: 325; 5 TABLET ORAL at 11:30

## 2023-08-23 RX ADMIN — FAMOTIDINE 20 MG: 20 TABLET, FILM COATED ORAL at 07:19

## 2023-08-23 RX ADMIN — ONDANSETRON 4 MG: 2 INJECTION INTRAMUSCULAR; INTRAVENOUS at 09:01

## 2023-08-23 RX ADMIN — FENTANYL CITRATE 25 MCG: 50 INJECTION INTRAMUSCULAR; INTRAVENOUS at 09:29

## 2023-08-23 RX ADMIN — DEXAMETHASONE SODIUM PHOSPHATE 4 MG: 4 INJECTION INTRA-ARTICULAR; INTRALESIONAL; INTRAMUSCULAR; INTRAVENOUS; SOFT TISSUE at 09:01

## 2023-08-23 RX ADMIN — SODIUM CHLORIDE, SODIUM LACTATE, POTASSIUM CHLORIDE, AND CALCIUM CHLORIDE: 600; 310; 30; 20 INJECTION, SOLUTION INTRAVENOUS at 07:19

## 2023-08-23 RX ADMIN — PROPOFOL 13 MG: 10 INJECTION, EMULSION INTRAVENOUS at 08:42

## 2023-08-23 RX ADMIN — INSULIN LISPRO 3 UNITS: 100 INJECTION, SOLUTION INTRAVENOUS; SUBCUTANEOUS at 09:54

## 2023-08-23 RX ADMIN — FENTANYL CITRATE 25 MCG: 50 INJECTION INTRAMUSCULAR; INTRAVENOUS at 08:42

## 2023-08-23 RX ADMIN — MIDAZOLAM 1 MG: 1 INJECTION, SOLUTION INTRAMUSCULAR; INTRAVENOUS at 08:38

## 2023-08-23 ASSESSMENT — PAIN SCALES - GENERAL
PAINLEVEL_OUTOF10: 6
PAINLEVEL_OUTOF10: 5
PAINLEVEL_OUTOF10: 0
PAINLEVEL_OUTOF10: 0

## 2023-08-23 ASSESSMENT — PAIN - FUNCTIONAL ASSESSMENT
PAIN_FUNCTIONAL_ASSESSMENT: ACTIVITIES ARE NOT PREVENTED
PAIN_FUNCTIONAL_ASSESSMENT: 0-10

## 2023-08-23 ASSESSMENT — PAIN DESCRIPTION - DESCRIPTORS
DESCRIPTORS: PRESSURE
DESCRIPTORS: PRESSURE;ACHING

## 2023-08-23 ASSESSMENT — PAIN DESCRIPTION - LOCATION
LOCATION: ABDOMEN
LOCATION: ABDOMEN

## 2023-08-23 NOTE — BRIEF OP NOTE
Brief Postoperative Note      Patient: Nir Sood  YOB: 1950  MRN: [id number]    Date of Procedure: 8/23/2023    Pre-Op Diagnosis Codes:     * Chronic cholecystitis [K81.1]    Post-Op Diagnosis: Same       Procedure(s):  ROBOTIC CHOLECYSTECTOMY, LIVER BIOPSY    Surgeon(s): Greg Melo MD    Assistant:  Surgical Assistant: Cornelius Chadwick    Anesthesia: General    Estimated Blood Loss (mL): Minimal    Complications: None    Specimens:   ID Type Source Tests Collected by Time Destination   A : GALLBLADDER Tissue Gallbladder SURGICAL PATHOLOGY Greg Melo MD 8/23/2023 5162    B : LIVER BIOPSY Tissue Tissue SURGICAL PATHOLOGY Greg Melo MD 8/23/2023 7693        Implants:  * No implants in log *      Drains:   [REMOVED] NG/OG/NJ/NE Tube Orogastric 16 fr Center mouth (Removed)       Findings: Normal looking gallbladder.        Electronically signed by Greg Melo MD on 8/23/2023 at 9:16 AM

## 2023-08-23 NOTE — PROGRESS NOTES
Update History & Physical    The patient's History and Physical was reviewed with the patient and I examined the patient. There was no change. The surgical site was confirmed by the patient and me. Plan: The risks, benefits, expected outcome, and alternative to the recommended procedure have been discussed with the patient. Patient understands and wants to proceed with the procedure. Will proceed with robotic cholecystectomy and liver biopsy.     Electronically signed by Odin Callahan MD on 8/23/2023 at 8:10 AM

## 2023-08-23 NOTE — ANESTHESIA POSTPROCEDURE EVALUATION
Department of Anesthesiology  Postprocedure Note    Patient: Rl Brice  MRN: [id number]  YOB: 1950  Date of evaluation: 8/23/2023      Procedure Summary     Date: 08/23/23 Room / Location: [facility] MAIN 06 / [facility] MAIN OR    Anesthesia Start: 6545 Anesthesia Stop: 9334    Procedure: ROBOTIC CHOLECYSTECTOMY, LIVER BIOPSY (Abdomen) Diagnosis:       Chronic cholecystitis      (Chronic cholecystitis [K81.1])    Surgeons:  Mauro Redding MD Responsible Provider: Fanta Singh MD    Anesthesia Type: General ASA Status: 3          Anesthesia Type: General    Eula Phase I: Eula Score: 5    Eula Phase II:        Anesthesia Post Evaluation    Patient location during evaluation: PACU  Patient participation: complete - patient participated  Level of consciousness: awake  Airway patency: patent  Nausea & Vomiting: no nausea  Complications: no  Cardiovascular status: hemodynamically stable  Respiratory status: acceptable  Hydration status: stable  Pain management: adequate

## 2023-08-23 NOTE — PERIOP NOTE
Dr. Luisa Scruggs made aware of cont low blood pressure and pulse rate. Pt with no c/o pain or discomforts. Arouses easily. Per anesthesia pt is to rec 500ml bolus LR.

## 2023-08-23 NOTE — ANESTHESIA PRE PROCEDURE
Department of Anesthesiology  Preprocedure Note       Name:  Chrissy Velez   Age:  67 y.o.  :  1950                                          MRN:  [id number]         Date:  2023      Surgeon: Jorge Luis Santiago): Juan Pablo Hernandez MD    Procedure: Procedure(s):  ROBOTIC CHOLECYSTECTOMY, LIVER BIOPSY    Medications prior to admission:   Prior to Admission medications    Medication Sig Start Date End Date Taking? Authorizing Provider   esomeprazole Magnesium (NEXIUM) 40 MG PACK Take 1 packet by mouth every 7 days   Yes Historical Provider, MD   calcium carbonate (TUMS) 500 MG chewable tablet Take 1 tablet by mouth daily as needed for Heartburn   Yes Historical Provider, MD   PAPAYA PO Take by mouth as needed   Yes Historical Provider, MD   furosemide (LASIX) 40 MG tablet Take 1 tablet by mouth daily 8/15/23   Kathrin Gil MD   Blood Glucose Monitoring Suppl (Oval Car) w/Device KIT Use to check blood sugars up to 3x/day 23   Kathrin Gil MD   blood glucose test strips [facility]) strip 1 each by In Vitro route daily As needed.  23   Kathrin Gil MD   OneTouch Delica Lancets 28S MISC Use to check blood sugars up to 3x/day 23   Kathrin Gil MD   levothyroxine (SYNTHROID) 50 MCG tablet take 1 tablet by mouth EVERY MORNING BEFORE BREAKFAST 23   Kathrin Gil MD   blood glucose test strips (ASCENSIA AUTODISC VI;ONE TOUCH ULTRA TEST VI) strip Inject 1 each into the skin 3 times daily 23   Kathrin Gil MD   blood glucose test strips (ASCENSIA AUTODISC VI;ONE TOUCH ULTRA TEST VI) strip  19   Historical Provider, MD   dilTIAZem (CARDIZEM CD) 360 MG extended release capsule take 1 capsule by mouth once daily 5/3/23   Kathrin Gil MD   dulaglutide (TRULICITY) 1.5 LO/1.5NJ SC injection Inject 0.5 mLs into the skin once a week 23   Kathrin Gil MD   insulin glargine (LANTUS SOLOSTAR) 100 UNIT/ML injection pen Inject 22 Units into the skin daily

## 2023-08-23 NOTE — DISCHARGE INSTRUCTIONS
DISCHARGE SUMMARY from Nurse    PATIENT INSTRUCTIONS:    After general anesthesia or intravenous sedation, for 24 hours or while taking prescription Narcotics:  Limit your activities  Do not drive and operate hazardous machinery  Do not make important personal or business decisions  Do  not drink alcoholic beverages  If you have not urinated within 8 hours after discharge, please contact your surgeon on call. Report the following to your surgeon:  Excessive pain, swelling, redness or odor of or around the surgical area  Temperature over 100.5  Nausea and vomiting lasting longer than 4 hours or if unable to take medications  Any signs of decreased circulation or nerve impairment to extremity: change in color, persistent  numbness, tingling, coldness or increase pain  Any questions         Gallbladder Removal Surgery: What to Expect at [address]  After your surgery, you will likely feel weak and tired for several days after you return home. Your belly may be swollen. If you had laparoscopic surgery, it's normal to also have some shoulder pain. This is caused by the air that your doctor put in your belly to help see your organs better. You may have gas or need to burp a lot at first. A few people get diarrhea. The diarrhea usually goes away in 2 to 4 weeks, but it may last longer. How quickly you recover depends on whether you had a laparoscopic or open surgery. For a laparoscopic surgery, most people can go back to work or their normal routine in 1 to 2 weeks. But it may take longer, depending on the type of work you do. For an open surgery, it will probably take 4 to 6 weeks before you get back to your normal routine. This care sheet gives you a general idea about how long it will take for you to recover. However, each person recovers at a different pace. Follow the steps below to get better as quickly as possible. How can you care for yourself at home? Activity    Rest when you feel tired.

## 2023-08-23 NOTE — OP NOTE
[facility]  OPERATIVE REPORT    Name:  Quinn Tomlinson  MR#:   [id number]  :  1950  ACCOUNT #:  [de-identified]  DATE OF SERVICE:  2023    PREOPERATIVE DIAGNOSES:  Chronic cholecystitis and liver steatosis. POSTOPERATIVE DIAGNOSES:  Chronic cholecystitis and liver steatosis. PROCEDURES PERFORMED:  1. Liver biopsy. 2.  Robotic cholecystectomy. SURGEON:  Maria A Wiseman. Avel Julio MD.    Vinny Neville CSA. ANESTHESIA:  General.    COMPLICATIONS:  None. SPECIMENS REMOVED:  Gallbladder and liver biopsy. IMPLANTS:  None. ESTIMATED BLOOD LOSS:  Minimal.    DETAILS OF PROCEDURE:  The patient was brought to the operating room. Anesthesia was induced. Scrubbing and draping of the abdomen were done in the usual manner. A time-out was performed. A skin incision in the supraumbilical area was performed. Veress needle was inserted. Saline drop test was performed. Abdomen was insufflated. A 12 mm port was inserted. Abdomen was explored. There was no injury from the Veress needle or port placement. Under direct visualization, two other 8 mm ports were placed on the right side and one on the left side and TAP block was performed bilaterally. The patient was placed in reverse Trendelenburg position and the robot was docked. The gallbladder was identified. It was retracted superiorly. The cystic duct and artery were dissected and the Calot triangle was identified. The critical view was seen by seeing the cystic duct going into the gallbladder. At this point, Firefly was also used to identify the junction of the cystic duct with the gallbladder. The cystic duct was clipped with three clips on the staying side and one clip toward the gallbladder and was divided with scissors. The cystic artery was clipped proximally and cauterized distally. The gallbladder was taken out from the gallbladder bed using electrocautery.   I also did a liver biopsy by using a hook cautery and

## 2023-09-13 ENCOUNTER — HOSPITAL ENCOUNTER (OUTPATIENT)
Facility: HOSPITAL | Age: 73
Setting detail: SPECIMEN
Discharge: HOME | End: 2023-09-16

## 2023-09-13 ENCOUNTER — NURSE ONLY (OUTPATIENT)
Facility: CLINIC | Age: 73
End: 2023-09-13
Payer: MEDICARE

## 2023-09-13 DIAGNOSIS — Z79.4 TYPE 2 DIABETES MELLITUS WITH DIABETIC NEPHROPATHY, WITH LONG-TERM CURRENT USE OF INSULIN (HCC): Primary | ICD-10-CM

## 2023-09-13 DIAGNOSIS — E11.21 TYPE 2 DIABETES MELLITUS WITH DIABETIC NEPHROPATHY, WITH LONG-TERM CURRENT USE OF INSULIN (HCC): Primary | ICD-10-CM

## 2023-09-13 LAB — SENTARA SPECIMEN COLLECTION: NORMAL

## 2023-09-13 PROCEDURE — 36415 COLL VENOUS BLD VENIPUNCTURE: CPT | Performed by: FAMILY MEDICINE

## 2023-09-14 ENCOUNTER — OFFICE VISIT (OUTPATIENT)
Age: 73
End: 2023-09-14

## 2023-09-14 VITALS
HEART RATE: 80 BPM | OXYGEN SATURATION: 98 % | BODY MASS INDEX: 32.96 KG/M2 | TEMPERATURE: 98 F | SYSTOLIC BLOOD PRESSURE: 111 MMHG | WEIGHT: 186 LBS | HEIGHT: 63 IN | DIASTOLIC BLOOD PRESSURE: 64 MMHG

## 2023-09-14 DIAGNOSIS — Z09 POSTOPERATIVE FOLLOW-UP: Primary | ICD-10-CM

## 2023-09-14 LAB
ANION GAP SERPL CALCULATED.3IONS-SCNC: 12 MMOL/L (ref 3–15)
AVERAGE GLUCOSE: 216 MG/DL (ref 91–123)
BUN BLDV-MCNC: 27 MG/DL (ref 6–22)
CALCIUM SERPL-MCNC: 9 MG/DL (ref 8.4–10.5)
CHLORIDE BLD-SCNC: 100 MMOL/L (ref 98–110)
CO2: 28 MMOL/L (ref 20–32)
CREAT SERPL-MCNC: 1.7 MG/DL (ref 0.8–1.4)
GLOMERULAR FILTRATION RATE: 30.9 ML/MIN/1.73 SQ.M.
GLUCOSE: 264 MG/DL (ref 70–99)
HBA1C MFR BLD: 9.1 % (ref 4.8–5.6)
POTASSIUM SERPL-SCNC: 5 MMOL/L (ref 3.5–5.5)
SODIUM BLD-SCNC: 140 MMOL/L (ref 133–145)

## 2023-09-14 PROCEDURE — 99024 POSTOP FOLLOW-UP VISIT: CPT | Performed by: SURGERY

## 2023-09-14 NOTE — PROGRESS NOTES
Patient seen and examined. He is doing great. Her abdomen is soft and nontender and her wounds are healing well. Her pathology showed mild chronic cholecystitis and her liver biopsy showed minimal steatosis. Follow-up as needed.

## 2023-09-14 NOTE — PROGRESS NOTES
Johanna Chau is a 67 y.o. female (: 1950) presenting to address:    Chief Complaint   Patient presents with    Post-Op Check     Cholecystectomy and liver biopsy 23       Medication list and allergies have been reviewed with Johanna Chau and updated as of today's date. I have gone over all Medical, Surgical and Social History with Johanna Chau and updated/added the information accordingly. 1. Have you been to the ER, Urgent Care or Hospitalized since your last visit? No          2. Have you followed up with your PCP or any other Physicians since your procedure/ last office visit?    No

## 2023-09-19 ENCOUNTER — OFFICE VISIT (OUTPATIENT)
Facility: CLINIC | Age: 73
End: 2023-09-19
Payer: MEDICARE

## 2023-09-19 VITALS
TEMPERATURE: 97.6 F | SYSTOLIC BLOOD PRESSURE: 118 MMHG | OXYGEN SATURATION: 94 % | HEART RATE: 71 BPM | HEIGHT: 63 IN | WEIGHT: 186 LBS | BODY MASS INDEX: 32.96 KG/M2 | DIASTOLIC BLOOD PRESSURE: 64 MMHG

## 2023-09-19 DIAGNOSIS — N18.32 STAGE 3B CHRONIC KIDNEY DISEASE (CKD) (HCC): ICD-10-CM

## 2023-09-19 DIAGNOSIS — E11.21 TYPE 2 DIABETES MELLITUS WITH DIABETIC NEPHROPATHY, WITH LONG-TERM CURRENT USE OF INSULIN (HCC): Primary | ICD-10-CM

## 2023-09-19 DIAGNOSIS — E55.9 VITAMIN D DEFICIENCY: ICD-10-CM

## 2023-09-19 DIAGNOSIS — M85.89 OSTEOPENIA OF MULTIPLE SITES: ICD-10-CM

## 2023-09-19 DIAGNOSIS — Z79.4 TYPE 2 DIABETES MELLITUS WITH DIABETIC NEPHROPATHY, WITH LONG-TERM CURRENT USE OF INSULIN (HCC): Primary | ICD-10-CM

## 2023-09-19 DIAGNOSIS — I10 ESSENTIAL (PRIMARY) HYPERTENSION: ICD-10-CM

## 2023-09-19 DIAGNOSIS — E03.9 ACQUIRED HYPOTHYROIDISM: ICD-10-CM

## 2023-09-19 DIAGNOSIS — Z86.010 HISTORY OF ADENOMATOUS POLYP OF COLON: ICD-10-CM

## 2023-09-19 DIAGNOSIS — Z23 NEED FOR INFLUENZA VACCINATION: ICD-10-CM

## 2023-09-19 PROCEDURE — 90694 VACC AIIV4 NO PRSRV 0.5ML IM: CPT | Performed by: FAMILY MEDICINE

## 2023-09-19 PROCEDURE — 99214 OFFICE O/P EST MOD 30 MIN: CPT | Performed by: FAMILY MEDICINE

## 2023-09-19 PROCEDURE — 3074F SYST BP LT 130 MM HG: CPT | Performed by: FAMILY MEDICINE

## 2023-09-19 PROCEDURE — 3046F HEMOGLOBIN A1C LEVEL >9.0%: CPT | Performed by: FAMILY MEDICINE

## 2023-09-19 PROCEDURE — 1123F ACP DISCUSS/DSCN MKR DOCD: CPT | Performed by: FAMILY MEDICINE

## 2023-09-19 PROCEDURE — 3078F DIAST BP <80 MM HG: CPT | Performed by: FAMILY MEDICINE

## 2023-09-19 PROCEDURE — G0008 ADMIN INFLUENZA VIRUS VAC: HCPCS | Performed by: FAMILY MEDICINE

## 2023-09-19 ASSESSMENT — PATIENT HEALTH QUESTIONNAIRE - PHQ9
1. LITTLE INTEREST OR PLEASURE IN DOING THINGS: 0
6. FEELING BAD ABOUT YOURSELF - OR THAT YOU ARE A FAILURE OR HAVE LET YOURSELF OR YOUR FAMILY DOWN: 0
SUM OF ALL RESPONSES TO PHQ QUESTIONS 1-9: 0
7. TROUBLE CONCENTRATING ON THINGS, SUCH AS READING THE NEWSPAPER OR WATCHING TELEVISION: 0
SUM OF ALL RESPONSES TO PHQ QUESTIONS 1-9: 0
8. MOVING OR SPEAKING SO SLOWLY THAT OTHER PEOPLE COULD HAVE NOTICED. OR THE OPPOSITE, BEING SO FIGETY OR RESTLESS THAT YOU HAVE BEEN MOVING AROUND A LOT MORE THAN USUAL: 0
3. TROUBLE FALLING OR STAYING ASLEEP: 0
SUM OF ALL RESPONSES TO PHQ9 QUESTIONS 1 & 2: 0
SUM OF ALL RESPONSES TO PHQ QUESTIONS 1-9: 0
5. POOR APPETITE OR OVEREATING: 0
SUM OF ALL RESPONSES TO PHQ QUESTIONS 1-9: 0
4. FEELING TIRED OR HAVING LITTLE ENERGY: 0
9. THOUGHTS THAT YOU WOULD BE BETTER OFF DEAD, OR OF HURTING YOURSELF: 0
2. FEELING DOWN, DEPRESSED OR HOPELESS: 0

## 2023-09-19 NOTE — PATIENT INSTRUCTIONS
Call other pharmacies if [address] doesn't have a expected date that's imminent  Schedule diabetes eye exam

## 2023-09-19 NOTE — ASSESSMENT & PLAN NOTE
Uncontrolled, A1c 9.1. Declined attendant with suspension of dulaglutide 1.5 mg perioperatively and then out of stock at her preferred pharmacy. She will be following up with them immediately after this appointment and we will seek an alternative venue if needed. 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 Jonatan Solitario office on 10/3. Continue insulin unchanged.   Schedule eye exam.  Follow-up 6 weeks with labs prior

## 2023-09-19 NOTE — ASSESSMENT & PLAN NOTE
Well-controlled with diltiazem 360 mg, furosemide 40 mg. ACE/ARB contraindicated by history of hyperkalemia.

## 2023-09-19 NOTE — PROGRESS NOTES
Chrissy Velez (: 1950) is a 67 y.o. female, established patient, here for:    ASSESSMENT/PLAN:  1. Type 2 diabetes mellitus with diabetic nephropathy, with long-term current use of insulin ([facility])  Assessment & Plan:  Uncontrolled, A1c 9.1. Declined attendant with suspension of dulaglutide 1.5 mg perioperatively and then out of stock at her preferred pharmacy. She will be following up with them immediately after this appointment and we will seek an alternative venue if needed. 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 Ronaldo Villegas office on 10/3. Continue insulin unchanged. Schedule eye exam.  Follow-up 6 weeks with labs prior  Orders:  -     Lipid Panel; Future  -     Hemoglobin A1C; Future  2. Stage 3b chronic kidney disease (CKD) ([address])  3. Essential (primary) hypertension  Assessment & Plan:  Well-controlled with diltiazem 360 mg, furosemide 40 mg. ACE/ARB contraindicated by history of hyperkalemia. 4. History of adenomatous polyp of colon  Assessment & Plan:  [place] scheduled   5. Acquired hypothyroidism  -     TSH with Reflex; Future  6. Vitamin D deficiency  -     Vitamin D 25 Hydroxy; Future  7. Osteopenia of multiple sites  -     Vitamin D 25 Hydroxy; Future  8. Need for influenza vaccination  -     Influenza, FLUAD, (age 72 y+), IM, Preservative Free, 0.5 mL      Follow-up and Dispositions    Return in about 6 weeks (around 10/31/2023) for chronic medical conditions, labs 1 week prior, (30). SUBJECTIVE/OBJECTIVE:  Doing well postcholecystectomy with resolution of abdominal pain.   Reassuring postop with Dr. Raeann Irby, released without concerns    follow up hypertension - diltiazem, furosemide    follow up DM2 with nephropathy - insulin, dulaglutide latter was suspended perioperatively x approx 3 weeks and now out of stock at [facility] wasn't given a expected date    Sleep study

## 2023-09-22 DIAGNOSIS — I10 ESSENTIAL (PRIMARY) HYPERTENSION: ICD-10-CM

## 2023-09-22 DIAGNOSIS — E11.21 TYPE 2 DIABETES MELLITUS WITH DIABETIC NEPHROPATHY, WITH LONG-TERM CURRENT USE OF INSULIN (HCC): ICD-10-CM

## 2023-09-22 DIAGNOSIS — Z79.4 TYPE 2 DIABETES MELLITUS WITH DIABETIC NEPHROPATHY, WITH LONG-TERM CURRENT USE OF INSULIN (HCC): ICD-10-CM

## 2023-09-22 NOTE — TELEPHONE ENCOUNTER
Pt called in regards to a medication issue. Pt states the medication TRULICITY is on back order and her blood sugar has spiked to around 300. Please review and advise as soon as possible.

## 2023-09-22 NOTE — TELEPHONE ENCOUNTER
Pratima James MD  You 1 hour ago (12:18 PM)     Please advise to shop around other pharmacies. We'll send new order. KJS        Patient has been advised to call around to other pharmacies to see if they have the medication in stock and to call the office back  so we can send this in for her, she says she was also told the same thing by her pharmacy and she says Elnor Schwab was mentioned to her so she will start with that pharmacy.

## 2023-09-22 NOTE — TELEPHONE ENCOUNTER
Patient called the office back and has asked that this be sent to [facility] in [facility]. Pharmacy has been changed and medication has been pended.

## 2023-09-25 ENCOUNTER — OFFICE VISIT (OUTPATIENT)
Age: 73
End: 2023-09-25
Payer: MEDICARE

## 2023-09-25 VITALS
SYSTOLIC BLOOD PRESSURE: 126 MMHG | HEART RATE: 75 BPM | WEIGHT: 187 LBS | DIASTOLIC BLOOD PRESSURE: 70 MMHG | HEIGHT: 63 IN | BODY MASS INDEX: 33.13 KG/M2 | TEMPERATURE: 97.2 F | RESPIRATION RATE: 18 BRPM | OXYGEN SATURATION: 96 %

## 2023-09-25 DIAGNOSIS — N18.32 STAGE 3B CHRONIC KIDNEY DISEASE (CKD) (HCC): ICD-10-CM

## 2023-09-25 DIAGNOSIS — E11.21 TYPE 2 DIABETES MELLITUS WITH DIABETIC NEPHROPATHY, WITH LONG-TERM CURRENT USE OF INSULIN (HCC): ICD-10-CM

## 2023-09-25 DIAGNOSIS — E11.21 DIABETIC NEPHROPATHY ASSOCIATED WITH TYPE 2 DIABETES MELLITUS (HCC): ICD-10-CM

## 2023-09-25 DIAGNOSIS — R35.1 NOCTURIA: ICD-10-CM

## 2023-09-25 DIAGNOSIS — G47.00 INSOMNIA, UNSPECIFIED TYPE: ICD-10-CM

## 2023-09-25 DIAGNOSIS — Z79.4 TYPE 2 DIABETES MELLITUS WITH DIABETIC NEPHROPATHY, WITH LONG-TERM CURRENT USE OF INSULIN (HCC): ICD-10-CM

## 2023-09-25 DIAGNOSIS — G47.8 NON-RESTORATIVE SLEEP: ICD-10-CM

## 2023-09-25 DIAGNOSIS — G47.33 SEVERE OBSTRUCTIVE SLEEP APNEA: Primary | ICD-10-CM

## 2023-09-25 DIAGNOSIS — I10 ESSENTIAL (PRIMARY) HYPERTENSION: ICD-10-CM

## 2023-09-25 DIAGNOSIS — D50.0 IRON DEFICIENCY ANEMIA DUE TO CHRONIC BLOOD LOSS: ICD-10-CM

## 2023-09-25 PROCEDURE — 3046F HEMOGLOBIN A1C LEVEL >9.0%: CPT | Performed by: OTOLARYNGOLOGY

## 2023-09-25 PROCEDURE — 99214 OFFICE O/P EST MOD 30 MIN: CPT | Performed by: OTOLARYNGOLOGY

## 2023-09-25 PROCEDURE — 3074F SYST BP LT 130 MM HG: CPT | Performed by: OTOLARYNGOLOGY

## 2023-09-25 PROCEDURE — 1123F ACP DISCUSS/DSCN MKR DOCD: CPT | Performed by: OTOLARYNGOLOGY

## 2023-09-25 PROCEDURE — 3078F DIAST BP <80 MM HG: CPT | Performed by: OTOLARYNGOLOGY

## 2023-09-25 ASSESSMENT — PATIENT HEALTH QUESTIONNAIRE - PHQ9
1. LITTLE INTEREST OR PLEASURE IN DOING THINGS: 0
SUM OF ALL RESPONSES TO PHQ QUESTIONS 1-9: 0
SUM OF ALL RESPONSES TO PHQ9 QUESTIONS 1 & 2: 0
2. FEELING DOWN, DEPRESSED OR HOPELESS: 0

## 2023-09-25 ASSESSMENT — SLEEP AND FATIGUE QUESTIONNAIRES
HOW LIKELY ARE YOU TO NOD OFF OR FALL ASLEEP WHILE SITTING AND TALKING TO SOMEONE: 0
HOW LIKELY ARE YOU TO NOD OFF OR FALL ASLEEP WHILE LYING DOWN TO REST IN THE AFTERNOON WHEN CIRCUMSTANCES PERMIT: 2
HOW LIKELY ARE YOU TO NOD OFF OR FALL ASLEEP WHILE SITTING AND READING: 1
HOW LIKELY ARE YOU TO NOD OFF OR FALL ASLEEP WHEN YOU ARE A PASSENGER IN A CAR FOR AN HOUR WITHOUT A BREAK: 1
ESS TOTAL SCORE: 4
HOW LIKELY ARE YOU TO NOD OFF OR FALL ASLEEP IN A CAR, WHILE STOPPED FOR A FEW MINUTES IN TRAFFIC: 0
HOW LIKELY ARE YOU TO NOD OFF OR FALL ASLEEP WHILE WATCHING TV: 0
HOW LIKELY ARE YOU TO NOD OFF OR FALL ASLEEP WHILE SITTING INACTIVE IN A PUBLIC PLACE: 0
HOW LIKELY ARE YOU TO NOD OFF OR FALL ASLEEP WHILE SITTING QUIETLY AFTER LUNCH WITHOUT ALCOHOL: 0

## 2023-09-25 NOTE — PATIENT INSTRUCTIONS
Please make a follow up appointment to discuss the results of your sleep study. If this is impossible for some reason, please send me a \"My Chart\" message so that I may get back with you in a timely manner. The [facility] is located in the [address] [facility], adjacent to [facility]. The lab is on the second floor. The direct number to call for sleep study related questions is: [phone]. Please call our clinic back at [phone] or send a message on [employer] if you have any questions or concerns or if you are experiencing any of the following: You have not received a follow up appointment within 30 days prior the recommended follow up time. If you are not tolerating treatment plan and/or not able to obtain equipment or prescribed medication(s). if you are experiencing any difficulties with the [facility] [employer]  (DME) Company you may be using or is assigned to you. Two weeks have passed and you have not received an appointment for a scheduled procedure. Two weeks have passed since you underwent a test and/or procedure and you have not received your results. If you are using a CPAP/BIPAP, or Home Ventilator Device- Please note the following. Currently, many DMEs are experiencing supply chain difficulties and orders for equipment may be back logged several weeks. Your  Durable Medical Equipment (DME ) company is supposed to provide you with replacement filters, tubing and masks. You can either call your DME when you need new supplies or you can arrange for an automatic shipment schedule. Your need to be seen by our office at lat minimum of every 12 months in order to renew the prescription for these supplies. Please make note of who your DME company is and their phone number. Please make sure that you clean your mask and hosing on a regular basis.   Your DME can provide you with additional information regarding proper care and cleaning of your

## 2023-09-28 ENCOUNTER — CLINICAL DOCUMENTATION (OUTPATIENT)
Age: 73
End: 2023-09-28

## 2023-10-25 ENCOUNTER — HOSPITAL ENCOUNTER (OUTPATIENT)
Facility: HOSPITAL | Age: 73
Setting detail: SPECIMEN
Discharge: HOME | End: 2023-10-28

## 2023-10-25 ENCOUNTER — NURSE ONLY (OUTPATIENT)
Facility: CLINIC | Age: 73
End: 2023-10-25
Payer: MEDICARE

## 2023-10-25 DIAGNOSIS — E11.21 TYPE 2 DIABETES MELLITUS WITH DIABETIC NEPHROPATHY, WITH LONG-TERM CURRENT USE OF INSULIN (HCC): ICD-10-CM

## 2023-10-25 DIAGNOSIS — Z79.4 TYPE 2 DIABETES MELLITUS WITH DIABETIC NEPHROPATHY, WITH LONG-TERM CURRENT USE OF INSULIN (HCC): ICD-10-CM

## 2023-10-25 DIAGNOSIS — I10 ESSENTIAL (PRIMARY) HYPERTENSION: Primary | ICD-10-CM

## 2023-10-25 LAB — SENTARA SPECIMEN COLLECTION: NORMAL

## 2023-10-25 PROCEDURE — 36415 COLL VENOUS BLD VENIPUNCTURE: CPT | Performed by: FAMILY MEDICINE

## 2023-10-25 NOTE — PROGRESS NOTES
Patient here today for lab draw name and  verified venipuncture performed on patients right arm patient tolerated well, 3 SST's and1 lavender collected.

## 2023-10-31 ENCOUNTER — HOSPITAL ENCOUNTER (OUTPATIENT)
Facility: HOSPITAL | Age: 73
Setting detail: SPECIMEN
Discharge: HOME | End: 2023-11-03

## 2023-10-31 ENCOUNTER — OFFICE VISIT (OUTPATIENT)
Facility: CLINIC | Age: 73
End: 2023-10-31
Payer: MEDICARE

## 2023-10-31 VITALS
TEMPERATURE: 97.6 F | WEIGHT: 188 LBS | BODY MASS INDEX: 33.31 KG/M2 | OXYGEN SATURATION: 98 % | HEART RATE: 82 BPM | SYSTOLIC BLOOD PRESSURE: 124 MMHG | DIASTOLIC BLOOD PRESSURE: 60 MMHG | HEIGHT: 63 IN

## 2023-10-31 DIAGNOSIS — Z79.4 TYPE 2 DIABETES MELLITUS WITH DIABETIC NEPHROPATHY, WITH LONG-TERM CURRENT USE OF INSULIN (HCC): Primary | ICD-10-CM

## 2023-10-31 DIAGNOSIS — I10 ESSENTIAL (PRIMARY) HYPERTENSION: ICD-10-CM

## 2023-10-31 DIAGNOSIS — E11.21 TYPE 2 DIABETES MELLITUS WITH DIABETIC NEPHROPATHY, WITH LONG-TERM CURRENT USE OF INSULIN (HCC): Primary | ICD-10-CM

## 2023-10-31 DIAGNOSIS — E03.9 ACQUIRED HYPOTHYROIDISM: ICD-10-CM

## 2023-10-31 DIAGNOSIS — E55.9 VITAMIN D DEFICIENCY: ICD-10-CM

## 2023-10-31 LAB — SENTARA SPECIMEN COLLECTION: NORMAL

## 2023-10-31 PROCEDURE — 99214 OFFICE O/P EST MOD 30 MIN: CPT | Performed by: FAMILY MEDICINE

## 2023-10-31 PROCEDURE — 3074F SYST BP LT 130 MM HG: CPT | Performed by: FAMILY MEDICINE

## 2023-10-31 PROCEDURE — 3052F HG A1C>EQUAL 8.0%<EQUAL 9.0%: CPT | Performed by: FAMILY MEDICINE

## 2023-10-31 PROCEDURE — 1123F ACP DISCUSS/DSCN MKR DOCD: CPT | Performed by: FAMILY MEDICINE

## 2023-10-31 PROCEDURE — 3078F DIAST BP <80 MM HG: CPT | Performed by: FAMILY MEDICINE

## 2023-10-31 RX ORDER — INSULIN GLARGINE 100 [IU]/ML
22 INJECTION, SOLUTION SUBCUTANEOUS DAILY
Qty: 15 ML | Refills: 3 | Status: SHIPPED | OUTPATIENT
Start: 2023-10-31

## 2023-10-31 NOTE — ASSESSMENT & PLAN NOTE
Uncontrolled, A1c down from 9.1 to 8.5 with resumption of dulaglutide x 3 weeks. 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 Care interrupted by insurance issues between system and payor. Encouraged her to coordinate the disparate diets (fatty liver, renal, DM) with the nutritionist in Dr. Haydee Herring office one those insurance issues are resolved. Continue dulaglutide and insulin unchanged.   Schedule eye exam.

## 2023-11-01 LAB
CREATININE URINE: 176 MG/DL
MICROALB/CREAT RATIO (UG/MG CREAT.): 189.2 (ref 0–30)
MICROALBUMIN/CREAT 24H UR: 333 MG/L (ref 0.1–17)

## 2024-01-18 DIAGNOSIS — N18.32 CHRONIC KIDNEY DISEASE, STAGE 3B (HCC): ICD-10-CM

## 2024-01-18 DIAGNOSIS — I10 ESSENTIAL (PRIMARY) HYPERTENSION: ICD-10-CM

## 2024-01-22 NOTE — TELEPHONE ENCOUNTER
Medication was last sent in on 8/15/23 90 with 3 refills called pharmacy spoke with staff who said patient last picked this up on 11/24/23 and the directions have her only taking 1/2 tab. She was told there was a prescription sent in on 8/15/2023 for 90 and 3 refills and directions have her taking a whole tab, she says they do not have that prescription on file. Medication has been pended please review and sign if appropriate.

## 2024-01-23 RX ORDER — FUROSEMIDE 40 MG/1
40 TABLET ORAL DAILY
Qty: 90 TABLET | Refills: 3 | Status: SHIPPED | OUTPATIENT
Start: 2024-01-23

## 2024-01-24 ENCOUNTER — NURSE ONLY (OUTPATIENT)
Facility: CLINIC | Age: 74
End: 2024-01-24
Payer: MEDICARE

## 2024-01-24 ENCOUNTER — HOSPITAL ENCOUNTER (OUTPATIENT)
Facility: HOSPITAL | Age: 74
Setting detail: SPECIMEN
Discharge: HOME | End: 2024-01-27

## 2024-01-24 DIAGNOSIS — N18.32 STAGE 3B CHRONIC KIDNEY DISEASE (CKD) (HCC): ICD-10-CM

## 2024-01-24 DIAGNOSIS — Z79.4 TYPE 2 DIABETES MELLITUS WITH DIABETIC NEPHROPATHY, WITH LONG-TERM CURRENT USE OF INSULIN (HCC): Primary | ICD-10-CM

## 2024-01-24 DIAGNOSIS — E11.21 TYPE 2 DIABETES MELLITUS WITH DIABETIC NEPHROPATHY, WITH LONG-TERM CURRENT USE OF INSULIN (HCC): Primary | ICD-10-CM

## 2024-01-24 DIAGNOSIS — E55.9 VITAMIN D DEFICIENCY: ICD-10-CM

## 2024-01-24 LAB — SENTARA SPECIMEN COLLECTION: NORMAL

## 2024-01-24 PROCEDURE — 36415 COLL VENOUS BLD VENIPUNCTURE: CPT | Performed by: FAMILY MEDICINE

## 2024-01-24 NOTE — PROGRESS NOTES
Patient here for [place] lab draw name and  verified venipuncture performed on patients left arm was successful patient tolerated well.

## 2024-01-25 ENCOUNTER — HOSPITAL ENCOUNTER (OUTPATIENT)
Facility: HOSPITAL | Age: 74
Discharge: HOME | End: 2024-01-28

## 2024-01-25 ENCOUNTER — OFFICE VISIT (OUTPATIENT)
Age: 74
End: 2024-01-25
Payer: MEDICARE

## 2024-01-25 VITALS
DIASTOLIC BLOOD PRESSURE: 78 MMHG | BODY MASS INDEX: 34.55 KG/M2 | HEART RATE: 61 BPM | WEIGHT: 195 LBS | HEIGHT: 63 IN | SYSTOLIC BLOOD PRESSURE: 130 MMHG | RESPIRATION RATE: 18 BRPM | OXYGEN SATURATION: 96 % | TEMPERATURE: 97.1 F

## 2024-01-25 DIAGNOSIS — R35.1 NOCTURIA: ICD-10-CM

## 2024-01-25 DIAGNOSIS — Z79.4 TYPE 2 DIABETES MELLITUS WITH DIABETIC NEPHROPATHY, WITH LONG-TERM CURRENT USE OF INSULIN (HCC): ICD-10-CM

## 2024-01-25 DIAGNOSIS — D50.0 IRON DEFICIENCY ANEMIA DUE TO CHRONIC BLOOD LOSS: ICD-10-CM

## 2024-01-25 DIAGNOSIS — N18.32 STAGE 3B CHRONIC KIDNEY DISEASE (CKD) (HCC): ICD-10-CM

## 2024-01-25 DIAGNOSIS — E11.21 TYPE 2 DIABETES MELLITUS WITH DIABETIC NEPHROPATHY, WITH LONG-TERM CURRENT USE OF INSULIN (HCC): ICD-10-CM

## 2024-01-25 DIAGNOSIS — I10 ESSENTIAL (PRIMARY) HYPERTENSION: ICD-10-CM

## 2024-01-25 DIAGNOSIS — G47.33 SEVERE OBSTRUCTIVE SLEEP APNEA: Primary | ICD-10-CM

## 2024-01-25 DIAGNOSIS — G47.00 INSOMNIA, UNSPECIFIED TYPE: ICD-10-CM

## 2024-01-25 LAB — SENTARA SPECIMEN COLLECTION: NORMAL

## 2024-01-25 PROCEDURE — 3075F SYST BP GE 130 - 139MM HG: CPT | Performed by: OTOLARYNGOLOGY

## 2024-01-25 PROCEDURE — 1123F ACP DISCUSS/DSCN MKR DOCD: CPT | Performed by: OTOLARYNGOLOGY

## 2024-01-25 PROCEDURE — 99214 OFFICE O/P EST MOD 30 MIN: CPT | Performed by: OTOLARYNGOLOGY

## 2024-01-25 PROCEDURE — 3078F DIAST BP <80 MM HG: CPT | Performed by: OTOLARYNGOLOGY

## 2024-01-25 ASSESSMENT — PATIENT HEALTH QUESTIONNAIRE - PHQ9
2. FEELING DOWN, DEPRESSED OR HOPELESS: 1
SUM OF ALL RESPONSES TO PHQ QUESTIONS 1-9: 1
SUM OF ALL RESPONSES TO PHQ9 QUESTIONS 1 & 2: 1
1. LITTLE INTEREST OR PLEASURE IN DOING THINGS: 0
SUM OF ALL RESPONSES TO PHQ QUESTIONS 1-9: 1

## 2024-01-25 ASSESSMENT — SLEEP AND FATIGUE QUESTIONNAIRES
HOW LIKELY ARE YOU TO NOD OFF OR FALL ASLEEP WHILE SITTING AND TALKING TO SOMEONE: 0
HOW LIKELY ARE YOU TO NOD OFF OR FALL ASLEEP WHILE SITTING QUIETLY AFTER LUNCH WITHOUT ALCOHOL: 0
HOW LIKELY ARE YOU TO NOD OFF OR FALL ASLEEP WHILE WATCHING TV: 0
HOW LIKELY ARE YOU TO NOD OFF OR FALL ASLEEP WHILE SITTING AND READING: 0
ESS TOTAL SCORE: 1
HOW LIKELY ARE YOU TO NOD OFF OR FALL ASLEEP IN A CAR, WHILE STOPPED FOR A FEW MINUTES IN TRAFFIC: 0
HOW LIKELY ARE YOU TO NOD OFF OR FALL ASLEEP WHILE LYING DOWN TO REST IN THE AFTERNOON WHEN CIRCUMSTANCES PERMIT: 0
HOW LIKELY ARE YOU TO NOD OFF OR FALL ASLEEP WHEN YOU ARE A PASSENGER IN A CAR FOR AN HOUR WITHOUT A BREAK: 0
HOW LIKELY ARE YOU TO NOD OFF OR FALL ASLEEP WHILE SITTING INACTIVE IN A PUBLIC PLACE: 1

## 2024-01-25 NOTE — PATIENT INSTRUCTIONS
Please make a follow up appointment to discuss the results of your sleep study. If this is impossible for some reason, please send me a \"My Chart\" message so that I may get back with you in a timely manner.    The [facility] is located in the [facility], adjacent to [facility]. The lab is on the second floor. The direct number to call for sleep study related questions is: [phone].    Please call our clinic back at [phone] or send a message on [employer] if you have any questions or concerns or if you are experiencing any of the following:     You have not received a follow up appointment within 30 days prior the recommended follow up time.    If you are not tolerating treatment plan and/or not able to obtain equipment or prescribed medication(s).  if you are experiencing any difficulties with the Durable Medical Equipment  (DME) Company you may be using or is assigned to you.  Two weeks have passed and you have not received an appointment for a scheduled procedure.  Two weeks have passed since you underwent a test and/or procedure and you have not received your results.     If you are using a CPAP/BIPAP, or Home Ventilator Device- Please note the following.  Currently, many DMEs are experiencing supply chain difficulties and orders for equipment may be back logged several weeks.     Your  Durable Medical Equipment (DME ) company is supposed to provide you with replacement filters, tubing and masks. You can either call your DME when you need new supplies or you can arrange for an automatic shipment schedule.    Your need to be seen by our office at lat minimum of every 12 months in order to renew the prescription for these supplies.   Please make note of who your DME company is and their phone number.   Please make sure that you clean your mask and hosing on a regular basis.  Your DME can provide you with additional information regarding proper care and cleaning of your

## 2024-01-25 NOTE — ASSESSMENT & PLAN NOTE
Well-controlled, continue current medications and continue current treatment plan On her sleep study: AHI 32.9 O2 neil: 79% Time below 88%: 39 min.  To be complete, will order overnight oximetry to be done when using her CPAP for one night.

## 2024-01-25 NOTE — PROGRESS NOTES
Vaishali Hunt presents today for   Chief Complaint   Patient presents with    Follow-up     CPAP       Is someone accompanying this pt? no    Is the patient using any DME equipment during OV? no    -DME Company: [employer]     Depression Screenin/25/2024     9:16 AM   PHQ-9    Little interest or pleasure in doing things 0   Feeling down, depressed, or hopeless 1   PHQ-2 Score 1   PHQ-9 Total Score 1        [place] Sleepiness Scale:      2024     9:20 AM   Sleep Medicine   Sitting and reading 0   Watching TV 0   Sitting, inactive in a public place (e.g. a theatre or a meeting) 1   As a passenger in a car for an hour without a break 0   Lying down to rest in the afternoon when circumstances permit 0   Sitting and talking to someone 0   Sitting quietly after a lunch without alcohol 0   In a car, while stopped for a few minutes in traffic 0   [place] Sleepiness Score 1       Stop-Ban/16/2023    10:28 AM   STOP-BANG QUESTIONNAIRE   Are you a loud and/or regular snorer? 1   Do you often feel tired or groggy upon awakening or do you awaken with a headache? 1   Have you been observed to gasp or stop breathing during sleep? 0   Are you often tired or fatigued during wake time hours?  1   Do you fall asleep sitting, reading, watching TV or driving? 1   Do you often have problems with memory or concentration? 0   Do you have or are you being treated for high blood pressure? 1   Recent BMI (Calculated) 33.3   Is BMI greater than 35 kg/m2? 0=No   Age older than 50 years old? 1=Yes   Is your neck circumference greater than 17 inches (Male) or 16 inches (Female)? 0   Gender - Male 0=No   STOP-Bang Total Score 39.3           Coordination of Care:  1. Have you been to the ER, urgent care clinic since your last visit? Hospitalized since your last visit?  no    2. Have you seen or consulted any other health care providers outside of the [facility] since your last visit? Include any pap smears or colon 
(H) 4.8 - 5.6 % Final        Historical Sleep Testing Data: States she had a PSG in [place] in the 1990s. No sleep diagnosis at that time    PSG done 8/11/23  TST: 6.7 hours  AHI 32.9  O2 neil: 79%  Time below 88%: 39 min  PLMI: 0  Sleep latency: 5.5 minutes    Positive Airway Pressure (PAP) Compliance  Report & Summary Trends  DME: [employer]    Date >4 hr use % Time  (Total Time%) AHI PAP Rx Pressure @ 95% Median Use Time Leak-Median  (95%) Notes   11/25/23-1/23/24 97% 0.3 4-2- cwp 8.9 6:33 0.6                                       Hiral Guadarrama DO, FACS  Sleep Medicine       Total time spent on this encounter, including previsit review of of separately obtained history, face to face interaction, performing medically appropriate physical exam, patient/counseling, ordering tests, care coordination and documentation was  22  minutes.  I did place an order for CPAP supplies and to continue current settings.  We did go over her download data in detail today.    This note was dictated utilizing voice recognition software which may lead to typographical errors.  I apologize in advance if the situation occurs.  If questions arise please do not hesitate to contact me or call our department.

## 2024-01-26 ENCOUNTER — CLINICAL DOCUMENTATION (OUTPATIENT)
Age: 74
End: 2024-01-26

## 2024-01-26 LAB
ANION GAP SERPL CALCULATED.3IONS-SCNC: 10 MMOL/L (ref 3–15)
ANION GAP SERPL CALCULATED.3IONS-SCNC: 11 MMOL/L (ref 3–15)
BUN BLDV-MCNC: 16 MG/DL (ref 6–22)
BUN BLDV-MCNC: 21 MG/DL (ref 6–22)
CALCIUM SERPL-MCNC: 9.3 MG/DL (ref 8.4–10.5)
CALCIUM SERPL-MCNC: 9.3 MG/DL (ref 8.4–10.5)
CHLORIDE BLD-SCNC: 106 MMOL/L (ref 98–110)
CHLORIDE BLD-SCNC: 106 MMOL/L (ref 98–110)
CO2: 25 MMOL/L (ref 20–32)
CO2: 26 MMOL/L (ref 20–32)
CREAT SERPL-MCNC: 1.4 MG/DL (ref 0.8–1.4)
CREAT SERPL-MCNC: 1.7 MG/DL (ref 0.8–1.4)
CREATININE URINE: 153 MG/DL
ESTIMATED AVERAGE GLUCOSE: 221 MG/DL (ref 91–123)
ESTIMATED AVERAGE GLUCOSE: 224 MG/DL (ref 91–123)
GLOMERULAR FILTRATION RATE: 30.7 ML/MIN/1.73 SQ.M.
GLOMERULAR FILTRATION RATE: 38.4 ML/MIN/1.73 SQ.M.
GLUCOSE: 136 MG/DL (ref 70–99)
GLUCOSE: 226 MG/DL (ref 70–99)
HBA1C MFR BLD: 9.3 % (ref 4.8–5.6)
HBA1C MFR BLD: 9.4 % (ref 4.8–5.6)
MICROALB/CREAT RATIO (UG/MG CREAT.): 315.5 (ref 0–30)
MICROALBUMIN/CREAT 24H UR: 482.7 MG/L (ref 0.1–17)
POTASSIUM SERPL-SCNC: 4.4 MMOL/L (ref 3.5–5.5)
POTASSIUM SERPL-SCNC: 4.5 MMOL/L (ref 3.5–5.5)
SODIUM BLD-SCNC: 142 MMOL/L (ref 133–145)
SODIUM BLD-SCNC: 142 MMOL/L (ref 133–145)
VITAMIN D 25-HYDROXY: 47.3 NG/ML (ref 32–100)
VITAMIN D 25-HYDROXY: 48.9 NG/ML (ref 32–100)

## 2024-01-30 ENCOUNTER — OFFICE VISIT (OUTPATIENT)
Facility: CLINIC | Age: 74
End: 2024-01-30
Payer: MEDICARE

## 2024-01-30 VITALS
BODY MASS INDEX: 35.08 KG/M2 | WEIGHT: 198 LBS | TEMPERATURE: 98 F | HEART RATE: 66 BPM | OXYGEN SATURATION: 98 % | HEIGHT: 63 IN | SYSTOLIC BLOOD PRESSURE: 142 MMHG | DIASTOLIC BLOOD PRESSURE: 66 MMHG

## 2024-01-30 DIAGNOSIS — Z87.898 HISTORY OF SEXUAL VIOLENCE: ICD-10-CM

## 2024-01-30 DIAGNOSIS — Z79.4 TYPE 2 DIABETES MELLITUS WITH HYPERGLYCEMIA, WITH LONG-TERM CURRENT USE OF INSULIN (HCC): ICD-10-CM

## 2024-01-30 DIAGNOSIS — E11.21 TYPE 2 DIABETES MELLITUS WITH DIABETIC NEPHROPATHY, WITH LONG-TERM CURRENT USE OF INSULIN (HCC): Primary | ICD-10-CM

## 2024-01-30 DIAGNOSIS — N18.32 CHRONIC KIDNEY DISEASE, STAGE 3B (HCC): ICD-10-CM

## 2024-01-30 DIAGNOSIS — Z79.4 TYPE 2 DIABETES MELLITUS WITH DIABETIC NEPHROPATHY, WITH LONG-TERM CURRENT USE OF INSULIN (HCC): Primary | ICD-10-CM

## 2024-01-30 DIAGNOSIS — I10 ESSENTIAL (PRIMARY) HYPERTENSION: ICD-10-CM

## 2024-01-30 DIAGNOSIS — E11.65 TYPE 2 DIABETES MELLITUS WITH HYPERGLYCEMIA, WITH LONG-TERM CURRENT USE OF INSULIN (HCC): ICD-10-CM

## 2024-01-30 DIAGNOSIS — N18.32 STAGE 3B CHRONIC KIDNEY DISEASE (CKD) (HCC): ICD-10-CM

## 2024-01-30 PROCEDURE — 99215 OFFICE O/P EST HI 40 MIN: CPT | Performed by: FAMILY MEDICINE

## 2024-01-30 PROCEDURE — 3046F HEMOGLOBIN A1C LEVEL >9.0%: CPT | Performed by: FAMILY MEDICINE

## 2024-01-30 PROCEDURE — G2211 COMPLEX E/M VISIT ADD ON: HCPCS | Performed by: FAMILY MEDICINE

## 2024-01-30 PROCEDURE — 3078F DIAST BP <80 MM HG: CPT | Performed by: FAMILY MEDICINE

## 2024-01-30 PROCEDURE — 3077F SYST BP >= 140 MM HG: CPT | Performed by: FAMILY MEDICINE

## 2024-01-30 PROCEDURE — 1123F ACP DISCUSS/DSCN MKR DOCD: CPT | Performed by: FAMILY MEDICINE

## 2024-01-30 RX ORDER — INSULIN GLARGINE 100 [IU]/ML
25 INJECTION, SOLUTION SUBCUTANEOUS DAILY
Qty: 15 ML | Refills: 3 | Status: SHIPPED | OUTPATIENT
Start: 2024-01-30

## 2024-01-30 RX ORDER — FUROSEMIDE 40 MG/1
40 TABLET ORAL DAILY
Qty: 90 TABLET | Refills: 3 | Status: SHIPPED | OUTPATIENT
Start: 2024-01-30

## 2024-01-30 NOTE — PROGRESS NOTES
Chief Complaint   Patient presents with    Diabetes    Hypertension    Thyroid Problem    Vitamin D def         \"Have you been to the ER, urgent care clinic since your last visit?  Hospitalized since your last visit?\"    NO    “Have you seen or consulted any other health care providers outside of [facility] since your last visit?”    NO           
297.5 (H) 11/09/2022    LDLCALC 99 10/26/2023    CREATININE 1.4 01/25/2024       CMP:    Lab Results   Component Value Date/Time     01/25/2024 10:39 AM    K 4.5 01/25/2024 10:39 AM     01/25/2024 10:39 AM    CO2 25 01/25/2024 10:39 AM    BUN 16 01/25/2024 10:39 AM    CREATININE 1.4 01/25/2024 10:39 AM    GFRAA 35.3 03/15/2022 09:43 AM    AGRATIO 2.0 03/08/2023 08:18 AM    LABGLOM 29.1 03/15/2022 09:43 AM    GLUCOSE 136 01/25/2024 10:39 AM    PROT 6.2 03/08/2023 08:18 AM    LABALBU 4.1 03/08/2023 08:18 AM    CALCIUM 9.3 01/25/2024 10:39 AM    BILITOT 0.2 03/08/2023 08:18 AM    ALKPHOS 109 03/08/2023 08:18 AM    AST 16 03/08/2023 08:18 AM    ALT 12 03/08/2023 08:18 AM     Lipids:    Lab Results   Component Value Date/Time    TRIG 128 10/26/2023 12:01 PM    HDL 49 10/26/2023 12:01 PM    LDLCALC 99 10/26/2023 12:01 PM    LDLDIRECT 60 08/10/2022 09:14 AM     Microalbumin/Creatinine ratio:    Lab Results   Component Value Date/Time    MALBCR 297.5 11/09/2022 09:04 AM     TSH:    Lab Results   Component Value Date/Time    TSH 4.36 10/26/2023 12:01 PM     VITAMIN D:   Lab Results   Component Value Date/Time    VITD25 48.9 01/25/2024 10:39 AM         Physical Exam  Constitutional:       General: She is not in acute distress.     Appearance: Normal appearance.   Cardiovascular:      Rate and Rhythm: Normal rate and regular rhythm.      Heart sounds: No murmur heard.  Pulmonary:      Effort: Pulmonary effort is normal.      Breath sounds: No wheezing, rhonchi or rales.   Neurological:      Mental Status: She is alert and oriented to person, place, and time.         On this date 1/30/2024 I have spent 40 minutes reviewing previous notes, test results and face to face with the patient discussing the diagnosis and importance of compliance with the treatment plan as well as documenting on the day of the visit.            -- Merissa Kim MD

## 2024-01-30 NOTE — ASSESSMENT & PLAN NOTE
Uncontrolled due to a lapse in pharmacy generated furosemide continue diltiazem, sending new prescription to alternative pharmacy.
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
## 2024-01-30 NOTE — ASSESSMENT & PLAN NOTE
Being evaluated/managed by Dr. Cage. No acute findings today meriting change in management plan.
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
## 2024-02-12 ENCOUNTER — TELEPHONE (OUTPATIENT)
Age: 74
End: 2024-02-12

## 2024-02-12 NOTE — TELEPHONE ENCOUNTER
Pt called & stated that she called [facility] & they do not have the new prescription for the nasal mask.

## 2024-02-20 DIAGNOSIS — Z79.4 TYPE 2 DIABETES MELLITUS WITH DIABETIC NEPHROPATHY, WITH LONG-TERM CURRENT USE OF INSULIN (HCC): Primary | ICD-10-CM

## 2024-02-20 DIAGNOSIS — E11.21 TYPE 2 DIABETES MELLITUS WITH DIABETIC NEPHROPATHY, WITH LONG-TERM CURRENT USE OF INSULIN (HCC): Primary | ICD-10-CM

## 2024-02-20 RX ORDER — PEN NEEDLE, DIABETIC 31 GX3/16"
NEEDLE, DISPOSABLE MISCELLANEOUS
Qty: 100 EACH | Refills: 3 | Status: SHIPPED | OUTPATIENT
Start: 2024-02-20

## 2024-03-01 RX ORDER — ROSUVASTATIN CALCIUM 20 MG/1
TABLET, COATED ORAL
Qty: 90 TABLET | Refills: 3 | OUTPATIENT
Start: 2024-03-01

## 2024-03-01 NOTE — TELEPHONE ENCOUNTER
Patient was seen 1/30/24 with plan to follow up in 6 weeks. Her medication was sent in on 3/14/23 for 90 with 3 refills. Called patient who says she does have enough medication to last her until her appt on 3/12.

## 2024-03-12 ENCOUNTER — OFFICE VISIT (OUTPATIENT)
Facility: CLINIC | Age: 74
End: 2024-03-12
Payer: MEDICARE

## 2024-03-12 VITALS
DIASTOLIC BLOOD PRESSURE: 64 MMHG | HEART RATE: 75 BPM | TEMPERATURE: 98 F | WEIGHT: 190.6 LBS | OXYGEN SATURATION: 97 % | SYSTOLIC BLOOD PRESSURE: 130 MMHG | BODY MASS INDEX: 33.77 KG/M2 | HEIGHT: 63 IN

## 2024-03-12 VITALS
BODY MASS INDEX: 33.77 KG/M2 | OXYGEN SATURATION: 97 % | HEIGHT: 63 IN | TEMPERATURE: 98 F | SYSTOLIC BLOOD PRESSURE: 130 MMHG | DIASTOLIC BLOOD PRESSURE: 64 MMHG | HEART RATE: 75 BPM | WEIGHT: 190.6 LBS

## 2024-03-12 DIAGNOSIS — H91.91 DECREASED HEARING OF RIGHT EAR: ICD-10-CM

## 2024-03-12 DIAGNOSIS — E03.9 ACQUIRED HYPOTHYROIDISM: ICD-10-CM

## 2024-03-12 DIAGNOSIS — E11.21 TYPE 2 DIABETES MELLITUS WITH DIABETIC NEPHROPATHY, WITH LONG-TERM CURRENT USE OF INSULIN (HCC): Primary | ICD-10-CM

## 2024-03-12 DIAGNOSIS — Z12.31 ENCOUNTER FOR SCREENING MAMMOGRAM FOR MALIGNANT NEOPLASM OF BREAST: ICD-10-CM

## 2024-03-12 DIAGNOSIS — Z78.0 ASYMPTOMATIC MENOPAUSAL STATE: ICD-10-CM

## 2024-03-12 DIAGNOSIS — I10 ESSENTIAL (PRIMARY) HYPERTENSION: ICD-10-CM

## 2024-03-12 DIAGNOSIS — E55.9 VITAMIN D DEFICIENCY: ICD-10-CM

## 2024-03-12 DIAGNOSIS — E11.65 TYPE 2 DIABETES MELLITUS WITH HYPERGLYCEMIA, WITH LONG-TERM CURRENT USE OF INSULIN (HCC): ICD-10-CM

## 2024-03-12 DIAGNOSIS — E78.5 HYPERLIPIDEMIA, UNSPECIFIED HYPERLIPIDEMIA TYPE: ICD-10-CM

## 2024-03-12 DIAGNOSIS — Z00.00 MEDICARE ANNUAL WELLNESS VISIT, SUBSEQUENT: Primary | ICD-10-CM

## 2024-03-12 DIAGNOSIS — Z79.4 TYPE 2 DIABETES MELLITUS WITH DIABETIC NEPHROPATHY, WITH LONG-TERM CURRENT USE OF INSULIN (HCC): Primary | ICD-10-CM

## 2024-03-12 DIAGNOSIS — Z79.4 TYPE 2 DIABETES MELLITUS WITH HYPERGLYCEMIA, WITH LONG-TERM CURRENT USE OF INSULIN (HCC): ICD-10-CM

## 2024-03-12 DIAGNOSIS — M85.89 OSTEOPENIA OF MULTIPLE SITES: ICD-10-CM

## 2024-03-12 PROCEDURE — 1123F ACP DISCUSS/DSCN MKR DOCD: CPT | Performed by: FAMILY MEDICINE

## 2024-03-12 PROCEDURE — 3075F SYST BP GE 130 - 139MM HG: CPT | Performed by: FAMILY MEDICINE

## 2024-03-12 PROCEDURE — 3046F HEMOGLOBIN A1C LEVEL >9.0%: CPT | Performed by: FAMILY MEDICINE

## 2024-03-12 PROCEDURE — G0439 PPPS, SUBSEQ VISIT: HCPCS | Performed by: FAMILY MEDICINE

## 2024-03-12 PROCEDURE — 3078F DIAST BP <80 MM HG: CPT | Performed by: FAMILY MEDICINE

## 2024-03-12 PROCEDURE — 99214 OFFICE O/P EST MOD 30 MIN: CPT | Performed by: FAMILY MEDICINE

## 2024-03-12 RX ORDER — ROSUVASTATIN CALCIUM 20 MG/1
20 TABLET, COATED ORAL NIGHTLY
Qty: 90 TABLET | Refills: 3 | Status: SHIPPED | OUTPATIENT
Start: 2024-03-12

## 2024-03-12 RX ORDER — LEVOTHYROXINE SODIUM 0.05 MG/1
50 TABLET ORAL
Qty: 90 TABLET | Refills: 3 | Status: SHIPPED | OUTPATIENT
Start: 2024-03-12

## 2024-03-12 RX ORDER — INSULIN GLARGINE 100 [IU]/ML
25 INJECTION, SOLUTION SUBCUTANEOUS DAILY
Qty: 15 ML | Refills: 3 | Status: SHIPPED | OUTPATIENT
Start: 2024-03-12 | End: 2024-03-15 | Stop reason: SDUPTHER

## 2024-03-12 RX ORDER — DILTIAZEM HYDROCHLORIDE 360 MG/1
360 CAPSULE, EXTENDED RELEASE ORAL DAILY
Qty: 90 CAPSULE | Refills: 3 | Status: SHIPPED | OUTPATIENT
Start: 2024-03-12

## 2024-03-12 ASSESSMENT — PATIENT HEALTH QUESTIONNAIRE - PHQ9
SUM OF ALL RESPONSES TO PHQ9 QUESTIONS 1 & 2: 0
SUM OF ALL RESPONSES TO PHQ QUESTIONS 1-9: 0
2. FEELING DOWN, DEPRESSED OR HOPELESS: 0
SUM OF ALL RESPONSES TO PHQ QUESTIONS 1-9: 0
SUM OF ALL RESPONSES TO PHQ QUESTIONS 1-9: 0
1. LITTLE INTEREST OR PLEASURE IN DOING THINGS: 0
SUM OF ALL RESPONSES TO PHQ QUESTIONS 1-9: 0

## 2024-03-12 ASSESSMENT — LIFESTYLE VARIABLES
HOW OFTEN DO YOU HAVE A DRINK CONTAINING ALCOHOL: NEVER
HOW MANY STANDARD DRINKS CONTAINING ALCOHOL DO YOU HAVE ON A TYPICAL DAY: PATIENT DOES NOT DRINK

## 2024-03-12 NOTE — ACP (ADVANCE CARE PLANNING)
Advance Care Planning     General Advance Care Planning (ACP) Conversation    Date of Conversation: 3/12/2024  Conducted with: Patient with Decision Making Capacity    Healthcare Decision Maker:    Primary Decision Maker: Janiya Rizo () - Child - [phone]    Secondary Decision Maker: Sloan Hunt - Child - [phone]  Click here to complete Healthcare Decision Makers including selection of the Healthcare Decision Maker Relationship (ie \"Primary\").    Content/Action Overview:   No documents. Philosophy is care focused on alleviation of suffering at the end of life without life prolonging procedures. Her daughter is aware of her wishes and reportedly is willing to support her choices.        Length of Voluntary ACP Conversation in minutes:  <16 minutes (Non-Billable)    Merissa Kim MD

## 2024-03-12 NOTE — PROGRESS NOTES
Chief Complaint   Patient presents with    Diabetes    Hypertension    Chronic Kidney Disease     \"Have you been to the ER, urgent care clinic since your last visit?  Hospitalized since your last visit?\"    NO    “Have you seen or consulted any other health care providers outside of [facility] since your last visit?”    NO           
01/25/2024 10:39 AM    BILITOT 0.2 03/08/2023 08:18 AM    ALKPHOS 109 03/08/2023 08:18 AM    AST 16 03/08/2023 08:18 AM    ALT 12 03/08/2023 08:18 AM     Lipids:    Lab Results   Component Value Date/Time    TRIG 128 10/26/2023 12:01 PM    HDL 49 10/26/2023 12:01 PM    LDLCALC 99 10/26/2023 12:01 PM    LDLDIRECT 60 08/10/2022 09:14 AM     Microalbumin/Creatinine ratio:    Lab Results   Component Value Date/Time    MALBCR 297.5 11/09/2022 09:04 AM     TSH:    Lab Results   Component Value Date/Time    TSH 4.36 10/26/2023 12:01 PM     VITAMIN D:   Lab Results   Component Value Date/Time    VITD25 48.9 01/25/2024 10:39 AM         Physical Exam  Constitutional:       General: She is not in acute distress.     Appearance: Normal appearance.   HENT:      Head: Normocephalic and atraumatic.   Pulmonary:      Effort: Pulmonary effort is normal.   Neurological:      Mental Status: She is alert and oriented to person, place, and time.         On this date 1/30/2024 I have spent 40 minutes reviewing previous notes, test results and face to face with the patient discussing the diagnosis and importance of compliance with the treatment plan as well as documenting on the day of the visit.            -- Merissa Kim MD

## 2024-03-12 NOTE — PATIENT INSTRUCTIONS
for not to use tobacco products. They can affect dental and general health.  Many older adults have a fixed income and feel that they can't afford dental care. But most towns and cities have programs in which dentists help older adults by lowering fees. Contact your area's public health offices or  for information about dental care in your area.  Using a toothbrush  Older adults with arthritis sometimes have trouble brushing their teeth because they can't easily hold the toothbrush. Their hands and fingers may be stiff, painful, or weak. If this is the case, you can:  Offer an electric toothbrush.  Enlarge the handle of a non-electric toothbrush by wrapping a sponge, an elastic bandage, or adhesive tape around it.  Push the toothbrush handle through a ball made of rubber or soft foam.  Make the handle longer and thicker by taping Popsicle sticks or tongue depressors to it.  You may also be able to buy special toothbrushes, toothpaste dispensers, and floss holders.  Your doctor may recommend a soft-bristle toothbrush if the person you care for bleeds easily. Bleeding can happen because of a health problem or from certain medicines.  A toothpaste for sensitive teeth may help if the person you care for has sensitive teeth.  How do you brush and floss someone's teeth?  If the person you are caring for has a hard time cleaning their teeth on their own, you may need to brush and floss their teeth for them. It may be easiest to have the person sit and face away from you, and to sit or stand behind them. That way you can steady their head against your arm as you reach around to floss and brush their teeth. Choose a place that has good lighting and is comfortable for both of you.  Before you begin, gather your supplies. You will need gloves, floss, a toothbrush, and a container to hold water if you are not near a sink. Wash and dry your hands well and put on gloves. Start by flossing:  Gently work a piece of

## 2024-03-12 NOTE — PROGRESS NOTES
Chief Complaint   Patient presents with    Medicare AWV       \"Have you been to the ER, urgent care clinic since your last visit?  Hospitalized since your last visit?\"    NO    “Have you seen or consulted any other health care providers outside of [facility] since your last visit?”    NO

## 2024-03-12 NOTE — PROGRESS NOTES
Medicare Annual Wellness Visit    Vaishali Hunt is here for Medicare AWV    Assessment & Plan   Medicare annual wellness visit, subsequent  Encounter for screening mammogram for malignant neoplasm of breast  -     SURESH TOBIAS DIGITAL SCREEN BILATERAL; Future  Decreased hearing of right ear  -     External Referral To Audiology  Osteopenia of multiple sites  -     DEXA BONE DENSITY AXIAL SKELETON; Future  Asymptomatic menopausal state  -     DEXA BONE DENSITY AXIAL SKELETON; Future    Recommendations for Preventive Services Due: see orders and patient instructions/AVS.  Recommended screening schedule for the next 5-10 years is provided to the patient in written form: see Patient Instructions/AVS.     No follow-ups on file.     Subjective       Patient's complete Health Risk Assessment and screening values have been reviewed and are found in Flowsheets. The following problems were reviewed today and where indicated follow up appointments were made and/or referrals ordered.    Positive Risk Factor Screenings with Interventions:               General HRA Questions:  Select all that apply: (!) Stress    Stress Interventions:  Patient comments: \"everything that happened in Dec, moving, [facility]\" good support from her daughter      Activity, Diet, and Weight:  On average, how many days per week do you engage in moderate to strenuous exercise (like a brisk walk)?: 4 days  On average, how many minutes do you engage in exercise at this level?: 20 min    Do you eat balanced/healthy meals regularly?: Yes    Body mass index is 33.76 kg/m². (!) Abnormal    Obesity Interventions:  E/M - dm management            Dentist Screen:  Have you seen the dentist within the past year?: (!) No    Intervention:  Advised to schedule with their dentist    Hearing Screen:  Do you or your family notice any trouble with your hearing that hasn't been managed with hearing aids?: (!) Yes    Interventions:  Referred to Audiology    Vision Screen:  Do you

## 2024-03-12 NOTE — PATIENT INSTRUCTIONS
Call around for dulaglutide (Trulicity) availability. Call us for new order if you find it elsewhere.    If not available, ask your pharmacy whether they have semaglutide (Ozemipic) or tirzepatide (Mounjaro) as alternatives. Call us for orders.

## 2024-03-12 NOTE — ASSESSMENT & PLAN NOTE
Uncontrolled. Settled in with her daughter who is cooking. Blood sugars have been improving. Lapse in dulaglutide 1.5 mg. Shop around. Continue basal insulin 25 units, short acting insulin 5 units as needed blood sugars greater than 250.  Schedule eye exam.  Follow-up 6 weeks labs prior

## 2024-03-14 ENCOUNTER — TELEPHONE (OUTPATIENT)
Facility: CLINIC | Age: 74
End: 2024-03-14

## 2024-03-14 DIAGNOSIS — E11.65 TYPE 2 DIABETES MELLITUS WITH HYPERGLYCEMIA, WITH LONG-TERM CURRENT USE OF INSULIN (HCC): ICD-10-CM

## 2024-03-14 DIAGNOSIS — Z79.4 TYPE 2 DIABETES MELLITUS WITH DIABETIC NEPHROPATHY, WITH LONG-TERM CURRENT USE OF INSULIN (HCC): ICD-10-CM

## 2024-03-14 DIAGNOSIS — E11.21 TYPE 2 DIABETES MELLITUS WITH DIABETIC NEPHROPATHY, WITH LONG-TERM CURRENT USE OF INSULIN (HCC): ICD-10-CM

## 2024-03-14 DIAGNOSIS — Z79.4 TYPE 2 DIABETES MELLITUS WITH HYPERGLYCEMIA, WITH LONG-TERM CURRENT USE OF INSULIN (HCC): ICD-10-CM

## 2024-03-14 NOTE — TELEPHONE ENCOUNTER
Called patient to let her know I will be sending this information to Dr. Kim for review. She says she did take her 5 units Novolog last night and was able to get her sugar down to 213. Her fasting this AM was at 211 so she administered 5 units of Novolog as well as her 25 units of Lantus which is also on back order at her pharmacy and she only has 1 1/2 pens of this left. Told patient I will call her pharmacy to see what alternatives they have available for her Trulicity and she says she already asked and they do not currently have anything in stock. Patient has been trying to call around to other pharmacies to see what they have in stock and says she has not been able to get through on the telephone so she is planning to go there in person and has been asked to let me know what she can find out.     Patient asked if there is anyway she can go back on her Jardiance and has been told I will send this to Dr. Kim for her input.   Patient called the office back and says the [facility] on [address] has Trulicity 3 mg but does not have the 1.5 mg. Called [facility] on [email or web address] and verified they also have Lantus Pen in stock. Preferred Formulary has also been printed for review if needed

## 2024-03-14 NOTE — TELEPHONE ENCOUNTER
Patient called to inform Dr. Kim , that Trulicity is on backorder and will not be available until the end of 2024 , last night her levels were 371 .Please review and advise, she would like a call From Dr. Kim or clinical as soon as possible.

## 2024-03-15 RX ORDER — INSULIN GLARGINE 100 [IU]/ML
30 INJECTION, SOLUTION SUBCUTANEOUS DAILY
Qty: 15 ML | Refills: 3 | Status: SHIPPED | OUTPATIENT
Start: 2024-03-15

## 2024-03-15 NOTE — TELEPHONE ENCOUNTER
Called patient she has been made made aware her Trulicity has been increased to 3 mg and [facility] [address] as well as new sig for her Lantus that has also been sent to the [facility] on [address]. Patient has expressed understanding and was able to repeat her updated sig back to me correctly. She has been told to contact the office if she has any side effects from the increase in her Trulicity.

## 2024-03-15 NOTE — TELEPHONE ENCOUNTER
Type 2 diabetes mellitus with diabetic nephropathy, with long-term current use of insulin ([facility])  Assessment & Plan:  With hyperglycemia due to lapse in dulaglutide 1.5.   -Dulaglutide 3 mg sent to alternative pharmacy due to availability issues with 1.5 mg dose.   -Increase Lantus to 30 units  -Return to 25 unit dose when fasting sugars are <120   Orders:  -     Dulaglutide 3 MG/0.5ML SOPN; Inject 3 mg into the skin once a week, Disp-12 Adjustable Dose Pre-filled Pen Syringe, R-3Normal  -     insulin glargine (LANTUS SOLOSTAR) 100 UNIT/ML injection pen; Inject 30 Units into the skin daily, Disp-15 mL, R-3Normal  Type 2 diabetes mellitus with hyperglycemia, with long-term current use of insulin ([facility])  -     Dulaglutide 3 MG/0.5ML SOPN; Inject 3 mg into the skin once a week, Disp-12 Adjustable Dose Pre-filled Pen Syringe, R-3Normal  -     insulin glargine (LANTUS SOLOSTAR) 100 UNIT/ML injection pen; Inject 30 Units into the skin daily, Disp-15 mL, R-3Normal    Merissa Kim MD

## 2024-03-15 NOTE — ASSESSMENT & PLAN NOTE
With hyperglycemia due to lapse in dulaglutide 1.5.   -Dulaglutide 3 mg sent to alternative pharmacy due to availability issues with 1.5 mg dose.   -Increase Lantus to 30 units  -Return to 25 unit dose when fasting sugars are <120

## 2024-03-22 DIAGNOSIS — I10 ESSENTIAL (PRIMARY) HYPERTENSION: ICD-10-CM

## 2024-03-22 DIAGNOSIS — N18.32 CHRONIC KIDNEY DISEASE, STAGE 3B (HCC): ICD-10-CM

## 2024-03-22 RX ORDER — FUROSEMIDE 40 MG/1
40 TABLET ORAL DAILY
Qty: 90 TABLET | Refills: 3 | Status: SHIPPED | OUTPATIENT
Start: 2024-03-22

## 2024-03-22 NOTE — TELEPHONE ENCOUNTER
This patient contacted office for the following prescriptions to be filled: REQUESTING A WHOLE PILL INSTEAD OF 1/2 OF PILL    Medication requested : furosemide (LASIX) 40 MG tablet  PCP: Dr. Kim  Pharmacy [facility] Print:  [facility]  Mail order or Local pharmacy [phone]    Scheduled appointment if not seen by current providers in office:LOV 03/12/24 upcoming 04/24/24.

## 2024-03-22 NOTE — TELEPHONE ENCOUNTER
Patient has f/u scheduled, her medication was sent in to [facility] and she would like this to be sent to [facility]. Medication has been pended please review and sign if appropriate.

## 2024-04-05 ENCOUNTER — CLINICAL DOCUMENTATION (OUTPATIENT)
Age: 74
End: 2024-04-05

## 2024-04-05 NOTE — PROGRESS NOTES
[facility]:   Order sent to [facility]    Overnight Oximetry Order sent to [facility]

## 2024-04-15 ENCOUNTER — HOSPITAL ENCOUNTER (OUTPATIENT)
Facility: HOSPITAL | Age: 74
Setting detail: SPECIMEN
Discharge: HOME | End: 2024-04-18

## 2024-04-15 ENCOUNTER — NURSE ONLY (OUTPATIENT)
Facility: CLINIC | Age: 74
End: 2024-04-15
Payer: MEDICARE

## 2024-04-15 DIAGNOSIS — N18.32 STAGE 3B CHRONIC KIDNEY DISEASE (CKD) (HCC): ICD-10-CM

## 2024-04-15 DIAGNOSIS — E11.21 TYPE 2 DIABETES MELLITUS WITH DIABETIC NEPHROPATHY, WITH LONG-TERM CURRENT USE OF INSULIN (HCC): ICD-10-CM

## 2024-04-15 DIAGNOSIS — E03.9 ACQUIRED HYPOTHYROIDISM: ICD-10-CM

## 2024-04-15 DIAGNOSIS — Z79.4 TYPE 2 DIABETES MELLITUS WITH DIABETIC NEPHROPATHY, WITH LONG-TERM CURRENT USE OF INSULIN (HCC): ICD-10-CM

## 2024-04-15 DIAGNOSIS — E55.9 VITAMIN D DEFICIENCY: ICD-10-CM

## 2024-04-15 DIAGNOSIS — I10 ESSENTIAL (PRIMARY) HYPERTENSION: Primary | ICD-10-CM

## 2024-04-15 LAB — SENTARA SPECIMEN COLLECTION: NORMAL

## 2024-04-15 PROCEDURE — 99001 SPECIMEN HANDLING PT-LAB: CPT

## 2024-04-15 PROCEDURE — 36415 COLL VENOUS BLD VENIPUNCTURE: CPT | Performed by: FAMILY MEDICINE

## 2024-04-15 NOTE — PROGRESS NOTES
Patient here for her [place] lab draw name and  verified venipuncture performed on patients left arm was successful patient tolerated well.

## 2024-04-16 LAB
A/G RATIO: 1.9 RATIO (ref 1.1–2.6)
ALBUMIN: 4.3 G/DL (ref 3.5–5)
ALP BLD-CCNC: 134 U/L (ref 40–120)
ALT SERPL-CCNC: 15 U/L (ref 5–40)
ANION GAP SERPL CALCULATED.3IONS-SCNC: 12 MMOL/L (ref 3–15)
AST SERPL-CCNC: 18 U/L (ref 10–37)
BILIRUB SERPL-MCNC: 0.4 MG/DL (ref 0.2–1.2)
BUN BLDV-MCNC: 23 MG/DL (ref 6–22)
CALCIUM SERPL-MCNC: 9.5 MG/DL (ref 8.4–10.5)
CHLORIDE BLD-SCNC: 103 MMOL/L (ref 98–110)
CHOLESTEROL/HDL RATIO: 3.9 (ref 0–5)
CHOLESTEROL: 159 MG/DL (ref 110–200)
CO2: 29 MMOL/L (ref 20–32)
CREAT SERPL-MCNC: 1.6 MG/DL (ref 0.8–1.4)
CREATININE URINE: 26 MG/DL
ESTIMATED AVERAGE GLUCOSE: 199 MG/DL (ref 91–123)
GLOBULIN: 2.3 G/DL (ref 2–4)
GLOMERULAR FILTRATION RATE: 32.9 ML/MIN/1.73 SQ.M.
GLUCOSE: 130 MG/DL (ref 70–99)
HBA1C MFR BLD: 8.6 % (ref 4.8–5.6)
HDLC SERPL-MCNC: 41 MG/DL
LDL CHOLESTEROL CALCULATED: 91 MG/DL (ref 50–99)
LDL/HDL RATIO: 2.2
MICROALB/CREAT RATIO (UG/MG CREAT.): 219.2 (ref 0–30)
MICROALBUMIN/CREAT 24H UR: 57 MG/L (ref 0.1–17)
NON-HDL CHOLESTEROL: 118 MG/DL
POTASSIUM SERPL-SCNC: 4.6 MMOL/L (ref 3.5–5.5)
SODIUM BLD-SCNC: 144 MMOL/L (ref 133–145)
TOTAL PROTEIN: 6.6 G/DL (ref 6.2–8.1)
TRIGL SERPL-MCNC: 133 MG/DL (ref 40–149)
TSH SERPL DL<=0.05 MIU/L-ACNC: 1.88 MCU/ML (ref 0.27–4.2)
VITAMIN D 25-HYDROXY: 53.4 NG/ML (ref 32–100)
VLDLC SERPL CALC-MCNC: 27 MG/DL (ref 8–30)

## 2024-04-20 SDOH — ECONOMIC STABILITY: FOOD INSECURITY: WITHIN THE PAST 12 MONTHS, THE FOOD YOU BOUGHT JUST DIDN'T LAST AND YOU DIDN'T HAVE MONEY TO GET MORE.: NEVER TRUE

## 2024-04-20 SDOH — ECONOMIC STABILITY: FOOD INSECURITY: WITHIN THE PAST 12 MONTHS, YOU WORRIED THAT YOUR FOOD WOULD RUN OUT BEFORE YOU GOT MONEY TO BUY MORE.: NEVER TRUE

## 2024-04-20 SDOH — ECONOMIC STABILITY: TRANSPORTATION INSECURITY
IN THE PAST 12 MONTHS, HAS LACK OF TRANSPORTATION KEPT YOU FROM MEETINGS, WORK, OR FROM GETTING THINGS NEEDED FOR DAILY LIVING?: NO

## 2024-04-20 SDOH — ECONOMIC STABILITY: INCOME INSECURITY: HOW HARD IS IT FOR YOU TO PAY FOR THE VERY BASICS LIKE FOOD, HOUSING, MEDICAL CARE, AND HEATING?: NOT VERY HARD

## 2024-04-23 ENCOUNTER — OFFICE VISIT (OUTPATIENT)
Facility: CLINIC | Age: 74
End: 2024-04-23
Payer: MEDICARE

## 2024-04-23 VITALS
HEART RATE: 86 BPM | SYSTOLIC BLOOD PRESSURE: 122 MMHG | TEMPERATURE: 98.7 F | BODY MASS INDEX: 33.38 KG/M2 | DIASTOLIC BLOOD PRESSURE: 68 MMHG | HEIGHT: 63 IN | WEIGHT: 188.4 LBS | OXYGEN SATURATION: 97 %

## 2024-04-23 DIAGNOSIS — E11.21 DIABETIC NEPHROPATHY ASSOCIATED WITH TYPE 2 DIABETES MELLITUS (HCC): ICD-10-CM

## 2024-04-23 DIAGNOSIS — N18.32 STAGE 3B CHRONIC KIDNEY DISEASE (CKD) (HCC): ICD-10-CM

## 2024-04-23 DIAGNOSIS — Z79.4 TYPE 2 DIABETES MELLITUS WITH DIABETIC NEPHROPATHY, WITH LONG-TERM CURRENT USE OF INSULIN (HCC): Primary | ICD-10-CM

## 2024-04-23 DIAGNOSIS — D50.0 IRON DEFICIENCY ANEMIA DUE TO CHRONIC BLOOD LOSS: ICD-10-CM

## 2024-04-23 DIAGNOSIS — E11.21 TYPE 2 DIABETES MELLITUS WITH DIABETIC NEPHROPATHY, WITH LONG-TERM CURRENT USE OF INSULIN (HCC): Primary | ICD-10-CM

## 2024-04-23 PROCEDURE — G2211 COMPLEX E/M VISIT ADD ON: HCPCS | Performed by: FAMILY MEDICINE

## 2024-04-23 PROCEDURE — 3052F HG A1C>EQUAL 8.0%<EQUAL 9.0%: CPT | Performed by: FAMILY MEDICINE

## 2024-04-23 PROCEDURE — 3078F DIAST BP <80 MM HG: CPT | Performed by: FAMILY MEDICINE

## 2024-04-23 PROCEDURE — 1123F ACP DISCUSS/DSCN MKR DOCD: CPT | Performed by: FAMILY MEDICINE

## 2024-04-23 PROCEDURE — 99214 OFFICE O/P EST MOD 30 MIN: CPT | Performed by: FAMILY MEDICINE

## 2024-04-23 PROCEDURE — 3074F SYST BP LT 130 MM HG: CPT | Performed by: FAMILY MEDICINE

## 2024-04-23 RX ORDER — FERROUS SULFATE 325(65) MG
325 TABLET ORAL
Qty: 90 TABLET | Refills: 3 | Status: SHIPPED | OUTPATIENT
Start: 2024-04-23

## 2024-04-23 NOTE — ASSESSMENT & PLAN NOTE
Dr. Cage's office no longer taking her insurance. New referral Dr. MICKY Hernandez. No acute findings today meriting change in management plan.

## 2024-04-23 NOTE — PROGRESS NOTES
Vaishali Hunt (: 1950) is a 73 y.o. female, established patient, here for:    ASSESSMENT/PLAN:  1. Type 2 diabetes mellitus with diabetic nephropathy, with long-term current use of insulin ([facility])  Assessment & Plan:  Uncontrolled. The patient's glycemic control has been commendable, with only one recorded blood glucose exceeding 200 over the past week, while the rest are predominantly below 150. Notably, there has been no instances of hypoglycemia. Her hemoglobin A1c has shown improvement, from 9.4 to 8.6 in just the past 6 weeks. Continue dulaglutide 3 mg. The dosage of Lantus will be reduced to 25 units daily. A referral to Dr. Brad Hernandez will be made.   Orders:  -     blood glucose test strips (ASCENSIA AUTODISC VI;ONE TOUCH ULTRA TEST VI) strip; DAILY Starting 2024, Disp-100 each, R-3, NormalAs needed.  -     Comprehensive Metabolic Panel; Future  -     Microalbumin / Creatinine Urine Ratio; Future  -     Lipid Panel; Future  -     Hemoglobin A1C; Future  2. Diabetic nephropathy associated with type 2 diabetes mellitus (HCC)  -     External Referral To Nephrology  3. Stage 3b chronic kidney disease (CKD) ([facility])  Assessment & Plan:  Dr. Cage's office no longer taking her insurance. New referral Dr. MICKY Hernandez. No acute findings today meriting change in management plan.     Orders:  -     External Referral To Nephrology  4. Iron deficiency anemia due to chronic blood loss  -     ferrous sulfate (IRON 325) 325 (65 Fe) MG tablet; Take 1 tablet by mouth daily (with breakfast), Disp-90 tablet, R-3Normal  -     Iron and TIBC; Future    Assessment & Plan  1. Diabetes Mellitus.      2. Hypothyroidism.  The patient's thyroid levels are well-managed. The patient will maintain her current regimen of levothyroxine 50 mcg.    3. Hypertension.  The patient will continue her current regimen of diltiazem 360 mg.    4. Hypercholesterolemia.  The patient's cholesterol levels are well-managed with

## 2024-04-23 NOTE — ASSESSMENT & PLAN NOTE
Uncontrolled. The patient's glycemic control has been commendable, with only one recorded blood glucose exceeding 200 over the past week, while the rest are predominantly below 150. Notably, there has been no instances of hypoglycemia. Her hemoglobin A1c has shown improvement, from 9.4 to 8.6 in just the past 6 weeks. Continue dulaglutide 3 mg. The dosage of Lantus will be reduced to 25 units daily. A referral to Dr. Brad Hernandez will be made.

## 2024-04-23 NOTE — PROGRESS NOTES
Chief Complaint   Patient presents with    Diabetes     \"Have you been to the ER, urgent care clinic since your last visit?  Hospitalized since your last visit?\"    NO    “Have you seen or consulted any other health care providers outside of [facility] since your last visit?”    NO            Click Here for Release of Records Request

## 2024-04-24 DIAGNOSIS — Z79.4 TYPE 2 DIABETES MELLITUS WITH DIABETIC NEPHROPATHY, WITH LONG-TERM CURRENT USE OF INSULIN (HCC): Primary | ICD-10-CM

## 2024-04-24 DIAGNOSIS — E11.21 TYPE 2 DIABETES MELLITUS WITH DIABETIC NEPHROPATHY, WITH LONG-TERM CURRENT USE OF INSULIN (HCC): Primary | ICD-10-CM

## 2024-04-24 NOTE — TELEPHONE ENCOUNTER
Called patient back who states the wrong test strips were sent in to her pharmacy. She was dispensed One Touch Ultra strips however she uses One Touch Verio. She was able to get a box of 100 One Touch Verio strips but will need a new prescription sent in before this can be refilled for her again. She has also requested the directions be changed to four times daily. Prescription pended please review and sign if appropriate.

## 2024-04-24 NOTE — TELEPHONE ENCOUNTER
Pt called to speak to Destinyjennifer in regards to an issue with Diabetic Test Strips. Informed pt that Jessie was helping out another provider today and would not be able to speak at the moment but she could return a call at her soonest convenience. Pt expressed understanding and is looking forward to speaking to Destinyjennifer. Please review and advise as needed.

## 2024-04-26 RX ORDER — BLOOD SUGAR DIAGNOSTIC
1 STRIP MISCELLANEOUS 4 TIMES DAILY
Qty: 400 EACH | Refills: 3 | Status: SHIPPED | OUTPATIENT
Start: 2024-04-26

## 2024-05-15 ENCOUNTER — HOSPITAL ENCOUNTER (OUTPATIENT)
Facility: HOSPITAL | Age: 74
Discharge: HOME | End: 2024-05-18
Attending: FAMILY MEDICINE
Payer: MEDICARE

## 2024-05-15 VITALS — HEIGHT: 63 IN | BODY MASS INDEX: 33.31 KG/M2 | WEIGHT: 188 LBS

## 2024-05-15 DIAGNOSIS — Z12.31 ENCOUNTER FOR SCREENING MAMMOGRAM FOR MALIGNANT NEOPLASM OF BREAST: ICD-10-CM

## 2024-05-15 DIAGNOSIS — Z78.0 ASYMPTOMATIC MENOPAUSAL STATE: ICD-10-CM

## 2024-05-15 DIAGNOSIS — M85.89 OSTEOPENIA OF MULTIPLE SITES: ICD-10-CM

## 2024-05-15 PROCEDURE — 77063 BREAST TOMOSYNTHESIS BI: CPT

## 2024-05-15 PROCEDURE — 77080 DXA BONE DENSITY AXIAL: CPT

## 2024-05-31 DIAGNOSIS — I10 ESSENTIAL (PRIMARY) HYPERTENSION: ICD-10-CM

## 2024-05-31 NOTE — TELEPHONE ENCOUNTER
Pt called to have the medication dilTIAZem (CARDIZEM CD) 360 MG extended release capsule sent to her new pharmacy ([address]) as soon as possible. Please review and advise as soon as possible.

## 2024-06-04 RX ORDER — DILTIAZEM HYDROCHLORIDE 360 MG/1
360 CAPSULE, EXTENDED RELEASE ORAL DAILY
Qty: 90 CAPSULE | Refills: 3 | Status: SHIPPED | OUTPATIENT
Start: 2024-06-04

## 2024-06-10 ENCOUNTER — TELEPHONE (OUTPATIENT)
Facility: CLINIC | Age: 74
End: 2024-06-10

## 2024-06-10 DIAGNOSIS — E03.9 ACQUIRED HYPOTHYROIDISM: ICD-10-CM

## 2024-06-10 NOTE — TELEPHONE ENCOUNTER
Med was sent in last on 3/12/24 to [facility], she has changed pharmacies and is requesting that her medication be sent to [facility]. She was seen last on 4/23/2024 and has f/u scheduled. Please review and sign if appropriate.

## 2024-06-10 NOTE — TELEPHONE ENCOUNTER
Patient called stating that her medication refills need to go to [facility] on [address], she is requesting a refill on her.   levothyroxine (SYNTHROID) 50 MCG tablet , and she also would like Dr. Kim to know that her Trulicity is on Backorder, please review and advise.

## 2024-06-11 RX ORDER — LEVOTHYROXINE SODIUM 0.05 MG/1
50 TABLET ORAL
Qty: 90 TABLET | Refills: 3 | Status: SHIPPED | OUTPATIENT
Start: 2024-06-11

## 2024-06-11 NOTE — TELEPHONE ENCOUNTER
Needs to shop around for the Trulicity. Ask her pharmacist for what agent in that class they do have. KAROLINE

## 2024-06-12 ENCOUNTER — TELEPHONE (OUTPATIENT)
Facility: CLINIC | Age: 74
End: 2024-06-12

## 2024-06-12 DIAGNOSIS — E11.21 TYPE 2 DIABETES MELLITUS WITH DIABETIC NEPHROPATHY, WITH LONG-TERM CURRENT USE OF INSULIN (HCC): Primary | ICD-10-CM

## 2024-06-12 DIAGNOSIS — Z79.4 TYPE 2 DIABETES MELLITUS WITH DIABETIC NEPHROPATHY, WITH LONG-TERM CURRENT USE OF INSULIN (HCC): Primary | ICD-10-CM

## 2024-06-12 NOTE — TELEPHONE ENCOUNTER
Called patient who has been made aware to ask the pharmacists at [facility] what other medications they have in stock that are similar to Trulicity and what strengths they have and to call the office back with this information. Patient states she at [facility] now and will go over to the pharmacy before she leaves.

## 2024-06-12 NOTE — TELEPHONE ENCOUNTER
Type 2 diabetes mellitus with diabetic nephropathy, with long-term current use of insulin (HCC)  Assessment & Plan:  Switched to semaglutide 0.5 mg weekly per availability   Orders:  -     Semaglutide,0.25 or 0.5MG/DOS, 2 MG/3ML SOPN; Inject 0.5 mg into the skin once a week, Disp-9 mL, R-3Normal     Merissa Kim MD

## 2024-06-12 NOTE — TELEPHONE ENCOUNTER
Patient called the office stating her [facility] pharmacy has Ozempic 0.5 mg, 2 mg and will be able to get the 1 mg in by tomorrow.

## 2024-06-25 ENCOUNTER — OFFICE VISIT (OUTPATIENT)
Facility: CLINIC | Age: 74
End: 2024-06-25
Payer: MEDICARE

## 2024-06-25 VITALS
TEMPERATURE: 97.2 F | HEART RATE: 76 BPM | WEIGHT: 188 LBS | SYSTOLIC BLOOD PRESSURE: 130 MMHG | OXYGEN SATURATION: 96 % | BODY MASS INDEX: 33.31 KG/M2 | HEIGHT: 63 IN | DIASTOLIC BLOOD PRESSURE: 60 MMHG

## 2024-06-25 DIAGNOSIS — L29.2 VULVAR ITCHING: Primary | ICD-10-CM

## 2024-06-25 DIAGNOSIS — N90.7 VULVAR CYST: ICD-10-CM

## 2024-06-25 PROCEDURE — 3075F SYST BP GE 130 - 139MM HG: CPT | Performed by: FAMILY MEDICINE

## 2024-06-25 PROCEDURE — G2211 COMPLEX E/M VISIT ADD ON: HCPCS | Performed by: FAMILY MEDICINE

## 2024-06-25 PROCEDURE — 1123F ACP DISCUSS/DSCN MKR DOCD: CPT | Performed by: FAMILY MEDICINE

## 2024-06-25 PROCEDURE — 99213 OFFICE O/P EST LOW 20 MIN: CPT | Performed by: FAMILY MEDICINE

## 2024-06-25 PROCEDURE — 3078F DIAST BP <80 MM HG: CPT | Performed by: FAMILY MEDICINE

## 2024-06-25 RX ORDER — MOMETASONE FUROATE MONOHYDRATE 50 UG/1
2 SPRAY, METERED NASAL DAILY
COMMUNITY
Start: 2024-06-19

## 2024-06-25 RX ORDER — AZELASTINE HYDROCHLORIDE 137 UG/1
SPRAY, METERED NASAL
COMMUNITY
Start: 2024-06-19

## 2024-06-25 NOTE — PROGRESS NOTES
Vaishali Hunt (: 1950) is a 73 y.o. female, established patient, here for:    ASSESSMENT/PLAN:  1. Vulvar itching  2. Vulvar cyst    Assessment & Plan    Consistent with plugged oil gland. The patient is advised to perform hot soaks once or twice daily for a duration of 10 to 15 minutes each time. It is recommended that she avoid manipulating the cyst, squeezing, or grabbing.     Larger issue of itching and pain suspected due to inflammation from retained fecal matter +/-urine and moisture from wearing a pad in the heat. Post-defecation, a gentle rinse is recommended. Avoid pad/liner use if possible. Dry area gently and often with cool hair dryer    Invited to see me separately for issue of fecal incontinence     Follow-up and Dispositions    Return if symptoms worsen or fail to improve.            SUBJECTIVE/OBJECTIVE:  Chief Complaint   Patient presents with    Cyst     Patient says she has a cyst in her vagina area that she has had for about a year that has become irritated with pain and itching x 1 month.       History of Present Illness  The patient presents for evaluation of a cyst in the pelvic area.    The patient reports a cyst located on the labia of her pelvic area, which has been present for approximately one month. The cyst is associated with pruritus and associated pain, particularly in the area where her underwear is located.  The cyst was initially the size of a finger-sized tip, which was white in color, leading her to suspect a pimple. She attempted to self-treat the cyst. The cyst is not tender to touch, but causes discomfort when pressure is applied. She also reports increased dryness in the area.    The patient reports daily fecal incontinence, necessitating the use of a pad.     No sexual contact x many years    /60 (Site: Left Upper Arm, Position: Sitting)   Pulse 76   Temp 97.2 °F (36.2 °C) (Tympanic)   Ht 1.6 m (5' 3\")   Wt 85.3 kg (188 lb)   SpO2 96%   BMI 33.30

## 2024-06-25 NOTE — PROGRESS NOTES
Chief Complaint   Patient presents with    Cyst     Patient says she has a cyst in her vagina area that she has had for about a year that has become irritated with pain and itching x 1 month.      \"Have you been to the ER, urgent care clinic since your last visit?  Hospitalized since your last visit?\"    NO    “Have you seen or consulted any other health care providers outside of [facility] since your last visit?”    YES - When: approximately 1  weeks ago.  Where and Why: ENT.            Click Here for Release of Records Request

## 2024-07-15 ENCOUNTER — HOSPITAL ENCOUNTER (OUTPATIENT)
Facility: HOSPITAL | Age: 74
Discharge: HOME | End: 2024-07-18

## 2024-07-15 LAB
SENTARA SPECIMEN COLLECTION: NORMAL
SENTARA SPECIMEN COLLECTION: NORMAL

## 2024-07-15 PROCEDURE — 99001 SPECIMEN HANDLING PT-LAB: CPT

## 2024-07-16 LAB
A/G RATIO: 1.7 RATIO (ref 1.1–2.6)
ALBUMIN: 4.3 G/DL (ref 3.5–5)
ALP BLD-CCNC: 140 U/L (ref 40–120)
ALT SERPL-CCNC: 14 U/L (ref 5–40)
ANION GAP SERPL CALCULATED.3IONS-SCNC: 13 MMOL/L (ref 3–15)
AST SERPL-CCNC: 17 U/L (ref 10–37)
BILIRUB SERPL-MCNC: 0.3 MG/DL (ref 0.2–1.2)
BUN BLDV-MCNC: 26 MG/DL (ref 6–22)
CALCIUM SERPL-MCNC: 9.1 MG/DL (ref 8.4–10.5)
CHLORIDE BLD-SCNC: 102 MMOL/L (ref 98–110)
CHOLESTEROL, TOTAL: 275 MG/DL (ref 110–200)
CHOLESTEROL/HDL RATIO: 6.7 (ref 0–5)
CO2: 26 MMOL/L (ref 20–32)
CREAT SERPL-MCNC: 1.4 MG/DL (ref 0.8–1.4)
CREATININE URINE: 31 MG/DL
ESTIMATED AVERAGE GLUCOSE: 176 MG/DL (ref 91–123)
GFR, ESTIMATED: 38.4 ML/MIN/1.73 SQ.M.
GLOBULIN: 2.5 G/DL (ref 2–4)
GLUCOSE: 152 MG/DL (ref 70–99)
HBA1C MFR BLD: 7.8 % (ref 4.8–5.6)
HDLC SERPL-MCNC: 41 MG/DL
IRON % SATURATION: 37 % (ref 20–50)
IRON: 104 MCG/DL (ref 30–160)
LDL CHOLESTEROL: 200 MG/DL (ref 50–99)
LDL/HDL RATIO: 4.9
MICROALB/CREAT RATIO (UG/MG CREAT.): 204.8 (ref 0–30)
MICROALBUMIN/CREAT 24H UR: 63.5 MG/L (ref 0.1–17)
NON-HDL CHOLESTEROL: 234 MG/DL
POTASSIUM SERPL-SCNC: 3.7 MMOL/L (ref 3.5–5.5)
SODIUM BLD-SCNC: 141 MMOL/L (ref 133–145)
TOTAL IRON BINDING CAPACITY: 282 MCG/DL (ref 228–428)
TOTAL PROTEIN: 6.8 G/DL (ref 6.2–8.1)
TRIGL SERPL-MCNC: 169 MG/DL (ref 40–149)
UIBC: 178 MCG/DL (ref 110–370)
VLDLC SERPL CALC-MCNC: 34 MG/DL (ref 8–30)

## 2024-07-23 ENCOUNTER — OFFICE VISIT (OUTPATIENT)
Facility: CLINIC | Age: 74
End: 2024-07-23
Payer: MEDICARE

## 2024-07-23 VITALS
BODY MASS INDEX: 33.31 KG/M2 | HEART RATE: 55 BPM | HEIGHT: 63 IN | TEMPERATURE: 97.9 F | OXYGEN SATURATION: 98 % | DIASTOLIC BLOOD PRESSURE: 62 MMHG | SYSTOLIC BLOOD PRESSURE: 124 MMHG | WEIGHT: 188 LBS

## 2024-07-23 DIAGNOSIS — E11.21 TYPE 2 DIABETES MELLITUS WITH DIABETIC NEPHROPATHY, WITH LONG-TERM CURRENT USE OF INSULIN (HCC): Primary | ICD-10-CM

## 2024-07-23 DIAGNOSIS — D50.0 IRON DEFICIENCY ANEMIA DUE TO CHRONIC BLOOD LOSS: ICD-10-CM

## 2024-07-23 DIAGNOSIS — I10 ESSENTIAL (PRIMARY) HYPERTENSION: ICD-10-CM

## 2024-07-23 DIAGNOSIS — Z79.4 TYPE 2 DIABETES MELLITUS WITH DIABETIC NEPHROPATHY, WITH LONG-TERM CURRENT USE OF INSULIN (HCC): ICD-10-CM

## 2024-07-23 DIAGNOSIS — N18.32 STAGE 3B CHRONIC KIDNEY DISEASE (CKD) (HCC): ICD-10-CM

## 2024-07-23 DIAGNOSIS — E11.21 TYPE 2 DIABETES MELLITUS WITH DIABETIC NEPHROPATHY, WITH LONG-TERM CURRENT USE OF INSULIN (HCC): ICD-10-CM

## 2024-07-23 DIAGNOSIS — Z79.4 TYPE 2 DIABETES MELLITUS WITH DIABETIC NEPHROPATHY, WITH LONG-TERM CURRENT USE OF INSULIN (HCC): Primary | ICD-10-CM

## 2024-07-23 PROCEDURE — 1123F ACP DISCUSS/DSCN MKR DOCD: CPT | Performed by: FAMILY MEDICINE

## 2024-07-23 PROCEDURE — 3074F SYST BP LT 130 MM HG: CPT | Performed by: FAMILY MEDICINE

## 2024-07-23 PROCEDURE — 3051F HG A1C>EQUAL 7.0%<8.0%: CPT | Performed by: FAMILY MEDICINE

## 2024-07-23 PROCEDURE — 99214 OFFICE O/P EST MOD 30 MIN: CPT | Performed by: FAMILY MEDICINE

## 2024-07-23 PROCEDURE — 3078F DIAST BP <80 MM HG: CPT | Performed by: FAMILY MEDICINE

## 2024-07-23 PROCEDURE — G2211 COMPLEX E/M VISIT ADD ON: HCPCS | Performed by: FAMILY MEDICINE

## 2024-07-23 NOTE — ASSESSMENT & PLAN NOTE
A1c significantly improved but diarrhea with semaglutide. DC. Resuming empagliflozin 10 mg in cautious trial. Previously had developed MEGAN with hyperK+ however could have been due to consumption of energy drinks. follow up 2 weeks,  to monitor her kidney function. Depending on her home blood sugar levels and overall well-being, the Ozempic will be reintroduced at the lowest dose.

## 2024-07-23 NOTE — ASSESSMENT & PLAN NOTE
Well controlled in the office, suspect technique issues at home. Continue diltiazem 360 mg and furosemide 40 mg. Handout on home BP measurements

## 2024-07-23 NOTE — PROGRESS NOTES
Chief Complaint   Patient presents with    Diabetes     Patient has been having side effects from the Ozempic. C/o GI upset. She would like to discuss going back on her Jardiance.        \"Have you been to the ER, urgent care clinic since your last visit?  Hospitalized since your last visit?\"    NO    “Have you seen or consulted any other health care providers outside of [facility] since your last visit?”    NO            Click Here for Release of Records Request    
diarrhea.    She expresses a desire to resume Jardiance, having previously experienced acute kidney injury and elevated potassium levels. She has abstained from consuming energy drinks.    She monitors her blood pressure 2 to 3 times daily, using a wrist cuff. Her readings have been as low as 110/60 and as high as 150/80. Inconsistent technique     Lab Results   Component Value Date    LABA1C 7.8 (H) 07/15/2024    LABA1C 8.6 (H) 04/15/2024    LABA1C 9.4 (H) 01/25/2024     Lab Results   Component Value Date    MALBCR 204.8 (H) 07/15/2024    CREATININE 1.4 07/15/2024     Lab Results   Component Value Date    TSH 1.88 04/15/2024     Lab Results   Component Value Date    CHOL 275 (H) 07/15/2024    TRIG 169 (H) 07/15/2024    HDL 41 07/15/2024    VLDL 34 (H) 07/15/2024    CHOLHDLRATIO 6.7 07/15/2024     Lab Results   Component Value Date     07/15/2024    K 3.7 07/15/2024     07/15/2024    CO2 26 07/15/2024    BUN 26 (H) 07/15/2024    CREATININE 1.4 07/15/2024    GLUCOSE 152 (H) 07/15/2024    CALCIUM 9.1 07/15/2024    BILITOT 0.3 07/15/2024    ALKPHOS 140 (H) 07/15/2024    AST 17 07/15/2024    ALT 14 07/15/2024    LABGLOM 38.4 (L) 07/15/2024    GFRAA 35.3 (L) 03/15/2022    AGRATIO 1.7 07/15/2024    GLOB 2.5 07/15/2024       Lab Results   Component Value Date/Time    VITD25 53.4 04/15/2024 09:38 AM      Lab Results   Component Value Date    IRON 104 07/15/2024    TIBC 282 07/15/2024    FERRITIN 81 03/08/2023                /62 (Site: Left Upper Arm, Position: Sitting)   Pulse 55   Temp 97.9 °F (36.6 °C) (Tympanic)   Ht 1.6 m (5' 3\")   Wt 85.3 kg (188 lb)   SpO2 98%   BMI 33.30 kg/m²      Physical Exam  Constitutional:       General: She is not in acute distress.     Appearance: Normal appearance.   HENT:      Head: Normocephalic and atraumatic.   Pulmonary:      Effort: Pulmonary effort is normal.   Neurological:      Mental Status: She is alert and oriented to person, place, and time.         The

## 2024-07-31 ENCOUNTER — NURSE ONLY (OUTPATIENT)
Facility: CLINIC | Age: 74
End: 2024-07-31
Payer: MEDICARE

## 2024-07-31 ENCOUNTER — HOSPITAL ENCOUNTER (OUTPATIENT)
Facility: HOSPITAL | Age: 74
Setting detail: SPECIMEN
Discharge: HOME | End: 2024-08-03

## 2024-07-31 DIAGNOSIS — I10 ESSENTIAL (PRIMARY) HYPERTENSION: Primary | ICD-10-CM

## 2024-07-31 DIAGNOSIS — Z79.4 TYPE 2 DIABETES MELLITUS WITH DIABETIC NEPHROPATHY, WITH LONG-TERM CURRENT USE OF INSULIN (HCC): ICD-10-CM

## 2024-07-31 DIAGNOSIS — E11.21 TYPE 2 DIABETES MELLITUS WITH DIABETIC NEPHROPATHY, WITH LONG-TERM CURRENT USE OF INSULIN (HCC): ICD-10-CM

## 2024-07-31 LAB — SENTARA SPECIMEN COLLECTION: NORMAL

## 2024-07-31 PROCEDURE — 99001 SPECIMEN HANDLING PT-LAB: CPT

## 2024-07-31 PROCEDURE — 36415 COLL VENOUS BLD VENIPUNCTURE: CPT | Performed by: FAMILY MEDICINE

## 2024-07-31 NOTE — PROGRESS NOTES
Patient here for NV lab draw. Name and  verified venipuncture performed on patients right arm was successful patient tolerated well.

## 2024-08-03 LAB
ANION GAP SERPL CALCULATED.3IONS-SCNC: 17 MMOL/L (ref 3–15)
BUN BLDV-MCNC: 23 MG/DL (ref 6–22)
CALCIUM SERPL-MCNC: 9.3 MG/DL (ref 8.4–10.5)
CHLORIDE BLD-SCNC: 102 MMOL/L (ref 98–110)
CO2: 28 MMOL/L (ref 20–32)
CREAT SERPL-MCNC: 2 MG/DL (ref 0.8–1.4)
GFR, ESTIMATED: 25.5 ML/MIN/1.73 SQ.M.
GLUCOSE: 280 MG/DL (ref 70–99)
POTASSIUM SERPL-SCNC: 4 MMOL/L (ref 3.5–5.5)
SODIUM BLD-SCNC: 147 MMOL/L (ref 133–145)

## 2024-08-06 ENCOUNTER — OFFICE VISIT (OUTPATIENT)
Facility: CLINIC | Age: 74
End: 2024-08-06
Payer: MEDICARE

## 2024-08-06 VITALS
SYSTOLIC BLOOD PRESSURE: 130 MMHG | WEIGHT: 185.3 LBS | HEART RATE: 86 BPM | BODY MASS INDEX: 32.83 KG/M2 | TEMPERATURE: 98.4 F | HEIGHT: 63 IN | OXYGEN SATURATION: 98 % | DIASTOLIC BLOOD PRESSURE: 68 MMHG

## 2024-08-06 DIAGNOSIS — N18.32 STAGE 3B CHRONIC KIDNEY DISEASE (CKD) (HCC): ICD-10-CM

## 2024-08-06 DIAGNOSIS — Z79.4 TYPE 2 DIABETES MELLITUS WITH DIABETIC NEPHROPATHY, WITH LONG-TERM CURRENT USE OF INSULIN (HCC): Primary | ICD-10-CM

## 2024-08-06 DIAGNOSIS — E11.21 TYPE 2 DIABETES MELLITUS WITH DIABETIC NEPHROPATHY, WITH LONG-TERM CURRENT USE OF INSULIN (HCC): Primary | ICD-10-CM

## 2024-08-06 DIAGNOSIS — I10 ESSENTIAL (PRIMARY) HYPERTENSION: ICD-10-CM

## 2024-08-06 DIAGNOSIS — N17.9 ACUTE KIDNEY INJURY (HCC): ICD-10-CM

## 2024-08-06 PROCEDURE — 3075F SYST BP GE 130 - 139MM HG: CPT | Performed by: FAMILY MEDICINE

## 2024-08-06 PROCEDURE — G2211 COMPLEX E/M VISIT ADD ON: HCPCS | Performed by: FAMILY MEDICINE

## 2024-08-06 PROCEDURE — 1123F ACP DISCUSS/DSCN MKR DOCD: CPT | Performed by: FAMILY MEDICINE

## 2024-08-06 PROCEDURE — 3051F HG A1C>EQUAL 7.0%<8.0%: CPT | Performed by: FAMILY MEDICINE

## 2024-08-06 PROCEDURE — 99214 OFFICE O/P EST MOD 30 MIN: CPT | Performed by: FAMILY MEDICINE

## 2024-08-06 PROCEDURE — 3078F DIAST BP <80 MM HG: CPT | Performed by: FAMILY MEDICINE

## 2024-08-06 RX ORDER — INSULIN GLARGINE 100 [IU]/ML
25 INJECTION, SOLUTION SUBCUTANEOUS DAILY
Qty: 15 ML | Refills: 3 | Status: SHIPPED | OUTPATIENT
Start: 2024-08-06

## 2024-08-06 SDOH — ECONOMIC STABILITY: FOOD INSECURITY: WITHIN THE PAST 12 MONTHS, YOU WORRIED THAT YOUR FOOD WOULD RUN OUT BEFORE YOU GOT MONEY TO BUY MORE.: NEVER TRUE

## 2024-08-06 SDOH — ECONOMIC STABILITY: FOOD INSECURITY: WITHIN THE PAST 12 MONTHS, THE FOOD YOU BOUGHT JUST DIDN'T LAST AND YOU DIDN'T HAVE MONEY TO GET MORE.: NEVER TRUE

## 2024-08-06 SDOH — ECONOMIC STABILITY: INCOME INSECURITY: HOW HARD IS IT FOR YOU TO PAY FOR THE VERY BASICS LIKE FOOD, HOUSING, MEDICAL CARE, AND HEATING?: NOT HARD AT ALL

## 2024-08-06 ASSESSMENT — PATIENT HEALTH QUESTIONNAIRE - PHQ9
SUM OF ALL RESPONSES TO PHQ QUESTIONS 1-9: 0
SUM OF ALL RESPONSES TO PHQ QUESTIONS 1-9: 0
2. FEELING DOWN, DEPRESSED OR HOPELESS: NOT AT ALL
SUM OF ALL RESPONSES TO PHQ9 QUESTIONS 1 & 2: 0
1. LITTLE INTEREST OR PLEASURE IN DOING THINGS: NOT AT ALL
SUM OF ALL RESPONSES TO PHQ QUESTIONS 1-9: 0
SUM OF ALL RESPONSES TO PHQ QUESTIONS 1-9: 0

## 2024-08-06 NOTE — ASSESSMENT & PLAN NOTE
Well controlled at home - 110-120s/60s. Continue diltiazem 360 mg and furosemide 40 mg. Handout on home BP measurements

## 2024-08-06 NOTE — PROGRESS NOTES
Chief Complaint   Patient presents with    Hypertension     2 week follow up     Diabetes     2 week follow up     \"Have you been to the ER, urgent care clinic since your last visit?  Hospitalized since your last visit?\"    NO    “Have you seen or consulted any other health care providers outside of [facility] since your last visit?”    NO            Click Here for Release of Records Request

## 2024-08-06 NOTE — PROGRESS NOTES
Vaishali Hunt (: 1950) is a 73 y.o. female, established patient, here for:    ASSESSMENT/PLAN:  1. Type 2 diabetes mellitus with diabetic nephropathy, with long-term current use of insulin ([facility])  Assessment & Plan:  GI symptoms have improved since discontinuing semaglutide two weeks ago. While her potassium remains normal, her kidney function has significantly deteriorated since starting empagliflozin, with her GFR dropping from 38 to 25 within two weeks. I am discontinuing it. I would defer to Dr. MICKY Hernandez's expertise if he elects to reintroduce it to her regimen. A copy of this note will be sent to him.     A follow-up appointment will be scheduled in a few weeks to ensure her kidney function recovers and further DM management. For now, she will continue with insulin therapy alone at the moment. Prescriptions for NovoLog and Lantus will be renewed. She is encouraged to maintain cultivation of her non-snacking habit.    Orders:  -     insulin glargine (LANTUS SOLOSTAR) 100 UNIT/ML injection pen; Inject 25 Units into the skin daily, Disp-15 mL, R-3Normal  -     Basic Metabolic Panel; Future  2. Acute kidney injury (HCC)  -     Basic Metabolic Panel; Future  3. Stage 3b chronic kidney disease (CKD) ([facility])  -     Basic Metabolic Panel; Future  4. Essential (primary) hypertension  Assessment & Plan:  Well controlled at home - 110-120s/60s. Continue diltiazem 360 mg and furosemide 40 mg. Handout on home BP measurements       Follow-up and Dispositions    Return in about 2 weeks (around 2024) for kidney injury and diabetes follow up, labs prior, (30).            SUBJECTIVE/OBJECTIVE:  Chief Complaint   Patient presents with    Hypertension     2 week follow up     Diabetes     2 week follow up      History of Present Illness  The patient is a 73-year-old female who presents for follow up evaluation of diabetes and hypertension     She has been monitoring her blood pressure at home, which has been

## 2024-08-06 NOTE — ASSESSMENT & PLAN NOTE
GI symptoms have improved since discontinuing semaglutide two weeks ago. While her potassium remains normal, her kidney function has significantly deteriorated since starting empagliflozin, with her GFR dropping from 38 to 25 within two weeks. I am discontinuing it. I would defer to Dr. MICKY Hernandez's expertise if he elects to reintroduce it to her regimen. A copy of this note will be sent to him.     A follow-up appointment will be scheduled in a few weeks to ensure her kidney function recovers and further DM management. For now, she will continue with insulin therapy alone at the moment. Prescriptions for NovoLog and Lantus will be renewed. She is encouraged to maintain cultivation of her non-snacking habit.

## 2024-08-14 ENCOUNTER — NURSE ONLY (OUTPATIENT)
Facility: CLINIC | Age: 74
End: 2024-08-14
Payer: MEDICARE

## 2024-08-14 ENCOUNTER — HOSPITAL ENCOUNTER (OUTPATIENT)
Facility: HOSPITAL | Age: 74
Setting detail: SPECIMEN
Discharge: HOME | End: 2024-08-17

## 2024-08-14 DIAGNOSIS — N17.9 ACUTE KIDNEY INJURY (HCC): Primary | ICD-10-CM

## 2024-08-14 LAB — SENTARA SPECIMEN COLLECTION: NORMAL

## 2024-08-14 PROCEDURE — 99001 SPECIMEN HANDLING PT-LAB: CPT

## 2024-08-14 PROCEDURE — 36415 COLL VENOUS BLD VENIPUNCTURE: CPT | Performed by: FAMILY MEDICINE

## 2024-08-16 LAB
ANION GAP SERPL CALCULATED.3IONS-SCNC: 13 MMOL/L (ref 3–15)
BUN BLDV-MCNC: 22 MG/DL (ref 6–22)
CALCIUM SERPL-MCNC: 9.1 MG/DL (ref 8.4–10.5)
CHLORIDE BLD-SCNC: 106 MMOL/L (ref 98–110)
CO2: 29 MMOL/L (ref 20–32)
CREAT SERPL-MCNC: 1.8 MG/DL (ref 0.8–1.4)
GFR, ESTIMATED: 28.8 ML/MIN/1.73 SQ.M.
GLUCOSE: 171 MG/DL (ref 70–99)
POTASSIUM SERPL-SCNC: 5.3 MMOL/L (ref 3.5–5.5)
SODIUM BLD-SCNC: 148 MMOL/L (ref 133–145)

## 2024-08-23 ENCOUNTER — OFFICE VISIT (OUTPATIENT)
Facility: CLINIC | Age: 74
End: 2024-08-23

## 2024-08-23 VITALS
DIASTOLIC BLOOD PRESSURE: 62 MMHG | SYSTOLIC BLOOD PRESSURE: 124 MMHG | HEART RATE: 74 BPM | WEIGHT: 188.6 LBS | HEIGHT: 63 IN | TEMPERATURE: 97.5 F | OXYGEN SATURATION: 98 % | BODY MASS INDEX: 33.42 KG/M2

## 2024-08-23 DIAGNOSIS — E78.00 HYPERCHOLESTEROLEMIA WITH LDL GREATER THAN 190 MG/DL: ICD-10-CM

## 2024-08-23 DIAGNOSIS — E55.9 VITAMIN D DEFICIENCY: ICD-10-CM

## 2024-08-23 DIAGNOSIS — I10 PRIMARY HYPERTENSION: ICD-10-CM

## 2024-08-23 DIAGNOSIS — Z79.4 TYPE 2 DIABETES MELLITUS WITH DIABETIC NEPHROPATHY, WITH LONG-TERM CURRENT USE OF INSULIN (HCC): Primary | ICD-10-CM

## 2024-08-23 DIAGNOSIS — Z79.4 TYPE 2 DIABETES MELLITUS WITH DIABETIC NEPHROPATHY, WITH LONG-TERM CURRENT USE OF INSULIN (HCC): ICD-10-CM

## 2024-08-23 DIAGNOSIS — E03.9 ACQUIRED HYPOTHYROIDISM: ICD-10-CM

## 2024-08-23 DIAGNOSIS — E11.21 TYPE 2 DIABETES MELLITUS WITH DIABETIC NEPHROPATHY, WITH LONG-TERM CURRENT USE OF INSULIN (HCC): ICD-10-CM

## 2024-08-23 DIAGNOSIS — E11.21 TYPE 2 DIABETES MELLITUS WITH DIABETIC NEPHROPATHY, WITH LONG-TERM CURRENT USE OF INSULIN (HCC): Primary | ICD-10-CM

## 2024-08-23 DIAGNOSIS — N18.32 STAGE 3B CHRONIC KIDNEY DISEASE (CKD) (HCC): ICD-10-CM

## 2024-08-23 NOTE — ASSESSMENT & PLAN NOTE
Her LDL cholesterol was significantly high at 200 in July 2024. She is currently on Crestor but has missed doses. She is advised to shift her Crestor intake to the morning to improve adherence. Lab orders for LDL cholesterol have been placed and should be completed prior to her next visit.    Orders:    Comprehensive Metabolic Panel; Future    Lipid Panel; Future

## 2024-08-23 NOTE — PROGRESS NOTES
Chief Complaint   Patient presents with    Diabetes    Chronic Kidney Disease    Hypertension     Patient brought in her home BP monitor today for comparison, reading was 139/71. Patient states this reading is higher than what she gets at home.   BP recheck with patient's machine. 141/77         \"Have you been to the ER, urgent care clinic since your last visit?  Hospitalized since your last visit?\"    NO    “Have you seen or consulted any other health care providers outside of [facility] since your last visit?”    NO            Click Here for Release of Records Request

## 2024-08-23 NOTE — PROGRESS NOTES
Vaishali Hunt (: 1950) is a 73 y.o. female, established patient, here for:    ASSESSMENT/PLAN:  Assessment & Plan  Type 2 diabetes mellitus with diabetic nephropathy, with long-term current use of insulin (HCC)  Her GFR has shown a positive trend, increasing from 25 to 29 within a span of two weeks off Jardiance. Potassium levels are high normal at 5.3. Blood glucose levels have been well-managed, with a recent reading of 171. Home readings have been in the 120s to 180s range. She is advised to continue her current regimen of Lantus 25 units daily. She is encouraged to maintain her current lifestyle changes, including increased water intake and reduced consumption of cranberry juice and soda.     Orders:    Comprehensive Metabolic Panel; Future    Microalbumin / Creatinine Urine Ratio; Future    Hemoglobin A1C; Future    Primary hypertension       Orders:    Comprehensive Metabolic Panel; Future    Hypercholesterolemia with LDL greater than 190 mg/dL   Her LDL cholesterol was significantly high at 200 in 2024. She is currently on Crestor but has missed doses. She is advised to shift her Crestor intake to the morning to improve adherence. Lab orders for LDL cholesterol have been placed and should be completed prior to her next visit.    Orders:    Comprehensive Metabolic Panel; Future    Lipid Panel; Future    Vitamin D deficiency       Orders:    Vitamin D 25 Hydroxy; Future    Acquired hypothyroidism       Orders:    TSH with Reflex; Future         Follow-up and Dispositions    Return in about 6 weeks (around 10/4/2024) for chronic medical conditions, labs 1 week prior, (30).            SUBJECTIVE/OBJECTIVE:  Chief Complaint   Patient presents with    Diabetes    Chronic Kidney Disease    Hypertension     Patient brought in her home BP monitor today for comparison, reading was 139/71. Patient states this reading is higher than what she gets at home.   BP recheck with patient's machine. 141/77

## 2024-08-23 NOTE — ASSESSMENT & PLAN NOTE
Her GFR has shown a positive trend, increasing from 25 to 29 within a span of two weeks off Jardiance. Potassium levels are high normal at 5.3. Blood glucose levels have been well-managed, with a recent reading of 171. Home readings have been in the 120s to 180s range. She is advised to continue her current regimen of Lantus 25 units daily. She is encouraged to maintain her current lifestyle changes, including increased water intake and reduced consumption of cranberry juice and soda.     Orders:    Comprehensive Metabolic Panel; Future    Microalbumin / Creatinine Urine Ratio; Future    Hemoglobin A1C; Future

## 2024-08-29 DIAGNOSIS — Z79.4 TYPE 2 DIABETES MELLITUS WITH DIABETIC NEPHROPATHY, WITH LONG-TERM CURRENT USE OF INSULIN (HCC): ICD-10-CM

## 2024-08-29 DIAGNOSIS — E11.21 TYPE 2 DIABETES MELLITUS WITH DIABETIC NEPHROPATHY, WITH LONG-TERM CURRENT USE OF INSULIN (HCC): ICD-10-CM

## 2024-08-29 DIAGNOSIS — Z79.4 TYPE 2 DIABETES MELLITUS WITH HYPERGLYCEMIA, WITH LONG-TERM CURRENT USE OF INSULIN (HCC): ICD-10-CM

## 2024-08-29 DIAGNOSIS — E11.65 TYPE 2 DIABETES MELLITUS WITH HYPERGLYCEMIA, WITH LONG-TERM CURRENT USE OF INSULIN (HCC): ICD-10-CM

## 2024-08-29 NOTE — TELEPHONE ENCOUNTER
Spoke with patient who states she is completely out of her Novolog and is asking if this can be sent in today for her. Medication has been pended please review and sign if appropriate.

## 2024-08-29 NOTE — TELEPHONE ENCOUNTER
This patient contacted office for the following prescriptions to be filled:    Medication requested : insulin aspart (NOVOLOG) 100 UNIT/ML injection pen   PCP: [facility]  Pharmacy or Print: Pharmacy  Mail order or Local pharmacy Local ([facility] [address] [place] [address]    Scheduled appointment if not seen by current providers in office: lov: 8/6/24 upcoming: 10/4/24

## 2024-08-30 DIAGNOSIS — Z79.4 TYPE 2 DIABETES MELLITUS WITH DIABETIC NEPHROPATHY, WITH LONG-TERM CURRENT USE OF INSULIN (HCC): ICD-10-CM

## 2024-08-30 DIAGNOSIS — E11.65 TYPE 2 DIABETES MELLITUS WITH HYPERGLYCEMIA, WITH LONG-TERM CURRENT USE OF INSULIN (HCC): ICD-10-CM

## 2024-08-30 DIAGNOSIS — E11.21 TYPE 2 DIABETES MELLITUS WITH DIABETIC NEPHROPATHY, WITH LONG-TERM CURRENT USE OF INSULIN (HCC): ICD-10-CM

## 2024-08-30 DIAGNOSIS — Z79.4 TYPE 2 DIABETES MELLITUS WITH HYPERGLYCEMIA, WITH LONG-TERM CURRENT USE OF INSULIN (HCC): ICD-10-CM

## 2024-09-03 ENCOUNTER — TELEPHONE (OUTPATIENT)
Facility: CLINIC | Age: 74
End: 2024-09-03

## 2024-09-03 DIAGNOSIS — E11.21 TYPE 2 DIABETES MELLITUS WITH DIABETIC NEPHROPATHY, WITH LONG-TERM CURRENT USE OF INSULIN (HCC): Primary | ICD-10-CM

## 2024-09-03 DIAGNOSIS — Z79.4 TYPE 2 DIABETES MELLITUS WITH DIABETIC NEPHROPATHY, WITH LONG-TERM CURRENT USE OF INSULIN (HCC): Primary | ICD-10-CM

## 2024-09-03 RX ORDER — INSULIN ASPART 100 [IU]/ML
INJECTION, SOLUTION INTRAVENOUS; SUBCUTANEOUS
Qty: 15 ML | Refills: 1 | OUTPATIENT
Start: 2024-09-03

## 2024-09-03 NOTE — TELEPHONE ENCOUNTER
Spoke with pharmacists who was made aware Dr. Kim will not be able to calculate patients max daily dose due to prescription being as needed. Pharmacists stated they need the max daily dose in order to bill patients insurance. It was explained to pharmacists Dr. Kim is not able to calculate max daily dose because she will not know how often patient will need to administered the 5 units.

## 2024-09-03 NOTE — TELEPHONE ENCOUNTER
Spoke with patient she has been made aware of Dr. Kim's response. Patient scheduled 9/6 and is planning to have her labs done tomorrow at . She was reminded no cranberry juice or soda. Patient states she has not had any cranberry juice and has limited her diet coke to just one a day and will not drink anymore diet coke until she is able to talk with Dr. Kim on 9/6.

## 2024-09-03 NOTE — TELEPHONE ENCOUNTER
Spoke with patient who states she will not be able to get her Novolog until tomorrow and took the last of this medication last Wednesday 9/28/24 for glucose that was at 542. She says she was able to get her glucose down to the 200''s that evening however her glucose readings have been elevated since then. She has not had any readings in the 500's since but says her glucose levels have been ranging in the 300's. She spoke with her pharmacy who stated they had the lower doses of Trulicity in stock and is asking if Dr. Kim would consider putting her back on the Trulicity since her glucose readings have been elevated. Spoke with pharmacy confirmed they currently have 1.5, 3.0 and 4.5 mg in stock. Please review and advise.

## 2024-09-03 NOTE — TELEPHONE ENCOUNTER
She's recovering from acute kidney injury on her chronic kidney disease, presumably from semaglutide.     Please add her to schedule Friday with a BMP between now and then. Please remind her NO CRANBERRY JUICE or SODA!    ANAS

## 2024-09-03 NOTE — TELEPHONE ENCOUNTER
Pt called to speak to Jessie. Informed pt that Jessie was not available at the moment but a message could be sent requesting her to call her back at her soonest convenience. Please review and advise as needed.

## 2024-09-04 ENCOUNTER — HOSPITAL ENCOUNTER (OUTPATIENT)
Facility: HOSPITAL | Age: 74
Discharge: HOME | End: 2024-09-07

## 2024-09-04 DIAGNOSIS — Z79.4 TYPE 2 DIABETES MELLITUS WITH DIABETIC NEPHROPATHY, WITH LONG-TERM CURRENT USE OF INSULIN (HCC): ICD-10-CM

## 2024-09-04 DIAGNOSIS — E11.21 TYPE 2 DIABETES MELLITUS WITH DIABETIC NEPHROPATHY, WITH LONG-TERM CURRENT USE OF INSULIN (HCC): ICD-10-CM

## 2024-09-04 LAB — SENTARA SPECIMEN COLLECTION: NORMAL

## 2024-09-04 PROCEDURE — 99001 SPECIMEN HANDLING PT-LAB: CPT

## 2024-09-06 ENCOUNTER — OFFICE VISIT (OUTPATIENT)
Facility: CLINIC | Age: 74
End: 2024-09-06
Payer: MEDICARE

## 2024-09-06 VITALS
DIASTOLIC BLOOD PRESSURE: 70 MMHG | OXYGEN SATURATION: 98 % | HEIGHT: 63 IN | BODY MASS INDEX: 33.45 KG/M2 | WEIGHT: 188.8 LBS | HEART RATE: 62 BPM | SYSTOLIC BLOOD PRESSURE: 128 MMHG | TEMPERATURE: 97.5 F

## 2024-09-06 DIAGNOSIS — I10 PRIMARY HYPERTENSION: ICD-10-CM

## 2024-09-06 DIAGNOSIS — Z23 NEED FOR INFLUENZA VACCINATION: ICD-10-CM

## 2024-09-06 DIAGNOSIS — N18.32 STAGE 3B CHRONIC KIDNEY DISEASE (CKD) (HCC): ICD-10-CM

## 2024-09-06 DIAGNOSIS — Z79.4 TYPE 2 DIABETES MELLITUS WITH DIABETIC NEPHROPATHY, WITH LONG-TERM CURRENT USE OF INSULIN (HCC): ICD-10-CM

## 2024-09-06 DIAGNOSIS — N17.9 ACUTE KIDNEY INJURY (HCC): ICD-10-CM

## 2024-09-06 DIAGNOSIS — E11.21 TYPE 2 DIABETES MELLITUS WITH DIABETIC NEPHROPATHY, WITH LONG-TERM CURRENT USE OF INSULIN (HCC): Primary | ICD-10-CM

## 2024-09-06 DIAGNOSIS — Z79.4 TYPE 2 DIABETES MELLITUS WITH DIABETIC NEPHROPATHY, WITH LONG-TERM CURRENT USE OF INSULIN (HCC): Primary | ICD-10-CM

## 2024-09-06 DIAGNOSIS — E11.21 TYPE 2 DIABETES MELLITUS WITH DIABETIC NEPHROPATHY, WITH LONG-TERM CURRENT USE OF INSULIN (HCC): ICD-10-CM

## 2024-09-06 LAB
ANION GAP SERPL CALCULATED.3IONS-SCNC: 15 MMOL/L (ref 3–15)
BUN BLDV-MCNC: 26 MG/DL (ref 6–22)
CALCIUM SERPL-MCNC: 9.4 MG/DL (ref 8.4–10.5)
CHLORIDE BLD-SCNC: 103 MMOL/L (ref 98–110)
CO2: 25 MMOL/L (ref 20–32)
CREAT SERPL-MCNC: 1.7 MG/DL (ref 0.8–1.4)
GFR, ESTIMATED: 30.7 ML/MIN/1.73 SQ.M.
GLUCOSE: 137 MG/DL (ref 70–99)
POTASSIUM SERPL-SCNC: 4.1 MMOL/L (ref 3.5–5.5)
SODIUM BLD-SCNC: 143 MMOL/L (ref 133–145)

## 2024-09-06 PROCEDURE — 3074F SYST BP LT 130 MM HG: CPT | Performed by: FAMILY MEDICINE

## 2024-09-06 PROCEDURE — 99214 OFFICE O/P EST MOD 30 MIN: CPT | Performed by: FAMILY MEDICINE

## 2024-09-06 PROCEDURE — 3078F DIAST BP <80 MM HG: CPT | Performed by: FAMILY MEDICINE

## 2024-09-06 PROCEDURE — 3051F HG A1C>EQUAL 7.0%<8.0%: CPT | Performed by: FAMILY MEDICINE

## 2024-09-06 PROCEDURE — G0008 ADMIN INFLUENZA VIRUS VAC: HCPCS | Performed by: FAMILY MEDICINE

## 2024-09-06 PROCEDURE — 1123F ACP DISCUSS/DSCN MKR DOCD: CPT | Performed by: FAMILY MEDICINE

## 2024-09-06 PROCEDURE — 90653 IIV ADJUVANT VACCINE IM: CPT | Performed by: FAMILY MEDICINE

## 2024-09-06 NOTE — PROGRESS NOTES
Chief Complaint   Patient presents with    Hyperglycemia    Diabetes    Chronic Kidney Disease     Patient has been having issues with elevated glucose. She had taken the last of her Novolog on 8/28 and was able to resume this on this on 9/4. Patient has bought in her glucose log today for review. Fasting glucose this morning was 174.      \"Have you been to the ER, urgent care clinic since your last visit?  Hospitalized since your last visit?\"    NO    “Have you seen or consulted any other health care providers outside our system since your last visit?”    NO        Click Here for Release of Records Request

## 2024-09-06 NOTE — PROGRESS NOTES
Vaishali Hunt (: 1950) is a 73 y.o. female, established patient, here for:    ASSESSMENT/PLAN:  Type 2 diabetes mellitus with diabetic nephropathy, with long-term current use of insulin ([facility])  Assessment & Plan:  Transient dramatic decline in control due to dietary indiscretion with lapse of short acting insulin. Glucose as high as 500s (asymptomatic) then 300s x 1 week. Now resolved. Continue present management with dietary adherence, basal and short acting insulin for times of excess. Prior MEGAN with semaglutide and no additional benefit in clinical condition beyond her current diet interventions when had been on dulaglutide. follow up 1 month as planned   Stage 3b chronic kidney disease (CKD) ([facility])  Assessment & Plan:  Indices improving post dc of semaglutide. GFR currently 30. Cr 1.7. reassess in conjunction with visit for other chronic conditions next month. No med changes today   Need for influenza vaccination  -     Influenza, FLUAD Trivalent, (age 65 y+), IM, Preservative Free, 0.5mL  Primary hypertension  Assessment & Plan:   Well controlled at home. Continue diltiazem 360 mg and furosemide 40 mg.      Assessment & Plan  Medication Management.  She reported being almost out of her thyroid medication. She is advised to activate her refill, which was sent in  with a year's worth of refills, at the pharmacy on [facility].      She is recommended to receive the flu shot today. The recent distribution of the Covid vaccine was discussed, and she is advised to get the Covid vaccine as well.       Follow-up and Dispositions    Return in about 1 month (around 10/6/2024) for chronic medical conditions, labs 1 week prior, (30).            SUBJECTIVE/OBJECTIVE:  Chief Complaint   Patient presents with    Hyperglycemia    Diabetes    Chronic Kidney Disease     Patient has been having issues with elevated glucose. She had taken the last of her Novolog on  and was able to resume this on this on

## 2024-09-06 NOTE — ASSESSMENT & PLAN NOTE
Transient dramatic decline in control due to dietary indiscretion with lapse of short acting insulin. Glucose as high as 500s (asymptomatic) then 300s x 1 week. Now resolved. Continue present management with dietary adherence, basal and short acting insulin for times of excess. Prior MEGAN with semaglutide and no additional benefit in clinical condition beyond her current diet interventions when had been on dulaglutide. follow up 1 month as planned

## 2024-09-06 NOTE — ASSESSMENT & PLAN NOTE
Indices improving post dc of semaglutide. GFR currently 30. Cr 1.7. reassess in conjunction with visit for other chronic conditions next month. No med changes today

## 2024-09-23 ENCOUNTER — HOSPITAL ENCOUNTER (OUTPATIENT)
Facility: HOSPITAL | Age: 74
Setting detail: SPECIMEN
Discharge: HOME | End: 2024-09-26

## 2024-09-23 ENCOUNTER — NURSE ONLY (OUTPATIENT)
Facility: CLINIC | Age: 74
End: 2024-09-23
Payer: MEDICARE

## 2024-09-23 DIAGNOSIS — E78.00 HYPERCHOLESTEROLEMIA WITH LDL GREATER THAN 190 MG/DL: ICD-10-CM

## 2024-09-23 DIAGNOSIS — E03.9 ACQUIRED HYPOTHYROIDISM: ICD-10-CM

## 2024-09-23 DIAGNOSIS — I10 PRIMARY HYPERTENSION: ICD-10-CM

## 2024-09-23 DIAGNOSIS — E11.21 TYPE 2 DIABETES MELLITUS WITH DIABETIC NEPHROPATHY, WITH LONG-TERM CURRENT USE OF INSULIN (HCC): Primary | ICD-10-CM

## 2024-09-23 DIAGNOSIS — Z79.4 TYPE 2 DIABETES MELLITUS WITH DIABETIC NEPHROPATHY, WITH LONG-TERM CURRENT USE OF INSULIN (HCC): Primary | ICD-10-CM

## 2024-09-23 DIAGNOSIS — E55.9 VITAMIN D DEFICIENCY: ICD-10-CM

## 2024-09-23 DIAGNOSIS — Z79.4 TYPE 2 DIABETES MELLITUS WITH DIABETIC NEPHROPATHY, WITH LONG-TERM CURRENT USE OF INSULIN (HCC): ICD-10-CM

## 2024-09-23 DIAGNOSIS — E11.21 TYPE 2 DIABETES MELLITUS WITH DIABETIC NEPHROPATHY, WITH LONG-TERM CURRENT USE OF INSULIN (HCC): ICD-10-CM

## 2024-09-23 LAB — SENTARA SPECIMEN COLLECTION: NORMAL

## 2024-09-23 PROCEDURE — 99001 SPECIMEN HANDLING PT-LAB: CPT

## 2024-09-23 PROCEDURE — 36415 COLL VENOUS BLD VENIPUNCTURE: CPT | Performed by: FAMILY MEDICINE

## 2024-09-25 LAB
A/G RATIO: 2 RATIO (ref 1.1–2.6)
ALBUMIN: 4.5 G/DL (ref 3.5–5)
ALP BLD-CCNC: 146 U/L (ref 40–120)
ALT SERPL-CCNC: 16 U/L (ref 5–40)
ANION GAP SERPL CALCULATED.3IONS-SCNC: 14 MMOL/L (ref 3–15)
AST SERPL-CCNC: 18 U/L (ref 10–37)
BILIRUB SERPL-MCNC: 0.4 MG/DL (ref 0.2–1.2)
BUN BLDV-MCNC: 21 MG/DL (ref 6–22)
CALCIUM SERPL-MCNC: 9.8 MG/DL (ref 8.4–10.5)
CHLORIDE BLD-SCNC: 104 MMOL/L (ref 98–110)
CHOLESTEROL, TOTAL: 157 MG/DL (ref 110–200)
CHOLESTEROL/HDL RATIO: 3.9 (ref 0–5)
CO2: 27 MMOL/L (ref 20–32)
CREAT SERPL-MCNC: 1.6 MG/DL (ref 0.8–1.4)
ESTIMATED AVERAGE GLUCOSE: 218 MG/DL (ref 91–123)
GFR, ESTIMATED: 32.9 ML/MIN/1.73 SQ.M.
GLOBULIN: 2.3 G/DL (ref 2–4)
GLUCOSE: 185 MG/DL (ref 70–99)
HBA1C MFR BLD: 9.2 % (ref 4.8–5.6)
HDLC SERPL-MCNC: 40 MG/DL
LDL CHOLESTEROL: 92 MG/DL (ref 50–99)
LDL/HDL RATIO: 2.3
NON-HDL CHOLESTEROL: 117 MG/DL
POTASSIUM SERPL-SCNC: 4.6 MMOL/L (ref 3.5–5.5)
SODIUM BLD-SCNC: 145 MMOL/L (ref 133–145)
TOTAL PROTEIN: 6.8 G/DL (ref 6.2–8.1)
TRIGL SERPL-MCNC: 124 MG/DL (ref 40–149)
TSH SERPL DL<=0.05 MIU/L-ACNC: 3.65 MCU/ML (ref 0.27–4.2)
VITAMIN D 25-HYDROXY: 54.4 NG/ML (ref 32–100)
VLDLC SERPL CALC-MCNC: 25 MG/DL (ref 8–30)

## 2024-10-15 ENCOUNTER — OFFICE VISIT (OUTPATIENT)
Facility: CLINIC | Age: 74
End: 2024-10-15
Payer: MEDICARE

## 2024-10-15 VITALS
OXYGEN SATURATION: 98 % | HEIGHT: 63 IN | BODY MASS INDEX: 33.49 KG/M2 | DIASTOLIC BLOOD PRESSURE: 62 MMHG | HEART RATE: 67 BPM | SYSTOLIC BLOOD PRESSURE: 136 MMHG | TEMPERATURE: 97.9 F | WEIGHT: 189 LBS

## 2024-10-15 DIAGNOSIS — R07.9 CHEST PAIN, UNSPECIFIED TYPE: Primary | ICD-10-CM

## 2024-10-15 DIAGNOSIS — Z79.4 TYPE 2 DIABETES MELLITUS WITH DIABETIC NEPHROPATHY, WITH LONG-TERM CURRENT USE OF INSULIN (HCC): ICD-10-CM

## 2024-10-15 DIAGNOSIS — E11.21 TYPE 2 DIABETES MELLITUS WITH DIABETIC NEPHROPATHY, WITH LONG-TERM CURRENT USE OF INSULIN (HCC): ICD-10-CM

## 2024-10-15 DIAGNOSIS — I10 PRIMARY HYPERTENSION: ICD-10-CM

## 2024-10-15 PROCEDURE — 3075F SYST BP GE 130 - 139MM HG: CPT | Performed by: FAMILY MEDICINE

## 2024-10-15 PROCEDURE — 3078F DIAST BP <80 MM HG: CPT | Performed by: FAMILY MEDICINE

## 2024-10-15 PROCEDURE — 3046F HEMOGLOBIN A1C LEVEL >9.0%: CPT | Performed by: FAMILY MEDICINE

## 2024-10-15 PROCEDURE — G2211 COMPLEX E/M VISIT ADD ON: HCPCS | Performed by: FAMILY MEDICINE

## 2024-10-15 PROCEDURE — 1123F ACP DISCUSS/DSCN MKR DOCD: CPT | Performed by: FAMILY MEDICINE

## 2024-10-15 PROCEDURE — 99215 OFFICE O/P EST HI 40 MIN: CPT | Performed by: FAMILY MEDICINE

## 2024-10-15 NOTE — PROGRESS NOTES
Chief Complaint   Patient presents with    Diabetes     Patient states her home glucose readings have been reading in the 200's mostly. She has bought in her home glucose log for review    Hypertension    Chronic Kidney Disease     Patient here for 6 week follow up. She states her home blood pressure readings have been \"crazy\" and has brought in her home log for review.          \"Have you been to the ER, urgent care clinic since your last visit?  Hospitalized since your last visit?\"    NO    “Have you seen or consulted any other health care providers outside our system since your last visit?”    NO

## 2024-10-15 NOTE — ASSESSMENT & PLAN NOTE
New onset daily chest pain, palpitations, labile BP, nausea, headache, malaise in context of DM2, CKD suspicious for unstable angina. High risk. Coordinating urgent evaluation with Dr. Wolf tomorrow morning. Continue current medications unchanged pending eval.

## 2024-10-15 NOTE — PROGRESS NOTES
Vaishali Hunt (: 1950) is a 73 y.o. female, established patient, here for:    ASSESSMENT/PLAN:  Chest pain, unspecified type  Assessment & Plan:  New onset daily chest pain, palpitations, labile BP, nausea, headache, malaise in context of DM2, CKD suspicious for unstable angina. High risk. Coordinating urgent evaluation with Dr. Wolf tomorrow morning. Continue current medications unchanged pending eval.  Orders:  -     [facility] [place] [facility]  Type 2 diabetes mellitus with diabetic nephropathy, with long-term current use of insulin ([facility])  Primary hypertension           SUBJECTIVE/OBJECTIVE:  Chief Complaint   Patient presents with    Diabetes     Patient states her home glucose readings have been reading in the 200's mostly. She has bought in her home glucose log for review    Hypertension    Chronic Kidney Disease     Patient here for 6 week follow up. She states her home blood pressure readings have been \"crazy\" and has brought in her home log for review.       History of Present Illness  The patient presents for evaluation of multiple medical concerns.    She has been experiencing fluctuating blood pressure levels, with the lowest recorded at 73/50 on 2024 and the highest in the 130s. She monitors her blood pressure upon waking and after morning activities such as walking to the bathroom.     She also reports frequent heart palpitations, which have been occurring for a few weeks and can last for several hours. These palpitations are often accompanied by severe headaches, chest pain, nausea and dizziness. She has noticed that these symptoms tend to occur from midmorning to early afternoon, and sometimes result in low blood pressure readings.         She shares she was diagnosed with angina years ago in context of exercise stress test, though she never mentioned it because her chest pain resolved.     She is concerned about her father's history of heart

## 2024-10-16 ENCOUNTER — OFFICE VISIT (OUTPATIENT)
Age: 74
End: 2024-10-16

## 2024-10-16 VITALS
WEIGHT: 188 LBS | DIASTOLIC BLOOD PRESSURE: 60 MMHG | HEART RATE: 71 BPM | OXYGEN SATURATION: 97 % | HEIGHT: 63 IN | SYSTOLIC BLOOD PRESSURE: 134 MMHG | BODY MASS INDEX: 33.31 KG/M2

## 2024-10-16 DIAGNOSIS — E11.21 TYPE 2 DIABETES MELLITUS WITH DIABETIC NEPHROPATHY, WITH LONG-TERM CURRENT USE OF INSULIN (HCC): ICD-10-CM

## 2024-10-16 DIAGNOSIS — I10 PRIMARY HYPERTENSION: ICD-10-CM

## 2024-10-16 DIAGNOSIS — G47.33 SEVERE OBSTRUCTIVE SLEEP APNEA: ICD-10-CM

## 2024-10-16 DIAGNOSIS — Z79.4 TYPE 2 DIABETES MELLITUS WITH DIABETIC NEPHROPATHY, WITH LONG-TERM CURRENT USE OF INSULIN (HCC): ICD-10-CM

## 2024-10-16 DIAGNOSIS — R06.02 SHORTNESS OF BREATH: ICD-10-CM

## 2024-10-16 DIAGNOSIS — R07.9 CHEST PAIN, UNSPECIFIED TYPE: Primary | ICD-10-CM

## 2024-10-16 NOTE — PROGRESS NOTES
Vaishali Hunt presents today for   Chief Complaint   Patient presents with    New Patient     Referred by pcp       Vaishali Hunt preferred language for health care discussion is english/other.    Is someone accompanying this pt? no    Is the patient using any DME equipment during OV? no    Depression Screening:  Depression: Not at risk (8/6/2024)    PHQ-2     PHQ-2 Score: 0        Learning Assessment:  No question data found.       Pt currently taking Anticoagulant therapy? no    Pt currently taking Antiplatelet therapy ? no      Coordination of Care:  1. Have you been to the ER, urgent care clinic since your last visit? Hospitalized since your last visit? yes    2. Have you seen or consulted any other health care providers outside of the [facility] since your last visit? Include any pap smears or colon screening. no

## 2024-10-17 DIAGNOSIS — E11.21 TYPE 2 DIABETES MELLITUS WITH DIABETIC NEPHROPATHY, WITH LONG-TERM CURRENT USE OF INSULIN (HCC): ICD-10-CM

## 2024-10-17 DIAGNOSIS — Z79.4 TYPE 2 DIABETES MELLITUS WITH DIABETIC NEPHROPATHY, WITH LONG-TERM CURRENT USE OF INSULIN (HCC): ICD-10-CM

## 2024-10-17 DIAGNOSIS — Z79.4 TYPE 2 DIABETES MELLITUS WITH HYPERGLYCEMIA, WITH LONG-TERM CURRENT USE OF INSULIN (HCC): ICD-10-CM

## 2024-10-17 DIAGNOSIS — E78.5 HYPERLIPIDEMIA, UNSPECIFIED HYPERLIPIDEMIA TYPE: ICD-10-CM

## 2024-10-17 DIAGNOSIS — E11.65 TYPE 2 DIABETES MELLITUS WITH HYPERGLYCEMIA, WITH LONG-TERM CURRENT USE OF INSULIN (HCC): ICD-10-CM

## 2024-10-17 NOTE — TELEPHONE ENCOUNTER
This patient contacted office for the following prescriptions to be filled:    Medication requested : rosuvastatin (CRESTOR) 20 MG tablet   PCP: Stone  Pharmacy or Print: Pharmacy   Mail order or Local pharmacy Local ([address] ()[address]    Scheduled appointment if not seen by current providers in office: lov: 10/15/24 upcoming: none

## 2024-10-18 NOTE — TELEPHONE ENCOUNTER
Med was sent in last on 3/12/24 to [facility], patient is [facility], please review and sign if appropriate.

## 2024-10-23 RX ORDER — ROSUVASTATIN CALCIUM 20 MG/1
20 TABLET, COATED ORAL NIGHTLY
Qty: 90 TABLET | Refills: 3 | Status: SHIPPED | OUTPATIENT
Start: 2024-10-23

## 2024-10-26 RX ORDER — NITROGLYCERIN 0.4 MG/1
0.4 TABLET SUBLINGUAL EVERY 5 MIN PRN
Qty: 25 TABLET | Refills: 3 | Status: SHIPPED | OUTPATIENT
Start: 2024-10-26
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
## 2024-10-31 DIAGNOSIS — E11.21 TYPE 2 DIABETES MELLITUS WITH DIABETIC NEPHROPATHY, WITH LONG-TERM CURRENT USE OF INSULIN (HCC): ICD-10-CM

## 2024-10-31 DIAGNOSIS — Z79.4 TYPE 2 DIABETES MELLITUS WITH DIABETIC NEPHROPATHY, WITH LONG-TERM CURRENT USE OF INSULIN (HCC): ICD-10-CM

## 2024-11-01 RX ORDER — PEN NEEDLE, DIABETIC 31 GX3/16"
100 NEEDLE, DISPOSABLE MISCELLANEOUS DAILY
Qty: 100 EACH | Refills: 3 | Status: SHIPPED | OUTPATIENT
Start: 2024-11-01

## 2024-12-30 DIAGNOSIS — Z79.4 TYPE 2 DIABETES MELLITUS WITH DIABETIC NEPHROPATHY, WITH LONG-TERM CURRENT USE OF INSULIN (HCC): ICD-10-CM

## 2024-12-30 DIAGNOSIS — E11.21 TYPE 2 DIABETES MELLITUS WITH DIABETIC NEPHROPATHY, WITH LONG-TERM CURRENT USE OF INSULIN (HCC): ICD-10-CM

## 2025-01-02 NOTE — TELEPHONE ENCOUNTER
Patient was seen last on 10/15/24, no follow up plan indicated. Medication was sent in last on 8/6/24 15 mL with 3 refills. Please review and sign if appropriate.

## 2025-01-03 RX ORDER — INSULIN GLARGINE 100 [IU]/ML
INJECTION, SOLUTION SUBCUTANEOUS
Qty: 15 ML | Refills: 0 | Status: SHIPPED | OUTPATIENT
Start: 2025-01-03

## 2025-01-03 NOTE — TELEPHONE ENCOUNTER
Type 2 diabetes mellitus with diabetic nephropathy, with long-term current use of insulin (HCC)  Assessment & Plan:  Last seen for DM in Sept with plan to follow up 1 month. Please schedule, labs prior. Interim supply. Ordered.   Orders:  -     LANTUS SOLOSTAR 100 UNIT/ML injection pen; INJECT 30 UNITS SUBCUTANEOUSLY ONCE DAILY, Disp-15 mL, R-0, DAWNormal  -     Comprehensive Metabolic Panel; Future  -     Albumin/Creatinine Ratio, Urine; Future  -     Lipid Panel; Future  -     Hemoglobin A1C; Future    Merissa Kim MD

## 2025-01-13 ENCOUNTER — HOSPITAL ENCOUNTER (OUTPATIENT)
Facility: HOSPITAL | Age: 75
Setting detail: SPECIMEN
Discharge: HOME | End: 2025-01-16

## 2025-01-13 ENCOUNTER — NURSE ONLY (OUTPATIENT)
Facility: CLINIC | Age: 75
End: 2025-01-13
Payer: MEDICARE

## 2025-01-13 DIAGNOSIS — Z79.4 TYPE 2 DIABETES MELLITUS WITH DIABETIC NEPHROPATHY, WITH LONG-TERM CURRENT USE OF INSULIN (HCC): Primary | ICD-10-CM

## 2025-01-13 DIAGNOSIS — E11.21 TYPE 2 DIABETES MELLITUS WITH DIABETIC NEPHROPATHY, WITH LONG-TERM CURRENT USE OF INSULIN (HCC): Primary | ICD-10-CM

## 2025-01-13 LAB — SENTARA SPECIMEN COLLECTION: NORMAL

## 2025-01-13 PROCEDURE — 36415 COLL VENOUS BLD VENIPUNCTURE: CPT | Performed by: FAMILY MEDICINE

## 2025-01-13 PROCEDURE — 99001 SPECIMEN HANDLING PT-LAB: CPT

## 2025-01-15 LAB
A/G RATIO: 1.8 RATIO (ref 1.1–2.6)
ALBUMIN: 4.2 G/DL (ref 3.5–5)
ALP BLD-CCNC: 269 U/L (ref 40–120)
ALT SERPL-CCNC: 191 U/L (ref 5–40)
ANION GAP SERPL CALCULATED.3IONS-SCNC: 15 MMOL/L (ref 3–15)
AST SERPL-CCNC: 218 U/L (ref 10–37)
BILIRUB SERPL-MCNC: 0.4 MG/DL (ref 0.2–1.2)
BUN BLDV-MCNC: 25 MG/DL (ref 6–22)
CALCIUM SERPL-MCNC: 9.4 MG/DL (ref 8.4–10.5)
CHLORIDE BLD-SCNC: 101 MMOL/L (ref 98–110)
CHOLESTEROL, TOTAL: 145 MG/DL (ref 110–200)
CHOLESTEROL/HDL RATIO: 2.6 (ref 0–5)
CO2: 25 MMOL/L (ref 20–32)
CREAT SERPL-MCNC: 1.7 MG/DL (ref 0.8–1.4)
CREATININE URINE: 46 MG/DL
ESTIMATED AVERAGE GLUCOSE: 242 MG/DL (ref 91–123)
GFR, ESTIMATED: 30.5 ML/MIN/1.73 SQ.M.
GLOBULIN: 2.4 G/DL (ref 2–4)
GLUCOSE: 218 MG/DL (ref 70–99)
HBA1C MFR BLD: 10.1 % (ref 4.8–5.6)
HDLC SERPL-MCNC: 55 MG/DL
LDL CHOLESTEROL: 61 MG/DL (ref 50–99)
LDL/HDL RATIO: 1.1
MICROALB/CREAT RATIO (UG/MG CREAT.): 154.8 (ref 0–30)
MICROALBUMIN/CREAT 24H UR: 71.2 MG/L (ref 0.1–17)
NON-HDL CHOLESTEROL: 90 MG/DL
POTASSIUM SERPL-SCNC: 5 MMOL/L (ref 3.5–5.5)
SODIUM BLD-SCNC: 141 MMOL/L (ref 133–145)
TOTAL PROTEIN: 6.6 G/DL (ref 6.2–8.1)
TRIGL SERPL-MCNC: 143 MG/DL (ref 40–149)
VLDLC SERPL CALC-MCNC: 29 MG/DL (ref 8–30)

## 2025-01-17 ENCOUNTER — OFFICE VISIT (OUTPATIENT)
Facility: CLINIC | Age: 75
End: 2025-01-17

## 2025-01-17 VITALS
DIASTOLIC BLOOD PRESSURE: 66 MMHG | TEMPERATURE: 97.2 F | SYSTOLIC BLOOD PRESSURE: 138 MMHG | HEART RATE: 81 BPM | BODY MASS INDEX: 33.42 KG/M2 | OXYGEN SATURATION: 98 % | WEIGHT: 188.6 LBS | HEIGHT: 63 IN

## 2025-01-17 DIAGNOSIS — R07.9 CHEST PAIN, UNSPECIFIED TYPE: ICD-10-CM

## 2025-01-17 DIAGNOSIS — E11.21 TYPE 2 DIABETES MELLITUS WITH DIABETIC NEPHROPATHY, WITH LONG-TERM CURRENT USE OF INSULIN (HCC): ICD-10-CM

## 2025-01-17 DIAGNOSIS — Z11.59 ENCOUNTER FOR SCREENING FOR OTHER VIRAL DISEASES: ICD-10-CM

## 2025-01-17 DIAGNOSIS — B02.9 HERPES ZOSTER WITHOUT COMPLICATION: ICD-10-CM

## 2025-01-17 DIAGNOSIS — Z79.4 TYPE 2 DIABETES MELLITUS WITH DIABETIC NEPHROPATHY, WITH LONG-TERM CURRENT USE OF INSULIN (HCC): ICD-10-CM

## 2025-01-17 DIAGNOSIS — B17.9 ACUTE HEPATITIS: Primary | ICD-10-CM

## 2025-01-17 RX ORDER — INSULIN GLARGINE 100 [IU]/ML
30 INJECTION, SOLUTION SUBCUTANEOUS NIGHTLY
Qty: 45 ML | Refills: 3 | Status: SHIPPED | OUTPATIENT
Start: 2025-01-17

## 2025-01-17 RX ORDER — LIDOCAINE 4 G/G
1 PATCH TOPICAL DAILY
Qty: 30 PATCH | Refills: 0 | Status: SHIPPED | OUTPATIENT
Start: 2025-01-17 | End: 2025-02-16

## 2025-01-17 RX ORDER — LOPERAMIDE HYDROCHLORIDE 2 MG/1
2 CAPSULE ORAL PRN
COMMUNITY

## 2025-01-17 RX ORDER — GUAIFENESIN 600 MG/1
1200 TABLET, EXTENDED RELEASE ORAL 2 TIMES DAILY PRN
COMMUNITY

## 2025-01-17 SDOH — ECONOMIC STABILITY: FOOD INSECURITY: WITHIN THE PAST 12 MONTHS, YOU WORRIED THAT YOUR FOOD WOULD RUN OUT BEFORE YOU GOT MONEY TO BUY MORE.: NEVER TRUE

## 2025-01-17 SDOH — ECONOMIC STABILITY: FOOD INSECURITY: WITHIN THE PAST 12 MONTHS, THE FOOD YOU BOUGHT JUST DIDN'T LAST AND YOU DIDN'T HAVE MONEY TO GET MORE.: NEVER TRUE

## 2025-01-17 ASSESSMENT — PATIENT HEALTH QUESTIONNAIRE - PHQ9
6. FEELING BAD ABOUT YOURSELF - OR THAT YOU ARE A FAILURE OR HAVE LET YOURSELF OR YOUR FAMILY DOWN: SEVERAL DAYS
SUM OF ALL RESPONSES TO PHQ QUESTIONS 1-9: 9
10. IF YOU CHECKED OFF ANY PROBLEMS, HOW DIFFICULT HAVE THESE PROBLEMS MADE IT FOR YOU TO DO YOUR WORK, TAKE CARE OF THINGS AT HOME, OR GET ALONG WITH OTHER PEOPLE: NOT DIFFICULT AT ALL
2. FEELING DOWN, DEPRESSED OR HOPELESS: SEVERAL DAYS
SUM OF ALL RESPONSES TO PHQ QUESTIONS 1-9: 9
SUM OF ALL RESPONSES TO PHQ9 QUESTIONS 1 & 2: 1
1. LITTLE INTEREST OR PLEASURE IN DOING THINGS: NOT AT ALL
4. FEELING TIRED OR HAVING LITTLE ENERGY: NEARLY EVERY DAY
SUM OF ALL RESPONSES TO PHQ QUESTIONS 1-9: 9
8. MOVING OR SPEAKING SO SLOWLY THAT OTHER PEOPLE COULD HAVE NOTICED. OR THE OPPOSITE, BEING SO FIGETY OR RESTLESS THAT YOU HAVE BEEN MOVING AROUND A LOT MORE THAN USUAL: NOT AT ALL
9. THOUGHTS THAT YOU WOULD BE BETTER OFF DEAD, OR OF HURTING YOURSELF: NOT AT ALL
5. POOR APPETITE OR OVEREATING: SEVERAL DAYS
7. TROUBLE CONCENTRATING ON THINGS, SUCH AS READING THE NEWSPAPER OR WATCHING TELEVISION: NOT AT ALL
3. TROUBLE FALLING OR STAYING ASLEEP: NEARLY EVERY DAY
SUM OF ALL RESPONSES TO PHQ QUESTIONS 1-9: 9

## 2025-01-17 NOTE — ASSESSMENT & PLAN NOTE
Worsening. Med management complicated by comorbid conditions and adverse effects  - may be aggravated by interim liver injury  - increase Lantus to 30 units daily  - follow up 2 weeks

## 2025-01-17 NOTE — ASSESSMENT & PLAN NOTE
Daily symptoms continue. Has seen Dr. Mccabe. Stress test postponed due to interim herpes zoster.   - Has been avoiding NTG due to fear of adverse effects.   - Explained it is potentially life saving and that she should use for chest pain relief.

## 2025-01-17 NOTE — ASSESSMENT & PLAN NOTE
Lab Results   Component Value Date     (H) 01/13/2025     (H) 01/13/2025    ALKPHOS 269 (H) 01/13/2025    BILITOT 0.4 01/13/2025      Acute elevation as compared to values in September. Likely due to the new supplement Glucocel and concurrent use of Tylenol.  - Discontinue all over-the-counter supplements, including Glucocel and Tylenol.  - Avoid ibuprofen due to kidney disease as well as the liver injury.  - Prescription for lidocaine patches (Salonpas) for Shingles pain management.  - Repeat liver function tests in approximately 1 to 2 weeks.

## 2025-01-17 NOTE — ASSESSMENT & PLAN NOTE
Left torso. Completed a 5-day course of valacyclovir 1 month ago prescribed by urgent care.  - Stop acetaminophen (acute liver injury)  - Use lidocaine patches for pain relief.

## 2025-01-17 NOTE — PROGRESS NOTES
Chief Complaint   Patient presents with    Follow-up     Patient here to discuss her lab results.          \"Have you been to the ER, urgent care clinic since your last visit?  Hospitalized since your last visit?\"    YES - When: approximately 1 months ago.  Where and Why: [facility].    “Have you seen or consulted any other health care providers outside our system since your last visit?”    NO             
nitroGLYCERIN (NITROSTAT) 0.4 MG SL tablet Place 1 tablet under the tongue every 5 minutes as needed for Chest pain up to max of 3 total doses. If no relief after 1 dose, call 911. 25 tablet 3    rosuvastatin (CRESTOR) 20 MG tablet Take 1 tablet by mouth nightly 90 tablet 3    Elderberry 500 MG CAPS Take 50 mg by mouth daily      insulin aspart (NOVOLOG) 100 UNIT/ML injection pen Inject 5 Units into the skin as needed for High Blood Sugar (Greater than 250) 5 Adjustable Dose Pre-filled Pen Syringe 1    Azelastine HCl 137 MCG/SPRAY SOLN USE 2 SPRAY(S) IN EACH NOSTRIL TWICE DAILY      levothyroxine (SYNTHROID) 50 MCG tablet Take 1 tablet by mouth every morning (before breakfast) 90 tablet 3    dilTIAZem (CARDIZEM CD) 360 MG extended release capsule Take 1 capsule by mouth daily 90 capsule 3    blood glucose test strips (ONETOUCH VERIO) strip 1 each by In Vitro route in the morning, at noon, in the evening, and at bedtime As needed. 400 each 3    ferrous sulfate (IRON 325) 325 (65 Fe) MG tablet Take 1 tablet by mouth daily (with breakfast) 90 tablet 3    furosemide (LASIX) 40 MG tablet Take 1 tablet by mouth daily 90 tablet 3    esomeprazole Magnesium (NEXIUM) 40 MG PACK Take 0.5 packets by mouth Twice a Week Monday & Friday      Blood Glucose Monitoring Suppl (ONETOUCH VERIO) w/Device KIT Use to check blood sugars up to 3x/day 1 kit 0    OneTouch Delica Lancets 33G MISC Use to check blood sugars up to 3x/day 100 each 11    blood glucose test strips (ASCENSIA AUTODISC VI;ONE TOUCH ULTRA TEST VI) strip Inject 1 each into the skin 3 times daily 100 each 11    acetaminophen (TYLENOL) 500 MG tablet Take 2 tablets by mouth in the morning and at bedtime      estradiol (ESTRACE) 0.1 MG/GM vaginal cream APPLY A PEA SIZED AMOUNT 3 TIMES A WEEK AROUND THE URETHRA (Patient not taking: Reported on 1/17/2025)       No current facility-administered medications for this visit.            /66 (Site: Left Upper Arm, Position:

## 2025-01-27 ENCOUNTER — NURSE ONLY (OUTPATIENT)
Facility: CLINIC | Age: 75
End: 2025-01-27
Payer: MEDICARE

## 2025-01-27 DIAGNOSIS — B17.9 ACUTE HEPATITIS: ICD-10-CM

## 2025-01-27 DIAGNOSIS — Z11.59 ENCOUNTER FOR SCREENING FOR OTHER VIRAL DISEASES: ICD-10-CM

## 2025-01-27 PROCEDURE — 36415 COLL VENOUS BLD VENIPUNCTURE: CPT | Performed by: FAMILY MEDICINE

## 2025-01-28 ENCOUNTER — TELEPHONE (OUTPATIENT)
Facility: CLINIC | Age: 75
End: 2025-01-28

## 2025-01-28 DIAGNOSIS — Z79.4 TYPE 2 DIABETES MELLITUS WITH DIABETIC NEPHROPATHY, WITH LONG-TERM CURRENT USE OF INSULIN (HCC): Primary | ICD-10-CM

## 2025-01-28 DIAGNOSIS — E11.21 TYPE 2 DIABETES MELLITUS WITH DIABETIC NEPHROPATHY, WITH LONG-TERM CURRENT USE OF INSULIN (HCC): Primary | ICD-10-CM

## 2025-01-28 LAB
A/G RATIO: 2 RATIO (ref 1.1–2.6)
ALBUMIN: 4.3 G/DL (ref 3.5–5)
ALP BLD-CCNC: 178 U/L (ref 40–120)
ALT SERPL-CCNC: 15 U/L (ref 5–40)
ANION GAP SERPL CALCULATED.3IONS-SCNC: 16 MMOL/L (ref 3–15)
AST SERPL-CCNC: 14 U/L (ref 10–37)
BILIRUB SERPL-MCNC: 0.3 MG/DL (ref 0.2–1.2)
BUN BLDV-MCNC: 25 MG/DL (ref 6–22)
CALCIUM SERPL-MCNC: 9.3 MG/DL (ref 8.4–10.5)
CHLORIDE BLD-SCNC: 94 MMOL/L (ref 98–110)
CHOLESTEROL, TOTAL: 149 MG/DL (ref 110–200)
CHOLESTEROL/HDL RATIO: 3.9 (ref 0–5)
CO2: 25 MMOL/L (ref 20–32)
CREAT SERPL-MCNC: 1.9 MG/DL (ref 0.8–1.4)
CREATININE, URINE  MG/DL: 30 MG/DL
ESTIMATED AVERAGE GLUCOSE: 260 MG/DL (ref 91–123)
GFR, ESTIMATED: 26.9 ML/MIN/1.73 SQ.M.
GLOBULIN: 2.2 G/DL (ref 2–4)
GLUCOSE: 453 MG/DL (ref 70–99)
HBA1C MFR BLD: 10.7 % (ref 4.8–5.6)
HBV SURFACE AB TITR SER: <3.5 IU/L
HDLC SERPL-MCNC: 38 MG/DL
HEP B S AGB SURF AG: NORMAL
HEPATITIS B CORE IGM ANTIBODY: NORMAL
HEPATITIS C ANTIBODY: NORMAL
LDL CHOLESTEROL: 75 MG/DL (ref 50–99)
LDL/HDL RATIO: 2
MICROALBUMIN/CREAT 24H UR: 48.4 MG/L (ref 0.1–17)
MICROALBUMIN/CREAT UR-RTO: 161.3 (ref 0–30)
NON-HDL CHOLESTEROL: 111 MG/DL
POTASSIUM SERPL-SCNC: 4.1 MMOL/L (ref 3.5–5.5)
SODIUM BLD-SCNC: 135 MMOL/L (ref 133–145)
TOTAL PROTEIN: 6.5 G/DL (ref 6.2–8.1)
TRIGL SERPL-MCNC: 177 MG/DL (ref 40–149)
VLDLC SERPL CALC-MCNC: 35 MG/DL (ref 8–30)

## 2025-01-28 NOTE — ASSESSMENT & PLAN NOTE
Glucose 450 on prelim lab report:  Jessie, please:  Call patient. If she's feeling poorly (excessive thirst, n/v, confusion, malaise for example) direct to ER.   If she is asymptomatic  Increase Lantus to 35 units daily  add to my schedule Friday.  Call the lab to ensure they are processing remaining tests (they are listed only as Active, not In Progress)

## 2025-01-28 NOTE — TELEPHONE ENCOUNTER
Type 2 diabetes mellitus with diabetic nephropathy, with long-term current use of insulin ([facility])  Assessment & Plan:  Glucose 450 on prelim lab report:  Jessie, please:  Call patient. If she's feeling poorly (excessive thirst, n/v, confusion, malaise for example) direct to ER.   If she is asymptomatic  Increase Lantus to 35 units daily  add to my schedule Friday.  Call the lab to ensure they are processing remaining tests (they are listed only as Active, not In Progress)     Merissa Kim MD

## 2025-01-28 NOTE — TELEPHONE ENCOUNTER
Spoke with patient who has been made aware of her glucose level from her labs, she denies feeling bad she is not having any n/v, no confusion. She states she is always thirsty but she is not having any excessive thirst. Patient has been advised to increase her Lantus to 35 units daily and if she develops any of the above symptoms to go to the ER. Patient has also been added to schedule in Friday 1/31/25 at 9 AM. Patient would like to know how many times per day should she check her sugar. She is currently only checking once in the AM when she first gets up. Please advise.     [facility] reference lab has all of the results available and will be faxing them to the office.

## 2025-01-29 NOTE — TELEPHONE ENCOUNTER
Her liver enzymes have normalized. It is safe for her to take Tylenol as per directions on the bottle. KAROLINE

## 2025-01-29 NOTE — TELEPHONE ENCOUNTER
Called patient no answer left her a message letting her know Dr. Kim said her  liver enzymes are normal so she can take Tylenol as directed on the bottle. Asked that she call the office back if she has any questions.

## 2025-01-29 NOTE — TELEPHONE ENCOUNTER
Patient has been made aware of to check her glucose in the AM before breakfast and 1-2 hours after each meal and snack. She has expressed understanding and has been writing her readlings down. She states she has also been checking her blood pressure and will bring the readings to her appt on Friday. Patient is asking what can she take for pain if she were to have a headache or something like that. Please advise.

## 2025-01-29 NOTE — TELEPHONE ENCOUNTER
Pt called back and I read her the message word for word from Jessie and Dr. Kim, pt is aware and had no further questions.

## 2025-01-31 ENCOUNTER — OFFICE VISIT (OUTPATIENT)
Facility: CLINIC | Age: 75
End: 2025-01-31
Payer: MEDICARE

## 2025-01-31 VITALS
BODY MASS INDEX: 32.64 KG/M2 | HEIGHT: 63 IN | TEMPERATURE: 97.9 F | OXYGEN SATURATION: 97 % | DIASTOLIC BLOOD PRESSURE: 64 MMHG | HEART RATE: 87 BPM | SYSTOLIC BLOOD PRESSURE: 136 MMHG | WEIGHT: 184.2 LBS

## 2025-01-31 DIAGNOSIS — B17.9 ACUTE HEPATITIS: ICD-10-CM

## 2025-01-31 DIAGNOSIS — Z79.4 TYPE 2 DIABETES MELLITUS WITH DIABETIC NEPHROPATHY, WITH LONG-TERM CURRENT USE OF INSULIN (HCC): ICD-10-CM

## 2025-01-31 DIAGNOSIS — E11.21 TYPE 2 DIABETES MELLITUS WITH DIABETIC NEPHROPATHY, WITH LONG-TERM CURRENT USE OF INSULIN (HCC): ICD-10-CM

## 2025-01-31 DIAGNOSIS — E11.65 TYPE 2 DIABETES MELLITUS WITH HYPERGLYCEMIA, WITH LONG-TERM CURRENT USE OF INSULIN (HCC): Primary | ICD-10-CM

## 2025-01-31 DIAGNOSIS — Z79.4 TYPE 2 DIABETES MELLITUS WITH HYPERGLYCEMIA, WITH LONG-TERM CURRENT USE OF INSULIN (HCC): Primary | ICD-10-CM

## 2025-01-31 PROCEDURE — 1123F ACP DISCUSS/DSCN MKR DOCD: CPT | Performed by: FAMILY MEDICINE

## 2025-01-31 PROCEDURE — 1159F MED LIST DOCD IN RCRD: CPT | Performed by: FAMILY MEDICINE

## 2025-01-31 PROCEDURE — G2211 COMPLEX E/M VISIT ADD ON: HCPCS | Performed by: FAMILY MEDICINE

## 2025-01-31 PROCEDURE — 3046F HEMOGLOBIN A1C LEVEL >9.0%: CPT | Performed by: FAMILY MEDICINE

## 2025-01-31 PROCEDURE — 3078F DIAST BP <80 MM HG: CPT | Performed by: FAMILY MEDICINE

## 2025-01-31 PROCEDURE — 3075F SYST BP GE 130 - 139MM HG: CPT | Performed by: FAMILY MEDICINE

## 2025-01-31 PROCEDURE — 1160F RVW MEDS BY RX/DR IN RCRD: CPT | Performed by: FAMILY MEDICINE

## 2025-01-31 PROCEDURE — 99214 OFFICE O/P EST MOD 30 MIN: CPT | Performed by: FAMILY MEDICINE

## 2025-01-31 NOTE — PATIENT INSTRUCTIONS
Lantus (long acting): decrease to 32 units once daily  Novolog (short acting): If sugars are:  >250 - 6 units  >300 - 8 units  >400 - 10 units    Recheck your sugar in 2 hours

## 2025-01-31 NOTE — PROGRESS NOTES
Vaishali Hunt (: 1950) is a 74 y.o. female, established patient, here for:    ASSESSMENT/PLAN:  Type 2 diabetes mellitus with hyperglycemia, with long-term current use of insulin (HCC)  Assessment & Plan:  Loss of control coincident with acute hepatitis related to supplement use. Hepatitis now resolved. Blood sugars improving in recent days with normalization of fasting glucose, uncontrolled post prandial values. Also with dietary indiscretion.   - Administer 6 units of NovoLog for blood glucose levels exceeding 250.  - Administer 8 units of NovoLog for levels over 300.  - Administer 10 units of NovoLog for levels above 400.  - Recheck blood glucose levels approximately 2 hours post-insulin administration.  - Reduce long-acting insulin dosage from 35 to 32 units daily.  - Consume 4 ounces of orange juice or peanut butter in the event of hypoglycemia.  - Continue working with a nutritionist to manage diet, especially considering chronic kidney disease.  - Monitor and adjust insulin based on blood glucose readings.  - Goal: Maintain fasting blood glucose level below 120, ideally under 110.  - follow up q 2 weeks for now     Type 2 diabetes mellitus with diabetic nephropathy, with long-term current use of insulin (HCC)  Acute hepatitis  Assessment & Plan:   Enzymes normalized. Tests for hepatitis B and C were negative. The likely cause of liver damage was a supplement, which has been discontinued.   - Avoid over-the-counter supplements without consulting healthcare providers.        Follow-up and Dispositions    Return in about 2 weeks (around 2025) for diabetes follow up, no labs prior, (30).            SUBJECTIVE/OBJECTIVE:  Chief Complaint   Patient presents with    Diabetes     Patient here for follow up on her elevated glucose. She states this morning her fasting was 84. She has a record of her home readings with her today.       History of Present Illness  The patient presents for a follow-up on

## 2025-01-31 NOTE — PROGRESS NOTES
\"Have you been to the ER, urgent care clinic since your last visit?  Hospitalized since your last visit?\"    NO    “Have you seen or consulted any other health care providers outside our system since your last visit?”    YES - When: approximately 1 days ago.  Where and Why: Nephrology.

## 2025-01-31 NOTE — ASSESSMENT & PLAN NOTE
Loss of control coincident with acute hepatitis related to supplement use. Hepatitis now resolved. Blood sugars improving in recent days with normalization of fasting glucose, uncontrolled post prandial values. Also with dietary indiscretion.   - Administer 6 units of NovoLog for blood glucose levels exceeding 250.  - Administer 8 units of NovoLog for levels over 300.  - Administer 10 units of NovoLog for levels above 400.  - Recheck blood glucose levels approximately 2 hours post-insulin administration.  - Reduce long-acting insulin dosage from 35 to 32 units daily.  - Consume 4 ounces of orange juice or peanut butter in the event of hypoglycemia.  - Continue working with a nutritionist to manage diet, especially considering chronic kidney disease.  - Monitor and adjust insulin based on blood glucose readings.  - Goal: Maintain fasting blood glucose level below 120, ideally under 110.  - follow up q 2 weeks for now

## 2025-01-31 NOTE — ASSESSMENT & PLAN NOTE
Enzymes normalized. Tests for hepatitis B and C were negative. The likely cause of liver damage was a supplement, which has been discontinued.   - Avoid over-the-counter supplements without consulting healthcare providers.

## 2025-02-03 DIAGNOSIS — E11.21 TYPE 2 DIABETES MELLITUS WITH DIABETIC NEPHROPATHY, WITH LONG-TERM CURRENT USE OF INSULIN (HCC): ICD-10-CM

## 2025-02-03 DIAGNOSIS — Z79.4 TYPE 2 DIABETES MELLITUS WITH DIABETIC NEPHROPATHY, WITH LONG-TERM CURRENT USE OF INSULIN (HCC): ICD-10-CM

## 2025-02-13 ENCOUNTER — OFFICE VISIT (OUTPATIENT)
Age: 75
End: 2025-02-13
Payer: MEDICARE

## 2025-02-13 VITALS
HEART RATE: 83 BPM | OXYGEN SATURATION: 97 % | HEIGHT: 63 IN | SYSTOLIC BLOOD PRESSURE: 120 MMHG | BODY MASS INDEX: 32.78 KG/M2 | DIASTOLIC BLOOD PRESSURE: 70 MMHG | WEIGHT: 185 LBS

## 2025-02-13 DIAGNOSIS — R07.9 CHEST PAIN, UNSPECIFIED TYPE: ICD-10-CM

## 2025-02-13 DIAGNOSIS — Z79.4 TYPE 2 DIABETES MELLITUS WITH HYPERGLYCEMIA, WITH LONG-TERM CURRENT USE OF INSULIN (HCC): Primary | ICD-10-CM

## 2025-02-13 DIAGNOSIS — E11.65 TYPE 2 DIABETES MELLITUS WITH HYPERGLYCEMIA, WITH LONG-TERM CURRENT USE OF INSULIN (HCC): Primary | ICD-10-CM

## 2025-02-13 PROCEDURE — 3074F SYST BP LT 130 MM HG: CPT | Performed by: INTERNAL MEDICINE

## 2025-02-13 PROCEDURE — 1123F ACP DISCUSS/DSCN MKR DOCD: CPT | Performed by: INTERNAL MEDICINE

## 2025-02-13 PROCEDURE — 99214 OFFICE O/P EST MOD 30 MIN: CPT | Performed by: INTERNAL MEDICINE

## 2025-02-13 PROCEDURE — 1126F AMNT PAIN NOTED NONE PRSNT: CPT | Performed by: INTERNAL MEDICINE

## 2025-02-13 PROCEDURE — 3078F DIAST BP <80 MM HG: CPT | Performed by: INTERNAL MEDICINE

## 2025-02-13 PROCEDURE — 3046F HEMOGLOBIN A1C LEVEL >9.0%: CPT | Performed by: INTERNAL MEDICINE

## 2025-02-13 RX ORDER — DILTIAZEM HYDROCHLORIDE 240 MG/1
240 CAPSULE, COATED, EXTENDED RELEASE ORAL DAILY
COMMUNITY

## 2025-02-13 ASSESSMENT — PATIENT HEALTH QUESTIONNAIRE - PHQ9
SUM OF ALL RESPONSES TO PHQ QUESTIONS 1-9: 0
SUM OF ALL RESPONSES TO PHQ QUESTIONS 1-9: 0
SUM OF ALL RESPONSES TO PHQ9 QUESTIONS 1 & 2: 0
SUM OF ALL RESPONSES TO PHQ QUESTIONS 1-9: 0
1. LITTLE INTEREST OR PLEASURE IN DOING THINGS: NOT AT ALL
SUM OF ALL RESPONSES TO PHQ QUESTIONS 1-9: 0
2. FEELING DOWN, DEPRESSED OR HOPELESS: NOT AT ALL

## 2025-02-13 NOTE — PROGRESS NOTES
Vaishali Hunt presents today for   Chief Complaint   Patient presents with    Follow-up       Vaishali Hnut preferred language for health care discussion is english/other.    Is someone accompanying this pt? no    Is the patient using any DME equipment during OV? no    Depression Screening:  Depression: Not at risk (2/13/2025)    PHQ-2     PHQ-2 Score: 0   Recent Concern: Depression - At risk (1/17/2025)    PHQ-2     PHQ-2 Score: 9        Learning Assessment:  Who is the primary learner? Patient    What is the preferred language for health care of the primary learner? ENGLISH    How does the primary learner prefer to learn new concepts? DEMONSTRATION    Answered By patient    Relationship to Learner SELF           Pt currently taking Anticoagulant therapy? no    Pt currently taking Antiplatelet therapy ? no      Coordination of Care:  1. Have you been to the ER, urgent care clinic since your last visit? Hospitalized since your last visit? no    2. Have you seen or consulted any other health care providers outside of the [facility] since your last visit? Include any pap smears or colon screening. no

## 2025-02-18 SDOH — HEALTH STABILITY: PHYSICAL HEALTH: ON AVERAGE, HOW MANY DAYS PER WEEK DO YOU ENGAGE IN MODERATE TO STRENUOUS EXERCISE (LIKE A BRISK WALK)?: 0 DAYS

## 2025-02-18 SDOH — HEALTH STABILITY: PHYSICAL HEALTH: ON AVERAGE, HOW MANY MINUTES DO YOU ENGAGE IN EXERCISE AT THIS LEVEL?: 0 MIN

## 2025-02-18 ASSESSMENT — PATIENT HEALTH QUESTIONNAIRE - PHQ9
SUM OF ALL RESPONSES TO PHQ QUESTIONS 1-9: 0
1. LITTLE INTEREST OR PLEASURE IN DOING THINGS: NOT AT ALL
SUM OF ALL RESPONSES TO PHQ QUESTIONS 1-9: 0
SUM OF ALL RESPONSES TO PHQ QUESTIONS 1-9: 0
2. FEELING DOWN, DEPRESSED OR HOPELESS: NOT AT ALL
SUM OF ALL RESPONSES TO PHQ QUESTIONS 1-9: 0

## 2025-02-21 RX ORDER — DILTIAZEM HYDROCHLORIDE 240 MG/1
240 CAPSULE, COATED, EXTENDED RELEASE ORAL DAILY
Qty: 30 CAPSULE | Refills: 5 | Status: SHIPPED | OUTPATIENT
Start: 2025-02-21
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
## 2025-03-29 DIAGNOSIS — N18.32 CHRONIC KIDNEY DISEASE, STAGE 3B (HCC): ICD-10-CM

## 2025-03-29 DIAGNOSIS — I10 ESSENTIAL (PRIMARY) HYPERTENSION: ICD-10-CM

## 2025-03-29 SDOH — HEALTH STABILITY: PHYSICAL HEALTH: ON AVERAGE, HOW MANY MINUTES DO YOU ENGAGE IN EXERCISE AT THIS LEVEL?: 0 MIN

## 2025-03-29 SDOH — HEALTH STABILITY: PHYSICAL HEALTH: ON AVERAGE, HOW MANY DAYS PER WEEK DO YOU ENGAGE IN MODERATE TO STRENUOUS EXERCISE (LIKE A BRISK WALK)?: 0 DAYS

## 2025-03-29 ASSESSMENT — PATIENT HEALTH QUESTIONNAIRE - PHQ9
SUM OF ALL RESPONSES TO PHQ QUESTIONS 1-9: 0
1. LITTLE INTEREST OR PLEASURE IN DOING THINGS: NOT AT ALL
2. FEELING DOWN, DEPRESSED OR HOPELESS: NOT AT ALL
SUM OF ALL RESPONSES TO PHQ QUESTIONS 1-9: 0

## 2025-03-29 ASSESSMENT — LIFESTYLE VARIABLES
HOW MANY STANDARD DRINKS CONTAINING ALCOHOL DO YOU HAVE ON A TYPICAL DAY: PATIENT DOES NOT DRINK
HOW OFTEN DO YOU HAVE A DRINK CONTAINING ALCOHOL: 1
HOW OFTEN DO YOU HAVE A DRINK CONTAINING ALCOHOL: NEVER
HOW MANY STANDARD DRINKS CONTAINING ALCOHOL DO YOU HAVE ON A TYPICAL DAY: 0
HOW OFTEN DO YOU HAVE SIX OR MORE DRINKS ON ONE OCCASION: 1

## 2025-04-01 ENCOUNTER — OFFICE VISIT (OUTPATIENT)
Facility: CLINIC | Age: 75
End: 2025-04-01
Payer: MEDICARE

## 2025-04-01 VITALS
DIASTOLIC BLOOD PRESSURE: 60 MMHG | HEIGHT: 63 IN | WEIGHT: 187 LBS | HEART RATE: 86 BPM | BODY MASS INDEX: 33.13 KG/M2 | TEMPERATURE: 97.1 F | SYSTOLIC BLOOD PRESSURE: 140 MMHG | OXYGEN SATURATION: 98 %

## 2025-04-01 VITALS
SYSTOLIC BLOOD PRESSURE: 148 MMHG | DIASTOLIC BLOOD PRESSURE: 62 MMHG | WEIGHT: 187 LBS | HEIGHT: 63 IN | OXYGEN SATURATION: 98 % | TEMPERATURE: 97.1 F | BODY MASS INDEX: 33.13 KG/M2 | HEART RATE: 86 BPM

## 2025-04-01 DIAGNOSIS — Z79.4 TYPE 2 DIABETES MELLITUS WITH HYPERGLYCEMIA, WITH LONG-TERM CURRENT USE OF INSULIN (HCC): Primary | ICD-10-CM

## 2025-04-01 DIAGNOSIS — I10 ESSENTIAL (PRIMARY) HYPERTENSION: ICD-10-CM

## 2025-04-01 DIAGNOSIS — Z00.00 MEDICARE ANNUAL WELLNESS VISIT, SUBSEQUENT: Primary | ICD-10-CM

## 2025-04-01 DIAGNOSIS — T74.21XA CONFIRMED VICTIM OF SEXUAL ABUSE IN ADULTHOOD, INITIAL ENCOUNTER: ICD-10-CM

## 2025-04-01 DIAGNOSIS — E11.21 TYPE 2 DIABETES MELLITUS WITH DIABETIC NEPHROPATHY, WITH LONG-TERM CURRENT USE OF INSULIN (HCC): ICD-10-CM

## 2025-04-01 DIAGNOSIS — K52.9 CHRONIC DIARRHEA: ICD-10-CM

## 2025-04-01 DIAGNOSIS — N18.32 STAGE 3B CHRONIC KIDNEY DISEASE (CKD) (HCC): ICD-10-CM

## 2025-04-01 DIAGNOSIS — E11.65 TYPE 2 DIABETES MELLITUS WITH HYPERGLYCEMIA, WITH LONG-TERM CURRENT USE OF INSULIN (HCC): Primary | ICD-10-CM

## 2025-04-01 DIAGNOSIS — Z79.4 TYPE 2 DIABETES MELLITUS WITH DIABETIC NEPHROPATHY, WITH LONG-TERM CURRENT USE OF INSULIN (HCC): ICD-10-CM

## 2025-04-01 LAB — HBA1C MFR BLD: 9 %

## 2025-04-01 PROCEDURE — 3052F HG A1C>EQUAL 8.0%<EQUAL 9.0%: CPT | Performed by: FAMILY MEDICINE

## 2025-04-01 PROCEDURE — 3078F DIAST BP <80 MM HG: CPT | Performed by: FAMILY MEDICINE

## 2025-04-01 PROCEDURE — 1123F ACP DISCUSS/DSCN MKR DOCD: CPT | Performed by: FAMILY MEDICINE

## 2025-04-01 PROCEDURE — 3077F SYST BP >= 140 MM HG: CPT | Performed by: FAMILY MEDICINE

## 2025-04-01 PROCEDURE — 99214 OFFICE O/P EST MOD 30 MIN: CPT | Performed by: FAMILY MEDICINE

## 2025-04-01 PROCEDURE — 83036 HEMOGLOBIN GLYCOSYLATED A1C: CPT | Performed by: FAMILY MEDICINE

## 2025-04-01 PROCEDURE — G0439 PPPS, SUBSEQ VISIT: HCPCS | Performed by: FAMILY MEDICINE

## 2025-04-01 RX ORDER — EMPAGLIFLOZIN 10 MG/1
10 TABLET, FILM COATED ORAL EVERY MORNING
COMMUNITY

## 2025-04-01 NOTE — ACP (ADVANCE CARE PLANNING)
Advance Care Planning     General Advance Care Planning (ACP) Conversation    Date of Conversation: 4/1/2025  Conducted with: Patient with Decision Making Capacity    Healthcare Decision Maker:    Primary Decision Maker: Janiya Rizo () - Child - [phone]    Secondary Decision Maker: Sloan Hunt - Child - [phone]      Content/Action Overview:   No documents. Philosophy is care focused on alleviation of suffering at the end of life without life prolonging procedures. Her daughter is aware of her wishes and reportedly is willing to support her choices.        Length of Voluntary ACP Conversation in minutes:  <16 minutes (Non-Billable)    Merissa Kim MD

## 2025-04-01 NOTE — PATIENT INSTRUCTIONS
Start tracking meal content, looking for triggers  When you are going to have a donut or a bagel, give yourself 8 units short acting insulin

## 2025-04-01 NOTE — PATIENT INSTRUCTIONS
Learning About Being Active as an Older Adult  Why is being active important as you get older?     Being active is one of the best things you can do for your health. And it's never too late to start. Being active--or getting active, if you aren't already--has definite benefits. It can:  Give you more energy,  Keep your mind sharp.  Improve balance to reduce your risk of falls.  Help you manage chronic illness with fewer medicines.  No matter how old you are, how fit you are, or what health problems you have, there is a form of activity that will work for you. And the more physical activity you can do, the better your overall health will be.  What kinds of activity can help you stay healthy?  Being more active will make your daily activities easier. Physical activity includes planned exercise and things you do in daily life. There are four types of activity:  Aerobic.  Doing aerobic activity makes your heart and lungs strong.  Includes walking, dancing, and gardening.  Aim for at least 2½ hours spread throughout the week.  It improves your energy and can help you sleep better.  Muscle-strengthening.  This type of activity can help maintain muscle and strengthen bones.  Includes climbing stairs, using resistance bands, and lifting or carrying heavy loads.  Aim for at least twice a week.  It can help protect the knees and other joints.  Stretching.  Stretching gives you better range of motion in joints and muscles.  Includes upper arm stretches, calf stretches, and gentle yoga.  Aim for at least twice a week, preferably after your muscles are warmed up from other activities.  It can help you function better in daily life.  Balancing.  This helps you stay coordinated and have good posture.  Includes heel-to-toe walking, elijah chi, and certain types of yoga.  Aim for at least 3 days a week.  It can reduce your risk of falling.  Even if you have a hard time meeting the recommendations, it's better to be more active

## 2025-04-01 NOTE — PROGRESS NOTES
Chief Complaint   Patient presents with    Diabetes     Patient here today to be seen for her follow up.     Diarrhea     Patient states she spoke with a  from her insurance company and was told her insurance will pay for incontinence supplies. She also states they will cover nutritional supplement. Orders have been received from [facility]. They require OV notes stating why patient needs nutritional supplement and incontinence supplies.          \"Have you been to the ER, urgent care clinic since your last visit?  Hospitalized since your last visit?\"    NO    “Have you seen or consulted any other health care providers outside our system since your last visit?”    YES - When: approximately 1  weeks ago.  Where and Why: Dr[facility], routine care.
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
## 2025-04-01 NOTE — PROGRESS NOTES
Chief Complaint   Patient presents with    Medicare AWV       
Monday & Friday Yes Alexsander Davis MD   loperamide (IMODIUM) 2 MG capsule Take 1 capsule by mouth as needed for Diarrhea Yes Alexsander Davis MD   Insulin Pen Needle (DROPLET PEN NEEDLES) 31G X 5 MM MISC Inject 100 each into the skin daily Yes Merissa Kim MD   nitroGLYCERIN (NITROSTAT) 0.4 MG SL tablet Place 1 tablet under the tongue every 5 minutes as needed for Chest pain up to max of 3 total doses. If no relief after 1 dose, call 911. Yes Gopal Wolf MD   rosuvastatin (CRESTOR) 20 MG tablet Take 1 tablet by mouth nightly Yes Merissa Kim MD   Azelastine HCl 137 MCG/SPRAY SOLN USE 2 SPRAY(S) IN EACH NOSTRIL TWICE DAILY Yes Alexsander Davis MD   levothyroxine (SYNTHROID) 50 MCG tablet Take 1 tablet by mouth every morning (before breakfast) Yes Merissa Kim MD   blood glucose test strips (ONETOUCH VERIO) strip 1 each by In Vitro route in the morning, at noon, in the evening, and at bedtime As needed. Yes Merissa Kim MD   ferrous sulfate (IRON 325) 325 (65 Fe) MG tablet Take 1 tablet by mouth daily (with breakfast) Yes Merissa iKm MD   furosemide (LASIX) 40 MG tablet Take 1 tablet by mouth daily Yes Merissa Kim MD   esomeprazole Magnesium (NEXIUM) 40 MG PACK Take 0.5 packets by mouth Twice a Week Monday & Friday Yes Alexsander Davis MD   Blood Glucose Monitoring Suppl (ONETOUCH VERIO) w/Device KIT Use to check blood sugars up to 3x/day Yes Merissa Kim MD   OneTouch Delica Lancets 33G MISC Use to check blood sugars up to 3x/day Yes Merissa Kim MD   estradiol (ESTRACE) 0.1 MG/GM vaginal cream  Yes Automatic Reconciliation, Ar   dulaglutide (TRULICITY) 0.75 MG/0.5ML SOAJ SC injection Inject 0.5 mLs into the skin every 7 days  Merissa Kim MD   dulaglutide (TRULICITY) 1.5 MG/0.5ML SC injection Inject 0.5 mLs into the skin every 7 days  Stone, MD PIPPA Spear SOLOSTAR 100 UNIT/ML injection pen Inject 32 Units into the skin

## 2025-04-02 ENCOUNTER — PHARMACY VISIT (OUTPATIENT)
Facility: CLINIC | Age: 75
End: 2025-04-02

## 2025-04-02 DIAGNOSIS — Z79.4 TYPE 2 DIABETES MELLITUS WITH DIABETIC NEPHROPATHY, WITH LONG-TERM CURRENT USE OF INSULIN (HCC): Primary | ICD-10-CM

## 2025-04-02 DIAGNOSIS — E11.21 TYPE 2 DIABETES MELLITUS WITH DIABETIC NEPHROPATHY, WITH LONG-TERM CURRENT USE OF INSULIN (HCC): Primary | ICD-10-CM

## 2025-04-02 PROBLEM — K52.9 CHRONIC DIARRHEA: Status: ACTIVE | Noted: 2025-04-02

## 2025-04-02 PROBLEM — T74.21XA: Status: ACTIVE | Noted: 2025-04-02

## 2025-04-02 RX ORDER — ACYCLOVIR 400 MG/1
TABLET ORAL
Qty: 9 EACH | Refills: 3
Start: 2025-04-02

## 2025-04-02 RX ORDER — INSULIN GLARGINE 100 [IU]/ML
32 INJECTION, SOLUTION SUBCUTANEOUS EVERY MORNING
Qty: 15 ML | Refills: 0 | Status: SHIPPED | OUTPATIENT
Start: 2025-04-02
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
## 2025-04-02 NOTE — PROGRESS NOTES
Pharmacy Progress Note - Diabetes Management       Assessment / Plan:   Diabetes Management:  Per ADA guidelines, Pt's A1c is not at goal of < 7%.  Pt's FBG values have been elevated the majority of the time.  However, this could be d/t her elevated hs values vs inadequate basal insulin.  She does not have consistent tx for prandial control d/t use of a sliding scale without base injection amount.  She tolerated Trulicity in the past so will restart this at 0.75mg weekly x4 weeks, then increase to 1.5mg weekly.  She may need a higher dose of Lantus, but her primary loss of control is prandial.  Dexcom G7 to be started at the next visit.  Will reassess with SMBG logs at follow up in 3 weeks.     Hyperlipidemia:  The 10-year ASCVD risk score (Brandin JONES, et al., 2019) is: 41.1% based on parameters listed. Current lipid treatment guidelines recommend at least moderate-intensity statin doses for all patients with diabetes to decrease overall ASCVD risk. Patient currently qualifies for a high intensity statin therapy based on current recommendations and is currently taking a high intensity statin.      Nutrition/Lifestyle Modifications:  - Educated pt on the importance of moderating carbohydrate intake. Reviewed sources of carbohydrates and method to help determine appropriate portion sizes (e.g., Diabetes Plate Method).  - Advised patient to avoid sugar-sweetened beverages and replace with water or diet/zero sugar option.  - Recommend ~30 minutes consistent, moderately intensive, exercise/day or ~150 minutes/week. Start small, stay consistent, and increase length and types of exercise, as tolerated.       Patient will return to clinic in 3 week(s) for follow up.        S/O: Ms. Vaishali Hunt, a 74 y.o. female referred by Merissa Kim MD,  has a past medical history of Acquired hypothyroidism, MEGAN (acute kidney injury), Arthritis, Chronic back pain, Chronic kidney disease, Depression, GERD (gastroesophageal reflux

## 2025-04-02 NOTE — ASSESSMENT & PLAN NOTE
Sugars improved since 1/31/25 visit and then again in past week post the initiation of Jardiance 10 mg last week by Dr. Hernandez. Still highly variable with hyperglycemia intermittently >250-300 after meals and snacks, suspecting related to donuts, bagels but without details in log to validate. Medication options limited by comorbid CKD.  - POC A1c today 9, vs 10.7 in January - improved but not controlled to target  - not yet realizing full benefit of new Jardiance 10 mg  - Continue basal insulin at 32 units for now  - Administer 8 units of short-acting insulin when consuming high-sugar/processed carb foods such as donuts or bagels.  - Maintain a food diary to identify other potential dietary triggers for hyperglycemia.  - Referral to PharmD Segundo Downing,

## 2025-04-02 NOTE — PATIENT INSTRUCTIONS
Your Visit Summary:     Plan:  - Continue Lantus 32 units every morning    - Stop Novolog    - Start Trulicity 0.75mg weekly for 4 weeks, then increase to 1.5mg weekly   * Contact the number below if you have any intolerable side effects    - Look out for a phone call from "[employer] [facility] for the Dexcom G7    For any questions, please call [phone] or your PCP office.    Check and document your blood sugar first thing in the morning (fasting at least 8 hours), 2 hours after a meal, and/or before bedtime.   Bring your meter/log to all future visits.     Your blood sugar goals:  - Fasting (first thing in the morning)  blood sugar: 80 - 130mg/dL  - 2 hours after a meal: 80 - 180mg/dL    When you experience symptoms of low blood sugar (example: less than 70):  - Confirm low reading by checking your blood sugar.   - Then treat with 15 grams of carbohydrates (one-half cup of juice or regular soda, or 4-5 glucose tablets).  - Wait 15 minutes to recheck blood sugar.   - Then eat a protein containing meal/snack to prevent another low blood sugar episode. (example: peanut butter + crackers)    Nutrition:  - When reviewing a nutrition label, focus on the serving size, total calories, fat (and type of fats), total carbohydrates, sugar (and amount of added sugar), amount of fiber (good for your digestive), and amount of protein.  Refer to your nutrition label guide for more information.  - For a meal : max 45 - 60 grams of carbohydrates  - For a snack: max 15 grams of carbohydrates  - Reduce amount of saturated and trans fat.  Consider more unsaturated fat options as they are better for your heart health.   - Have at least 1 serving of lean fat protein with each meal.    - Increase fiber intake slowly to prevent constipation.   - Substitute fruit juices for the whole fruit    Low carb snack ideas (15 grams total carb or less):    String cheese or babybel with 6 crackers  4 peanut butter crackers  3 cups of

## 2025-04-03 ENCOUNTER — OFFICE VISIT (OUTPATIENT)
Age: 75
End: 2025-04-03

## 2025-04-03 VITALS
BODY MASS INDEX: 32.78 KG/M2 | SYSTOLIC BLOOD PRESSURE: 132 MMHG | HEART RATE: 71 BPM | WEIGHT: 185 LBS | DIASTOLIC BLOOD PRESSURE: 70 MMHG | HEIGHT: 63 IN | OXYGEN SATURATION: 97 %

## 2025-04-03 DIAGNOSIS — R07.9 CHEST PAIN, UNSPECIFIED TYPE: Primary | ICD-10-CM

## 2025-04-03 RX ORDER — FUROSEMIDE 40 MG/1
40 TABLET ORAL DAILY
Qty: 90 TABLET | Refills: 0 | Status: SHIPPED | OUTPATIENT
Start: 2025-04-03

## 2025-04-03 NOTE — PROGRESS NOTES
Vaishali Hunt presents today for   Chief Complaint   Patient presents with    Follow-up       Vaishali Hunt preferred language for health care discussion is english/other.    Is someone accompanying this pt? no    Is the patient using any DME equipment during OV? no    Depression Screening:  Depression: Not at risk (3/29/2025)    PHQ-2     PHQ-2 Score: 0   Recent Concern: Depression - At risk (1/17/2025)    PHQ-2     PHQ-2 Score: 9        Learning Assessment:  Who is the primary learner? Patient    What is the preferred language for health care of the primary learner? ENGLISH    How does the primary learner prefer to learn new concepts? DEMONSTRATION    Answered By patient    Relationship to Learner SELF           Pt currently taking Anticoagulant therapy? no    Pt currently taking Antiplatelet therapy ? no      Coordination of Care:  1. Have you been to the ER, urgent care clinic since your last visit? Hospitalized since your last visit? no    2. Have you seen or consulted any other health care providers outside of the [facility] since your last visit? Include any pap smears or colon screening. no

## 2025-04-10 LAB — CALPROTECTIN, FECAL: 355 MCG/G

## 2025-04-11 ENCOUNTER — RESULTS FOLLOW-UP (OUTPATIENT)
Facility: CLINIC | Age: 75
End: 2025-04-11

## 2025-04-11 NOTE — RESULT ENCOUNTER NOTE
Charlie Roman, please forward this with the referral to GI. Thanks, KAROLINE Tom, this calprotectin test is very suggestive of an underlying inflammatory bowel disease (such as Crohn's disease). It is very important that you see one of my GI colleagues. Please coordinate with Jessie, if you do not receive a call from their office to schedule a visit. Take good care, KAROLINE

## 2025-04-23 NOTE — PROGRESS NOTES
Pharmacy Progress Note - Diabetes Management    Assessment & Plan      Assessment & Plan  Type 2 diabetes mellitus with diabetic nephropathy, with long-term current use of insulin (HCC)   Per ADA guidelines, Pt's A1c is not at goal of < 7%.  Her glycemic control has significantly improved with the start of Trulicity.  Her prandial fluctuations are not as severe, but still with large post-prandial hyperglycemia on occasion.  This should improve with the planned Trulicity increase to 1.5mg weekly.  Her two bouts of hypoglycemia were from Novolog injections to correct for BG > 250 mg/dL.  Will have her discard her Novolog to avoid further hypoglycemic episodes from its improper use.  Her basal values are not low enough to warrant a decrease in Lantus from 32 units qam with the pending increase in Trulicity dose.  Will reassess with CGM data in 4 weeks.    Orders:    dulaglutide (TRULICITY) 1.5 MG/0.5ML SC injection; Inject 0.5 mLs into the skin every 7 days      Nutrition/Lifestyle Modifications:  - Educated pt on the importance of moderating carbohydrate intake. Reviewed sources of carbohydrates and method to help determine appropriate portion sizes (e.g., Diabetes Plate Method).  - Advised patient to avoid sugar-sweetened beverages and replace with water or diet/zero sugar option.  - Recommend ~30 minutes consistent, moderately intensive, exercise/day or ~150 minutes/week. Start small, stay consistent, and increase length and types of exercise, as tolerated.      Patient will return to clinic in 4 week(s) for follow up.          Subjective      S/O: Ms. Vaishali Hunt, a 74 y.o. female referred by Merissa Kim MD,  has a past medical history of Acquired hypothyroidism, MEGAN (acute kidney injury), Arthritis, Chronic back pain, Chronic kidney disease, Depression, GERD (gastroesophageal reflux disease), Hearing loss, Hiatal hernia, Hypercholesterolemia, Hypertension, Migraines, Stage 3 chronic kidney disease (HCC),

## 2025-04-24 ENCOUNTER — PHARMACY VISIT (OUTPATIENT)
Facility: CLINIC | Age: 75
End: 2025-04-24

## 2025-04-24 DIAGNOSIS — Z79.4 TYPE 2 DIABETES MELLITUS WITH DIABETIC NEPHROPATHY, WITH LONG-TERM CURRENT USE OF INSULIN (HCC): ICD-10-CM

## 2025-04-24 DIAGNOSIS — E11.21 TYPE 2 DIABETES MELLITUS WITH DIABETIC NEPHROPATHY, WITH LONG-TERM CURRENT USE OF INSULIN (HCC): ICD-10-CM

## 2025-04-24 NOTE — PATIENT INSTRUCTIONS
- Continue Lantus 32 units every morning    - Increase Trulicity to 1.5mg weekly    - Throw away Novolog      For any questions, please call [phone] or your PCP office.

## 2025-04-24 NOTE — ASSESSMENT & PLAN NOTE
Per ADA guidelines, Pt's A1c is not at goal of < 7%.  Her glycemic control has significantly improved with the start of Trulicity.  Her prandial fluctuations are not as severe, but still with large post-prandial hyperglycemia on occasion.  This should improve with the planned Trulicity increase to 1.5mg weekly.  Her two bouts of hypoglycemia were from Novolog injections to correct for BG > 250 mg/dL.  Will have her discard her Novolog to avoid further hypoglycemic episodes from its improper use.  Her basal values are not low enough to warrant a decrease in Lantus from 32 units qam with the pending increase in Trulicity dose.  Will reassess with CGM data in 4 weeks.    Orders:    dulaglutide (TRULICITY) 1.5 MG/0.5ML SC injection; Inject 0.5 mLs into the skin every 7 days

## 2025-04-25 ENCOUNTER — OFFICE VISIT (OUTPATIENT)
Facility: CLINIC | Age: 75
End: 2025-04-25
Payer: MEDICARE

## 2025-04-25 VITALS
WEIGHT: 185.4 LBS | BODY MASS INDEX: 32.85 KG/M2 | OXYGEN SATURATION: 95 % | TEMPERATURE: 97.8 F | HEART RATE: 68 BPM | SYSTOLIC BLOOD PRESSURE: 122 MMHG | HEIGHT: 63 IN | DIASTOLIC BLOOD PRESSURE: 54 MMHG

## 2025-04-25 DIAGNOSIS — N39.41 URGE INCONTINENCE OF URINE: Primary | ICD-10-CM

## 2025-04-25 DIAGNOSIS — N95.2 ATROPHIC VULVOVAGINITIS: ICD-10-CM

## 2025-04-25 DIAGNOSIS — K52.9 CHRONIC DIARRHEA: ICD-10-CM

## 2025-04-25 DIAGNOSIS — T74.21XD CONFIRMED VICTIM OF SEXUAL ABUSE IN ADULTHOOD, SUBSEQUENT ENCOUNTER: ICD-10-CM

## 2025-04-25 PROCEDURE — 1160F RVW MEDS BY RX/DR IN RCRD: CPT | Performed by: FAMILY MEDICINE

## 2025-04-25 PROCEDURE — 1159F MED LIST DOCD IN RCRD: CPT | Performed by: FAMILY MEDICINE

## 2025-04-25 PROCEDURE — 99214 OFFICE O/P EST MOD 30 MIN: CPT | Performed by: FAMILY MEDICINE

## 2025-04-25 PROCEDURE — G2211 COMPLEX E/M VISIT ADD ON: HCPCS | Performed by: FAMILY MEDICINE

## 2025-04-25 PROCEDURE — 3074F SYST BP LT 130 MM HG: CPT | Performed by: FAMILY MEDICINE

## 2025-04-25 PROCEDURE — 3078F DIAST BP <80 MM HG: CPT | Performed by: FAMILY MEDICINE

## 2025-04-25 PROCEDURE — 1123F ACP DISCUSS/DSCN MKR DOCD: CPT | Performed by: FAMILY MEDICINE

## 2025-04-25 RX ORDER — ESTRADIOL 0.1 MG/G
CREAM VAGINAL
Qty: 42.5 G | Refills: 2 | Status: SHIPPED | OUTPATIENT
Start: 2025-04-25 | End: 2025-05-23
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
## 2025-05-09 DIAGNOSIS — Z51.81 ENCOUNTER FOR MEDICATION MONITORING: ICD-10-CM

## 2025-05-09 DIAGNOSIS — Z79.4 TYPE 2 DIABETES MELLITUS WITH DIABETIC NEPHROPATHY, WITH LONG-TERM CURRENT USE OF INSULIN (HCC): ICD-10-CM

## 2025-05-09 DIAGNOSIS — N18.32 STAGE 3B CHRONIC KIDNEY DISEASE (CKD) (HCC): ICD-10-CM

## 2025-05-09 DIAGNOSIS — N18.32 CHRONIC KIDNEY DISEASE, STAGE 3B (HCC): ICD-10-CM

## 2025-05-09 DIAGNOSIS — R68.89 OTHER GENERAL SYMPTOMS AND SIGNS: ICD-10-CM

## 2025-05-09 DIAGNOSIS — E03.9 ACQUIRED HYPOTHYROIDISM: ICD-10-CM

## 2025-05-09 DIAGNOSIS — E11.21 TYPE 2 DIABETES MELLITUS WITH DIABETIC NEPHROPATHY, WITH LONG-TERM CURRENT USE OF INSULIN (HCC): ICD-10-CM

## 2025-05-09 DIAGNOSIS — E55.9 VITAMIN D DEFICIENCY: ICD-10-CM

## 2025-05-09 DIAGNOSIS — D50.0 IRON DEFICIENCY ANEMIA DUE TO CHRONIC BLOOD LOSS: Primary | ICD-10-CM

## 2025-05-09 RX ORDER — FERROUS SULFATE 325(65) MG
1 TABLET ORAL
Qty: 90 TABLET | Refills: 0 | Status: SHIPPED | OUTPATIENT
Start: 2025-05-09

## 2025-05-09 NOTE — TELEPHONE ENCOUNTER
Please plan OV approx July-Aug, labs prior.   Thanks, KJS     Iron deficiency anemia due to chronic blood loss  -     SV IRON 325 (65 Fe) MG tablet; Take 1 tablet by mouth once daily with breakfast, Disp-90 tablet, R-0Normal  -     Ferritin; Future  -     Iron and TIBC; Future  -     CBC with Auto Differential; Future  Acquired hypothyroidism  -     TSH; Future  Vitamin D deficiency  -     Vitamin D 25 Hydroxy; Future  Encounter for medication monitoring  -     Magnesium; Future  Other general symptoms and signs  -     Vitamin B12; Future  Type 2 diabetes mellitus with diabetic nephropathy, with long-term current use of insulin (HCC)  -     Hemoglobin A1C; Future  -     Basic Metabolic Panel; Future  -     Albumin/Creatinine Ratio, Urine; Future  Stage 3b chronic kidney disease (CKD) ([facility])  -     Albumin/Creatinine Ratio, Urine; Future  Chronic kidney disease, stage 3b ([facility])  -     Magnesium; Future    Merissa Kim MD

## 2025-05-21 NOTE — ASSESSMENT & PLAN NOTE
Per ADA guidelines, Pt's A1c is not at goal of < 7%.  Pt's glycemic control has improved since the prior visit.  Her AGP report from the past 14 days shows time in target range 95%, average glucose 125 mg/dL, and GMI 6.3%.  She is having some nocturnal hypoglycemia or remaining at the LLN every night.  Will decrease her Lantus to 28 units qhs to bring up her baseline to prevent this hypoglycemia.  She continues to have some post-prandial elevations and would benefit from a dose increase of Trulicity for both improved glycemic control and weight loss.  Will increase her Trulicity to 3mg weekly.  When she increases her dose of Trulicity, will have her decrease her Lantus further to 25 units qhs to prevent hypoglycemia as noted above.  Will reassess with CGM data in 6 weeks.    Orders:    LANTUS SOLOSTAR 100 UNIT/ML injection pen; Inject 25 Units into the skin every morning    Dulaglutide 3 MG/0.5ML SOAJ; Inject 3 mg into the skin every 7 days

## 2025-05-21 NOTE — PROGRESS NOTES
Department Center   7/3/2025  1:00 PM Segundo Downing, [facility]   10/9/2025  9:40 AM Gopal Wolf MD [facility] BS AMB       Patient verbalized understanding of the information presented and all of the patient’s questions were answered.  AVS was handed to the patient. Patient advised to call the office with any additional questions or concerns.    Notifications of recommendations will be sent to Merissa Kim MD for review.      Thank you for the consult,  Segundo Downing, PharmD, BCACP, BC-[facility]        For Pharmacy Admin Tracking Only    Program: Medical Group  CPA in place:  Yes  Recommendation Provided To: Patient/Caregiver: 5 via In person  Intervention Detail: Adherence Monitorin, Dose Adjustment: 2, reason: Therapy Optimization, New Rx: 1, reason: Needs Additional Therapy, and Scheduled Appointment  Intervention Accepted By: Patient/Caregiver: 5  Gap Closed?: No   Time Spent (min): 30

## 2025-05-22 ENCOUNTER — PHARMACY VISIT (OUTPATIENT)
Facility: CLINIC | Age: 75
End: 2025-05-22

## 2025-05-22 DIAGNOSIS — Z79.4 TYPE 2 DIABETES MELLITUS WITH DIABETIC NEPHROPATHY, WITH LONG-TERM CURRENT USE OF INSULIN (HCC): Primary | ICD-10-CM

## 2025-05-22 DIAGNOSIS — E11.21 TYPE 2 DIABETES MELLITUS WITH DIABETIC NEPHROPATHY, WITH LONG-TERM CURRENT USE OF INSULIN (HCC): Primary | ICD-10-CM

## 2025-05-22 RX ORDER — INSULIN GLARGINE 100 [IU]/ML
25 INJECTION, SOLUTION SUBCUTANEOUS EVERY MORNING
Qty: 15 ML | Refills: 0 | Status: SHIPPED | OUTPATIENT
Start: 2025-05-22

## 2025-05-22 NOTE — PATIENT INSTRUCTIONS
- While still on the Trulicity 1.5mg dose, Decrease Lantus to 28 units every night    - When you increase to Trulicity 3mg dose, Decrease Lantus to 25 units every night

## 2025-05-27 DIAGNOSIS — E03.9 ACQUIRED HYPOTHYROIDISM: ICD-10-CM

## 2025-05-27 RX ORDER — LEVOTHYROXINE SODIUM 50 UG/1
TABLET ORAL
Qty: 90 TABLET | Refills: 0 | Status: SHIPPED | OUTPATIENT
Start: 2025-05-27

## 2025-05-27 NOTE — TELEPHONE ENCOUNTER
Last OV 4/25/25 for urinary incontinence. Medication was sent in last on 6/11/24 90 with 3 refills.

## 2025-06-17 ENCOUNTER — TRANSCRIBE ORDERS (OUTPATIENT)
Facility: HOSPITAL | Age: 75
End: 2025-06-17

## 2025-06-17 DIAGNOSIS — Z12.31 ENCOUNTER FOR SCREENING MAMMOGRAM FOR MALIGNANT NEOPLASM OF BREAST: Primary | ICD-10-CM

## 2025-06-27 DIAGNOSIS — I10 ESSENTIAL (PRIMARY) HYPERTENSION: ICD-10-CM

## 2025-06-27 DIAGNOSIS — N18.32 CHRONIC KIDNEY DISEASE, STAGE 3B (HCC): ICD-10-CM

## 2025-06-27 NOTE — TELEPHONE ENCOUNTER
Last OV: 04/25/2025  Next OV: 07/11/2025  Last refill: 04/03/2025    Refill request routed to Dr. Kim for review.

## 2025-06-30 NOTE — PROGRESS NOTES
Pharmacy Progress Note - Diabetes Management    Assessment & Plan      Assessment & Plan  Type 2 diabetes mellitus with diabetic nephropathy, with long-term current use of insulin (HCC)   Per ADA guidelines, Pt's A1c is not at goal of < 7%.  Pt's AGP report from the past 14 days shows time in target range 95%, average glucose 121 mg/dL, and GMI 6.2%.  Pt's basal values are adequate. However, she is rarely having nocturnal hypoglycemia despite the dose decrease of Lantus.  Will further decrease Lantus to 22 units qam to bring up baseline to prevent hypoglycemia.  Will maintain her current dose of Trulicity given her adequate prandial control.  Will reassess with CGM data in 6 weeks.      Orders:    LANTUS SOLOSTAR 100 UNIT/ML injection pen; Inject 22 Units into the skin every morning    Dulaglutide 3 MG/0.5ML SOAJ; Inject 3 mg into the skin every 7 days      Nutrition/Lifestyle Modifications:  - Educated pt on the importance of moderating carbohydrate intake. Reviewed sources of carbohydrates and method to help determine appropriate portion sizes (e.g., Diabetes Plate Method).  - Advised patient to avoid sugar-sweetened beverages and replace with water or diet/zero sugar option.  - Recommend ~30 minutes consistent, moderately intensive, exercise/day or ~150 minutes/week. Start small, stay consistent, and increase length and types of exercise, as tolerated.      Patient will return to clinic in 6 week(s) for follow up.          Subjective      S/O: Ms. Vaishali Hunt, a 74 y.o. female referred by Merissa Kim MD,  has a past medical history of Acquired hypothyroidism, MEGAN (acute kidney injury), Arthritis, Chronic back pain, Chronic kidney disease, Depression, GERD (gastroesophageal reflux disease), Hearing loss, Hiatal hernia, Hypercholesterolemia, Hypertension, Migraines, Stage 3 chronic kidney disease (HCC), Thyroid disease, and Type 2 diabetes mellitus without complication (HCC).  Pt was seen today for diabetes

## 2025-06-30 NOTE — ASSESSMENT & PLAN NOTE
Per ADA guidelines, Pt's A1c is not at goal of < 7%.  Pt's AGP report from the past 14 days shows time in target range 95%, average glucose 121 mg/dL, and GMI 6.2%.  Pt's basal values are adequate. However, she is rarely having nocturnal hypoglycemia despite the dose decrease of Lantus.  Will further decrease Lantus to 22 units qam to bring up baseline to prevent hypoglycemia.  Will maintain her current dose of Trulicity given her adequate prandial control.  Will reassess with CGM data in 6 weeks.      Orders:    LANTUS SOLOSTAR 100 UNIT/ML injection pen; Inject 22 Units into the skin every morning    Dulaglutide 3 MG/0.5ML SOAJ; Inject 3 mg into the skin every 7 days

## 2025-07-03 ENCOUNTER — PHARMACY VISIT (OUTPATIENT)
Facility: CLINIC | Age: 75
End: 2025-07-03

## 2025-07-03 DIAGNOSIS — E11.21 TYPE 2 DIABETES MELLITUS WITH DIABETIC NEPHROPATHY, WITH LONG-TERM CURRENT USE OF INSULIN (HCC): Primary | ICD-10-CM

## 2025-07-03 DIAGNOSIS — Z79.4 TYPE 2 DIABETES MELLITUS WITH DIABETIC NEPHROPATHY, WITH LONG-TERM CURRENT USE OF INSULIN (HCC): Primary | ICD-10-CM

## 2025-07-03 RX ORDER — INSULIN GLARGINE 100 [IU]/ML
22 INJECTION, SOLUTION SUBCUTANEOUS EVERY MORNING
Qty: 15 ML | Refills: 0 | Status: SHIPPED | OUTPATIENT
Start: 2025-07-03

## 2025-07-08 RX ORDER — FUROSEMIDE 40 MG/1
40 TABLET ORAL DAILY
Qty: 30 TABLET | Refills: 0 | Status: SHIPPED | OUTPATIENT
Start: 2025-07-08

## 2025-07-11 ENCOUNTER — OFFICE VISIT (OUTPATIENT)
Facility: CLINIC | Age: 75
End: 2025-07-11
Payer: MEDICARE

## 2025-07-11 VITALS
DIASTOLIC BLOOD PRESSURE: 64 MMHG | WEIGHT: 178.8 LBS | OXYGEN SATURATION: 98 % | HEIGHT: 63 IN | SYSTOLIC BLOOD PRESSURE: 134 MMHG | BODY MASS INDEX: 31.68 KG/M2 | TEMPERATURE: 97.4 F | HEART RATE: 77 BPM

## 2025-07-11 DIAGNOSIS — N95.2 ATROPHIC VULVOVAGINITIS: Primary | ICD-10-CM

## 2025-07-11 PROBLEM — B02.9 HERPES ZOSTER WITHOUT COMPLICATION: Status: RESOLVED | Noted: 2025-01-17 | Resolved: 2025-07-11

## 2025-07-11 PROCEDURE — G2211 COMPLEX E/M VISIT ADD ON: HCPCS | Performed by: FAMILY MEDICINE

## 2025-07-11 PROCEDURE — 3075F SYST BP GE 130 - 139MM HG: CPT | Performed by: FAMILY MEDICINE

## 2025-07-11 PROCEDURE — 1160F RVW MEDS BY RX/DR IN RCRD: CPT | Performed by: FAMILY MEDICINE

## 2025-07-11 PROCEDURE — 99214 OFFICE O/P EST MOD 30 MIN: CPT | Performed by: FAMILY MEDICINE

## 2025-07-11 PROCEDURE — 1123F ACP DISCUSS/DSCN MKR DOCD: CPT | Performed by: FAMILY MEDICINE

## 2025-07-11 PROCEDURE — 1159F MED LIST DOCD IN RCRD: CPT | Performed by: FAMILY MEDICINE

## 2025-07-11 PROCEDURE — 3078F DIAST BP <80 MM HG: CPT | Performed by: FAMILY MEDICINE

## 2025-07-11 RX ORDER — ESTRADIOL 10 UG/1
10 TABLET, FILM COATED VAGINAL
Qty: 30 TABLET | Refills: 3 | Status: SHIPPED | OUTPATIENT
Start: 2025-07-14

## 2025-07-11 NOTE — ASSESSMENT & PLAN NOTE
Improved but uncontrolled. Dislikes cream formulation  - estradiol (Vagifem) 10 mcg vaginal tablet twice weekly  - follow up on referral for PFPT  - return for next steps if not controlled within 3 months, otherwise 1 year

## 2025-07-11 NOTE — PROGRESS NOTES
Chief Complaint   Patient presents with    Incontinence     Patent here today to discuss possibly being prescribed the Estridae patch instead of the cream.        Have you been to the ER, urgent care clinic since your last visit?  Hospitalized since your last visit?   NO    Have you seen or consulted any other health care providers outside our system since your last visit?   NO

## 2025-07-11 NOTE — PROGRESS NOTES
Vaishali Hunt (: 1950) is a 74 y.o. female, established patient, here for:    ASSESSMENT/PLAN:  Atrophic vulvovaginitis  Assessment & Plan:  Improved but uncontrolled. Dislikes cream formulation  - estradiol (Vagifem) 10 mcg vaginal tablet twice weekly  - follow up on referral for PFPT  - return for next steps if not controlled within 3 months, otherwise 1 year   Orders:  -     Estradiol (VAGIFEM) 10 MCG TABS vaginal tablet; Place 1 tablet vaginally Twice a Week, Disp-30 tablet, R-3Normal        Follow-up and Dispositions    Return in about 6 weeks (around 2025) for chronic medical conditions, labs prior, (30).          Subjective   SUBJECTIVE/OBJECTIVE:    Chief Complaint   Patient presents with    Incontinence     Patent here today to discuss possibly being prescribed the Estridae patch instead of the cream.       History of Present Illness  The patient presents for urinary incontinence.    She has been using estradiol cream nightly for 2 weeks, followed by a twice-weekly regimen for nearly 3 months. She reports a slight improvement in her urinary control, except during instances of urgency or when she is near her home bathroom. She has not yet started pelvic floor physical therapy and is unsure if she has been contacted regarding the referral. She expresses a desire to switch from the estradiol cream to a patch due to discomfort similar to a yeast infection.    She mentions that the nurse practitioner at the gastroenterologist's office conducted extensive blood work. She was informed that she could be carrying around 5 to 8 pounds of fecal matter. She has an appointment with Dr. Hernandez on 2025 and will see him a week later. She has started to lose some weight.                 4/3/2025     3:11 PM   PHQ-9    Little interest or pleasure in doing things 0   Feeling down, depressed, or hopeless 0   PHQ-2 Score 0   PHQ-9 Total Score 0          Objective     /64 (BP Site: Left Upper Arm,

## 2025-07-15 ENCOUNTER — RESULTS FOLLOW-UP (OUTPATIENT)
Facility: CLINIC | Age: 75
End: 2025-07-15

## 2025-07-15 ENCOUNTER — HOSPITAL ENCOUNTER (OUTPATIENT)
Facility: HOSPITAL | Age: 75
Discharge: HOME | End: 2025-07-18
Attending: FAMILY MEDICINE
Payer: MEDICARE

## 2025-07-15 VITALS — BODY MASS INDEX: 31.68 KG/M2 | HEIGHT: 63 IN

## 2025-07-15 DIAGNOSIS — R92.2 INCONCLUSIVE MAMMOGRAM: Primary | ICD-10-CM

## 2025-07-15 DIAGNOSIS — Z12.31 ENCOUNTER FOR SCREENING MAMMOGRAM FOR MALIGNANT NEOPLASM OF BREAST: ICD-10-CM

## 2025-07-15 PROCEDURE — 77063 BREAST TOMOSYNTHESIS BI: CPT

## 2025-08-02 DIAGNOSIS — I10 ESSENTIAL (PRIMARY) HYPERTENSION: ICD-10-CM

## 2025-08-02 DIAGNOSIS — N18.32 CHRONIC KIDNEY DISEASE, STAGE 3B (HCC): ICD-10-CM

## 2025-08-04 RX ORDER — DILTIAZEM HYDROCHLORIDE 240 MG/1
240 CAPSULE, EXTENDED RELEASE ORAL DAILY
Qty: 90 CAPSULE | Refills: 3 | Status: SHIPPED | OUTPATIENT
Start: 2025-08-04

## 2025-08-05 RX ORDER — FUROSEMIDE 40 MG/1
40 TABLET ORAL DAILY
Qty: 30 TABLET | Refills: 0 | Status: SHIPPED | OUTPATIENT
Start: 2025-08-05

## 2025-08-09 DIAGNOSIS — E03.9 ACQUIRED HYPOTHYROIDISM: ICD-10-CM

## 2025-08-09 DIAGNOSIS — D50.0 IRON DEFICIENCY ANEMIA DUE TO CHRONIC BLOOD LOSS: ICD-10-CM

## 2025-08-09 DIAGNOSIS — R68.89 OTHER GENERAL SYMPTOMS AND SIGNS: ICD-10-CM

## 2025-08-11 DIAGNOSIS — D50.0 IRON DEFICIENCY ANEMIA DUE TO CHRONIC BLOOD LOSS: ICD-10-CM

## 2025-08-12 LAB
ALBUMIN/CREATININE RATIO, EXTERNAL: 86.6
HBA1C MFR BLD HPLC: 6.1 %

## 2025-08-12 RX ORDER — FERROUS SULFATE 325(65) MG
1 TABLET ORAL
Qty: 90 TABLET | Refills: 0 | Status: SHIPPED | OUTPATIENT
Start: 2025-08-12

## 2025-08-14 ENCOUNTER — PHARMACY VISIT (OUTPATIENT)
Facility: CLINIC | Age: 75
End: 2025-08-14

## 2025-08-14 DIAGNOSIS — E11.21 TYPE 2 DIABETES MELLITUS WITH DIABETIC NEPHROPATHY, WITH LONG-TERM CURRENT USE OF INSULIN (HCC): Primary | ICD-10-CM

## 2025-08-14 DIAGNOSIS — Z79.4 TYPE 2 DIABETES MELLITUS WITH DIABETIC NEPHROPATHY, WITH LONG-TERM CURRENT USE OF INSULIN (HCC): Primary | ICD-10-CM

## 2025-08-18 ENCOUNTER — CLINICAL SUPPORT (OUTPATIENT)
Facility: CLINIC | Age: 75
End: 2025-08-18
Payer: MEDICARE

## 2025-08-18 DIAGNOSIS — E11.21 TYPE 2 DIABETES MELLITUS WITH DIABETIC NEPHROPATHY, WITH LONG-TERM CURRENT USE OF INSULIN (HCC): ICD-10-CM

## 2025-08-18 DIAGNOSIS — Z79.4 TYPE 2 DIABETES MELLITUS WITH DIABETIC NEPHROPATHY, WITH LONG-TERM CURRENT USE OF INSULIN (HCC): ICD-10-CM

## 2025-08-18 DIAGNOSIS — Z51.81 ENCOUNTER FOR MEDICATION MONITORING: ICD-10-CM

## 2025-08-18 DIAGNOSIS — N18.32 CHRONIC KIDNEY DISEASE, STAGE 3B (HCC): ICD-10-CM

## 2025-08-18 DIAGNOSIS — Z01.89 ENCOUNTER FOR LABORATORY TEST: ICD-10-CM

## 2025-08-18 DIAGNOSIS — D50.0 IRON DEFICIENCY ANEMIA DUE TO CHRONIC BLOOD LOSS: Primary | ICD-10-CM

## 2025-08-18 PROCEDURE — 36415 COLL VENOUS BLD VENIPUNCTURE: CPT | Performed by: FAMILY MEDICINE

## 2025-08-19 LAB
ANION GAP SERPL CALCULATED.3IONS-SCNC: 13 MMOL/L (ref 3–15)
BASOPHILS # BLD: 1 % (ref 0–2)
BASOPHILS ABSOLUTE: 0.1 K/UL (ref 0–0.2)
BUN BLDV-MCNC: 25 MG/DL (ref 6–22)
CALCIUM SERPL-MCNC: 9.3 MG/DL (ref 8.4–10.5)
CHLORIDE BLD-SCNC: 106 MMOL/L (ref 98–110)
CO2: 25 MMOL/L (ref 20–32)
CREAT SERPL-MCNC: 1.6 MG/DL (ref 0.8–1.4)
EOSINOPHIL # BLD: 1 % (ref 0–6)
EOSINOPHILS ABSOLUTE: 0.1 K/UL (ref 0–0.5)
FERRITIN: 86 NG/ML (ref 10–291)
GFR, ESTIMATED: 32.7 ML/MIN/1.73 SQ.M.
GLUCOSE: 90 MG/DL (ref 70–99)
HCT VFR BLD CALC: 44.8 % (ref 35.1–48.3)
HEMOGLOBIN: 14.2 G/DL (ref 11.7–16.1)
IRON % SATURATION: 24 % (ref 20–50)
IRON: 68 MCG/DL (ref 30–160)
LYMPHOCYTES # BLD: 23 % (ref 20–45)
LYMPHOCYTES ABSOLUTE: 1.8 K/UL (ref 1–4.8)
MAGNESIUM: 2.3 MG/DL (ref 1.6–2.5)
MCH RBC QN AUTO: 30 PG (ref 26–34)
MCHC RBC AUTO-ENTMCNC: 32 G/DL (ref 31–36)
MCV RBC AUTO: 94 FL (ref 80–99)
MONOCYTES ABSOLUTE: 0.4 K/UL (ref 0.1–1)
MONOCYTES: 6 % (ref 3–12)
NEUTROPHILS ABSOLUTE: 5.3 K/UL (ref 1.8–7.7)
NEUTROPHILS SEGMENTED: 69 % (ref 40–75)
PDW BLD-RTO: 12.4 % (ref 10–15.5)
PLATELET # BLD: 283 K/UL (ref 140–440)
PMV BLD AUTO: 12.1 FL (ref 9–13)
POTASSIUM SERPL-SCNC: 4.5 MMOL/L (ref 3.5–5.5)
RBC # BLD: 4.76 M/UL (ref 3.8–5.2)
SODIUM BLD-SCNC: 144 MMOL/L (ref 133–145)
TOTAL IRON BINDING CAPACITY: 281 MCG/DL (ref 228–428)
UNSATURATED IRON BINDING CAPACITY: 213 MCG/DL (ref 110–370)
WBC # BLD: 7.7 K/UL (ref 4–11)

## 2025-08-26 ENCOUNTER — OFFICE VISIT (OUTPATIENT)
Facility: CLINIC | Age: 75
End: 2025-08-26
Payer: MEDICARE

## 2025-08-26 VITALS
SYSTOLIC BLOOD PRESSURE: 128 MMHG | TEMPERATURE: 97.6 F | BODY MASS INDEX: 29.81 KG/M2 | DIASTOLIC BLOOD PRESSURE: 60 MMHG | WEIGHT: 174.6 LBS | HEART RATE: 69 BPM | HEIGHT: 64 IN | OXYGEN SATURATION: 97 %

## 2025-08-26 DIAGNOSIS — E03.9 ACQUIRED HYPOTHYROIDISM: ICD-10-CM

## 2025-08-26 DIAGNOSIS — Z79.4 TYPE 2 DIABETES MELLITUS WITH DIABETIC NEPHROPATHY, WITH LONG-TERM CURRENT USE OF INSULIN (HCC): Primary | ICD-10-CM

## 2025-08-26 DIAGNOSIS — E55.9 VITAMIN D DEFICIENCY: ICD-10-CM

## 2025-08-26 DIAGNOSIS — R68.89 OTHER GENERAL SYMPTOMS AND SIGNS: ICD-10-CM

## 2025-08-26 DIAGNOSIS — D50.0 IRON DEFICIENCY ANEMIA DUE TO CHRONIC BLOOD LOSS: ICD-10-CM

## 2025-08-26 DIAGNOSIS — E78.5 HYPERLIPIDEMIA, UNSPECIFIED HYPERLIPIDEMIA TYPE: ICD-10-CM

## 2025-08-26 DIAGNOSIS — E11.21 TYPE 2 DIABETES MELLITUS WITH DIABETIC NEPHROPATHY, WITH LONG-TERM CURRENT USE OF INSULIN (HCC): Primary | ICD-10-CM

## 2025-08-26 DIAGNOSIS — I10 PRIMARY HYPERTENSION: ICD-10-CM

## 2025-08-26 DIAGNOSIS — N18.32 STAGE 3B CHRONIC KIDNEY DISEASE (CKD) (HCC): ICD-10-CM

## 2025-08-26 PROBLEM — E11.65 TYPE 2 DIABETES MELLITUS WITH HYPERGLYCEMIA (HCC): Status: RESOLVED | Noted: 2025-01-31 | Resolved: 2025-08-26

## 2025-08-26 PROCEDURE — 1123F ACP DISCUSS/DSCN MKR DOCD: CPT | Performed by: FAMILY MEDICINE

## 2025-08-26 PROCEDURE — 3074F SYST BP LT 130 MM HG: CPT | Performed by: FAMILY MEDICINE

## 2025-08-26 PROCEDURE — 99214 OFFICE O/P EST MOD 30 MIN: CPT | Performed by: FAMILY MEDICINE

## 2025-08-26 PROCEDURE — 3044F HG A1C LEVEL LT 7.0%: CPT | Performed by: FAMILY MEDICINE

## 2025-08-26 PROCEDURE — 3078F DIAST BP <80 MM HG: CPT | Performed by: FAMILY MEDICINE

## 2025-08-26 PROCEDURE — 1159F MED LIST DOCD IN RCRD: CPT | Performed by: FAMILY MEDICINE

## 2025-08-26 PROCEDURE — 1160F RVW MEDS BY RX/DR IN RCRD: CPT | Performed by: FAMILY MEDICINE

## 2025-08-26 RX ORDER — ROSUVASTATIN CALCIUM 20 MG/1
20 TABLET, COATED ORAL NIGHTLY
Qty: 90 TABLET | Refills: 3 | Status: SHIPPED | OUTPATIENT
Start: 2025-08-26

## 2025-08-26 RX ORDER — FUROSEMIDE 40 MG/1
20 TABLET ORAL EVERY OTHER DAY
COMMUNITY
Start: 2025-08-26

## 2025-08-26 RX ORDER — FERROUS SULFATE 325(65) MG
1 TABLET ORAL
Qty: 90 TABLET | Refills: 3 | Status: SHIPPED | OUTPATIENT
Start: 2025-08-26

## 2025-08-27 ENCOUNTER — RESULTS FOLLOW-UP (OUTPATIENT)
Facility: CLINIC | Age: 75
End: 2025-08-27

## 2025-08-27 DIAGNOSIS — E55.9 VITAMIN D DEFICIENCY: ICD-10-CM

## 2025-08-27 DIAGNOSIS — Z79.4 TYPE 2 DIABETES MELLITUS WITH DIABETIC NEPHROPATHY, WITH LONG-TERM CURRENT USE OF INSULIN (HCC): Primary | ICD-10-CM

## 2025-08-27 DIAGNOSIS — E03.9 ACQUIRED HYPOTHYROIDISM: ICD-10-CM

## 2025-08-27 DIAGNOSIS — E11.21 TYPE 2 DIABETES MELLITUS WITH DIABETIC NEPHROPATHY, WITH LONG-TERM CURRENT USE OF INSULIN (HCC): Primary | ICD-10-CM

## 2025-08-27 LAB
TSH SERPL DL<=0.05 MIU/L-ACNC: 1.34 MCU/ML (ref 0.27–4.2)
VITAMIN B-12: 465 PG/ML (ref 211–911)

## 2025-08-27 RX ORDER — LEVOTHYROXINE SODIUM 50 UG/1
50 TABLET ORAL DAILY
Qty: 90 TABLET | Refills: 3 | Status: SHIPPED | OUTPATIENT
Start: 2025-08-27

## 2025-08-28 ENCOUNTER — HOSPITAL ENCOUNTER (OUTPATIENT)
Facility: HOSPITAL | Age: 75
Discharge: HOME | End: 2025-08-31
Attending: FAMILY MEDICINE
Payer: MEDICARE

## 2025-08-28 DIAGNOSIS — R92.2 INCONCLUSIVE MAMMOGRAM: ICD-10-CM

## 2025-08-28 PROCEDURE — G0279 TOMOSYNTHESIS, MAMMO: HCPCS

## (undated) DEVICE — Device

## (undated) DEVICE — TIP COVER ACCESSORY

## (undated) DEVICE — TISSUE RETRIEVAL SYSTEM: Brand: INZII RETRIEVAL SYSTEM

## (undated) DEVICE — DRAPE TOWEL: Brand: CONVERTORS

## (undated) DEVICE — SYRINGE MED 30ML STD CLR PLAS LUERLOCK TIP N CTRL DISP

## (undated) DEVICE — TROCAR: Brand: KII SHIELDED BLADED ACCESS SYSTEM

## (undated) DEVICE — ARM DRAPE

## (undated) DEVICE — SUTURE SZ 0 27IN 5/8 CIR UR-6  TAPER PT VIOLET ABSRB VICRYL J603H

## (undated) DEVICE — NEEDLE SYR 18GA L1.5IN RED PLAS HUB S STL BLNT FILL W/O

## (undated) DEVICE — Z DISCONTINUED NO SUB SOLUTION IRRIGATION SODIUM CHL 0.9% 100 ML BTL

## (undated) DEVICE — STERILE POLYISOPRENE POWDER-FREE SURGICAL GLOVES: Brand: PROTEXIS

## (undated) DEVICE — ADHESIVE SKIN CLSR 0.7ML TOP DERMBND ADV

## (undated) DEVICE — APPLICATOR MEDICATED 26 CC SOLUTION HI LT ORNG CHLORAPREP

## (undated) DEVICE — SEAL

## (undated) DEVICE — SYRINGE MED 10ML LUERLOCK TIP W/O SFTY DISP

## (undated) DEVICE — COLUMN DRAPE

## (undated) DEVICE — SUTURE MCRYL SZ 4-0 L27IN ABSRB UD L24MM PS-1 3/8 CIR PRIM Y935H

## (undated) DEVICE — BLADELESS OBTURATOR: Brand: WECK VISTA

## (undated) DEVICE — ELECTRODE PT RET AD L9FT HI MOIST COND ADH HYDRGEL CORDED